# Patient Record
Sex: FEMALE | Race: WHITE | Employment: FULL TIME | URBAN - METROPOLITAN AREA
[De-identification: names, ages, dates, MRNs, and addresses within clinical notes are randomized per-mention and may not be internally consistent; named-entity substitution may affect disease eponyms.]

---

## 2017-02-20 ENCOUNTER — OFFICE VISIT (OUTPATIENT)
Dept: FAMILY MEDICINE | Facility: CLINIC | Age: 32
End: 2017-02-20
Payer: COMMERCIAL

## 2017-02-20 VITALS
SYSTOLIC BLOOD PRESSURE: 110 MMHG | RESPIRATION RATE: 14 BRPM | BODY MASS INDEX: 22.02 KG/M2 | DIASTOLIC BLOOD PRESSURE: 70 MMHG | WEIGHT: 137 LBS | HEART RATE: 72 BPM | TEMPERATURE: 98 F | HEIGHT: 66 IN

## 2017-02-20 DIAGNOSIS — Z30.013 ENCOUNTER FOR INITIAL PRESCRIPTION OF INJECTABLE CONTRACEPTIVE: Primary | ICD-10-CM

## 2017-02-20 LAB — BETA HCG QUAL IFA URINE: NEGATIVE

## 2017-02-20 PROCEDURE — 99213 OFFICE O/P EST LOW 20 MIN: CPT | Performed by: PHYSICIAN ASSISTANT

## 2017-02-20 PROCEDURE — 84703 CHORIONIC GONADOTROPIN ASSAY: CPT | Performed by: PHYSICIAN ASSISTANT

## 2017-02-20 RX ORDER — MEDROXYPROGESTERONE ACETATE 150 MG/ML
150 INJECTION, SUSPENSION INTRAMUSCULAR
Qty: 1 ML | Refills: 3 | OUTPATIENT
Start: 2017-02-20 | End: 2018-02-20

## 2017-02-20 NOTE — PROGRESS NOTES
Initial Depo Injection     Is the patient within 1st 5 days of a normal period?   N/A  Is the patient post delivery ?      No  If yes, is she breastfeeding?  No  If yes breastfeeding, shot given within 6 weeks after delivery?    N/A.  If no breastfeeding, shot given within 5 days of delivery?  N/A    BP Readings from Last 1 Encounters:   02/20/17 110/70     Patient's last menstrual period was 02/05/2017.    Date range to return is given to patient for her next dose: May 4-22nd     See Medication Note for administration information    Staff Sig: Cindy Clinton CMA

## 2017-02-20 NOTE — PATIENT INSTRUCTIONS
Patient Education    Medroxyprogesterone Acetate Oral tablet    Medroxyprogesterone Acetate Suspension for injection [Contraception]    Medroxyprogesterone Acetate Suspension for injection [Malignancy]  Medroxyprogesterone Acetate Suspension for injection [Contraception]  What is this medicine?  MEDROXYPROGESTERONE (me DROX ee proe LAVERNE te sukhdeep) contraceptive injections prevent pregnancy. They provide effective birth control for 3 months. Depo-subQ Provera 104 is also used for treating pain related to endometriosis.  This medicine may be used for other purposes; ask your health care provider or pharmacist if you have questions.  What should I tell my health care provider before I take this medicine?  They need to know if you have any of these conditions:    frequently drink alcohol    asthma    blood vessel disease or a history of a blood clot in the lungs or legs    bone disease such as osteoporosis    breast cancer    diabetes    eating disorder (anorexia nervosa or bulimia)    high blood pressure    HIV infection or AIDS    kidney disease    liver disease    mental depression    migraine    seizures (convulsions)    stroke    tobacco smoker    vaginal bleeding    an unusual or allergic reaction to medroxyprogesterone, other hormones, medicines, foods, dyes, or preservatives    pregnant or trying to get pregnant    breast-feeding  How should I use this medicine?  Depo-Provera Contraceptive injection is given into a muscle. Depo-subQ Provera 104 injection is given under the skin. These injections are given by a health care professional. You must not be pregnant before getting an injection. The injection is usually given during the first 5 days after the start of a menstrual period or 6 weeks after delivery of a baby.  Talk to your pediatrician regarding the use of this medicine in children. Special care may be needed. These injections have been used in female children who have started having menstrual  periods.  Overdosage: If you think you have taken too much of this medicine contact a poison control center or emergency room at once.  NOTE: This medicine is only for you. Do not share this medicine with others.  What if I miss a dose?  Try not to miss a dose. You must get an injection once every 3 months to maintain birth control. If you cannot keep an appointment, call and reschedule it. If you wait longer than 13 weeks between Depo-Provera contraceptive injections or longer than 14 weeks between Depo-subQ Provera 104 injections, you could get pregnant. Use another method for birth control if you miss your appointment. You may also need a pregnancy test before receiving another injection.  What may interact with this medicine?  Do not take this medicine with any of the following medications:    bosentan  This medicine may also interact with the following medications:    aminoglutethimide    antibiotics or medicines for infections, especially rifampin, rifabutin, rifapentine, and griseofulvin    aprepitant    barbiturate medicines such as phenobarbital or primidone    bexarotene    carbamazepine    medicines for seizures like ethotoin, felbamate, oxcarbazepine, phenytoin, topiramate    modafinil    [place]'s wort  This list may not describe all possible interactions. Give your health care provider a list of all the medicines, herbs, non-prescription drugs, or dietary supplements you use. Also tell them if you smoke, drink alcohol, or use illegal drugs. Some items may interact with your medicine.  What should I watch for while using this medicine?  This drug does not protect you against HIV infection (AIDS) or other sexually transmitted diseases.  Use of this product may cause you to lose calcium from your bones. Loss of calcium may cause weak bones (osteoporosis). Only use this product for more than 2 years if other forms of birth control are not right for you. The longer you use this product for birth control  the more likely you will be at risk for weak bones. Ask your health care professional how you can keep strong bones.  You may have a change in bleeding pattern or irregular periods. Many females stop having periods while taking this drug.  If you have received your injections on time, your chance of being pregnant is very low. If you think you may be pregnant, see your health care professional as soon as possible.  Tell your health care professional if you want to get pregnant within the next year. The effect of this medicine may last a long time after you get your last injection.  What side effects may I notice from receiving this medicine?  Side effects that you should report to your doctor or health care professional as soon as possible:    allergic reactions like skin rash, itching or hives, swelling of the face, lips, or tongue    breast tenderness or discharge    breathing problems    changes in vision    depression    feeling faint or lightheaded, falls    fever    pain in the abdomen, chest, groin, or leg    problems with balance, talking, walking    unusually weak or tired    yellowing of the eyes or skin  Side effects that usually do not require medical attention (report to your doctor or health care professional if they continue or are bothersome):    acne    fluid retention and swelling    headache    irregular periods, spotting, or absent periods    temporary pain, itching, or skin reaction at site where injected    weight gain  This list may not describe all possible side effects. Call your doctor for medical advice about side effects. You may report side effects to FDA at 4-839-FDA-8788.  Where should I keep my medicine?  This does not apply. The injection will be given to you by a health care professional.  NOTE:This sheet is a summary. It may not cover all possible information. If you have questions about this medicine, talk to your doctor, pharmacist, or health care provider. Copyright  2016 [employer]  Standard

## 2017-02-20 NOTE — MR AVS SNAPSHOT
After Visit Summary   2/20/2017    Kamilah Dee    MRN: [id number]           Patient Information     Date Of Birth          1985        Visit Information        Provider Department      2/20/2017 10:30 AM Lluvia Peace PA-C [facility]        Today's Diagnoses     Encounter for initial prescription of injectable contraceptive    -  1      Care Instructions      Patient Education    Medroxyprogesterone Acetate Oral tablet    Medroxyprogesterone Acetate Suspension for injection [Contraception]    Medroxyprogesterone Acetate Suspension for injection [Malignancy]  Medroxyprogesterone Acetate Suspension for injection [Contraception]  What is this medicine?  MEDROXYPROGESTERONE (me DROX ee proe LAVERNE te sukhdeep) contraceptive injections prevent pregnancy. They provide effective birth control for 3 months. Depo-subQ Provera 104 is also used for treating pain related to endometriosis.  This medicine may be used for other purposes; ask your health care provider or pharmacist if you have questions.  What should I tell my health care provider before I take this medicine?  They need to know if you have any of these conditions:    frequently drink alcohol    asthma    blood vessel disease or a history of a blood clot in the lungs or legs    bone disease such as osteoporosis    breast cancer    diabetes    eating disorder (anorexia nervosa or bulimia)    high blood pressure    HIV infection or AIDS    kidney disease    liver disease    mental depression    migraine    seizures (convulsions)    stroke    tobacco smoker    vaginal bleeding    an unusual or allergic reaction to medroxyprogesterone, other hormones, medicines, foods, dyes, or preservatives    pregnant or trying to get pregnant    breast-feeding  How should I use this medicine?  Depo-Provera Contraceptive injection is given into a muscle. Depo-subQ Provera 104 injection is given under the skin. These injections are  given by a health care professional. You must not be pregnant before getting an injection. The injection is usually given during the first 5 days after the start of a menstrual period or 6 weeks after delivery of a baby.  Talk to your pediatrician regarding the use of this medicine in children. Special care may be needed. These injections have been used in female children who have started having menstrual periods.  Overdosage: If you think you have taken too much of this medicine contact a poison control center or emergency room at once.  NOTE: This medicine is only for you. Do not share this medicine with others.  What if I miss a dose?  Try not to miss a dose. You must get an injection once every 3 months to maintain birth control. If you cannot keep an appointment, call and reschedule it. If you wait longer than 13 weeks between Depo-Provera contraceptive injections or longer than 14 weeks between Depo-subQ Provera 104 injections, you could get pregnant. Use another method for birth control if you miss your appointment. You may also need a pregnancy test before receiving another injection.  What may interact with this medicine?  Do not take this medicine with any of the following medications:    bosentan  This medicine may also interact with the following medications:    aminoglutethimide    antibiotics or medicines for infections, especially rifampin, rifabutin, rifapentine, and griseofulvin    aprepitant    barbiturate medicines such as phenobarbital or primidone    bexarotene    carbamazepine    medicines for seizures like ethotoin, felbamate, oxcarbazepine, phenytoin, topiramate    modafinil    [place]'s wort  This list may not describe all possible interactions. Give your health care provider a list of all the medicines, herbs, non-prescription drugs, or dietary supplements you use. Also tell them if you smoke, drink alcohol, or use illegal drugs. Some items may interact with your medicine.  What should I  watch for while using this medicine?  This drug does not protect you against HIV infection (AIDS) or other sexually transmitted diseases.  Use of this product may cause you to lose calcium from your bones. Loss of calcium may cause weak bones (osteoporosis). Only use this product for more than 2 years if other forms of birth control are not right for you. The longer you use this product for birth control the more likely you will be at risk for weak bones. Ask your health care professional how you can keep strong bones.  You may have a change in bleeding pattern or irregular periods. Many females stop having periods while taking this drug.  If you have received your injections on time, your chance of being pregnant is very low. If you think you may be pregnant, see your health care professional as soon as possible.  Tell your health care professional if you want to get pregnant within the next year. The effect of this medicine may last a long time after you get your last injection.  What side effects may I notice from receiving this medicine?  Side effects that you should report to your doctor or health care professional as soon as possible:    allergic reactions like skin rash, itching or hives, swelling of the face, lips, or tongue    breast tenderness or discharge    breathing problems    changes in vision    depression    feeling faint or lightheaded, falls    fever    pain in the abdomen, chest, groin, or leg    problems with balance, talking, walking    unusually weak or tired    yellowing of the eyes or skin  Side effects that usually do not require medical attention (report to your doctor or health care professional if they continue or are bothersome):    acne    fluid retention and swelling    headache    irregular periods, spotting, or absent periods    temporary pain, itching, or skin reaction at site where injected    weight gain  This list may not describe all possible side effects. Call your doctor for  medical advice about side effects. You may report side effects to FDA at 5-334-UCP-8511.  Where should I keep my medicine?  This does not apply. The injection will be given to you by a health care professional.  NOTE:This sheet is a summary. It may not cover all possible information. If you have questions about this medicine, talk to your doctor, pharmacist, or health care provider. Copyright  2016 [employer] Standard              Follow-ups after your visit        Your next 10 appointments already scheduled     Feb 20, 2017 10:30 AM CST   Office Visit with Lluvia Peace PA-C   [facility] ([facility])    [address] [place] [id number] [phone]           Bring a current list of meds and any records pertaining to this visit.  For Physicals, please bring immunization records and any forms needing to be filled out.  Please arrive 10 minutes early to complete paperwork.              Who to contact     If you have questions or need follow up information about today's clinic visit or your schedule please contact [facility] directly at [phone].  Normal or non-critical lab and imaging results will be communicated to you by MyChart, letter or phone within 4 business days after the clinic has received the results. If you do not hear from us within 7 days, please contact the clinic through [employer] or phone. If you have a critical or abnormal lab result, we will notify you by phone as soon as possible.  Submit refill requests through [employer] or call your pharmacy and they will forward the refill request to us. Please allow 3 business days for your refill to be completed.          Additional Information About Your Visit        MyChart Information     [employer] gives you secure access to your electronic health record. If you see a primary care provider, you can also send messages to your care team and  "make appointments. If you have questions, please call your primary care clinic.  If you do not have a primary care provider, please call [phone] and they will assist you.        Care EveryWhere ID     This is your Care EveryWhere ID. This could be used by other organizations to access your [facility] medical records  [id number]        Your Vitals Were     Pulse Temperature Respirations Height Last Period BMI (Body Mass Index)    72 98  F (36.7  C) (Tympanic) 14 5' 6\" (1.676 m) 02/05/2017 22.11 kg/m2       Blood Pressure from Last 3 Encounters:   02/20/17 110/70   12/12/16 116/70   10/24/16 117/77    Weight from Last 3 Encounters:   02/20/17 137 lb (62.1 kg)   12/12/16 138 lb (62.6 kg)   10/24/16 140 lb (63.5 kg)              We Performed the Following     Beta HCG qual IFA urine          Today's Medication Changes          These changes are accurate as of: 2/20/17 10:25 AM.  If you have any questions, ask your nurse or doctor.               Start taking these medicines.        Dose/Directions    medroxyPROGESTERone 150 MG/ML injection   Commonly known as:  DEPO-PROVERA   Used for:  Encounter for initial prescription of injectable contraceptive   Started by:  Lluvia Peace PA-C        Dose:  150 mg   Inject 1 mL (150 mg) into the muscle every 3 months   Quantity:  1 mL   Refills:  3         Stop taking these medicines if you haven't already. Please contact your care team if you have questions.     ibuprofen 600 MG tablet   Commonly known as:  ADVIL/MOTRIN   Stopped by:  Lluvia Peace PA-C           MAGIC MOUTHWASH (ENTER INGREDIENTS IN COMMENTS)   Stopped by:  Lluvia Peace PA-C           meclizine 25 MG tablet   Commonly known as:  ANTIVERT   Stopped by:  Lluvia Peace PA-C                Where to get your medicines      Some of these will need a paper prescription and others can be bought over the counter.  Ask your nurse if you have questions.     You don't need a prescription for these " medications     medroxyPROGESTERone 150 MG/ML injection                Primary Care Provider Office Phone # Fax #    Mario Joseph [phone]       [facility] [address] [facility] [address]        Thank you!     Thank you for choosing [facility]  for your care. Our goal is always to provide you with excellent care. Hearing back from our patients is one way we can continue to improve our services. Please take a few minutes to complete the written survey that you may receive in the mail after your visit with us. Thank you!             Your Updated Medication List - Protect others around you: Learn how to safely use, store and throw away your medicines at www.disposemymeds.org.          This list is accurate as of: 2/20/17 10:25 AM.  Always use your most recent med list.                   Brand Name Dispense Instructions for use    DULoxetine 30 MG EC capsule    CYMBALTA    60 capsule    Take 1 capsule (30 mg) by mouth 2 times daily       medroxyPROGESTERone 150 MG/ML injection    DEPO-PROVERA    1 mL    Inject 1 mL (150 mg) into the muscle every 3 months

## 2017-02-20 NOTE — PROGRESS NOTES
SUBJECTIVE:                                                    Kamilah Dee is a 31 year old female who presents to clinic today for the following health issues:    Contraception      Duration: today    Description (location/character/radiation): start depo provera again    Intensity:  na    Accompanying signs and symptoms: has been on it in the past with some weight gain    History (similar episodes/previous evaluation): None    Precipitating or alleviating factors: None    Therapies tried and outcome: None       HPI additional notes:   Chief Complaint   Patient presents with     Contraception     Kamilah presents today requesting resumption of Depo injections. Patient has used in the past; reports mild weight gain, but no other adverse events. Has been off it for approx. 2 years. Does not desire pregnancy in the near future.     ROS:  Skin: negative  Eyes: negative  Ears/Nose/Throat: negative  Respiratory: No shortness of breath, dyspnea on exertion, cough, or hemoptysis  Cardiovascular: negative  Gastrointestinal: negative  Genitourinary: negative  Musculoskeletal: negative  Neurologic: negative  Psychiatric: negative  Hematologic/Lymphatic/Immunologic: negative  Endocrine: negative    Chart Review:  History   Smoking Status     Former Smoker     Quit date: 5/25/2010   Smokeless Tobacco     Never Used       Patient Active Problem List   Diagnosis     Tic disorder     CARDIOVASCULAR SCREENING; LDL GOAL LESS THAN 160     Health Care Home     Migraine     Anxiety     Verruca     HPV test positive     Major depressive disorder, single episode, mild (H)     Cervical high risk HPV (human papillomavirus) test positive     Past Surgical History   Procedure Laterality Date     Hc removal of tonsils,11+ y/o  1996     Tonsils 11+y.o.     Problem list, Medication list, Allergies, Medical/Social/Surg hx reviewed in [employer], updated as appropriate.   OBJECTIVE:                                                    /70  Pulse  "72  Temp 98  F (36.7  C) (Tympanic)  Resp 14  Ht 5' 6\" (1.676 m)  Wt 137 lb (62.1 kg)  LMP 02/05/2017  BMI 22.11 kg/m2  Body mass index is 22.11 kg/(m^2).  GENERAL:  WDWN, no acute distress  PSYCH: pleasant, cooperative  EYES: no discharge, no injection  HENT:  Normocephalic. Moist mucus membranes.  NECK:  Supple, symmetric  EXTREMITIES:  No gross deformities, moves all 4 limbs spontaneously, no peripheral edema  SKIN:  Warm and dry, no rash or suspicious lesions    NEUROLOGIC:  Alert, intermittent tic    Diagnostic test results:   Results for orders placed or performed in visit on 02/20/17 (from the past 24 hour(s))   Beta HCG qual IFA urine   Result Value Ref Range    Beta HCG Qual IFA Urine Negative NEG        ASSESSMENT/PLAN:                                                          ICD-10-CM    1. Encounter for initial prescription of injectable contraceptive Z30.013 Beta HCG qual IFA urine     medroxyPROGESTERone (DEPO-PROVERA) 150 MG/ML injection     Discussed potential SEs and possibility of breakthrough bleeding the first 3 months after injection. Pregnancy test negative. Depo injections resumed today.    Please see patient instructions for treatment details.    Follow up in 3 months for next inj.    Lluvia Peace PA-C  [facility]       "

## 2017-03-17 ENCOUNTER — OFFICE VISIT (OUTPATIENT)
Dept: INTERNAL MEDICINE | Facility: CLINIC | Age: 32
End: 2017-03-17
Payer: COMMERCIAL

## 2017-03-17 VITALS
DIASTOLIC BLOOD PRESSURE: 58 MMHG | WEIGHT: 136.5 LBS | HEART RATE: 81 BPM | BODY MASS INDEX: 21.94 KG/M2 | HEIGHT: 66 IN | OXYGEN SATURATION: 100 % | SYSTOLIC BLOOD PRESSURE: 106 MMHG | TEMPERATURE: 97.8 F

## 2017-03-17 DIAGNOSIS — S63.502A LEFT WRIST SPRAIN, INITIAL ENCOUNTER: Primary | ICD-10-CM

## 2017-03-17 PROCEDURE — 99213 OFFICE O/P EST LOW 20 MIN: CPT | Performed by: INTERNAL MEDICINE

## 2017-03-17 NOTE — MR AVS SNAPSHOT
After Visit Summary   3/17/2017    Kamilah Dee    MRN: [id number]           Patient Information     Date Of Birth          1985        Visit Information        Provider Department      3/17/2017 10:20 AM Marshall Jovel MD [facility]        Today's Diagnoses     Left wrist sprain, initial encounter    -  1      Care Instructions      WRIST SPRAIN:     *  Wrist sprain, no evidence for fracture on xray.     *  No evidence for ankle arthritis, gout, fracture, or other cause for discomfort and swelling. This should slowly resolve over the next couple of weeks.      *  During this recovery  time, your ankle is more vulnerable to re-injury due to the fact that the ligaments are not able to provide as much support for the bones of the foot/ankle.      *  Ice the affected wrist as needed.     *  Return to activity as tolerated.     * Motrin 400-600 mg three times per day as needed for relief of pain and swelling and inflammation.  Be sure to take with food to help reduce chance of stomach irritation   (DO NOT TAKE IBUPROFEN/MOTRIN/ADVIL IF YOU HAVE PRIOR TROUBLES WITH THESE MEDICATIONS OR HAVE BEEN TOLD NOT TO TAKE THEM)    *  Use a wrist brace to help improve stability of the wrist and keep the wrist in a neutral position to help take pressure off that nerve.              *  Wear the wrist brace specifically when you sleep, drive a car, or use the computer.  I would wear it all day long for the next week or two, then gradually wear it less as indicated by your symptoms.  I would continue to wear it while sleeping until your symptoms go completely away.    *  Range of motion exercises for wrist.  Trace the letter of the alphabet in the air.       *  If you continue to have problems, then we can have you see our hand physical therapists or even hand surgeon specialists if needed.               Follow-ups after your visit        Who to contact     If you have questions  "or need follow up information about today's clinic visit or your schedule please contact [facility] directly at [phone].  Normal or non-critical lab and imaging results will be communicated to you by MyChart, letter or phone within 4 business days after the clinic has received the results. If you do not hear from us within 7 days, please contact the clinic through [employer] or phone. If you have a critical or abnormal lab result, we will notify you by phone as soon as possible.  Submit refill requests through [employer] or call your pharmacy and they will forward the refill request to us. Please allow 3 business days for your refill to be completed.          Additional Information About Your Visit        [employer] Information     [employer] gives you secure access to your electronic health record. If you see a primary care provider, you can also send messages to your care team and make appointments. If you have questions, please call your primary care clinic.  If you do not have a primary care provider, please call [phone] and they will assist you.        Care EveryWhere ID     This is your Care EveryWhere ID. This could be used by other organizations to access your [place] medical records  [id number]        Your Vitals Were     Pulse Temperature Height Last Period Pulse Oximetry BMI (Body Mass Index)    81 97.8  F (36.6  C) (Oral) 5' 6\" (1.676 m) 03/13/2017 100% 22.03 kg/m2       Blood Pressure from Last 3 Encounters:   03/17/17 106/58   02/20/17 110/70   12/12/16 116/70    Weight from Last 3 Encounters:   03/17/17 136 lb 8 oz (61.9 kg)   02/20/17 137 lb (62.1 kg)   12/12/16 138 lb (62.6 kg)              Today, you had the following     No orders found for display       Primary Care Provider Office Phone # Fax #    Mario Joseph [phone]       [facility] [address] [facility] [address]        Thank you!     Thank you for choosing [facility]  for your care. Our goal is always to provide you with excellent care. Hearing back from our patients is one way we can continue to improve our services. Please take a few minutes to complete the written survey that you may receive in the mail after your visit with us. Thank you!             Your Updated Medication List - Protect others around you: Learn how to safely use, store and throw away your medicines at www.disposemymeds.org.          This list is accurate as of: 3/17/17 11:59 PM.  Always use your most recent med list.                   Brand Name Dispense Instructions for use    DULoxetine 30 MG EC capsule    CYMBALTA    60 capsule    Take 1 capsule (30 mg) by mouth 2 times daily       medroxyPROGESTERone 150 MG/ML injection    DEPO-PROVERA    1 mL    Inject 1 mL (150 mg) into the muscle every 3 months

## 2017-03-17 NOTE — NURSING NOTE
"Chief Complaint   Patient presents with     Musculoskeletal Problem     left wrist and hand        Initial /58  Pulse 81  Temp 97.8  F (36.6  C) (Oral)  Ht 5' 6\" (1.676 m)  Wt 136 lb 8 oz (61.9 kg)  LMP 03/13/2017  SpO2 100%  BMI 22.03 kg/m2 Estimated body mass index is 22.03 kg/(m^2) as calculated from the following:    Height as of this encounter: 5' 6\" (1.676 m).    Weight as of this encounter: 136 lb 8 oz (61.9 kg).  Medication Reconciliation: complete   Kate Mai  "

## 2017-03-17 NOTE — PROGRESS NOTES
SUBJECTIVE:                                                    Kamilah Dee is a 31 year old female who presents to clinic today for the following health issues:      Joint Pain     Onset: 3 weeks     Description:   Location: L wrist   Character: Dull ache    Intensity: moderate    Progression of Symptoms: same    Accompanying Signs & Symptoms:  Other symptoms: none   History:   Previous similar pain: no       Precipitating factors:   Trauma or overuse: YES, pt lifts at work     Alleviating factors:  Improved by: rest/inactivity       Therapies Tried and outcome: just inactivity provides some relief          Problem list and histories reviewed & adjusted, as indicated.  Additional history: as documented        Reviewed and updated as needed this visit by clinical staff  Allergies       Reviewed and updated as needed this visit by Provider           Past Medical History:  ---------------------------  Past Medical History:   Diagnosis Date     Anemia     with pregnancy     Anxiety 1/21/2014     Depo-Provera contraceptive status      Depression      History of varicella-documented immunity 7/21/2011     Migraine 8/3/2012     Other, mixed, or unspecified nondependent drug abuse, episodic      Tic age 14       Past Surgical History:  ---------------------------  Past Surgical History:   Procedure Laterality Date     HC REMOVAL OF TONSILS,11+ Y/O  1996    Tonsils 11+y.o.       Current Medications:  ---------------------------  Current Outpatient Prescriptions   Medication Sig Dispense Refill     medroxyPROGESTERone (DEPO-PROVERA) 150 MG/ML injection Inject 1 mL (150 mg) into the muscle every 3 months 1 mL 3     DULoxetine (CYMBALTA) 30 MG capsule Take 1 capsule (30 mg) by mouth 2 times daily 60 capsule 11       Allergies:  -------------  No Known Allergies    Social History:  -------------------  Social History     Social History     Marital status:      Spouse name: Miky     Number of children: 1      "Years of education: N/A     Occupational History     [employer]     Social History Main Topics     Smoking status: Former Smoker     Quit date: 5/25/2010     Smokeless tobacco: Never Used     Alcohol use No      Comment: occasionally 1-2 etoh a month/     Drug use: No      Comment: marijuana hasn't used in 3 years, prior use of ampht, LSD, crack & cocaine. Sober since age 18      Sexual activity: Yes     Partners: Male      Comment: Pt. is currently pregnant     Other Topics Concern     Not on file     Social History Narrative    Living arrangements - the patient lives with her , Miky and son       Family Medical History:  ------------------------------  Family History   Problem Relation Age of Onset     Alcohol/Drug Mother      b:1964 addicted to crack     Family History Negative Father      b: 1962     Family History Negative Sister      b:1994     Cervical Cancer Sister 35     10/2015     Family History Negative Sister      b:1981     Family History Negative Brother      b:1989         ROS:  REVIEW OF SYSTEMS:    RESP: negative for cough, dyspnea, wheezing, hemoptysis  CV: negative for chest pain, palpitations, PND, YAP, orthopnea; reports no changes in their ability to perform physical activity (from cardiovascular standpoint)  GI: negative for dysphagia, N/V, pain, melena, diarrhea and constipation  NEURO: negative for numbness/tingling, paralysis, incoordination, or focal weakness     OBJECTIVE:                                                    /58  Pulse 81  Temp 97.8  F (36.6  C) (Oral)  Ht 5' 6\" (1.676 m)  Wt 136 lb 8 oz (61.9 kg)  LMP 03/13/2017  SpO2 100%  BMI 22.03 kg/m2     GENERAL alert and no distress  EYES:  Normal sclera,conjunctiva, EOMI  HENT: oral and posterior pharynx without lesions or erythema, facies symmetric  NECK: Neck supple. No LAD, without thyroidmegaly or JVD., Carotids without bruits.  RESP: Clear to ausculation bilaterally without wheezes or " crackles. Normal BS in all fields.  CV: RRR normal S1S2 without murmurs, rubs or gallops. PMI normal  LYMPH: no cervical lymph adenopathy appreciated  MS: extremities- no gross deformities of the visible extremities noted, no edema  PSYCH: Alert and oriented times 3; speech- coherent  SKIN:  No obvious significant skin lesions on visible portions of face  WRIST:  Left wrist, very minimal swelling, no erythema, no warmth, no crepitus with movement, has FROM in all direction without significant pain, no single focal tender spot.   srtength in hands and thumbs is good.          ASSESSMENT/PLAN:                                                      (S63.502A) Left wrist sprain, initial encounter  (primary encounter diagnosis)  Comment: Wrist sprain, no evidence for fracture or acute bony injury at this time.   Apply ice intermittently as needed.  As pain recedes, begin normal activities slowly as tolerated.    iscussed activity with moderation and slow return to activty.   NSAIDs prn.    Wrist braces to help stabilize the wrist in neutral position, told to wear specifically when sleeping, driving, keyboarding, or using the hands.  Reduce the use of the brace as needed based on symptoms.   Contact us if the symptoms persist, worsen, or change in any way.     Plan:       See Patient Instructions    WARREN UNDERWOOD M.D., MD  [facility]

## 2017-03-23 NOTE — PATIENT INSTRUCTIONS
WRIST SPRAIN:     *  Wrist sprain, no evidence for fracture on xray.     *  No evidence for ankle arthritis, gout, fracture, or other cause for discomfort and swelling. This should slowly resolve over the next couple of weeks.      *  During this recovery  time, your ankle is more vulnerable to re-injury due to the fact that the ligaments are not able to provide as much support for the bones of the foot/ankle.      *  Ice the affected wrist as needed.     *  Return to activity as tolerated.     * Motrin 400-600 mg three times per day as needed for relief of pain and swelling and inflammation.  Be sure to take with food to help reduce chance of stomach irritation   (DO NOT TAKE IBUPROFEN/MOTRIN/ADVIL IF YOU HAVE PRIOR TROUBLES WITH THESE MEDICATIONS OR HAVE BEEN TOLD NOT TO TAKE THEM)    *  Use a wrist brace to help improve stability of the wrist and keep the wrist in a neutral position to help take pressure off that nerve.              *  Wear the wrist brace specifically when you sleep, drive a car, or use the computer.  I would wear it all day long for the next week or two, then gradually wear it less as indicated by your symptoms.  I would continue to wear it while sleeping until your symptoms go completely away.    *  Range of motion exercises for wrist.  Trace the letter of the alphabet in the air.       *  If you continue to have problems, then we can have you see our hand physical therapists or even hand surgeon specialists if needed.

## 2017-05-11 ENCOUNTER — OFFICE VISIT (OUTPATIENT)
Dept: INTERNAL MEDICINE | Facility: CLINIC | Age: 32
End: 2017-05-11
Payer: COMMERCIAL

## 2017-05-11 VITALS
OXYGEN SATURATION: 98 % | BODY MASS INDEX: 22.1 KG/M2 | DIASTOLIC BLOOD PRESSURE: 74 MMHG | SYSTOLIC BLOOD PRESSURE: 100 MMHG | TEMPERATURE: 98.8 F | RESPIRATION RATE: 20 BRPM | HEART RATE: 86 BPM | HEIGHT: 66 IN | WEIGHT: 137.5 LBS

## 2017-05-11 DIAGNOSIS — F32.0 MAJOR DEPRESSIVE DISORDER, SINGLE EPISODE, MILD (H): Primary | ICD-10-CM

## 2017-05-11 DIAGNOSIS — F41.9 ANXIETY: ICD-10-CM

## 2017-05-11 PROCEDURE — 99214 OFFICE O/P EST MOD 30 MIN: CPT | Performed by: INTERNAL MEDICINE

## 2017-05-11 RX ORDER — BUSPIRONE HYDROCHLORIDE 10 MG/1
10 TABLET ORAL 2 TIMES DAILY
Qty: 60 TABLET | Refills: 11 | Status: SHIPPED | OUTPATIENT
Start: 2017-05-11 | End: 2018-01-29

## 2017-05-11 RX ORDER — LORAZEPAM 1 MG/1
TABLET ORAL
Qty: 10 TABLET | Refills: 0 | Status: SHIPPED | OUTPATIENT
Start: 2017-05-11 | End: 2017-10-16

## 2017-05-11 RX ORDER — DULOXETIN HYDROCHLORIDE 30 MG/1
CAPSULE, DELAYED RELEASE ORAL
Qty: 90 CAPSULE | Refills: 5 | Status: SHIPPED | OUTPATIENT
Start: 2017-05-11 | End: 2018-01-04

## 2017-05-11 ASSESSMENT — ANXIETY QUESTIONNAIRES
6. BECOMING EASILY ANNOYED OR IRRITABLE: NEARLY EVERY DAY
1. FEELING NERVOUS, ANXIOUS, OR ON EDGE: NEARLY EVERY DAY
IF YOU CHECKED OFF ANY PROBLEMS ON THIS QUESTIONNAIRE, HOW DIFFICULT HAVE THESE PROBLEMS MADE IT FOR YOU TO DO YOUR WORK, TAKE CARE OF THINGS AT HOME, OR GET ALONG WITH OTHER PEOPLE: VERY DIFFICULT
3. WORRYING TOO MUCH ABOUT DIFFERENT THINGS: NEARLY EVERY DAY
7. FEELING AFRAID AS IF SOMETHING AWFUL MIGHT HAPPEN: NEARLY EVERY DAY
GAD7 TOTAL SCORE: 21
2. NOT BEING ABLE TO STOP OR CONTROL WORRYING: NEARLY EVERY DAY
5. BEING SO RESTLESS THAT IT IS HARD TO SIT STILL: NEARLY EVERY DAY

## 2017-05-11 ASSESSMENT — PATIENT HEALTH QUESTIONNAIRE - PHQ9: 5. POOR APPETITE OR OVEREATING: NEARLY EVERY DAY

## 2017-05-11 NOTE — MR AVS SNAPSHOT
After Visit Summary   5/11/2017    Kamilah Dee    MRN: [id number]           Patient Information     Date Of Birth          1985        Visit Information        Provider Department      5/11/2017 9:30 AM Mario Joseph MD [facility]        Today's Diagnoses     Major depressive disorder, single episode, mild (H)    -  1    Anxiety          Care Instructions    Continue Duloxetine 30mg  Capsule, 2 capsules in AM for now  Add Buspirone 10mg tab, 1/2 tab twice a day for 2 days, Then 1 tab twice a day for anxiety  In 1 week, then increase Duloxetine to 30mg capsule, 3 capsules in AM for depression and anxiety  Lorazepam 1mg tab, 1/2-1 tab  Every 12 hours as needed for severe anxiety episodes (shortterm emergency prescription only)  Appt with  psychiatry in [place]. Call for appt  If not seen by psych in the next  3 weeks, then follow-up with me end of May. Call/email earlier if side effects with meds  Consider counsselling in addition        Follow-ups after your visit        Additional Services     MENTAL HEALTH REFERRAL       Your provider [place] to: [facility]: [facility] [phone]   [email or web address]   *Referral from [facility] Primary Care Provider required - Consultation Model - medication management & future refills will be returned to [facility] PCP upon completion of evaluation  *Please call to schedule an appointment.    All scheduling is subject to the client's specific insurance plan & benefits, provider/location availability, and provider clinical specialities.  Please arrive 15 minutes early for your first appointment and bring your completed paperwork.    Please be aware that coverage of these services is subject to the terms and limitations of your health insurance plan.  Call member services at your health plan with any benefit or coverage questions.          "         Who to contact     If you have questions or need follow up information about today's clinic visit or your schedule please contact [facility] directly at [phone].  Normal or non-critical lab and imaging results will be communicated to you by MyChart, letter or phone within 4 business days after the clinic has received the results. If you do not hear from us within 7 days, please contact the clinic through MyChart or phone. If you have a critical or abnormal lab result, we will notify you by phone as soon as possible.  Submit refill requests through [employer] or call your pharmacy and they will forward the refill request to us. Please allow 3 business days for your refill to be completed.          Additional Information About Your Visit        [employer] Information     [employer] gives you secure access to your electronic health record. If you see a primary care provider, you can also send messages to your care team and make appointments. If you have questions, please call your primary care clinic.  If you do not have a primary care provider, please call [phone] and they will assist you.        Care EveryWhere ID     This is your Care EveryWhere ID. This could be used by other organizations to access your [place] medical records  [id number]        Your Vitals Were     Pulse Temperature Respirations Height Pulse Oximetry BMI (Body Mass Index)    86 98.8  F (37.1  C) (Oral) 20 5' 6\" (1.676 m) 98% 22.19 kg/m2       Blood Pressure from Last 3 Encounters:   05/11/17 100/74   03/17/17 106/58   02/20/17 110/70    Weight from Last 3 Encounters:   05/11/17 137 lb 8 oz (62.4 kg)   03/17/17 136 lb 8 oz (61.9 kg)   02/20/17 137 lb (62.1 kg)              We Performed the Following     DEPRESSION ACTION PLAN (DAP)     MENTAL HEALTH REFERRAL          Today's Medication Changes          These changes are accurate as of: 5/11/17 10:07 AM.  If you have any questions, ask your nurse or doctor.    "            Start taking these medicines.        Dose/Directions    busPIRone 10 MG tablet   Commonly known as:  BUSPAR   Used for:  Anxiety   Started by:  Mario Joseph MD        Dose:  10 mg   Take 1 tablet (10 mg) by mouth 2 times daily   Quantity:  60 tablet   Refills:  11       LORazepam 1 MG tablet   Commonly known as:  ATIVAN   Used for:  Anxiety   Started by:  Mario Joseph MD        1/2-1 tab every 12 hours as needed for severe anxiety   Quantity:  10 tablet   Refills:  0         These medicines have changed or have updated prescriptions.        Dose/Directions    DULoxetine 30 MG EC capsule   Commonly known as:  CYMBALTA   This may have changed:    - how much to take  - how to take this  - when to take this  - additional instructions   Used for:  Major depressive disorder, single episode, mild (H), Anxiety   Changed by:  Mario Joseph MD        3 capsules (total 90mg) daily in AM   Quantity:  90 capsule   Refills:  5            Where to get your medicines      These medications were sent to [facility] [id number] [place] [address] AT [facility] [address]  [address] [place] [id number]     Phone:  [phone]     busPIRone 10 MG tablet    DULoxetine 30 MG EC capsule         Some of these will need a paper prescription and others can be bought over the counter.  Ask your nurse if you have questions.     Bring a paper prescription for each of these medications     LORazepam 1 MG tablet                Primary Care Provider Office Phone # Fax #    Mario Joseph [phone]       [facility] [address] [facility] [address]        Thank you!     Thank you for choosing [facility]  for your care. Our goal is always to provide you with excellent care. Hearing back from our patients is one way we can continue to improve our services. Please take a few minutes to complete the written survey that you may receive in the  mail after your visit with us. Thank you!             Your Updated Medication List - Protect others around you: Learn how to safely use, store and throw away your medicines at www.disposemymeds.org.          This list is accurate as of: 5/11/17 10:07 AM.  Always use your most recent med list.                   Brand Name Dispense Instructions for use    busPIRone 10 MG tablet    BUSPAR    60 tablet    Take 1 tablet (10 mg) by mouth 2 times daily       DULoxetine 30 MG EC capsule    CYMBALTA    90 capsule    3 capsules (total 90mg) daily in AM       LORazepam 1 MG tablet    ATIVAN    10 tablet    1/2-1 tab every 12 hours as needed for severe anxiety       medroxyPROGESTERone 150 MG/ML injection    DEPO-PROVERA    1 mL    Inject 1 mL (150 mg) into the muscle every 3 months

## 2017-05-11 NOTE — NURSING NOTE
"Chief Complaint   Patient presents with     Depression     Anxiety       Initial /74  Pulse 86  Temp 98.8  F (37.1  C) (Oral)  Resp 20  Ht 5' 6\" (1.676 m)  Wt 137 lb 8 oz (62.4 kg)  SpO2 98%  BMI 22.19 kg/m2 Estimated body mass index is 22.19 kg/(m^2) as calculated from the following:    Height as of this encounter: 5' 6\" (1.676 m).    Weight as of this encounter: 137 lb 8 oz (62.4 kg).  Medication Reconciliation: complete   Kate Mai MA   "

## 2017-05-11 NOTE — LETTER
My Depression Action Plan  Name: Kamilah Dee   Date of Birth 1985  Date: 5/11/2017    My doctor: Mario Joseph   My clinic: [address] [address] [phone]          GREEN    ZONE   Good Control    What it looks like:     Things are going generally well. You have normal up s and down s. You may even feel depressed from time to time, but bad moods usually last less than a day.   What you need to do:  1. Continue to care for yourself (see self care plan)  2. Check your depression survival kit and update it as needed  3. Follow your physician s recommendations including any medication.  4. Do not stop taking medication unless you consult with your physician first.           YELLOW         ZONE Getting Worse    What it looks like:     Depression is starting to interfere with your life.     It may be hard to get out of bed; you may be starting to isolate yourself from others.    Symptoms of depression are starting to last most all day and this has happened for several days.     You may have suicidal thoughts but they are not constant.   What you need to do:     1. Call your care team, your response to treatment will improve if you keep your care team informed of your progress. Yellow periods are signs an adjustment may need to be made.     2. Continue your self-care, even if you have to fake it!    3. Talk to someone in your support network    4. Open up your depression survival kit           RED    ZONE Medical Alert - Get Help    What it looks like:     Depression is seriously interfering with your life.     You may experience these or other symptoms: You can t get out of bed most days, can t work or engage in other necessary activities, you have trouble taking care of basic hygiene, or basic responsibilities, thoughts of suicide or death that will not go away, self-injurious behavior.     What you need to do:  1. Call your care  team and request a same-day appointment. If they are not available (weekends or after hours) call your local crisis line, emergency room or 911.      Electronically signed by: Mario Joseph, May 11, 2017    Depression Self Care Plan / Survival Kit    Self-Care for Depression  Here s the deal. Your body and mind are really not as separate as most people think.  What you do and think affects how you feel and how you feel influences what you do and think. This means if you do things that people who feel good do, it will help you feel better.  Sometimes this is all it takes.  There is also a place for medication and therapy depending on how severe your depression is, so be sure to consult with your medical provider and/ or Behavioral Health Consultant if your symptoms are worsening or not improving.     In order to better manage my stress, I will:    Exercise  Get some form of exercise, every day. This will help reduce pain and release endorphins, the  feel good  chemicals in your brain. This is almost as good as taking antidepressants!  This is not the same as joining a gym and then never going! (they count on that by the way ) It can be as simple as just going for a walk or doing some gardening, anything that will get you moving.      Hygiene   Maintain good hygiene (Get out of bed in the morning, Make your bed, Brush your teeth, Take a shower, and Get dressed like you were going to work, even if you are unemployed).  If your clothes don't fit try to get ones that do.    Diet  I will strive to eat foods that are good for me, drink plenty of water, and avoid excessive sugar, caffeine, alcohol, and other mood-altering substances.  Some foods that are helpful in depression are: complex carbohydrates, B vitamins, flaxseed, fish or fish oil, fresh fruits and vegetables.    Psychotherapy  I agree to participate in Individual Therapy (if recommended).    Medication  If prescribed medications, I agree to take them.  Missing  doses can result in serious side effects.  I understand that drinking alcohol, or other illicit drug use, may cause potential side effects.  I will not stop my medication abruptly without first discussing it with my provider.    Staying Connected With Others  I will stay in touch with my friends, family members, and my primary care provider/team.    Use your imagination  Be creative.  We all have a creative side; it doesn t matter if it s oil painting, sand castles, or mud pies! This will also kick up the endorphins.    Witness Beauty  (AKA stop and smell the roses) Take a look outside, even in mid-winter. Notice colors, textures. Watch the squirrels and birds.     Service to others  Be of service to others.  There is always someone else in need.  By helping others we can  get out of ourselves  and remember the really important things.  This also provides opportunities for practicing all the other parts of the program.    Humor  Laugh and be silly!  Adjust your TV habits for less news and crime-drama and more comedy.    Control your stress  Try breathing deep, massage therapy, biofeedback, and meditation. Find time to relax each day.     My support system    Clinic Contact:  Phone number:    Contact 1:  Phone number:    Contact 2:  Phone number:    Yarsanism/:  Phone number:    Therapist:  Phone number:    Local crisis center:    Phone number:    Other community support:  Phone number:

## 2017-05-11 NOTE — PATIENT INSTRUCTIONS
Continue Duloxetine 30mg  Capsule, 2 capsules in AM for now  Add Buspirone 10mg tab, 1/2 tab twice a day for 2 days, Then 1 tab twice a day for anxiety  In 1 week, then increase Duloxetine to 30mg capsule, 3 capsules in AM for depression and anxiety  Lorazepam 1mg tab, 1/2-1 tab  Every 12 hours as needed for severe anxiety episodes (shortterm emergency prescription only). NO driving for 4 hours after use  Appt with  psychiatry in [place]. Call for appt  If not seen by psych in the next  3 weeks, then follow-up with me end of May. Call/email earlier if side effects with meds  Consider counsselling in addition  Stop use of caffeine

## 2017-05-11 NOTE — PROGRESS NOTES
SUBJECTIVE:                                                    Kamilah Dee is a 32 year old female who presents to clinic today for the following health issues:        Depression and Anxiety Follow-Up    Status since last visit: Worsened, high anxiety, especially at night, having SOB and worrying, keeping her up at night. Had a crying episode at work.     Other associated symptoms:None    Complicating factors: Shaking, SOB    Significant life event: No     Current substance abuse: None    PHQ-9 SCORE 1/11/2016 4/11/2016 12/12/2016   Total Score - - -   Total Score 12 16 19     HAMIDA-7 SCORE 1/13/2014 12/11/2015 12/12/2016   Total Score 18 - -   Total Score - 8 12        PHQ-9  English      PHQ-9   Any Language     GAD7       Amount of exercise or physical activity: gets exercise at work, that's it     Problems taking medications regularly: No    Medication side effects: none    Diet: regular (no restrictions)    Pt's past medical history, family history, habits, medications and allergies were reviewed with the patient today.  See snap shot for  HCM status. Most recent lab results reviewed with pt. Problem list and histories reviewed & adjusted, as indicated.  Additional history as below:    More anxious at work. 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  Having family stressors. Some marital issues with . Denies any abuse issues. Not doing therapy. Did in the distant past  but not very helpful.  No ETOH use.  Taking  Cymbalta 60mg daily overall but forgetting to take meds 1x/week. Up at night  Some with anxiety and gets some SOB and lightheadedness. Not  With exertion during the day or at night when not anxious. No shortness of breath now. No chest pain or reflux sx. Nonsmoker. Has 1-2 cups coffee i AM most days  Chronic hx depression and anxiety. Previous med trials reviewed  HAMIDA = 21. PHQ = 19     Additional ROS:   Constitutional, HEENT, Cardiovascular, Pulmonary, GI and , Neuro, MSK and Psych review  "of systems/symptoms are otherwise negative or unchanged from previous, except as noted above.      OBJECTIVE:  /74  Pulse 86  Temp 98.8  F (37.1  C) (Oral)  Resp 20  Ht 5' 6\" (1.676 m)  Wt 137 lb 8 oz (62.4 kg)  SpO2 98%  BMI 22.19 kg/m2   Estimated body mass index is 22.19 kg/(m^2) as calculated from the following:    Height as of this encounter: 5' 6\" (1.676 m).    Weight as of this encounter: 137 lb 8 oz (62.4 kg).    Neck: no adenopathy. Thyroid normal to palpation. No bruits  Pulm: Lungs clear to auscultation   CV: Regular rates and rhythm  GI: Soft, nontender, Normal active bowel sounds, No hepatosplenomegaly or masses palpable  Ext: Peripheral pulses intact. No edema.  Neuro: Normal strength and tone, sensory exam grossly normal  Gen: Anxious affect. Normal dress/thought content/eye contact    Assessment/Plan: (See plan discussion below for further details)  1. Major depressive disorder, single episode, mild (H)  See plan discussion below  - MENTAL HEALTH REFERRAL  - DULoxetine (CYMBALTA) 30 MG EC capsule; 3 capsules (total 90mg) daily in AM  Dispense: 90 capsule; Refill: 5    2. Anxiety  See plan discussion below  - MENTAL HEALTH REFERRAL  - busPIRone (BUSPAR) 10 MG tablet; Take 1 tablet (10 mg) by mouth 2 times daily  Dispense: 60 tablet; Refill: 11  - LORazepam (ATIVAN) 1 MG tablet; 1/2-1 tab every 12 hours as needed for severe anxiety  Dispense: 10 tablet; Refill: 0  - DULoxetine (CYMBALTA) 30 MG EC capsule; 3 capsules (total 90mg) daily in AM  Dispense: 90 capsule; Refill: 5    Plan discussion:  Continue Duloxetine 30mg  Capsule, 2 capsules in AM for now  Add Buspirone 10mg tab, 1/2 tab twice a day for 2 days, Then 1 tab twice a day for anxiety  In 1 week, then increase Duloxetine to 30mg capsule, 3 capsules in AM for depression and anxiety  Lorazepam 1mg tab, 1/2-1 tab  Every 12 hours as needed for severe anxiety episodes (shortterm emergency prescription only). NO driving for 4 hours after " use  Appt with  psychiatry in [place]. Call for appt  If not seen by psych in the next  3 weeks, then follow-up with me end of May. Call/email earlier if side effects with meds  Consider counsselling in addition  Stop use of caffeine       Mario Joseph MD  Internal Medicine Department  [facility]

## 2017-05-12 ASSESSMENT — PATIENT HEALTH QUESTIONNAIRE - PHQ9: SUM OF ALL RESPONSES TO PHQ QUESTIONS 1-9: 19

## 2017-05-12 ASSESSMENT — ANXIETY QUESTIONNAIRES: GAD7 TOTAL SCORE: 21

## 2017-05-22 ENCOUNTER — ALLIED HEALTH/NURSE VISIT (OUTPATIENT)
Dept: NURSING | Facility: CLINIC | Age: 32
End: 2017-05-22
Payer: COMMERCIAL

## 2017-05-22 VITALS — SYSTOLIC BLOOD PRESSURE: 108 MMHG | DIASTOLIC BLOOD PRESSURE: 62 MMHG

## 2017-05-22 PROCEDURE — 96372 THER/PROPH/DIAG INJ SC/IM: CPT

## 2017-05-22 NOTE — MR AVS SNAPSHOT
After Visit Summary   5/22/2017    Kamilah Dee    MRN: [id number]           Patient Information     Date Of Birth          1985        Visit Information        Provider Department      5/22/2017 2:00 PM [facility] - NURSE [facility]        Today's Diagnoses     Birth control    -  1       Follow-ups after your visit        Who to contact     If you have questions or need follow up information about today's clinic visit or your schedule please contact [facility] directly at [phone].  Normal or non-critical lab and imaging results will be communicated to you by [employer], letter or phone within 4 business days after the clinic has received the results. If you do not hear from us within 7 days, please contact the clinic through [employer] or phone. If you have a critical or abnormal lab result, we will notify you by phone as soon as possible.  Submit refill requests through [employer] or call your pharmacy and they will forward the refill request to us. Please allow 3 business days for your refill to be completed.          Additional Information About Your Visit        MyChart Information     [employer] gives you secure access to your electronic health record. If you see a primary care provider, you can also send messages to your care team and make appointments. If you have questions, please call your primary care clinic.  If you do not have a primary care provider, please call [phone] and they will assist you.        Care EveryWhere ID     This is your Care EveryWhere ID. This could be used by other organizations to access your [facility] medical records  [id number]         Blood Pressure from Last 3 Encounters:   05/22/17 108/62   05/11/17 100/74   03/17/17 106/58    Weight from Last 3 Encounters:   05/11/17 137 lb 8 oz (62.4 kg)   03/17/17 136 lb 8 oz (61.9 kg)   02/20/17 137 lb (62.1 kg)              We Performed the Following     INJECTION  INTRAMUSCULAR OR SUB-Q     Medroxyprogesterone inj  1mg   (Depo Provera J-Code)        Primary Care Provider Office Phone # Fax #    Mario Joseph [phone]       [facility] [address]  [facility] [address]        Thank you!     Thank you for choosing [facility]  for your care. Our goal is always to provide you with excellent care. Hearing back from our patients is one way we can continue to improve our services. Please take a few minutes to complete the written survey that you may receive in the mail after your visit with us. Thank you!             Your Updated Medication List - Protect others around you: Learn how to safely use, store and throw away your medicines at www.disposemymeds.org.          This list is accurate as of: 5/22/17  2:50 PM.  Always use your most recent med list.                   Brand Name Dispense Instructions for use    busPIRone 10 MG tablet    BUSPAR    60 tablet    Take 1 tablet (10 mg) by mouth 2 times daily       DULoxetine 30 MG EC capsule    CYMBALTA    90 capsule    3 capsules (total 90mg) daily in AM       LORazepam 1 MG tablet    ATIVAN    10 tablet    1/2-1 tab every 12 hours as needed for severe anxiety       medroxyPROGESTERone 150 MG/ML injection    DEPO-PROVERA    1 mL    Inject 1 mL (150 mg) into the muscle every 3 months

## 2017-05-22 NOTE — NURSING NOTE
BLOOD PRESSURE: 108/62    DATE OF LAST PAP or ANNUAL EXAM:   Lab Results   Component Value Date    PAP NIL 12/12/2016     URINE HCG:not indicated    The following medication was given:     MEDICATION: Depo Provera 150mg  ROUTE: IM  SITE: Deltoid - Left  : Optisense  LOT #: [id number]  EXPIRATION:08/01/2019  NEXT INJECTION DUE: Aug 7th- Aug 21st  Provider:Lluvia Peace PA-C Tamika Ballard, MA

## 2017-08-10 ENCOUNTER — TELEPHONE (OUTPATIENT)
Dept: INTERNAL MEDICINE | Facility: CLINIC | Age: 32
End: 2017-08-10

## 2017-08-10 NOTE — TELEPHONE ENCOUNTER
Panel Management Review      Patient has the following on her problem list:     Depression / Dysthymia review  PHQ-9 SCORE 4/11/2016 12/12/2016 5/11/2017   Total Score - - -   Total Score 16 19 19      Patient is due for:  PHQ9        Composite cancer screening  Chart review shows that this patient is due/due soon for the following None  Summary:    Patient is due/failing the following:   PHQ9    Action needed:   Patient needs to do PHQ9.    Type of outreach:    Sent letter.    Questions for provider review:    None                                                                                                                                    Lisseth Khan CMA       Chart routed to N/A .

## 2017-08-10 NOTE — LETTER
[facility]  [address] [address]  [phone]        Kamilah Dee                                                               Date: 8/10/2017  [address] [place] [id number]      Dear Kamilah,    In order to ensure we are providing the best quality care, we have reviewed your chart and see that you are due for:  1.   PHQ9 (Depression Screening)  Please call the clinic at your earliest convenience to schedule an appointment. Please bring completed form to your appointment    We greatly appreciate the opportunity to serve you.  Thank you for trusting us with your health care.      Sincerely,    Your care team at [facility]  Lisseth Joseph MD

## 2017-08-28 ENCOUNTER — ALLIED HEALTH/NURSE VISIT (OUTPATIENT)
Dept: NURSING | Facility: CLINIC | Age: 32
End: 2017-08-28
Payer: COMMERCIAL

## 2017-08-28 DIAGNOSIS — Z30.42 DEPO-PROVERA CONTRACEPTIVE STATUS: Primary | ICD-10-CM

## 2017-08-28 LAB — BETA HCG QUAL IFA URINE: NEGATIVE

## 2017-08-28 PROCEDURE — 84703 CHORIONIC GONADOTROPIN ASSAY: CPT | Performed by: INTERNAL MEDICINE

## 2017-08-28 PROCEDURE — 96372 THER/PROPH/DIAG INJ SC/IM: CPT

## 2017-08-28 NOTE — NURSING NOTE
BP: Data Unavailable    LAST PAP/EXAM:   Lab Results   Component Value Date    PAP NIL 12/12/2016     URINE HCG:negative    The following medication was given:     MEDICATION: Depo Provera 150mg  ROUTE: IM  SITE: Deltoid - Left  : OptiWi-fi  LOT #: [id number]  EXP:01/2020  NEXT INJECTION DUE: 11/13/17 - 11/27/17   Provider: Opal    Prior to injection verified patient identity using patient's name and date of birth.  Per orders of Dr. Joseph, injection of Depo given by Lisseth Khan. Patient instructed to remain in clinic for 15 minutes afterwards, and to report any adverse reaction to me immediately. Patient declined.

## 2017-08-28 NOTE — MR AVS SNAPSHOT
After Visit Summary   8/28/2017    Kamilah Dee    MRN: [id number]           Patient Information     Date Of Birth          1985        Visit Information        Provider Department      8/28/2017 2:00 PM [facility] - NURSE [facility]        Today's Diagnoses     Depo-Provera contraceptive status    -  1       Follow-ups after your visit        Who to contact     If you have questions or need follow up information about today's clinic visit or your schedule please contact [facility] directly at [phone].  Normal or non-critical lab and imaging results will be communicated to you by Fieldbookhart, letter or phone within 4 business days after the clinic has received the results. If you do not hear from us within 7 days, please contact the clinic through [employer] or phone. If you have a critical or abnormal lab result, we will notify you by phone as soon as possible.  Submit refill requests through [employer] or call your pharmacy and they will forward the refill request to us. Please allow 3 business days for your refill to be completed.          Additional Information About Your Visit        MyChart Information     [employer] gives you secure access to your electronic health record. If you see a primary care provider, you can also send messages to your care team and make appointments. If you have questions, please call your primary care clinic.  If you do not have a primary care provider, please call [phone] and they will assist you.        Care EveryWhere ID     This is your Care EveryWhere ID. This could be used by other organizations to access your [place] medical records  [id number]         Blood Pressure from Last 3 Encounters:   05/22/17 108/62   05/11/17 100/74   03/17/17 106/58    Weight from Last 3 Encounters:   05/11/17 137 lb 8 oz (62.4 kg)   03/17/17 136 lb 8 oz (61.9 kg)   02/20/17 137 lb (62.1 kg)              We Performed the  Following     Beta HCG qual IFA urine        Primary Care Provider Office Phone # Fax #    Mario Joseph [phone]       [address] [facility] [address]        Equal Access to Services     SAV CALVILLO : [place] long dialloo Sokaylan, waaxda luqadaha, qaybta kaalmada adedorianyada, [place] kirill chan. So [facility] [phone].    ATENCIÓN: Si habla español, tiene a farfan disposición servicios gratuitos de asistencia lingüística. Llame al [phone].    We comply with applicable federal civil rights laws and [place] laws. We do not discriminate on the basis of race, color, national origin, age, disability sex, sexual orientation or gender identity.            Thank you!     Thank you for choosing [facility]  for your care. Our goal is always to provide you with excellent care. Hearing back from our patients is one way we can continue to improve our services. Please take a few minutes to complete the written survey that you may receive in the mail after your visit with us. Thank you!             Your Updated Medication List - Protect others around you: Learn how to safely use, store and throw away your medicines at www.disposemymeds.org.          This list is accurate as of: 8/28/17  2:02 PM.  Always use your most recent med list.                   Brand Name Dispense Instructions for use Diagnosis    busPIRone 10 MG tablet    BUSPAR    60 tablet    Take 1 tablet (10 mg) by mouth 2 times daily    Anxiety       DULoxetine 30 MG EC capsule    CYMBALTA    90 capsule    3 capsules (total 90mg) daily in AM    Major depressive disorder, single episode, mild (H), Anxiety       LORazepam 1 MG tablet    ATIVAN    10 tablet    1/2-1 tab every 12 hours as needed for severe anxiety    Anxiety       medroxyPROGESTERone 150 MG/ML injection    DEPO-PROVERA    1 mL    Inject 1 mL (150 mg) into the muscle every 3 months    Encounter for initial prescription of injectable  contraceptive

## 2017-10-11 DIAGNOSIS — F41.9 ANXIETY: ICD-10-CM

## 2017-10-11 RX ORDER — LORAZEPAM 1 MG/1
TABLET ORAL
Qty: 10 TABLET | Refills: 0 | Status: CANCELLED | OUTPATIENT
Start: 2017-10-11

## 2017-10-11 NOTE — TELEPHONE ENCOUNTER
LORazepam (ATIVAN) 1 MG tablet      Last Written Prescription Date: 05/11/2017  Last Fill Quantity: 10,  # refills: 0   Last Office Visit with [facility], [facility] or [facility] prescribing provider: 05/11/2017                                         Next 5 appointments (look out 90 days)     Oct 16, 2017 10:00 AM CDT   Office Visit with Mario Joseph MD   [facility]    [address] [place] [address] [phone]

## 2017-10-13 DIAGNOSIS — F41.9 ANXIETY: ICD-10-CM

## 2017-10-13 RX ORDER — LORAZEPAM 1 MG/1
TABLET ORAL
Qty: 10 TABLET | Refills: 0 | Status: CANCELLED | OUTPATIENT
Start: 2017-10-13

## 2017-10-16 ENCOUNTER — OFFICE VISIT (OUTPATIENT)
Dept: INTERNAL MEDICINE | Facility: CLINIC | Age: 32
End: 2017-10-16
Payer: COMMERCIAL

## 2017-10-16 VITALS
HEART RATE: 93 BPM | BODY MASS INDEX: 24.05 KG/M2 | DIASTOLIC BLOOD PRESSURE: 60 MMHG | SYSTOLIC BLOOD PRESSURE: 110 MMHG | OXYGEN SATURATION: 99 % | TEMPERATURE: 98.2 F | WEIGHT: 149 LBS

## 2017-10-16 DIAGNOSIS — F41.9 ANXIETY: ICD-10-CM

## 2017-10-16 DIAGNOSIS — F95.9 TIC DISORDER: ICD-10-CM

## 2017-10-16 DIAGNOSIS — F32.0 MAJOR DEPRESSIVE DISORDER, SINGLE EPISODE, MILD (H): ICD-10-CM

## 2017-10-16 PROCEDURE — 99214 OFFICE O/P EST MOD 30 MIN: CPT | Performed by: INTERNAL MEDICINE

## 2017-10-16 RX ORDER — LORAZEPAM 1 MG/1
TABLET ORAL
Qty: 30 TABLET | Refills: 0 | Status: SHIPPED | OUTPATIENT
Start: 2017-10-16 | End: 2018-02-05

## 2017-10-16 ASSESSMENT — ANXIETY QUESTIONNAIRES
GAD7 TOTAL SCORE: 13
5. BEING SO RESTLESS THAT IT IS HARD TO SIT STILL: SEVERAL DAYS
IF YOU CHECKED OFF ANY PROBLEMS ON THIS QUESTIONNAIRE, HOW DIFFICULT HAVE THESE PROBLEMS MADE IT FOR YOU TO DO YOUR WORK, TAKE CARE OF THINGS AT HOME, OR GET ALONG WITH OTHER PEOPLE: VERY DIFFICULT
3. WORRYING TOO MUCH ABOUT DIFFERENT THINGS: MORE THAN HALF THE DAYS
7. FEELING AFRAID AS IF SOMETHING AWFUL MIGHT HAPPEN: MORE THAN HALF THE DAYS
6. BECOMING EASILY ANNOYED OR IRRITABLE: MORE THAN HALF THE DAYS
2. NOT BEING ABLE TO STOP OR CONTROL WORRYING: MORE THAN HALF THE DAYS
1. FEELING NERVOUS, ANXIOUS, OR ON EDGE: MORE THAN HALF THE DAYS

## 2017-10-16 ASSESSMENT — PATIENT HEALTH QUESTIONNAIRE - PHQ9
SUM OF ALL RESPONSES TO PHQ QUESTIONS 1-9: 16
5. POOR APPETITE OR OVEREATING: MORE THAN HALF THE DAYS

## 2017-10-16 NOTE — TELEPHONE ENCOUNTER
LORazepam (ATIVAN) 1 MG tablet      Last Written Prescription Date:  5/11/2017  Last Fill Quantity: 10,   # refills: 0  Last Office Visit with [facility] prescribing provider: 5/11/2017  Future Office visit:    Next 5 appointments (look out 90 days)     Oct 16, 2017 10:00 AM CDT   Office Visit with Mario Joseph MD   [facility]    [address] [address] [phone]                   Routing refill request to provider for review/approval because:  Drug not on the [facility], [facility] refill protocol or controlled substance

## 2017-10-16 NOTE — NURSING NOTE
"Chief Complaint   Patient presents with     Anxiety and Depression Follow up       Initial /60  Pulse 93  Temp 98.2  F (36.8  C) (Oral)  Wt 149 lb (67.6 kg)  SpO2 99%  BMI 24.05 kg/m2 Estimated body mass index is 24.05 kg/(m^2) as calculated from the following:    Height as of 5/11/17: 5' 6\" (1.676 m).    Weight as of this encounter: 149 lb (67.6 kg).  Medication Reconciliation: complete  "

## 2017-10-16 NOTE — MR AVS SNAPSHOT
After Visit Summary   10/16/2017    Kamilah Dee    MRN: [id number]           Patient Information     Date Of Birth          1985        Visit Information        Provider Department      10/16/2017 10:00 Mario Sarmiento MD [facility]        Today's Diagnoses     Major depressive disorder, single episode, mild (H)        Anxiety        Tic disorder           Follow-ups after your visit        Your next 10 appointments already scheduled     Nov 15, 2017  1:15 PM CST   New Visit with Peggy Lassiter NP   [facility] ([facility]    [address]  [address]  [facility] [address] [phone]              Who to contact     If you have questions or need follow up information about today's clinic visit or your schedule please contact [facility] directly at [phone].  Normal or non-critical lab and imaging results will be communicated to you by MyChart, letter or phone within 4 business days after the clinic has received the results. If you do not hear from us within 7 days, please contact the clinic through [employer] or phone. If you have a critical or abnormal lab result, we will notify you by phone as soon as possible.  Submit refill requests through [employer] or call your pharmacy and they will forward the refill request to us. Please allow 3 business days for your refill to be completed.          Additional Information About Your Visit        MyChart Information     [employer] gives you secure access to your electronic health record. If you see a primary care provider, you can also send messages to your care team and make appointments. If you have questions, please call your primary care clinic.  If you do not have a primary care provider, please call [phone] and they will assist you.        Care EveryWhere ID     This is your Care EveryWhere ID. This could be used by other organizations to  access your [place] medical records  [id number]        Your Vitals Were     Pulse Temperature Pulse Oximetry BMI (Body Mass Index)          93 98.2  F (36.8  C) (Oral) 99% 24.05 kg/m2         Blood Pressure from Last 3 Encounters:   10/16/17 110/60   05/22/17 108/62   05/11/17 100/74    Weight from Last 3 Encounters:   10/16/17 149 lb (67.6 kg)   05/11/17 137 lb 8 oz (62.4 kg)   03/17/17 136 lb 8 oz (61.9 kg)              Today, you had the following     No orders found for display         Where to get your medicines      Some of these will need a paper prescription and others can be bought over the counter.  Ask your nurse if you have questions.     Bring a paper prescription for each of these medications     LORazepam 1 MG tablet          Primary Care Provider Office Phone # Fax #    Mario Joseph [phone]       [address] [facility] [address]        Equal Access to Services     REYMUNDO CALVILLO : Hadii aad ku hadasho Soomaali, waaxda luqadaha, qaybta kaalmada adeegyada, waxay venuin hayagnesn melanie roy . So [facility] [phone].    ATENCIÓN: Si habla español, tiene a farfan disposición servicios gratuitos de asistencia lingüística. Llame al [phone].    We comply with applicable federal civil rights laws and [place] laws. We do not discriminate on the basis of race, color, national origin, age, disability, sex, sexual orientation, or gender identity.            Thank you!     Thank you for choosing [facility]  for your care. Our goal is always to provide you with excellent care. Hearing back from our patients is one way we can continue to improve our services. Please take a few minutes to complete the written survey that you may receive in the mail after your visit with us. Thank you!             Your Updated Medication List - Protect others around you: Learn how to safely use, store and throw away your medicines at www.disposemymeds.org.          This list is  accurate as of: 10/16/17 10:54 PM.  Always use your most recent med list.                   Brand Name Dispense Instructions for use Diagnosis    busPIRone 10 MG tablet    BUSPAR    60 tablet    Take 1 tablet (10 mg) by mouth 2 times daily    Anxiety       DULoxetine 30 MG EC capsule    CYMBALTA    90 capsule    3 capsules (total 90mg) daily in AM    Major depressive disorder, single episode, mild (H), Anxiety       LORazepam 1 MG tablet    ATIVAN    30 tablet    1/2-1 tab every 12 hours as needed for severe anxiety    Anxiety, Tic disorder       medroxyPROGESTERone 150 MG/ML injection    DEPO-PROVERA    1 mL    Inject 1 mL (150 mg) into the muscle every 3 months    Encounter for initial prescription of injectable contraceptive

## 2017-10-16 NOTE — PROGRESS NOTES
SUBJECTIVE:   Kamilah Dee is a 32 year old female who presents to clinic today for the following health issues:      Depression and Anxiety Follow-Up    Status since last visit: Improved    Other associated symptoms:None    Complicating factors:     Significant life event: No     Current substance abuse: None    PHQ-9 Score and MyChart F/U Questions 12/12/2016 5/11/2017 10/16/2017   Total Score 19 19 16   Q9: Suicide Ideation Not at all Not at all Not at all     HAMIDA-7 SCORE 12/12/2016 5/11/2017 10/16/2017   Total Score - - -   Total Score 12 21 13             Amount of exercise or physical activity: None    Problems taking medications regularly: No    Medication side effects: none  Diet: regular (no restrictions)    Pt's past medical history, family history, habits, medications and allergies were reviewed with the patient today.  See snap shot for  HCM status. Most recent lab results reviewed with pt. Problem list and histories reviewed & adjusted, as indicated.  Additional history as below:    Forgetting to take PM dose of Buspar often. Takes Buspar in AM daily.  Had dose Dulozetine increased to 90mg  In May 2017 but depression/anxiety issues remain. HAMIDA and PHQ scores remained elevated. HAs appt with psych in 2 weeks. When  has used Lorazepam in past with 1/2 tab, then was much calmer and tic issues better. Previously seen by neurology who felt intermittent tics related to psych issue rather than neurologic. 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 Denies abuse issues. Not seeing a therapist currently. Still not interested in counselling as previously recommended     Additional ROS:   Constitutional, HEENT, Cardiovascular, Pulmonary, GI and , Neuro, MSK and Psych review of systems/symptoms are otherwise negative or unchanged from previous, except as noted above.      OBJECTIVE:  /60  Pulse 93  Temp 98.2  F (36.8  C) (Oral)  Wt  "149 lb (67.6 kg)  SpO2 99%  BMI 24.05 kg/m2   Estimated body mass index is 24.05 kg/(m^2) as calculated from the following:    Height as of 5/11/17: 5' 6\" (1.676 m).    Weight as of this encounter: 149 lb (67.6 kg).  Eye: PERRL, EOMI  HENT: ear canals and TM's normal and nose and mouth without ulcers or lesions   Neck: no adenopathy. Thyroid normal to palpation. No bruits  Pulm: Lungs clear to auscultation   CV: Regular rates and rhythm  GI: Soft, nontender, Normal active bowel sounds, No hepatosplenomegaly or masses palpable  Ext: Peripheral pulses intact. No edema.  Neuro: Normal strength and tone, sensory exam grossly normal. No tic movements noted during appt today  Gen: Anxious affect. Normal dress and thought content. Speech not pressured    Assessment/Plan: (See plan discussion below for further details)  1. Major depressive disorder, single episode, mild (H)   On Duloxetine 90mg daily but sx persist. Pt to see psych as scheduled. Strongly consider counseling in addition    2. Anxiety  Continue Buspar and encouraged to use BID. Lorazepam for breathrough sx until seen by psych ([place]  reviewed and no refill since May 2017). Possible med alternatives as below  - LORazepam (ATIVAN) 1 MG tablet; 1/2-1 tab every 12 hours as needed for severe anxiety  Dispense: 30 tablet; Refill: 0    3. Tic disorder  Thought psych related per neurology. No sx seen in clinic today. See plan discussion below re: possible other anxiety treatment options. Encouraged avoidance of caffeine (pt having a couple cups/day currently)  - LORazepam (ATIVAN) 1 MG tablet; 1/2-1 tab every 12 hours as needed for severe anxiety  Dispense: 30 tablet; Refill: 0    Plan discussion:  See [facility] psychiatry in 2 weeks as scheduled  Continue current meds pending psych appt  Lorazepam refilled shortterm. Prefer other longterm treatment option for anxiety besides current meds. ? Gabapentin vs atypical antipsychotic (Seroquel) vs Hydroxyzine. Await psych " "input. Pt has seen pt before re: \"tic\" and felt related to psych issues rather than neuro etiology     >25 minutes was spent with the patient today with more than 50% of the appointment providing counseling/education re: possible treatment options for anxiety/depression/tic disorder (especially nonaddictive options as above)  and coordination of care      Mario Joseph MD  Internal Medicine Department  [facility]        "

## 2017-10-17 ASSESSMENT — ANXIETY QUESTIONNAIRES: GAD7 TOTAL SCORE: 13

## 2017-11-15 ENCOUNTER — OFFICE VISIT (OUTPATIENT)
Dept: PSYCHIATRY | Facility: CLINIC | Age: 32
End: 2017-11-15
Attending: INTERNAL MEDICINE
Payer: COMMERCIAL

## 2017-11-15 VITALS
TEMPERATURE: 98.1 F | WEIGHT: 149 LBS | BODY MASS INDEX: 24.05 KG/M2 | OXYGEN SATURATION: 99 % | HEART RATE: 118 BPM | DIASTOLIC BLOOD PRESSURE: 60 MMHG | SYSTOLIC BLOOD PRESSURE: 110 MMHG

## 2017-11-15 DIAGNOSIS — F41.1 GAD (GENERALIZED ANXIETY DISORDER): Primary | ICD-10-CM

## 2017-11-15 DIAGNOSIS — F95.9 TIC DISORDER: ICD-10-CM

## 2017-11-15 PROCEDURE — 90792 PSYCH DIAG EVAL W/MED SRVCS: CPT | Performed by: NURSE PRACTITIONER

## 2017-11-15 RX ORDER — RISPERIDONE 0.5 MG/1
0.5 TABLET ORAL AT BEDTIME
Qty: 30 TABLET | Refills: 1 | Status: SHIPPED | OUTPATIENT
Start: 2017-11-15 | End: 2018-01-29

## 2017-11-15 ASSESSMENT — ANXIETY QUESTIONNAIRES
1. FEELING NERVOUS, ANXIOUS, OR ON EDGE: NEARLY EVERY DAY
6. BECOMING EASILY ANNOYED OR IRRITABLE: MORE THAN HALF THE DAYS
5. BEING SO RESTLESS THAT IT IS HARD TO SIT STILL: SEVERAL DAYS
GAD7 TOTAL SCORE: 17
IF YOU CHECKED OFF ANY PROBLEMS ON THIS QUESTIONNAIRE, HOW DIFFICULT HAVE THESE PROBLEMS MADE IT FOR YOU TO DO YOUR WORK, TAKE CARE OF THINGS AT HOME, OR GET ALONG WITH OTHER PEOPLE: VERY DIFFICULT
3. WORRYING TOO MUCH ABOUT DIFFERENT THINGS: NEARLY EVERY DAY
2. NOT BEING ABLE TO STOP OR CONTROL WORRYING: NEARLY EVERY DAY
7. FEELING AFRAID AS IF SOMETHING AWFUL MIGHT HAPPEN: MORE THAN HALF THE DAYS

## 2017-11-15 ASSESSMENT — PATIENT HEALTH QUESTIONNAIRE - PHQ9
SUM OF ALL RESPONSES TO PHQ QUESTIONS 1-9: 20
5. POOR APPETITE OR OVEREATING: NEARLY EVERY DAY

## 2017-11-15 NOTE — PROGRESS NOTES
"                                                         Outpatient Psychiatric Evaluation  Adult    Name:  Kamilah Dee  : 1985    Source of Referral:  Primary Care Provider: Mario Joseph MD - last visit 10/16/2017  Current Psychotherapist: None    Identifying Data:  Patient is a 32 year old year old,   White American female  who presents for initial visit with me.  Patient is currently employed full time. Patient attended the session alone. Consent to communicate signed for no-one. Consent for treatment signed and included in electronic medical record. Discussed limits of confidentiality today. My Practice Policy was reviewed and signed.    Chief Complaint:  Consultation.  Patient reports: \"depression and anxiety and review medications\"  Patient prefers to be called: \"Kamilah\"    HPI:  Patient did not have her paperwork completed today. No history of psychiatry care. History of counseling.   Patient reports that lately she is struggling with symptoms that have caused difficulty with work at home and with her employer. Symptoms have been ongoing for years, but worse lately. Reports tics are worse lately. Diagnosed in . Reportedly was on medications in the past that \"did not work\". Reports current medications are making things better, but still struggling. Patient repots \"if I didn't have other problems, I would not be so depressed.\" Reports problems of tic disorder, sleep, psychosocial stressors. Has been missing work due to her symptoms over the last few weeks. Patient reports she has \"severe anxiety\" and it is hard to concentrate and breath at work. Has been at this employer for the last 13 years. Patient has been taking the Ativan (lorazepam) for anxiety and sleep 0.5 mg daily. Has been on this for the last 2 months. She also feels this helps with her tics. Reports her mood has been \"really depressed\". Reports \"I have never really felt happy\".   Past diagnoses include: Major Depressive " Disorder, Anxiety, Tic Disorder   Current medications include: Ativan (lorazepam), Buspar (buspirone), Cymbalta (duloxetine)    Medication side effects: Denies  Current stressors include: Medical Comorbidities, 's Legal Difficulties, Financial Difficulties  Coping mechanisms and supports include: Ativan (lorazepam), Friends    Psychiatric Review of Symptoms:  Depression: Interest: Decrease    Depressed Mood    Sleep: Decrease     Energy: Decrease    Appetite: Decrease     Guilt: Increase    Concentration: Decrease    Irritability: Increase     Ruminations: Increase    PHQ-9 scores:   PHQ-9 SCORE 5/11/2017 10/16/2017 11/15/2017   Total Score 19 16 20     Jesika:  Distractibility: Increase   MDQ Score: Negative Screen  Anxiety: Feeling nervous, anxious, or on edge    Uncontrolled worrying    Worrying too much about different things    Trouble relaxing    Restlessness    Easily annoyed or irritable    Thoughts of impending doom    HAMIDA-7 scores:    HAMIDA-7 SCORE 5/11/2017 10/16/2017 11/15/2017   Total Score 21 13 17     Panic:  Tremors    Shortness of Breath    Last for about 10 minutes when takes Ativan (lorazepam)    Agoraphobia:  No, but reports anxiety   PTSD:  No symptoms   OCD:  Cleaning ears a few times per day, does not interfere with functioning  Psychosis: No symptoms   ADD / ADHD: No symptoms  Gambling or shoplifting: No   Eating Disorder:  No symptoms  Sleep:   Trouble falling asleep    Trouble staying asleep       Psychiatric History:   Hospitalizations: None  History of Commitment? No   Past Treatment: counseling, physician / PCP and medication(s) from physician / PCP  Suicide Attempts: No   Current Suicide Risk:  Suicide Assessment Completed Today.  Self-injurious Behavior: Denies  Electroconvulsive Therapy (ECT) or Transcranial Magnetic Stimulation (TMS): No   GeneSight Genetic Testing: No     Past medication trials include but are not limited to:   Ativan (lorazepam)  Cymbalta (duloxetine) 90  mg  Buspar (buspirone) 10 mg BID  Celexa (citalopram) 30 mg  Lexapro (escitalopram)   Klonopin (clonazepam) 0.5 mg  Adderall (amphetamine salts)   Paxil (paroxetine) 20 mg  Prozac (fluoxetine) 20 mg  Zoloft (sertraline) 50 mg  Remeron (mirtazapine) 15 mg   Wellbutrin (buproprion)     Substance Use History:  Current use of drugs or alcohol: Alcohol 5 drinks per month. History of experimentation with poly substances- not since last 7 years.   Patient reports no problems as a result of their drinking / drug use.   Based on the clinical interview, there  are not indications of drug or alcohol abuse.  Tobacco use: History - quit in 2010  Caffeine:  Yes  3-4 cups/week of coffee  Patient has not received chemical dependency treatment in the past  Recovery Programming Involvement: None    Past Medical History:  Past Medical History:   Diagnosis Date     Anemia     with pregnancy     Anxiety 1/21/2014     Depo-Provera contraceptive status      Depression      History of varicella-documented immunity 7/21/2011     Migraine 8/3/2012     Other, mixed, or unspecified nondependent drug abuse, episodic      Tic age 14      Surgery:   Past Surgical History:   Procedure Laterality Date     HC REMOVAL OF TONSILS,11+ Y/O  1996    Tonsils 11+y.o.     Allergies:   No Known Allergies  Primary Care Provider: Mario Joseph MD  Seizures or Head Injury: No- but two times in last one year had head injury at work with no LOC  Diet: No Restrictions  Food Allergies: No   Exercise: No regular exercise program, treadmill use rarely  Supplements: Reviewed per Electronic Medical Record Today    Current Medications:    Current Outpatient Prescriptions:      LORazepam (ATIVAN) 1 MG tablet, 1/2-1 tab every 12 hours as needed for severe anxiety, Disp: 30 tablet, Rfl: 0     busPIRone (BUSPAR) 10 MG tablet, Take 1 tablet (10 mg) by mouth 2 times daily, Disp: 60 tablet, Rfl: 11     DULoxetine (CYMBALTA) 30 MG EC capsule, 3 capsules (total 90mg) daily in  AM, Disp: 90 capsule, Rfl: 5     medroxyPROGESTERone (DEPO-PROVERA) 150 MG/ML injection, Inject 1 mL (150 mg) into the muscle every 3 months, Disp: 1 mL, Rfl: 3    The [place] Prescription Monitoring Program has been reviewed and there are no concerns about diversionary activity for controlled substances at this time.  Ativan (lorazepam) 1 mg 10 tabs filled 5/11/2017, 10/16/2017 from Primary Care Provider. No other controlled substances    Vital Signs:  Vitals: /60 (BP Location: Right arm, Patient Position: Chair, Cuff Size: Adult Regular)  Pulse 118  Temp 98.1  F (36.7  C) (Oral)  Wt 149 lb (67.6 kg)  SpO2 99%  BMI 24.05 kg/m2    Labs:  Most recent laboratory results reviewed and the pertinent results include:   [facility]ied [facility]/Nurse Visit on 08/28/2017   Component Date Value Ref Range Status     Beta HCG Qual IFA Urine 08/28/2017 Negative  NEG^Negative    Final      Most recent EKG from 2/1/2016 reviewed. QTc interval 401.     Review of Systems:  10 systems (general, cardiovascular, respiratory, eyes, ENT, endocrine, GI, , M/S, neurological) were reviewed. Most pertinent finding(s) is/are: dizziness, tics, chronic shortness of breath, fatigue. The remaining systems are all unremarkable.    Family History:   Patient reported family history includes:   Family History   Problem Relation Age of Onset     Alcohol/Drug Mother      b:1964 addicted to crack     Family History Negative Father      b: 1962     Family History Negative Sister      b:1994     Cervical Cancer Sister 35     10/2015     Family History Negative Sister      b:1981     Family History Negative Brother      b:1989     Mental Illness History: Unknown  Substance Abuse History: Yes: Per EPIC Electronic Medical Record  Suicide History: Denies  Medications: Unknown     Social History:   Birth place: [place]  Childhood: Yes intact home until age of 7 parents got . Both remarried.   Siblings:  two Brother(s),  three Sister(s)  Highest  "education level was high school graduate.   Childhood illnesses: Denies  Current Living situation:  [place], [place] with Spouse/Partner and 2 children. Feels safe at home.  Children: two   Firearms/Weapons Access: Yes No access   Service: No    Legal History:  No: Patient denies any legal history    Significant Losses / Trauma / Abuse / Neglect Issues:  There are no indications or report of: significant losses, trauma, abuse or neglect.   Issues of possible neglect are not present.   A safety and risk management plan has not been developed at this time, however client was given the after-hours number / 911 should there be a change in any of these risk factors..    Mental Status Examination:     Appearance:  awake, alert, adequately groomed, appeared stated age, no apparent distress and normal weight  Attitude:  cooperative   Eye Contact:  adequate  Gait and Station: Normal, No assistive Devices used and No dizziness or falls  Psychomotor Behavior:  evidence of tics  Oriented to:  time, person, and place  Attention Span and Concentration:  Normal  Speech:  clear, coherent, regular rate, regular rhythm and fluent  Mood:  \"really depressed\"  Affect:  appropriate and in normal range  Associations:  no loose associations  Thought Process:  logical, linear and goal oriented  Thought Content:  no evidence of suicidal ideation or homicidal ideation, no evidence of psychotic thought and Appropriate to Interview  Recent and Remote Memory:  intact but notes difficulty at times. Not formally assessed. No amnesia.  Fund of Knowledge: appropriate  Insight:  good  Judgment:  intact and adequate for safety  Impulse Control:  intact    Suicide Risk Assessment:  Today Kamilah Dee reports no history of suicide but has chronic depression and anxiety. In addition, there are notable risk factors for self-harm, including age, anxiety and comorbid medical condition of tic disorder. However, risk is mitigated by commitment to " family, absence of past attempts, ability to volunteer a safety plan, history of seeking help when needed, future oriented, no access to firearms or weapons, denies suicidal intent or plan, no family history of suicide and denies homicidal ideation, intent, or plan. Therefore, based on all available evidence including the factors cited above, Kamilah Dee does not appear to be at imminent risk for self-harm, does not meet criteria for a 72-hr hold, and therefore remains appropriate for ongoing outpatient level of care.  A thorough assessment of risk factors related to suicide and self-harm have been reviewed and are noted above. The patient convincingly denies acute suicidality on several occasions. Local community safety resources reviewed and printed for patient to use if needed. There was no deceit detected, and the patient presented in a manner that was believable.     DSM5  Diagnosis:  Motor Disorders  307.20 (F95.9) Tic Disorders: Unspecified Tic Disorder- motor tics, head shaking, shoulder shrugging  296.32 (F33.1) Major Depressive Disorder, Recurrent Episode, Moderate With anxious distress  300.02 (F41.1) Generalized Anxiety Disorder    Medical Comorbidities Include:   Patient Active Problem List    Diagnosis Date Noted     Major depressive disorder, single episode, mild (H) 04/11/2016     Priority: Medium     Verruca 06/03/2015     Priority: Medium     Anxiety 01/21/2014     Priority: Medium     Migraine 08/03/2012     Priority: Medium     Health Care Home 01/18/2012     Priority: Medium     X  EMERGENCY CARE PLAN  Presenting Problem Signs and Symptoms Treatment Plan    Questions or concerns during clinic hours    I will call the clinic directly     Questions or concerns outside clinic hours    I will call the 24 hour nurse line at [phone]    Patient needs to schedule an appointment    I will call the 24 hour scheduling team at [phone] or clinic directly    Same day treatment     I will call  the clinic first, nurse line if after hours, urgent care and express care if needed                            DX V65.8 REPLACED WITH 13735 HEALTH CARE HOME (04/08/2013)       CARDIOVASCULAR SCREENING; LDL GOAL LESS THAN 160 10/31/2010     Priority: Medium     Tic disorder 06/14/2010     Priority: Medium       Psychosocial & Contextual Factors:  Occupational Difficulties, Financial Difficulties and Medical Comorbidites    Strengths and Opportunities:   Kamilah Dee identified the following strengths or resources that will help she succeed in counseling: commitment to health and well being, friends / good social support, intelligence and positive work environment. Things that may interfere with the patient's success include:  financial hardship.    A 12-item WHODAS 2.0 assessment was completed by the patient today and recorded in EPIC.    WHODAS 2.0 TOTAL SCORES 11/15/2017   Total Score 31       The Patient Activation Measure (ADRIANNE) score was completed and recorded in [facility]. This assesses patient knowledge, skill, and confidence for self-management.   ADRIANNE Score (Last Two) 4/27/2012   ADRIANNE Raw Score 33   Activation Score 41.7   ADRIANNE Level 1     Impression:  Kamilah Dee reports long history of depression and anxiety and tic disorder. Medication side effects and alternatives reviewed. Health promotion activities recommended and reviewed today. All questions addressed. Education and counseling completed regarding risks and benefits of medications and psychotherapy options. Collaborative Care Psychiatry Service model reviewed today. Recommend therapy for additional support. Patient reports that her tic disorder and physical health is affecting her depression and anxiety. Will continue as needed Ativan (lorazepam) for now, but will likely not continue this long term. Will work on adjusting her other daily medications. Has chronic low blood pressure and orthostasis, so may avoid Intuniv (guanfacine) and Catapres  (clonidine) for tic disorder management. Topamax (topiramate) may be an option for mood and tic management. Other options would be to add low dose of Risperdal (risperidone) or Abilify (aripriprazole). First generation antipsychotics for worsening tics may be Orap (pimozide) (will need an EKG) or Haldol (haloperidol). Wellbutrin (buproprion) may help to augment with depression and energy and focus concerns, but this may worsen tics. Patient will keep us updated on how she is doing with new medication and if we need to adjust between visits.    Treatment Plan:    Continue Cymbalta (duloxetine) 90 mg by mouth daily in AM for depression and anxiety. May consider dose increase.     Change Buspar (buspirone) to 20 mg in AM. For anxiety. May consider dose increase.     Start Risperdal (risperidone) 0.5 mg by mouth daily at bedtime for sleep and tics.     Continue Ativan (lorazepam) 0.5 mg - 1 mg as needed for panic per primary care provider.     Continue all other medications as reviewed per electronic medical record today.     Safety plan reviewed. To the Emergency Department as needed or call after hours crisis line at [phone] or [phone].     To schedule individual or family therapy, call [facility] at [phone].     Schedule an appointment with me in 8-10 weeks or sooner as needed.  Call [facility] at [phone] to schedule.    Follow up with primary care provider as planned or for acute medical concerns.    Call the psychiatric nurse line with medication questions or concerns at [phone].    Interwisehart may be used to communicate with your provider, but this is not intended to be used for emergencies.    Community Resources:    National Suicide Prevention Lifeline: 419.709.8170 (TTY: 577.288.8769). Call anytime for help.  (www.suicidepreventionlifeline.org)  National [place] on Mental Illness (www.sarika.org): 136.871.8548 or 515-231-6084.   Mental Health Association  (www.mentalhealth.org): 841.548.3558 or 159-501-7065.    Administrative Billing:   Time spent with patient was 60 minutes and greater than 50% of time or 40 minutes was spent in counseling and coordination of care.    Patient Status:  Patient will continue to be seen for ongoing consultation and stabilization.    Signed:   Peggy Lassiter, PhD, APRN, CNP  Psychiatry

## 2017-11-15 NOTE — Clinical Note
Puneet Joseph Please see psychiatry consult. Tics are a significant concern for patient so will start with manageing those. Will also work on depression and anxiety. More details in my note. Peggy

## 2017-11-15 NOTE — MR AVS SNAPSHOT
After Visit Summary   11/15/2017    Kamilah Dee    MRN: [id number]           Patient Information     Date Of Birth          1985        Visit Information        Provider Department      11/15/2017 1:15 PM Peggy Lassiter NP [facility]        Today's Diagnoses     HAMIDA (generalized anxiety disorder)    -  1    Tic disorder          Care Instructions    Treatment Plan:    Continue Cymbalta (duloxetine) 90 mg by mouth daily in AM for depression and anxiety. May consider dose increase.     Change Buspar (buspirone) to 20 mg in AM. For anxiety. May consider dose increase.     Start Risperdal (risperidone) 0.5 mg by mouth daily at bedtime for sleep and tics.     Continue Ativan (lorazepam) 0.5 mg - 1 mg as needed for panic per primary care provider.     Continue all other medications as reviewed per electronic medical record today.     Safety plan reviewed. To the Emergency Department as needed or call after hours crisis line at [phone] or [phone].     To schedule individual or family therapy, call [facility] at [phone].     Schedule an appointment with me in 8-10 weeks or sooner as needed.  Call [facility] at [phone] to schedule.    Follow up with primary care provider as planned or for acute medical concerns.    Call the psychiatric nurse line with medication questions or concerns at [phone].    Convertrohart may be used to communicate with your provider, but this is not intended to be used for emergencies.          Follow-ups after your visit        Follow-up notes from your care team     Return in about 8 weeks (around 1/10/2018).      Who to contact     If you have questions or need follow up information about today's clinic visit or your schedule please contact [facility] directly at [phone].  Normal or non-critical lab and imaging results will be communicated to you by MyChart, letter or phone within 4  business days after the clinic has received the results. If you do not hear from us within 7 days, please contact the clinic through Wylio or phone. If you have a critical or abnormal lab result, we will notify you by phone as soon as possible.  Submit refill requests through Wylio or call your pharmacy and they will forward the refill request to us. Please allow 3 business days for your refill to be completed.          Additional Information About Your Visit        Wylio Information     Wylio gives you secure access to your electronic health record. If you see a primary care provider, you can also send messages to your care team and make appointments. If you have questions, please call your primary care clinic.  If you do not have a primary care provider, please call [phone] and they will assist you.        Care EveryWhere ID     This is your Care EveryWhere ID. This could be used by other organizations to access your [place] medical records  [id number]        Your Vitals Were     Pulse Temperature Pulse Oximetry BMI (Body Mass Index)          118 98.1  F (36.7  C) (Oral) 99% 24.05 kg/m2         Blood Pressure from Last 3 Encounters:   11/15/17 110/60   10/16/17 110/60   05/22/17 108/62    Weight from Last 3 Encounters:   11/15/17 149 lb (67.6 kg)   10/16/17 149 lb (67.6 kg)   05/11/17 137 lb 8 oz (62.4 kg)              Today, you had the following     No orders found for display         Today's Medication Changes          These changes are accurate as of: 11/15/17  2:09 PM.  If you have any questions, ask your nurse or doctor.               Start taking these medicines.        Dose/Directions    risperiDONE 0.5 MG tablet   Commonly known as:  risperDAL   Used for:  Tic disorder   Started by:  Peggy Lassiter NP        Dose:  0.5 mg   Take 1 tablet (0.5 mg) by mouth At Bedtime   Quantity:  30 tablet   Refills:  1            Where to get your medicines      These medications were sent to [facility] - [facility] [address] [facility] [address]  [address] [facility] [address]     Phone:  [phone]     risperiDONE 0.5 MG tablet                Primary Care Provider Office Phone # Fax #    Mario Joseph [phone]       [address] [facility] [address]        Equal Access to Services     SAV CALVILLO : Hadii aad ku hadasho Soomaali, waaxda luqadaha, qaybta kaalmada adeegyada, waxay idiin hayaan adeeg kharash la'radhika . So [facility] [phone].    ATENCIÓN: Si habla español, tiene a farfan disposición servicios gratuitos de asistencia lingüística. [facility] [phone].    We comply with applicable federal civil rights laws and [place] laws. We do not discriminate on the basis of race, color, national origin, age, disability, sex, sexual orientation, or gender identity.            Thank you!     Thank you for choosing [facility]  for your care. Our goal is always to provide you with excellent care. Hearing back from our patients is one way we can continue to improve our services. Please take a few minutes to complete the written survey that you may receive in the mail after your visit with us. Thank you!             Your Updated Medication List - Protect others around you: Learn how to safely use, store and throw away your medicines at www.disposemymeds.org.          This list is accurate as of: 11/15/17  2:09 PM.  Always use your most recent med list.                   Brand Name Dispense Instructions for use Diagnosis    busPIRone 10 MG tablet    BUSPAR    60 tablet    Take 1 tablet (10 mg) by mouth 2 times daily    Anxiety       DULoxetine 30 MG EC capsule    CYMBALTA    90 capsule    3 capsules (total 90mg) daily in AM    Major depressive disorder, single episode, mild (H), Anxiety       LORazepam 1 MG tablet    ATIVAN    30 tablet    1/2-1 tab every 12 hours as needed for severe anxiety    Anxiety, Tic disorder        medroxyPROGESTERone 150 MG/ML injection    DEPO-PROVERA    1 mL    Inject 1 mL (150 mg) into the muscle every 3 months    Encounter for initial prescription of injectable contraceptive       risperiDONE 0.5 MG tablet    risperDAL    30 tablet    Take 1 tablet (0.5 mg) by mouth At Bedtime    Tic disorder

## 2017-11-15 NOTE — PATIENT INSTRUCTIONS
Treatment Plan:    Continue Cymbalta (duloxetine) 90 mg by mouth daily in AM for depression and anxiety. May consider dose increase.     Change Buspar (buspirone) to 20 mg in AM. For anxiety. May consider dose increase.     Start Risperdal (risperidone) 0.5 mg by mouth daily at bedtime for sleep and tics.     Continue Ativan (lorazepam) 0.5 mg - 1 mg as needed for panic per primary care provider.     Continue all other medications as reviewed per electronic medical record today.     Safety plan reviewed. To the Emergency Department as needed or call after hours crisis line at [phone] or [phone].     To schedule individual or family therapy, call [facility] at [phone].     Schedule an appointment with me in 8-10 weeks or sooner as needed.  Call [facility] at [phone] to schedule.    Follow up with primary care provider as planned or for acute medical concerns.    Call the psychiatric nurse line with medication questions or concerns at [phone].    Pervaciohart may be used to communicate with your provider, but this is not intended to be used for emergencies.

## 2017-11-15 NOTE — NURSING NOTE
"Chief Complaint   Patient presents with     Consult     referred by Dr Joseph-depression/anxiety review medications       Initial /60 (BP Location: Right arm, Patient Position: Chair, Cuff Size: Adult Regular)  Pulse 118  Temp 98.1  F (36.7  C) (Oral)  Wt 149 lb (67.6 kg)  SpO2 99%  BMI 24.05 kg/m2 Estimated body mass index is 24.05 kg/(m^2) as calculated from the following:    Height as of 5/11/17: 5' 6\" (1.676 m).    Weight as of this encounter: 149 lb (67.6 kg).  Medication Reconciliation: complete    "

## 2017-11-16 ASSESSMENT — ANXIETY QUESTIONNAIRES: GAD7 TOTAL SCORE: 17

## 2017-11-21 ENCOUNTER — ALLIED HEALTH/NURSE VISIT (OUTPATIENT)
Dept: NURSING | Facility: CLINIC | Age: 32
End: 2017-11-21
Payer: COMMERCIAL

## 2017-11-21 VITALS — BODY MASS INDEX: 23.73 KG/M2 | WEIGHT: 147 LBS | DIASTOLIC BLOOD PRESSURE: 68 MMHG | SYSTOLIC BLOOD PRESSURE: 110 MMHG

## 2017-11-21 PROCEDURE — 96372 THER/PROPH/DIAG INJ SC/IM: CPT

## 2018-01-04 DIAGNOSIS — F32.0 MAJOR DEPRESSIVE DISORDER, SINGLE EPISODE, MILD (H): ICD-10-CM

## 2018-01-04 DIAGNOSIS — F41.9 ANXIETY: ICD-10-CM

## 2018-01-04 RX ORDER — DULOXETIN HYDROCHLORIDE 30 MG/1
CAPSULE, DELAYED RELEASE ORAL
Qty: 90 CAPSULE | Refills: 0 | Status: SHIPPED | OUTPATIENT
Start: 2018-01-04 | End: 2018-01-29

## 2018-01-04 NOTE — TELEPHONE ENCOUNTER
Requested Prescriptions   Pending Prescriptions Disp Refills     DULoxetine (CYMBALTA) 30 MG EC capsule [Pharmacy Med Name: DULOXETINE DR 30MG CAPSULES]  Last Written Prescription Date:  5/11/17  Last Fill Quantity: 90 CAPSULE,  # refills: 5   Last Office Visit with [facility], [facility] or [facility] prescribing provider:  10/16/17   Future Office Visit:    Next 5 appointments (look out 90 days)     Jan 18, 2018  9:45 AM CST   [facility] Visit with Peggy Lassiter NP   [facility]    [address]  [address]  [facility] [address] [phone]                  90 capsule 0     Sig: TAKE 3 CAPSULES(90 MG) BY MOUTH DAILY IN THE MORNING    Serotonin-Norepinephrine Reuptake Inhibitors  Failed    1/4/2018  3:59 AM       Failed - PHQ-9 score of less than 5 in past 6 months    The PHQ-9 criteria is meant to fail. It requires a PHQ-9 score review  PHQ-9 SCORE 5/11/2017 10/16/2017 11/15/2017   Total Score - - -   Total Score 19 16 20              Passed - Blood pressure under 140/90    BP Readings from Last 3 Encounters:   11/21/17 110/68   11/15/17 110/60   10/16/17 110/60                Passed - Patient is age 18 or older       Passed - No active pregnancy on record       Passed - No positive pregnancy test in past 12 months       Passed - Recent (6 mo) or future visit with authorizing provider's specialty    Patient had office visit in the last 6 months or has a visit in the next 30 days with authorizing provider.  See chart review.

## 2018-01-29 DIAGNOSIS — F41.9 ANXIETY: ICD-10-CM

## 2018-01-29 DIAGNOSIS — F95.9 TIC DISORDER: ICD-10-CM

## 2018-01-29 DIAGNOSIS — F32.0 MAJOR DEPRESSIVE DISORDER, SINGLE EPISODE, MILD (H): ICD-10-CM

## 2018-01-29 NOTE — TELEPHONE ENCOUNTER
"Requested Prescriptions   Pending Prescriptions Disp Refills    DULoxetine (CYMBALTA) 30 MG EC capsule 90 capsule 0    Serotonin-Norepinephrine Reuptake Inhibitors  Failed    1/29/2018  1:26 PM       Failed - PHQ-9 score of less than 5 in past 6 months    The PHQ-9 criteria is meant to fail. It requires a PHQ-9 score review         Passed - Blood pressure under 140/90    BP Readings from Last 3 Encounters:   11/21/17 110/68   11/15/17 110/60   10/16/17 110/60                Passed - Patient is age 18 or older       Passed - No active pregnancy on record       Passed - No positive pregnancy test in past 12 months       Passed - Recent (6 mo) or future visit with authorizing provider's specialty    Patient had office visit in the last 6 months or has a visit in the next 30 days with authorizing provider.  See \"Patient Info\" tab in inbasket, or \"Choose Columns\" in Meds & Orders section of the refill encounter.            busPIRone (BUSPAR) 10 MG tablet 60 tablet 11     Sig: Take 1 tablet (10 mg) by mouth 2 times daily    Atypical Antidepressants Protocol Failed    1/29/2018  1:26 PM       Failed - Patient has PHQ-9 score less than 5 in past 6 months.    The PHQ-9 criteria is meant to fail. It requires a PHQ-9 score review         Passed - Patient is age 18 or older       Passed - No active pregnancy on record       Passed - No positive pregnancy test in past 12 mos       Passed - Recent (6 mo) or future visit with authorizing provider's specialty    Patient had office visit in the last 6 months or has a visit in the next 30 days with authorizing provider.  See \"Patient Info\" tab in inbasket, or \"Choose Columns\" in Meds & Orders section of the refill encounter.            risperiDONE (RISPERDAL) 0.5 MG tablet  Last Written Prescription Date:  11/15/2017  Last Fill Quantity: 30,  # refills: 1   Last Office Visit with [facility] provider:  10/16/2017   Future Office Visit:    Next 5 appointments (look out 90 days)     Feb 05, " "2018  4:30 PM CST   Office Visit with Mario Joseph MD   [facility] ([facility])    [address] [address] [phone]                  30 tablet 1     Sig: Take 1 tablet (0.5 mg) by mouth At Bedtime    Antipsychotic Medications Failed    1/29/2018  1:26 PM       Failed - Lipid panel on file within the past 12 months    No lab results found.           Failed - CBC on file in past 12 months    Recent Labs   Lab Test  10/28/14   2327   05/10/14   1300   WBC   --    --   8.5   RBC   --    --   4.18   HGB  12.2   < >  12.2   HCT   --    --   34.9*   PLT   --    --   208    < > = values in this interval not displayed.            Failed - A1c or Glucose on file in past 12 months    Recent Labs   Lab Test  01/11/16   1209   GLC  74       Please review patients last 3 weights. If a weight gain of >10 lbs exists, you may refill the prescription once after instructing the patient to schedule an appointment within the next 30 days.    Wt Readings from Last 3 Encounters:   11/21/17 147 lb (66.7 kg)   11/15/17 149 lb (67.6 kg)   10/16/17 149 lb (67.6 kg)            Passed - BP is less then 140/90    BP Readings from Last 3 Encounters:   11/21/17 110/68   11/15/17 110/60   10/16/17 110/60                Passed - Patient is 12 years of age or older       Passed - Heart Rate on file within past 12 months    Pulse Readings from Last 3 Encounters:   11/15/17 118   10/16/17 93   05/11/17 86              Passed - Patient is not pregnant       Passed - No positve pregnancy test on file in past 12 months       Passed - Recent or future visit with authorizing provider's specialty    Patient had office visit in the last 6 months or has a visit in the next 30 days with authorizing provider.  See \"Patient Info\" tab in inbasket, or \"Choose Columns\" in Meds & Orders section of the refill encounter.              "

## 2018-01-30 RX ORDER — BUSPIRONE HYDROCHLORIDE 10 MG/1
10 TABLET ORAL 2 TIMES DAILY
Qty: 60 TABLET | Refills: 0 | Status: SHIPPED | OUTPATIENT
Start: 2018-01-30 | End: 2018-09-06

## 2018-01-30 RX ORDER — DULOXETIN HYDROCHLORIDE 30 MG/1
CAPSULE, DELAYED RELEASE ORAL
Qty: 90 CAPSULE | Refills: 0 | Status: SHIPPED | OUTPATIENT
Start: 2018-01-30 | End: 2018-02-05

## 2018-01-30 RX ORDER — RISPERIDONE 0.5 MG/1
0.5 TABLET ORAL AT BEDTIME
Qty: 30 TABLET | Refills: 0 | Status: SHIPPED | OUTPATIENT
Start: 2018-01-30 | End: 2018-05-01

## 2018-01-30 NOTE — TELEPHONE ENCOUNTER
Routing refill request to provider for review/approval because:  Labs out of range:  phq-9  Routing refill request to provider for review/approval because:  Drug not on the G refill protocol     PHQ-9 SCORE 5/11/2017 10/16/2017 11/15/2017   Total Score - - -   Total Score 19 16 20       Requesting to different pharmacy

## 2018-01-31 NOTE — TELEPHONE ENCOUNTER
Risperdal was prescribed by psychiatry.  She was supposed to have a follow-up with psychiatry but was a no-show for the appointment.  Now is appointment to see me next week.  Mental health medications refilled for 1 month only at this time.  Will discuss status further with patient when seen back in clinic and likely arrange for psychiatry follow-up at that time also.

## 2018-02-05 ENCOUNTER — OFFICE VISIT (OUTPATIENT)
Dept: INTERNAL MEDICINE | Facility: CLINIC | Age: 33
End: 2018-02-05
Payer: COMMERCIAL

## 2018-02-05 VITALS
OXYGEN SATURATION: 99 % | TEMPERATURE: 98.7 F | DIASTOLIC BLOOD PRESSURE: 70 MMHG | HEART RATE: 76 BPM | SYSTOLIC BLOOD PRESSURE: 110 MMHG | BODY MASS INDEX: 24.11 KG/M2 | HEIGHT: 66 IN | RESPIRATION RATE: 16 BRPM | WEIGHT: 150 LBS

## 2018-02-05 DIAGNOSIS — F41.9 ANXIETY: ICD-10-CM

## 2018-02-05 DIAGNOSIS — F32.0 MAJOR DEPRESSIVE DISORDER, SINGLE EPISODE, MILD (H): ICD-10-CM

## 2018-02-05 DIAGNOSIS — F95.9 TIC DISORDER: ICD-10-CM

## 2018-02-05 PROCEDURE — 99214 OFFICE O/P EST MOD 30 MIN: CPT | Performed by: INTERNAL MEDICINE

## 2018-02-05 RX ORDER — DULOXETIN HYDROCHLORIDE 30 MG/1
CAPSULE, DELAYED RELEASE ORAL
Qty: 90 CAPSULE | Refills: 5 | Status: SHIPPED | OUTPATIENT
Start: 2018-02-05 | End: 2018-09-06

## 2018-02-05 RX ORDER — DULOXETIN HYDROCHLORIDE 30 MG/1
CAPSULE, DELAYED RELEASE ORAL
Qty: 90 CAPSULE | Refills: 0 | Status: CANCELLED | OUTPATIENT
Start: 2018-02-05

## 2018-02-05 RX ORDER — LORAZEPAM 1 MG/1
TABLET ORAL
Qty: 30 TABLET | Refills: 0 | Status: SHIPPED | OUTPATIENT
Start: 2018-02-05 | End: 2018-09-06

## 2018-02-05 RX ORDER — LORAZEPAM 1 MG/1
TABLET ORAL
Qty: 30 TABLET | Refills: 0 | Status: CANCELLED | OUTPATIENT
Start: 2018-02-05

## 2018-02-05 RX ORDER — BUSPIRONE HYDROCHLORIDE 15 MG/1
15 TABLET ORAL 2 TIMES DAILY
Qty: 60 TABLET | Refills: 3 | Status: SHIPPED | OUTPATIENT
Start: 2018-02-05 | End: 2018-09-06

## 2018-02-05 RX ORDER — BUSPIRONE HYDROCHLORIDE 10 MG/1
10 TABLET ORAL 2 TIMES DAILY
Qty: 60 TABLET | Refills: 0 | Status: CANCELLED | OUTPATIENT
Start: 2018-02-05

## 2018-02-05 RX ORDER — RISPERIDONE 1 MG/1
0.5 TABLET ORAL 2 TIMES DAILY
Qty: 60 TABLET | Refills: 0 | Status: SHIPPED | OUTPATIENT
Start: 2018-02-05 | End: 2018-05-01

## 2018-02-05 RX ORDER — RISPERIDONE 0.5 MG/1
0.5 TABLET ORAL AT BEDTIME
Qty: 30 TABLET | Refills: 0 | Status: CANCELLED | OUTPATIENT
Start: 2018-02-05

## 2018-02-05 ASSESSMENT — ANXIETY QUESTIONNAIRES
IF YOU CHECKED OFF ANY PROBLEMS ON THIS QUESTIONNAIRE, HOW DIFFICULT HAVE THESE PROBLEMS MADE IT FOR YOU TO DO YOUR WORK, TAKE CARE OF THINGS AT HOME, OR GET ALONG WITH OTHER PEOPLE: VERY DIFFICULT
1. FEELING NERVOUS, ANXIOUS, OR ON EDGE: MORE THAN HALF THE DAYS
3. WORRYING TOO MUCH ABOUT DIFFERENT THINGS: NEARLY EVERY DAY
7. FEELING AFRAID AS IF SOMETHING AWFUL MIGHT HAPPEN: NEARLY EVERY DAY
2. NOT BEING ABLE TO STOP OR CONTROL WORRYING: NEARLY EVERY DAY
6. BECOMING EASILY ANNOYED OR IRRITABLE: NEARLY EVERY DAY
GAD7 TOTAL SCORE: 17
5. BEING SO RESTLESS THAT IT IS HARD TO SIT STILL: NOT AT ALL

## 2018-02-05 ASSESSMENT — PATIENT HEALTH QUESTIONNAIRE - PHQ9: 5. POOR APPETITE OR OVEREATING: NEARLY EVERY DAY

## 2018-02-05 NOTE — PROGRESS NOTES
SUBJECTIVE:   Kamilah Dee is a 32 year old female who presents to clinic today for the following health issues:     Chief Complaint   Patient presents with     Depression     Anxiety   Follow up     Depression and Anxiety Follow-Up    Status since last visit: Worsened, Ticks have gotten worse     Other associated symptoms:Headaches    Complicating factors:     Significant life event: No     Current substance abuse: None    PHQ-9 10/16/2017 11/15/2017 2/5/2018   Total Score 16 20 21   Q9: Suicide Ideation Not at all Not at all Not at all     HAMIDA-7 SCORE 10/16/2017 11/15/2017 2/5/2018   Total Score - - -   Total Score 13 17 17       PHQ-9  English  PHQ-9   Any Language  HAMIDA-7  Suicide Assessment Five-step Evaluation and Treatment (SAFE-T)    Amount of exercise or physical activity: On feet at work 4 days a week for 10 hours each day    Problems taking medications regularly: No    Medication side effects: none    Diet: regular (no restrictions)      Pt's past medical history, family history, habits, medications and allergies were reviewed with the patient today.  See snap shot for  HCM status. Most recent lab results reviewed with pt. Problem list and histories reviewed & adjusted, as indicated.  Additional history as below:    Patient has a history of chronic depression and tic disorder.  Previous neurology evaluations have felt that the tick issue is psychological in cause and not neurologic.  Patient had been seen by [facility] in consultation once and was started on risperidone with continuation of duloxetine and BuSpar with suggestion of possible BuSpar increase in the future.  Has not been using lorazepam.  Patient has a 3 and 6-year-old child.  Family helps take care of these kids.  Starting about a month ago, the patient's depression and tic disorder worsened.  Patient admits to increasing stress around that time also.  Patient's  went to halfway 6 months ago in [place].  Patient states  "he was helping care for the children some prior to being incarcerated.  Patient was supposed to see psychiatry but could not get out of bed that day because of depression and crying and so missed that appointment and has not rescheduled a follow-up appointment with collaborative psychiatry.  Patient is having increasing work pressures at [employer] and states her manager is making work harder for her.  Patient is currently on medical assistance.  She is worried about potentially losing her job at [employer] because of decreased performance because of her current symptoms.  She has not spoken with human resources at her work regarding whether she needs FMLA or other paperwork.  Denies suicidal ideation  PHQ as above. HAMIDA = 17     Additional ROS:   Constitutional, HEENT, Cardiovascular, Pulmonary, GI and , Neuro, MSK and Psych review of systems/symptoms are otherwise negative or unchanged from previous, except as noted above.      OBJECTIVE:  /70  Pulse 76  Temp 98.7  F (37.1  C) (Oral)  Resp 16  Ht 5' 6\" (1.676 m)  Wt 150 lb (68 kg)  SpO2 99%  BMI 24.21 kg/m2   Estimated body mass index is 24.21 kg/(m^2) as calculated from the following:    Height as of this encounter: 5' 6\" (1.676 m).    Weight as of this encounter: 150 lb (68 kg).    Neck: no adenopathy. Thyroid normal to palpation. No bruits  Pulm: Lungs clear to auscultation   CV: Regular rates and rhythm  GI: Soft, nontender, Normal active bowel sounds, No hepatosplenomegaly or masses palpable  Ext: Peripheral pulses intact. No edema.  Neuro: Normal strength and tone, sensory exam grossly normal.  Intermittent body tic movement not localizing to a specific extremity  Gen:  flattened but anxious affect.  Normal dress, thought content.   Moderate eye contact    Assessment/Plan: (See plan discussion below for further details)  1. Major depressive disorder, single episode, mild (H)  Uncontrolled.  No suicidal ideation . See plan discussion below.   - " risperiDONE (RISPERDAL) 1 MG tablet; Take 0.5 tablets (0.5 mg) by mouth 2 times daily  Dispense: 60 tablet; Refill: 0  - busPIRone (BUSPAR) 15 MG tablet; Take 1 tablet (15 mg) by mouth 2 times daily  Dispense: 60 tablet; Refill: 3  - DULoxetine (CYMBALTA) 30 MG EC capsule; TAKE 3 CAPSULES(90 MG) BY MOUTH DAILY IN THE MORNING  Dispense: 90 capsule; Refill: 5  - MENTAL HEALTH REFERRAL  - Adult; Outpatient Treatment; Individual/Couples/Family/Group Therapy/Health Psychology; [facility]: [facility] [phone]; We will contact you to schedule the appointment or please call with any questions    2. Anxiety  Uncontrolled. See plan discussion below.   - risperiDONE (RISPERDAL) 1 MG tablet; Take 0.5 tablets (0.5 mg) by mouth 2 times daily  Dispense: 60 tablet; Refill: 0  - busPIRone (BUSPAR) 15 MG tablet; Take 1 tablet (15 mg) by mouth 2 times daily  Dispense: 60 tablet; Refill: 3  - DULoxetine (CYMBALTA) 30 MG EC capsule; TAKE 3 CAPSULES(90 MG) BY MOUTH DAILY IN THE MORNING  Dispense: 90 capsule; Refill: 5  - LORazepam (ATIVAN) 1 MG tablet; 1/2-1 tab every 12 hours as needed for severe anxiety  Dispense: 30 tablet; Refill: 0  - MENTAL HEALTH REFERRAL  - Adult; Outpatient Treatment; Individual/Couples/Family/Group Therapy/Health Psychology; [facility]: [facility] [phone]; We will contact you to schedule the appointment or please call with any questions    3. Tic disorder  Uncontrolled.  Neurology prior consultations have felt this is related to mental health rather than underlying neurologic etiology. See plan discussion below.   - risperiDONE (RISPERDAL) 1 MG tablet; Take 0.5 tablets (0.5 mg) by mouth 2 times daily  Dispense: 60 tablet; Refill: 0  - busPIRone (BUSPAR) 15 MG tablet; Take 1 tablet (15 mg) by mouth 2 times daily  Dispense: 60 tablet; Refill: 3  - LORazepam (ATIVAN) 1 MG tablet; 1/2-1 tab every 12 hours as needed for severe anxiety  Dispense: 30 tablet; Refill: 0  - MENTAL HEALTH  REFERRAL  - Adult; Outpatient Treatment; Individual/Couples/Family/Group Therapy/Health Psychology; [facility]: [facility] [phone]; We will contact you to schedule the appointment or please call with any questions    Plan discussion:   Increase Risperidone to 0.5mg tab, 1 tab twice a day for anxiety/depression/tic  Change  Buspirone to 15mg tab, 1 tab twice a day for anxiety/tic  COntinue DUloxetine  Referral to  Counselling. They will call to schedule  May use Lorazepam as ordered in addition if tic quite bothersome. May cause sedation and do not drive for 4 hrs after taking    Here a list of psychiatrists you can try depending on insurance coverage:    1.  [facility]  [address] [place] [address]  [phone]    2. [facility]  [address], [address]  [place] [address]  Phone:  [phone]     I will contact Peggy Lassiter re: other med recs vs appt to see her soon and let you know Thursday (Staff message sent in EPIC by this MD to Peggy Lassiter)    Email or call ([phone]) update later next week to me       Mario Joseph MD  Internal Medicine Department  [facility]

## 2018-02-05 NOTE — MR AVS SNAPSHOT
After Visit Summary   2/5/2018    Kamilah Dee    MRN: [id number]           Patient Information     Date Of Birth          1985        Visit Information        Provider Department      2/5/2018 4:30 PM Mario Joseph MD [facility]        Today's Diagnoses     Major depressive disorder, single episode, mild (H)        Anxiety        Tic disorder          Care Instructions     Increase Risperidone to 0.5mg tab, 1 tab twice a day for anxiety/depression/tic  Change  Buspirone to 15mg tab, 1 tab twice a day for anxiety/tic  COntinue DUloxetine  Referral to  Counselling. They will call to schedule    Here a list of psychiatrists you can try depending on insurance coverage:    1.  [facility]  [address] [place] [address]  [phone]    2. [facility]  [address], [address]  [place]  [address]  Phone:  [phone]     I will speak with Peggy Maikol re: other med recs vs appt to see her soon and let you know Wednesday    Email or call ([phone]) update later next week to me                     Follow-ups after your visit        Additional Services     MENTAL HEALTH REFERRAL  - Adult; Outpatient Treatment; Individual/Couples/Family/Group Therapy/Health Psychology; [facility] [phone]; We will contact you to schedule the appointment or please call with any questions       All scheduling is subject to the client's specific insurance plan & benefits, provider/location availability, and provider clinical specialities.  Please arrive 15 minutes early for your first appointment and bring your completed paperwork.    Please be aware that coverage of these services is subject to the terms and limitations of your health insurance plan.  Call member services at your health plan with any benefit or coverage questions.                            Who to contact     If you have questions or need follow  "up information about today's clinic visit or your schedule please contact [facility] directly at [phone].  Normal or non-critical lab and imaging results will be communicated to you by MyChart, letter or phone within 4 business days after the clinic has received the results. If you do not hear from us within 7 days, please contact the clinic through [employer] or phone. If you have a critical or abnormal lab result, we will notify you by phone as soon as possible.  Submit refill requests through [employer] or call your pharmacy and they will forward the refill request to us. Please allow 3 business days for your refill to be completed.          Additional Information About Your Visit        [employer] Information     [employer] gives you secure access to your electronic health record. If you see a primary care provider, you can also send messages to your care team and make appointments. If you have questions, please call your primary care clinic.  If you do not have a primary care provider, please call [phone] and they will assist you.        Care EveryWhere ID     This is your Care EveryWhere ID. This could be used by other organizations to access your [facility] medical records  [id number]        Your Vitals Were     Pulse Temperature Respirations Height Pulse Oximetry BMI (Body Mass Index)    76 98.7  F (37.1  C) (Oral) 16 5' 6\" (1.676 m) 99% 24.21 kg/m2       Blood Pressure from Last 3 Encounters:   02/05/18 110/70   11/21/17 110/68   11/15/17 110/60    Weight from Last 3 Encounters:   02/05/18 150 lb (68 kg)   11/21/17 147 lb (66.7 kg)   11/15/17 149 lb (67.6 kg)              We Performed the Following     MENTAL HEALTH REFERRAL  - Adult; Outpatient Treatment; Individual/Couples/Family/Group Therapy/Health Psychology; G: [facility] [phone]; We will contact you to schedule the appointment or please call with any questions          Today's Medication Changes        "   These changes are accurate as of 2/5/18  5:13 PM.  If you have any questions, ask your nurse or doctor.               These medicines have changed or have updated prescriptions.        Dose/Directions    * busPIRone 10 MG tablet   Commonly known as:  BUSPAR   This may have changed:  Another medication with the same name was added. Make sure you understand how and when to take each.   Used for:  Anxiety   Changed by:  Mario Joseph MD        Dose:  10 mg   Take 1 tablet (10 mg) by mouth 2 times daily   Quantity:  60 tablet   Refills:  0       * busPIRone 15 MG tablet   Commonly known as:  BUSPAR   This may have changed:  You were already taking a medication with the same name, and this prescription was added. Make sure you understand how and when to take each.   Used for:  Major depressive disorder, single episode, mild (H), Anxiety, Tic disorder   Changed by:  Mario Joseph MD        Dose:  15 mg   Take 1 tablet (15 mg) by mouth 2 times daily   Quantity:  60 tablet   Refills:  3       * risperiDONE 0.5 MG tablet   Commonly known as:  risperDAL   This may have changed:  Another medication with the same name was added. Make sure you understand how and when to take each.   Used for:  Tic disorder   Changed by:  Mario Joseph MD        Dose:  0.5 mg   Take 1 tablet (0.5 mg) by mouth At Bedtime   Quantity:  30 tablet   Refills:  0       * risperiDONE 1 MG tablet   Commonly known as:  risperDAL   This may have changed:  You were already taking a medication with the same name, and this prescription was added. Make sure you understand how and when to take each.   Used for:  Major depressive disorder, single episode, mild (H), Anxiety, Tic disorder   Changed by:  Mario Joseph MD        Dose:  0.5 mg   Take 0.5 tablets (0.5 mg) by mouth 2 times daily   Quantity:  60 tablet   Refills:  0       * Notice:  This list has 4 medication(s) that are the same as other medications prescribed for you. Read the directions carefully,  and ask your doctor or other care provider to review them with you.         Where to get your medicines      These medications were sent to [id number] - [place] [facility] [address]  [address] [place] [address]    Hours:  Added 10/26 CK Checked with pharmacy Phone:  [phone]     busPIRone 15 MG tablet    DULoxetine 30 MG EC capsule    risperiDONE 1 MG tablet         Some of these will need a paper prescription and others can be bought over the counter.  Ask your nurse if you have questions.     Bring a paper prescription for each of these medications     LORazepam 1 MG tablet                Primary Care Provider Office Phone # Fax #    Mario Joseph [phone]       [address] [place] [address]        Equal Access to Services     SAV CALVILLO : Hadii cristal long hadasho Soomaali, waaxda luqadaha, qaybta kaalmada adeegyada, wellington goldstein hayradhika roy . So [facility] [phone].    ATENCIÓN: Si habla español, tiene a farfan disposición servicios gratuitos de asistencia lingüística. Llame al [phone].    We comply with applicable federal civil rights laws and [place] laws. We do not discriminate on the basis of race, color, national origin, age, disability, sex, sexual orientation, or gender identity.            Thank you!     Thank you for choosing [facility]  for your care. Our goal is always to provide you with excellent care. Hearing back from our patients is one way we can continue to improve our services. Please take a few minutes to complete the written survey that you may receive in the mail after your visit with us. Thank you!             Your Updated Medication List - Protect others around you: Learn how to safely use, store and throw away your medicines at www.disposemymeds.org.          This list is accurate as of 2/5/18  5:13 PM.  Always use your most recent med list.                   Brand Name Dispense  Instructions for use Diagnosis    * busPIRone 10 MG tablet    BUSPAR    60 tablet    Take 1 tablet (10 mg) by mouth 2 times daily    Anxiety       * busPIRone 15 MG tablet    BUSPAR    60 tablet    Take 1 tablet (15 mg) by mouth 2 times daily    Major depressive disorder, single episode, mild (H), Anxiety, Tic disorder       DULoxetine 30 MG EC capsule    CYMBALTA    90 capsule    TAKE 3 CAPSULES(90 MG) BY MOUTH DAILY IN THE MORNING    Major depressive disorder, single episode, mild (H), Anxiety       LORazepam 1 MG tablet    ATIVAN    30 tablet    1/2-1 tab every 12 hours as needed for severe anxiety    Anxiety, Tic disorder       medroxyPROGESTERone 150 MG/ML injection    DEPO-PROVERA    1 mL    Inject 1 mL (150 mg) into the muscle every 3 months    Encounter for initial prescription of injectable contraceptive       * risperiDONE 0.5 MG tablet    risperDAL    30 tablet    Take 1 tablet (0.5 mg) by mouth At Bedtime    Tic disorder       * risperiDONE 1 MG tablet    risperDAL    60 tablet    Take 0.5 tablets (0.5 mg) by mouth 2 times daily    Major depressive disorder, single episode, mild (H), Anxiety, Tic disorder       * Notice:  This list has 4 medication(s) that are the same as other medications prescribed for you. Read the directions carefully, and ask your doctor or other care provider to review them with you.

## 2018-02-05 NOTE — PATIENT INSTRUCTIONS
Increase Risperidone to 0.5mg tab, 1 tab twice a day for anxiety/depression/tic  Change  Buspirone to 15mg tab, 1 tab twice a day for anxiety/tic  COntinue DUloxetine  Referral to [facility]. They will call to schedule  May use Lorazepam as ordered in addition if tic quite bothersome. May cause sedation and do not drive for 4 hrs after taking    Here a list of psychiatrists you can try depending on insurance coverage:    1.  [facility]  [address] [place]  [address]  [phone]    2. [facility]  [address], [address]  [place]  [address]  Phone:  [phone]     I will contact Peggy Lassiter  re: other med recs vs appt to see her soon and let you know Thursday    Email or call ([phone]) update later next week to me

## 2018-02-06 ASSESSMENT — PATIENT HEALTH QUESTIONNAIRE - PHQ9: SUM OF ALL RESPONSES TO PHQ QUESTIONS 1-9: 21

## 2018-02-06 ASSESSMENT — ANXIETY QUESTIONNAIRES: GAD7 TOTAL SCORE: 17

## 2018-02-08 ENCOUNTER — TELEPHONE (OUTPATIENT)
Dept: INTERNAL MEDICINE | Facility: CLINIC | Age: 33
End: 2018-02-08

## 2018-02-09 NOTE — TELEPHONE ENCOUNTER
Received a staff message from Peggy Lassiter agreeing with med changes recently made by me. I had given pt list of 2 private psych groups to try and establish care with including [facility]. Had another pt who recently contacted them and got in a week later so tried to call pt to both check on her and to also suggest that she speak with that clinic or [place] to see if could see her soon and take her insurance. Asked pt to update us via nurseline or [employer]. Pt has signed CTC in chart

## 2018-02-20 ENCOUNTER — ALLIED HEALTH/NURSE VISIT (OUTPATIENT)
Dept: NURSING | Facility: CLINIC | Age: 33
End: 2018-02-20
Payer: COMMERCIAL

## 2018-02-20 ENCOUNTER — OFFICE VISIT (OUTPATIENT)
Dept: PSYCHOLOGY | Facility: CLINIC | Age: 33
End: 2018-02-20
Attending: INTERNAL MEDICINE
Payer: COMMERCIAL

## 2018-02-20 DIAGNOSIS — F32.2 MAJOR DEPRESSIVE DISORDER, SINGLE EPISODE, SEVERE WITH ANXIOUS DISTRESS (H): Primary | ICD-10-CM

## 2018-02-20 DIAGNOSIS — Z30.013 ENCOUNTER FOR INITIAL PRESCRIPTION OF INJECTABLE CONTRACEPTIVE: ICD-10-CM

## 2018-02-20 LAB — BETA HCG QUAL IFA URINE: NEGATIVE

## 2018-02-20 PROCEDURE — 96372 THER/PROPH/DIAG INJ SC/IM: CPT

## 2018-02-20 PROCEDURE — 84703 CHORIONIC GONADOTROPIN ASSAY: CPT | Performed by: INTERNAL MEDICINE

## 2018-02-20 PROCEDURE — 90791 PSYCH DIAGNOSTIC EVALUATION: CPT | Performed by: SOCIAL WORKER

## 2018-02-20 RX ORDER — MEDROXYPROGESTERONE ACETATE 150 MG/ML
150 INJECTION, SUSPENSION INTRAMUSCULAR
Qty: 1 ML | Refills: 3 | OUTPATIENT
Start: 2018-02-20 | End: 2019-04-30

## 2018-02-20 ASSESSMENT — ANXIETY QUESTIONNAIRES
2. NOT BEING ABLE TO STOP OR CONTROL WORRYING: NEARLY EVERY DAY
6. BECOMING EASILY ANNOYED OR IRRITABLE: NEARLY EVERY DAY
1. FEELING NERVOUS, ANXIOUS, OR ON EDGE: SEVERAL DAYS
7. FEELING AFRAID AS IF SOMETHING AWFUL MIGHT HAPPEN: NEARLY EVERY DAY
5. BEING SO RESTLESS THAT IT IS HARD TO SIT STILL: SEVERAL DAYS
GAD7 TOTAL SCORE: 17
3. WORRYING TOO MUCH ABOUT DIFFERENT THINGS: NEARLY EVERY DAY
IF YOU CHECKED OFF ANY PROBLEMS ON THIS QUESTIONNAIRE, HOW DIFFICULT HAVE THESE PROBLEMS MADE IT FOR YOU TO DO YOUR WORK, TAKE CARE OF THINGS AT HOME, OR GET ALONG WITH OTHER PEOPLE: EXTREMELY DIFFICULT

## 2018-02-20 ASSESSMENT — PATIENT HEALTH QUESTIONNAIRE - PHQ9: 5. POOR APPETITE OR OVEREATING: NEARLY EVERY DAY

## 2018-02-20 NOTE — MR AVS SNAPSHOT
MRN:[id number]                      After Visit Summary   2/20/2018    Kamilah Dee    MRN: [id number]           Visit Information        Provider Department      2/20/2018 1:30 PM Norman [facility] Generic      Your next 10 appointments already scheduled     Mar 06, 2018  1:30 PM CST   Return Visit with [facility] ([facility] [place])    [address]  [place] [address]   [phone]            Mar 20, 2018 11:00 AM CDT   Return Visit with Shazia Norman   [facility] ([facility] [place])    [address]  [place] [address]   [phone]            Apr 03, 2018 11:00 AM CDT   Return Visit with Shazia [facility] ([facility] [place])    [address]  [place] [address]   [phone]              MyChart Information     [employer] gives you secure access to your electronic health record. If you see a primary care provider, you can also send messages to your care team and make appointments. If you have questions, please call your primary care clinic.  If you do not have a primary care provider, please call [phone] and they will assist you.        Care EveryWhere ID     This is your Care EveryWhere ID. This could be used by other organizations to access your [place] medical records  [id number]        Equal Access to Services     SAV CALVILLO AH: Hadii aad ku hadasho Solindsayali, waaxda luqadaha, qaybta kaalmada adeegyada, waxdionisio idichu chan. So [facility] [phone].    ATENCIÓN: Si habla español, tiene a farfan disposición servicios gratuitos de asistencia lingüística. Llame al [phone].    We comply with applicable federal civil rights laws and [place] laws. We do not discriminate on the basis of race, color, national origin, age, disability, sex, sexual orientation, or gender identity.

## 2018-02-20 NOTE — Clinical Note
Here is the diagnostic assessment for this client who I saw for an intake on Monday. I have not heard back on whether she will be staying with individual therapy or beginning day treatment. Let me know if you have any questions or concerns.  Thank you, Shazia Austin

## 2018-02-24 ASSESSMENT — ANXIETY QUESTIONNAIRES: GAD7 TOTAL SCORE: 17

## 2018-02-24 ASSESSMENT — PATIENT HEALTH QUESTIONNAIRE - PHQ9: SUM OF ALL RESPONSES TO PHQ QUESTIONS 1-9: 18

## 2018-02-27 ENCOUNTER — OFFICE VISIT (OUTPATIENT)
Dept: PSYCHOLOGY | Facility: CLINIC | Age: 33
End: 2018-02-27
Payer: COMMERCIAL

## 2018-02-27 DIAGNOSIS — F32.2 MAJOR DEPRESSIVE DISORDER, SINGLE EPISODE, SEVERE WITH ANXIOUS DISTRESS (H): Primary | ICD-10-CM

## 2018-02-27 PROCEDURE — 90834 PSYTX W PT 45 MINUTES: CPT | Performed by: SOCIAL WORKER

## 2018-02-27 NOTE — PROGRESS NOTES
Progress Note    Client Name: Kamilah Dee  Date: 2/27/2018         Service Type: Individual      Session Start Time: 1:35  Session End Time: 2:15      Session Length: 40 min     Session #: 2     Attendees: Client attended alone    Treatment Plan Last Reviewed: 2/27/2018  PHQ-9 / HAMIDA-7 : 2/27/2018     DATA      Progress Since Last Session (Related to Symptoms / Goals / Homework):   Symptoms: Improved Client reported a slight improvement in symptoms    Homework: Completed in session identified goals for therapy      Episode of Care Goals: Minimal progress - PREPARATION (Decided to change - considering how); Intervened by negotiating a change plan and determining options / strategies for behavior change, identifying triggers, exploring social supports, and working towards setting a date to begin behavior change     Current / Ongoing Stressors and Concerns:   Ongoing: The client reports experiencing increased symptoms of depression and anxiety since her  [place]. She states that she is struggling to get to work and to get out of bed each day.              Current: The client stated that she spoke with her employers about FMLA and taking paid time off work in order to attend day treatment, but that she was told she would get paid at 60% for the time she would miss. The client explained that she could not feasibly afford to take a pay cut and that she does not think she could make day treatment work for her. Discussed other sources of supportive services the client could enlist to help her through this period of severe depression and anxiety. The client stated that she was feeling a little better since starting to treat her symptoms and that talking about her experiences has helped. The client identified goals for therapy, including feeling more confident about herself, deciding whether she wants to stay in her marriage and finding more things in  her life that bring her ant. Discussed how the client's goals could be measured and how she could reach them. Explored what strategies the client could practice while waiting for further services to be set up.       Treatment Objective(s) Addressed in This Session:   Improve concentration, focus, and mindfulness in daily activities        Intervention:   Motivational Interviewing: Discussed the client's goals for therapy and what steps she can take to reach them.  Solution Focused: Identified resources for client to increase support system        ASSESSMENT: Current Emotional / Mental Status (status of significant symptoms):   Risk status (Self / Other harm or suicidal ideation)   Client denies current fears or concerns for personal safety.   Client denies current or recent suicidal ideation or behaviors.   Client denies current or recent homicidal ideation or behaviors.   Client denies current or recent self injurious behavior or ideation.   Client denies other safety concerns.   A safety and risk management plan has not been developed at this time, however client was given the after-hours number / 911 should there be a change in any of these risk factors.     Appearance:   Disheveled     Eye Contact:   Poor client rarely made eye contact in session   Psychomotor Behavior: Normal    Attitude:   Cooperative    Orientation:   All   Speech    Rate / Production: Normal     Volume:  Normal    Mood:    Depressed  Client presented with low energy, low mood, and feeling badly about herself   Affect:    Appropriate    Thought Content:  Clear    Thought Form:  Coherent  Logical    Insight:    Fair      Medication Review:   No changes to current psychiatric medication(s)     Medication Compliance:   No Client reports she forgets to take her medication occasionally. Identified strategies to help the client take her medication every day, including setting reminder alarms, using a pill reminder box, and taking them as soon as it  occurs to her rather than waiting for later.      Changes in Health Issues:   None reported     Chemical Use Review:   Substance Use: Chemical use reviewed, no active concerns identified      Tobacco Use: No current tobacco use.       Collateral Reports Completed:   Not Applicable    PLAN: (Client Tasks / Therapist Tasks / Other)  Client will use pill reminder box and alarms to remember to take medication every day, will call [facility] to set up further supportive services, do yoga at least once a week and return for therapy in one week.        Shazia IBARRA, [facility] 2/27/2018  Note reviewed and clinical supervision by FRED Camilo [facility] 3/2/2018                                                          ________________________________________________________________________    Treatment Plan    Client's Name: Kamilah Dee  YOB: 1985    Date: 2/27/2018    DSM-V Diagnoses: 296.23 (F32.2) Major Depressive Disorder, Single Episode, Severe With anxious distress  Psychosocial / Contextual Factors: Client has been experiencing an increase in symptoms after her  [place] two months ago.  WHODAS: see intake    Referral / Collaboration:  The following referral(s) was/were discussed but client declines follow up at this time. day treatment.    Anticipated number of session or this episode of care: 12-20      MeasurableTreatment Goal(s) related to diagnosis / functional impairment(s)  Goal 1: Client will reduce symptoms of depression as evidenced by PHQ-9 reducing from 15 to 6 over the next three months and client reporting improved confidence in self.    I will know I've met my goal when I can make decisions at work.      Objective #A (Client Action)    Client will use cognitive strategies identified in therapy to challenge anxious thoughts  Identify negative self-talk and behaviors: challenge core beliefs, myths, and actions.  Status: New - Date: 2/27/2018      Intervention(s)  Therapist will teach CBT skills .    Objective #B  Client will Increase interest, engagement, and pleasure in doing things  Improve concentration, focus, and mindfulness in daily activities .  Status: New - Date: 2/27/2018     Intervention(s)  Therapist will assign homework to practice skills daily  teach mindfulness skills and behavioral activation.    Objective #C  Client will Decrease frequency and intensity of feeling down, depressed, hopeless  Feel less tired and more energy during the day .  Status: New - Date: 2/27/2018     Intervention(s)  Therapist will assign homework to accomplish a task daily  teach behavioral activation, sleep hygeine and mindfulness techniques.      Goal 2: Client will improve symptoms of anxious distress as evidenced by HAMIDA-7 reducing from 17 to 6 over the next four months and client reporting an ability to make decisions about her future.       I will know I've met my goal when I have structure and things that make me happy.      Objective #A (Client Action)    Status: New - Date: 2/27/2018     Client will exercise for 30 minutes 3 times per week for 30 minutes.    Intervention(s)  Therapist will assign homework to track exercise.    Objective #B  Client will increase support systems and increase the frequency she contacts other people.    Status: New - Date: 2/27/2018     Intervention(s)  Therapist will assign homework to reach out to others on a regular basis  make referrals to case management and day treatment  provide support and encouragement for client to use support systems.    Objective #C  Client will use relaxation strategies 2 times per day to reduce the physical symptoms of anxiety.  Status: New - Date: 2/27/2018     Intervention(s)  Therapist will assign homework for client to use mindfulness skills when making decisions  teach how to make decisions while managing anxiety.          Client has reviewed and agreed to the above plan.      Shazia  Norman IBARRA, SW February 27, 2018  Note reviewed and clinical supervision by FR[facility] Camilo [facility] 3/2/2018

## 2018-02-27 NOTE — MR AVS SNAPSHOT
MRN:[id number]                      After Visit Summary   2/27/2018    Kamilah Dee    MRN: [id number]           Visit Information        Provider Department      2/27/2018 1:30 PM Norman [facility] Generic      Your next 10 appointments already scheduled     Mar 06, 2018  1:30 PM CST   Return [facility] ([facility] [place])    [address]  [place] [address]   [phone]            Mar 20, 2018 11:00 AM CDT   Return Visit with Shazia Norman   [facility] ([facility] [place])    [address]  [place] [address]   [phone]            Apr 03, 2018 11:00 AM CDT   Return Visit with Shazia [facility] ([facility] [place])    [address]  [place] [address]   [phone]              MyChart Information     [employer] gives you secure access to your electronic health record. If you see a primary care provider, you can also send messages to your care team and make appointments. If you have questions, please call your primary care clinic.  If you do not have a primary care provider, please call [phone] and they will assist you.        Care EveryWhere ID     This is your Care EveryWhere ID. This could be used by other organizations to access your [place] medical records  [id number]        Equal Access to Services     SAV CALVILLO AH: Hadii aad ku hadasho Solindsayali, waaxda luqadaha, qaybta kaalmada adeegyada, waxdionisio idichu chan. So [facility] [phone].    ATENCIÓN: Si habla español, tiene a farfan disposición servicios gratuitos de asistencia lingüística. Llame al [phone].    We comply with applicable federal civil rights laws and [place] laws. We do not discriminate on the basis of race, color, national origin, age, disability, sex, sexual orientation, or gender identity.

## 2018-03-06 ENCOUNTER — OFFICE VISIT (OUTPATIENT)
Dept: PSYCHOLOGY | Facility: CLINIC | Age: 33
End: 2018-03-06
Payer: COMMERCIAL

## 2018-03-06 DIAGNOSIS — F32.1 MAJOR DEPRESSIVE DISORDER, SINGLE EPISODE, MODERATE WITH ANXIOUS DISTRESS (H): Primary | ICD-10-CM

## 2018-03-06 PROCEDURE — 90832 PSYTX W PT 30 MINUTES: CPT | Performed by: SOCIAL WORKER

## 2018-03-06 ASSESSMENT — ANXIETY QUESTIONNAIRES
2. NOT BEING ABLE TO STOP OR CONTROL WORRYING: MORE THAN HALF THE DAYS
5. BEING SO RESTLESS THAT IT IS HARD TO SIT STILL: SEVERAL DAYS
IF YOU CHECKED OFF ANY PROBLEMS ON THIS QUESTIONNAIRE, HOW DIFFICULT HAVE THESE PROBLEMS MADE IT FOR YOU TO DO YOUR WORK, TAKE CARE OF THINGS AT HOME, OR GET ALONG WITH OTHER PEOPLE: VERY DIFFICULT
3. WORRYING TOO MUCH ABOUT DIFFERENT THINGS: MORE THAN HALF THE DAYS
7. FEELING AFRAID AS IF SOMETHING AWFUL MIGHT HAPPEN: MORE THAN HALF THE DAYS
1. FEELING NERVOUS, ANXIOUS, OR ON EDGE: SEVERAL DAYS
GAD7 TOTAL SCORE: 12
6. BECOMING EASILY ANNOYED OR IRRITABLE: MORE THAN HALF THE DAYS

## 2018-03-06 ASSESSMENT — PATIENT HEALTH QUESTIONNAIRE - PHQ9: 5. POOR APPETITE OR OVEREATING: MORE THAN HALF THE DAYS

## 2018-03-06 NOTE — PROGRESS NOTES
Progress Note    Client Name: Kamilah Dee  Date: 3/6/2018         Service Type: Individual      Session Start Time: 1:30  Session End Time: 2:00      Session Length: 30 min     Session #: 3     Attendees: Client attended alone    Treatment Plan Last Reviewed: 2/27/2018  PHQ-9 / HAMIDA-7 : 3/6/2018     DATA      Progress Since Last Session (Related to Symptoms / Goals / Homework):   Symptoms: Improved Client reported a slight improvement in symptoms    Homework: Completed in session identified goals for therapy      Episode of Care Goals: Minimal progress - PREPARATION (Decided to change - considering how); Intervened by negotiating a change plan and determining options / strategies for behavior change, identifying triggers, exploring social supports, and working towards setting a date to begin behavior change     Current / Ongoing Stressors and Concerns:   Ongoing: The client reports experiencing increased symptoms of depression and anxiety since her  went to [facility] 2 months ago. She states that she is struggling to get to work and to get out of bed each day.              Current: The client reported feeling better over the past week and having more energy. She stated that she hung out with a friend during the weekend, which she had not been able to do in a while. She reported that generally she was having less negative thinking about herself and that she had been taking her medication at the same time every day. When asked what she had been doing to improve her mood she reported that she had been staying busy. Discussed how distraction can be a helpful coping mechanism and encouraged the client to continue practicing mindfulness along with distraction to work towards her goals. The client reported that she did not do yoga as she had felt too busy to take the time. Explored the potential benefits of taking the time for yoga, and how the client could further  prioritize her well-being as she was able to do with therapy appointments. Discussed what overall wellness means to the client and how she could engage with her children while also practicing wellness for herself.      Treatment Objective(s) Addressed in This Session:   Improve concentration, focus, and mindfulness in daily activities   Increase interest, engagement, and pleasure in doing things     Intervention:   Motivational Interviewing: Identified the success the client had and the barriers she encountered. The client discussed how she could address these barriers and maintain her progress        ASSESSMENT: Current Emotional / Mental Status (status of significant symptoms):   Risk status (Self / Other harm or suicidal ideation)   Client denies current fears or concerns for personal safety.   Client denies current or recent suicidal ideation or behaviors.   Client denies current or recent homicidal ideation or behaviors.   Client denies current or recent self injurious behavior or ideation.   Client denies other safety concerns.   A safety and risk management plan has not been developed at this time, however client was given the after-hours number / 911 should there be a change in any of these risk factors.     Appearance:   Appropriate  Client appeared more put together than in previous appointments    Eye Contact:   Poor client rarely made eye contact in session   Psychomotor Behavior: Normal    Attitude:   Cooperative    Orientation:   All   Speech    Rate / Production: Normal     Volume:  Normal    Mood:    Depressed  Client presented with low energy, low mood, and feeling badly about herself   Affect:    Appropriate    Thought Content:  Clear    Thought Form:  Coherent  Logical    Insight:    Fair      Medication Review:   No changes to current psychiatric medication(s)     Medication Compliance:   Yes Client has enacted agreed upon strategies for increasing her medication compliance and is finding these  strategies to be successful.      Changes in Health Issues:   None reported     Chemical Use Review:   Substance Use: Chemical use reviewed, no active concerns identified      Tobacco Use: No current tobacco use.       Collateral Reports Completed:   Not Applicable    PLAN: (Client Tasks / Therapist Tasks / Other)  Client will continue taking medication daily, do one yoga class, have one social outing and return for therapy in two weeks.        Shazia Norman  MSW, [facility] 3/6/2018  Note reviewed and clinical supervision by FRED Camilo [facility] 3/9/2018                                                          ________________________________________________________________________    Treatment Plan    Client's Name: Kamilah Dee  YOB: 1985    Date: 2/27/2018    DSM-V Diagnoses: 296.22 (F32.1)  Major Depressive Disorder, Single Episode, Moderate _ and With anxious distress  Psychosocial / Contextual Factors: Client has been experiencing an increase in symptoms after her  went to [facility] two months ago.  WHODAS: see intake    Referral / Collaboration:  The following referral(s) was/were discussed but client declines follow up at this time. day treatment.    Anticipated number of session or this episode of care: 12-20      MeasurableTreatment Goal(s) related to diagnosis / functional impairment(s)  Goal 1: Client will reduce symptoms of depression as evidenced by PHQ-9 reducing from 15 to 6 over the next three months and client reporting improved confidence in self.    I will know I've met my goal when I can make decisions at work.      Objective #A (Client Action)    Client will use cognitive strategies identified in therapy to challenge anxious thoughts  Identify negative self-talk and behaviors: challenge core beliefs, myths, and actions.  Status: New - Date: 2/27/2018     Intervention(s)  Therapist will teach CBT skills .    Objective #B  Client will Increase interest, engagement, and  pleasure in doing things  Improve concentration, focus, and mindfulness in daily activities .  Status: New - Date: 2/27/2018     Intervention(s)  Therapist will assign homework to practice skills daily  teach mindfulness skills and behavioral activation.    Objective #C  Client will Decrease frequency and intensity of feeling down, depressed, hopeless  Feel less tired and more energy during the day .  Status: New - Date: 2/27/2018     Intervention(s)  Therapist will assign homework to accomplish a task daily  teach behavioral activation, sleep hygeine and mindfulness techniques.      Goal 2: Client will improve symptoms of anxious distress as evidenced by HAMIDA-7 reducing from 17 to 6 over the next four months and client reporting an ability to make decisions about her future.       I will know I've met my goal when I have structure and things that make me happy.      Objective #A (Client Action)    Status: New - Date: 2/27/2018     Client will exercise for 30 minutes 3 times per week for 30 minutes.    Intervention(s)  Therapist will assign homework to track exercise.    Objective #B  Client will increase support systems and increase the frequency she contacts other people.    Status: New - Date: 2/27/2018     Intervention(s)  Therapist will assign homework to reach out to others on a regular basis  make referrals to case management and day treatment  provide support and encouragement for client to use support systems.    Objective #C  Client will use relaxation strategies 2 times per day to reduce the physical symptoms of anxiety.  Status: New - Date: 2/27/2018     Intervention(s)  Therapist will assign homework for client to use mindfulness skills when making decisions  teach how to make decisions while managing anxiety.          Client has reviewed and agreed to the above plan.      Shazia IBARRA, [facility] February 27, 2018  Note reviewed and clinical supervision by FRED Camilo [facility] 3/2/2018

## 2018-03-07 ASSESSMENT — PATIENT HEALTH QUESTIONNAIRE - PHQ9: SUM OF ALL RESPONSES TO PHQ QUESTIONS 1-9: 12

## 2018-03-07 ASSESSMENT — ANXIETY QUESTIONNAIRES: GAD7 TOTAL SCORE: 12

## 2018-04-03 ENCOUNTER — OFFICE VISIT (OUTPATIENT)
Dept: PSYCHOLOGY | Facility: CLINIC | Age: 33
End: 2018-04-03
Payer: COMMERCIAL

## 2018-04-03 DIAGNOSIS — F32.1 MAJOR DEPRESSIVE DISORDER, SINGLE EPISODE, MODERATE WITH ANXIOUS DISTRESS (H): Primary | ICD-10-CM

## 2018-04-03 PROCEDURE — 90832 PSYTX W PT 30 MINUTES: CPT | Performed by: SOCIAL WORKER

## 2018-04-03 ASSESSMENT — ANXIETY QUESTIONNAIRES
7. FEELING AFRAID AS IF SOMETHING AWFUL MIGHT HAPPEN: MORE THAN HALF THE DAYS
1. FEELING NERVOUS, ANXIOUS, OR ON EDGE: MORE THAN HALF THE DAYS
GAD7 TOTAL SCORE: 14
3. WORRYING TOO MUCH ABOUT DIFFERENT THINGS: MORE THAN HALF THE DAYS
IF YOU CHECKED OFF ANY PROBLEMS ON THIS QUESTIONNAIRE, HOW DIFFICULT HAVE THESE PROBLEMS MADE IT FOR YOU TO DO YOUR WORK, TAKE CARE OF THINGS AT HOME, OR GET ALONG WITH OTHER PEOPLE: VERY DIFFICULT
5. BEING SO RESTLESS THAT IT IS HARD TO SIT STILL: SEVERAL DAYS
2. NOT BEING ABLE TO STOP OR CONTROL WORRYING: MORE THAN HALF THE DAYS
6. BECOMING EASILY ANNOYED OR IRRITABLE: NEARLY EVERY DAY

## 2018-04-03 ASSESSMENT — PATIENT HEALTH QUESTIONNAIRE - PHQ9: 5. POOR APPETITE OR OVEREATING: MORE THAN HALF THE DAYS

## 2018-04-03 NOTE — MR AVS SNAPSHOT
MRN:[id number]                      After Visit Summary   4/3/2018    Kamilah Dee    MRN: [id number]           Visit Information        Provider Department      4/3/2018 11:00 AM Norman Shazia [facility] Generic      Your next 10 appointments already scheduled     Apr 24, 2018 11:00 AM CDT   Return Visit with Shazia Austin   [facility] [place] ([facility])    [address]  [place] [address] [phone]              MyChart Information     [employer] gives you secure access to your electronic health record. If you see a primary care provider, you can also send messages to your care team and make appointments. If you have questions, please call your primary care clinic.  If you do not have a primary care provider, please call [phone] and they will assist you.        Care EveryWhere ID     This is your Care EveryWhere ID. This could be used by other organizations to access your [place] medical records  [id number]        Equal Access to Services     SAV CALVILLO AH: Hadii cristal long hadasho Soomaali, waaxda luqadaha, qaybta kaalmada adeegyada, [place] chan. So [facility] [phone].    ATENCIÓN: Si habla español, tiene a farfan disposición servicios gratuitos de asistencia lingüística. Llame al [phone].    We comply with applicable federal civil rights laws and [place] laws. We do not discriminate on the basis of race, color, national origin, age, disability, sex, sexual orientation, or gender identity.

## 2018-04-03 NOTE — PROGRESS NOTES
Progress Note    Client Name: Kamilah Dee  Date: 4/3/2018         Service Type: Individual      Session Start Time: 1:40  Session End Time: 2:00      Session Length: 20 min     Session #: 4     Attendees: Client attended alone    Treatment Plan Last Reviewed: 2/27/2018  PHQ-9 / HAMIDA-7 : 4/3/2018     DATA      Progress Since Last Session (Related to Symptoms / Goals / Homework):   Symptoms: Stable Client reported no change in symptoms    Homework: Partially completed client reported using distraction to minimize symptoms      Episode of Care Goals: Minimal progress - PREPARATION (Decided to change - considering how); Intervened by negotiating a change plan and determining options / strategies for behavior change, identifying triggers, exploring social supports, and working towards setting a date to begin behavior change     Current / Ongoing Stressors and Concerns:   Ongoing: The client reports experiencing increased symptoms of depression and anxiety since her  [facility] ago. She states that she is struggling to get to work and to get out of bed each day.              Current: The client stated that she had been very busy recently allowing her to feel distracted from feeling depressed. She explained that she was late to the session as she needed to fill up her gas in the car, and that she had to leave early to  her son. Discussed how the client was taking care of herself while she was so busy, and she reported she was taking her medication every day as prescribed. She reported that she was experiencing panic attacks at work while being at the [facility] by herself, as she has told her bosses she does not want to work alone in the [facility]. Discussed TIPP skills and identified how the client could use these skills while at work if she experiences a panic attack. 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 Identified how the client could use her coping skills to reduce her experience of stress over the next few weeks before she can return to therapy.       Treatment Objective(s) Addressed in This Session:   use cognitive strategies identified in therapy to challenge anxious thoughts  Improve concentration, focus, and mindfulness in daily activities   Increase interest, engagement, and pleasure in doing things     Intervention:   Motivational Interviewing: Explored how the client could overcome challenges she anticipated encountering over the next few weeks        ASSESSMENT: Current Emotional / Mental Status (status of significant symptoms):   Risk status (Self / Other harm or suicidal ideation)   Client denies current fears or concerns for personal safety.   Client denies current or recent suicidal ideation or behaviors.   Client denies current or recent homicidal ideation or behaviors.   Client denies current or recent self injurious behavior or ideation.   Client denies other safety concerns.   A safety and risk management plan has not been developed at this time, however client was given the after-hours number / 911 should there be a change in any of these risk factors.     Appearance:   Appropriate     Eye Contact:   Poor client rarely made eye contact in session   Psychomotor Behavior: Normal    Attitude:   Cooperative    Orientation:   All   Speech    Rate / Production: Normal     Volume:  Normal    Mood:    Anxious  Elevated  Client presented with anxiety and nervous energy discussing her busy schedule   Affect:    Appropriate    Thought Content:  Clear    Thought Form:  Coherent  Logical    Insight:    Fair      Medication Review:   No changes to current  psychiatric medication(s)     Medication Compliance:   Yes Client has enacted agreed upon strategies for increasing her medication compliance and is finding these strategies to be successful.      Changes in Health Issues:   None reported     Chemical Use Review:   Substance Use: Chemical use reviewed, no active concerns identified      Tobacco Use: No current tobacco use.       Collateral Reports Completed:   Not Applicable    PLAN: (Client Tasks / Therapist Tasks / Other)  Client will continue taking medication daily, do one yoga class, use TIPP skills if she experiences a panic attack and return for therapy in two weeks.        Shazia Austin  MSW, [facility] 4/3/2018  Note reviewed and clinical supervision by FRED Camilo [facility] 4/6/2018                                                             ________________________________________________________________________    Treatment Plan    Client's Name: Kamilah Dee  YOB: 1985    Date: 2/27/2018    DSM-V Diagnoses: 296.22 (F32.1)  Major Depressive Disorder, Single Episode, Moderate _ and With anxious distress  Psychosocial / Contextual Factors: Client has been experiencing an increase in symptoms after her  went [facility] two months ago.  WHODAS: see intake    Referral / Collaboration:  The following referral(s) was/were discussed but client declines follow up at this time. day treatment.    Anticipated number of session or this episode of care: 12-20      MeasurableTreatment Goal(s) related to diagnosis / functional impairment(s)  Goal 1: Client will reduce symptoms of depression as evidenced by PHQ-9 reducing from 15 to 6 over the next three months and client reporting improved confidence in self.    I will know I've met my goal when I can make decisions at work.      Objective #A (Client Action)    Client will use cognitive strategies identified in therapy to challenge anxious thoughts  Identify negative self-talk and behaviors:  challenge core beliefs, myths, and actions.  Status: New - Date: 2/27/2018     Intervention(s)  Therapist will teach CBT skills .    Objective #B  Client will Increase interest, engagement, and pleasure in doing things  Improve concentration, focus, and mindfulness in daily activities .  Status: New - Date: 2/27/2018     Intervention(s)  Therapist will assign homework to practice skills daily  teach mindfulness skills and behavioral activation.    Objective #C  Client will Decrease frequency and intensity of feeling down, depressed, hopeless  Feel less tired and more energy during the day .  Status: New - Date: 2/27/2018     Intervention(s)  Therapist will assign homework to accomplish a task daily  teach behavioral activation, sleep hygeine and mindfulness techniques.      Goal 2: Client will improve symptoms of anxious distress as evidenced by HAMIDA-7 reducing from 17 to 6 over the next four months and client reporting an ability to make decisions about her future.       I will know I've met my goal when I have structure and things that make me happy.      Objective #A (Client Action)    Status: New - Date: 2/27/2018     Client will exercise for 30 minutes 3 times per week for 30 minutes.    Intervention(s)  Therapist will assign homework to track exercise.    Objective #B  Client will increase support systems and increase the frequency she contacts other people.    Status: New - Date: 2/27/2018     Intervention(s)  Therapist will assign homework to reach out to others on a regular basis  make referrals to case management and day treatment  provide support and encouragement for client to use support systems.    Objective #C  Client will use relaxation strategies 2 times per day to reduce the physical symptoms of anxiety.  Status: New - Date: 2/27/2018     Intervention(s)  Therapist will assign homework for client to use mindfulness skills when making decisions  teach how to make decisions while managing  anxiety.          Client has reviewed and agreed to the above plan.      Shazia IBARRA, LGSW February 27, 2018  Note reviewed and clinical supervision by FRED Camilo LICSW 3/2/2018

## 2018-04-04 ASSESSMENT — ANXIETY QUESTIONNAIRES: GAD7 TOTAL SCORE: 14

## 2018-04-04 ASSESSMENT — PATIENT HEALTH QUESTIONNAIRE - PHQ9: SUM OF ALL RESPONSES TO PHQ QUESTIONS 1-9: 14

## 2018-04-05 ENCOUNTER — OFFICE VISIT (OUTPATIENT)
Dept: INTERNAL MEDICINE | Facility: CLINIC | Age: 33
End: 2018-04-05
Payer: COMMERCIAL

## 2018-04-05 VITALS
BODY MASS INDEX: 22.56 KG/M2 | OXYGEN SATURATION: 99 % | WEIGHT: 139.8 LBS | HEART RATE: 72 BPM | RESPIRATION RATE: 12 BRPM | SYSTOLIC BLOOD PRESSURE: 100 MMHG | TEMPERATURE: 98 F | DIASTOLIC BLOOD PRESSURE: 68 MMHG

## 2018-04-05 DIAGNOSIS — L85.3 DRY SKIN: Primary | ICD-10-CM

## 2018-04-05 PROCEDURE — 99212 OFFICE O/P EST SF 10 MIN: CPT | Performed by: PHYSICIAN ASSISTANT

## 2018-04-05 ASSESSMENT — PAIN SCALES - GENERAL: PAINLEVEL: NO PAIN (0)

## 2018-04-05 NOTE — PATIENT INSTRUCTIONS
-  a daily moisturized - Cerave cream for legs otherwise lotion   - Aveno moisturizing, Cetphil     - use twice a day     - if worsens, try vanicream

## 2018-04-05 NOTE — PROGRESS NOTES
SUBJECTIVE:   Kamilah Dee is a 32 year old female who presents to clinic today for the following health issues:    Chief Complaint   Patient presents with     Derm Problem     Pt has a rash on bilateral legs- thighs,  red bumps and sometimes they itch x a couple of months     - pt presents to clinic for concern of rash   - onset: couple months ago  - location: mainly on legs, but does spread some to the glutes   - accompanying symptoms: pt notes rash is icthy, sometimes  - overall pt notes rash is improving     Problem list and histories reviewed & adjusted, as indicated.  Additional history: as documented    Reviewed and updated as needed this visit by clinical staff  Tobacco  Allergies  Meds  Problems       Reviewed and updated as needed this visit by Provider  Meds  Problems         OBJECTIVE:     /68  Pulse 72  Temp 98  F (36.7  C) (Oral)  Resp 12  Wt 139 lb 12.8 oz (63.4 kg)  SpO2 99%  Breastfeeding? No  BMI 22.56 kg/m2  Body mass index is 22.56 kg/(m^2).  GENERAL: healthy, alert and no distress  SKIN: small red papules noted throughout the skin, skin overall is dry         ASSESSMENT/PLAN:       1. Dry skin  - keratosis pilaris identified due to dry skin   - reviewed using moisturizer twice a day  - follow up if symptoms worsen or fail to improve   - pt agreed to plan and all questions are answered         Enedelia Gordon PA-C  [facility]

## 2018-04-05 NOTE — MR AVS SNAPSHOT
After Visit Summary   4/5/2018    Kamilah Dee    MRN: [id number]           Patient Information     Date Of Birth          1985        Visit Information        Provider Department      4/5/2018 10:20 AM Enedelia Gordon PA-C [facility]        Care Instructions    -  a daily moisturized - Cerave cream for legs otherwise lotion   - Aveno moisturizing, Cetphil     - use twice a day     - if worsens, try vanicream           Follow-ups after your visit        Your next 10 appointments already scheduled     Apr 24, 2018 11:00 AM CDT   Return Visit with Shazia Austin   [facility] [place] ([facility] [place])    [address]  [place] [address] [phone]              Who to contact     If you have questions or need follow up information about today's clinic visit or your schedule please contact [facility] directly at [phone].  Normal or non-critical lab and imaging results will be communicated to you by [employer], letter or phone within 4 business days after the clinic has received the results. If you do not hear from us within 7 days, please contact the clinic through [employer] or phone. If you have a critical or abnormal lab result, we will notify you by phone as soon as possible.  Submit refill requests through [employer] or call your pharmacy and they will forward the refill request to us. Please allow 3 business days for your refill to be completed.          Additional Information About Your Visit        MyChart Information     [employer] gives you secure access to your electronic health record. If you see a primary care provider, you can also send messages to your care team and make appointments. If you have questions, please call your primary care clinic.  If you do not have a primary care provider, please call [phone] and they will assist you.        Care EveryWhere ID     This is your Care EveryWhere ID. This  could be used by other organizations to access your [place] medical records  [id number]        Your Vitals Were     Pulse Temperature Respirations Pulse Oximetry Breastfeeding? BMI (Body Mass Index)    72 98  F (36.7  C) (Oral) 12 99% No 22.56 kg/m2       Blood Pressure from Last 3 Encounters:   04/05/18 100/68   02/05/18 110/70   11/21/17 110/68    Weight from Last 3 Encounters:   04/05/18 139 lb 12.8 oz (63.4 kg)   02/05/18 150 lb (68 kg)   11/21/17 147 lb (66.7 kg)              Today, you had the following     No orders found for display       Primary Care Provider Office Phone # Fax #    Mario Joseph [phone]       [address] [facility] [address]        Equal Access to Services     SAV CALVILLO : Hadii cristal long hadasho Soomaali, waaxda luqadaha, qaybta kaalmada adeegyada, [place] goldstein hayradhika roy . So [facility] [phone].    ATENCIÓN: Si habla español, tiene a farfan disposición servicios gratuitos de asistencia lingüística. Llame al [phone].    We comply with applicable federal civil rights laws and [place] laws. We do not discriminate on the basis of race, color, national origin, age, disability, sex, sexual orientation, or gender identity.            Thank you!     Thank you for choosing [facility]  for your care. Our goal is always to provide you with excellent care. Hearing back from our patients is one way we can continue to improve our services. Please take a few minutes to complete the written survey that you may receive in the mail after your visit with us. Thank you!             Your Updated Medication List - Protect others around you: Learn how to safely use, store and throw away your medicines at www.disposemymeds.org.          This list is accurate as of 4/5/18 11:01 AM.  Always use your most recent med list.                   Brand Name Dispense Instructions for use Diagnosis    * busPIRone 10 MG tablet    BUSPAR    60 tablet     Take 1 tablet (10 mg) by mouth 2 times daily    Anxiety       * busPIRone 15 MG tablet    BUSPAR    60 tablet    Take 1 tablet (15 mg) by mouth 2 times daily    Major depressive disorder, single episode, mild (H), Anxiety, Tic disorder       DULoxetine 30 MG EC capsule    CYMBALTA    90 capsule    TAKE 3 CAPSULES(90 MG) BY MOUTH DAILY IN THE MORNING    Major depressive disorder, single episode, mild (H), Anxiety       LORazepam 1 MG tablet    ATIVAN    30 tablet    1/2-1 tab every 12 hours as needed for severe anxiety    Anxiety, Tic disorder       medroxyPROGESTERone 150 MG/ML injection    DEPO-PROVERA    1 mL    Inject 1 mL (150 mg) into the muscle every 3 months    Encounter for initial prescription of injectable contraceptive       * risperiDONE 0.5 MG tablet    risperDAL    30 tablet    Take 1 tablet (0.5 mg) by mouth At Bedtime    Tic disorder       * risperiDONE 1 MG tablet    risperDAL    60 tablet    Take 0.5 tablets (0.5 mg) by mouth 2 times daily    Major depressive disorder, single episode, mild (H), Anxiety, Tic disorder       * Notice:  This list has 4 medication(s) that are the same as other medications prescribed for you. Read the directions carefully, and ask your doctor or other care provider to review them with you.

## 2018-05-01 ENCOUNTER — OFFICE VISIT (OUTPATIENT)
Dept: INTERNAL MEDICINE | Facility: CLINIC | Age: 33
End: 2018-05-01
Payer: COMMERCIAL

## 2018-05-01 VITALS
DIASTOLIC BLOOD PRESSURE: 60 MMHG | BODY MASS INDEX: 21.95 KG/M2 | HEART RATE: 74 BPM | WEIGHT: 136 LBS | RESPIRATION RATE: 16 BRPM | SYSTOLIC BLOOD PRESSURE: 110 MMHG | TEMPERATURE: 98 F

## 2018-05-01 DIAGNOSIS — F32.0 MAJOR DEPRESSIVE DISORDER, SINGLE EPISODE, MILD (H): ICD-10-CM

## 2018-05-01 DIAGNOSIS — F95.9 TIC DISORDER: Primary | ICD-10-CM

## 2018-05-01 DIAGNOSIS — F41.1 GAD (GENERALIZED ANXIETY DISORDER): ICD-10-CM

## 2018-05-01 LAB
ALBUMIN SERPL-MCNC: 4 G/DL (ref 3.4–5)
ALP SERPL-CCNC: 43 U/L (ref 40–150)
ALT SERPL W P-5'-P-CCNC: 22 U/L (ref 0–50)
ANION GAP SERPL CALCULATED.3IONS-SCNC: 6 MMOL/L (ref 3–14)
AST SERPL W P-5'-P-CCNC: 11 U/L (ref 0–45)
BILIRUB SERPL-MCNC: 0.6 MG/DL (ref 0.2–1.3)
BUN SERPL-MCNC: 15 MG/DL (ref 7–30)
CALCIUM SERPL-MCNC: 8.8 MG/DL (ref 8.5–10.1)
CHLORIDE SERPL-SCNC: 107 MMOL/L (ref 94–109)
CO2 SERPL-SCNC: 27 MMOL/L (ref 20–32)
CREAT SERPL-MCNC: 0.72 MG/DL (ref 0.52–1.04)
GFR SERPL CREATININE-BSD FRML MDRD: >90 ML/MIN/1.7M2
GLUCOSE SERPL-MCNC: 91 MG/DL (ref 70–99)
MAGNESIUM SERPL-MCNC: 2.1 MG/DL (ref 1.6–2.3)
POTASSIUM SERPL-SCNC: 4.3 MMOL/L (ref 3.4–5.3)
PROT SERPL-MCNC: 7.5 G/DL (ref 6.8–8.8)
SODIUM SERPL-SCNC: 140 MMOL/L (ref 133–144)
TSH SERPL DL<=0.005 MIU/L-ACNC: 1.82 MU/L (ref 0.4–4)

## 2018-05-01 PROCEDURE — 80053 COMPREHEN METABOLIC PANEL: CPT | Performed by: INTERNAL MEDICINE

## 2018-05-01 PROCEDURE — 99214 OFFICE O/P EST MOD 30 MIN: CPT | Performed by: INTERNAL MEDICINE

## 2018-05-01 PROCEDURE — 83735 ASSAY OF MAGNESIUM: CPT | Performed by: INTERNAL MEDICINE

## 2018-05-01 PROCEDURE — 82306 VITAMIN D 25 HYDROXY: CPT | Performed by: INTERNAL MEDICINE

## 2018-05-01 PROCEDURE — 36415 COLL VENOUS BLD VENIPUNCTURE: CPT | Performed by: INTERNAL MEDICINE

## 2018-05-01 PROCEDURE — 84443 ASSAY THYROID STIM HORMONE: CPT | Performed by: INTERNAL MEDICINE

## 2018-05-01 RX ORDER — BACLOFEN 10 MG/1
TABLET ORAL
Qty: 90 TABLET | Refills: 1 | Status: CANCELLED | OUTPATIENT
Start: 2018-05-01

## 2018-05-01 RX ORDER — GABAPENTIN 300 MG/1
CAPSULE ORAL
Qty: 60 CAPSULE | Refills: 11 | Status: SHIPPED | OUTPATIENT
Start: 2018-05-01 | End: 2018-09-06

## 2018-05-01 ASSESSMENT — PAIN SCALES - GENERAL: PAINLEVEL: NO PAIN (0)

## 2018-05-01 NOTE — MR AVS SNAPSHOT
After Visit Summary   5/1/2018    Kamilah Dee    MRN: [id number]           Patient Information     Date Of Birth          1985        Visit Information        Provider Department      5/1/2018 9:00 AM Mario Joseph MD [facility]        Today's Diagnoses     Tic disorder    -  1      Care Instructions    Gabapentin 300mg capsule, 1 capsule at bedtime. If tolerate over 4 days and tics not better, then increase to 1 capsule twice a day (Am and PM)  Referral to Dr Mota ( Neurology) for consultation. Call [facility] [phone]  For appt   labs as ordered  Email  Update in 2 weeks in Mychart  Continue counselling               Follow-ups after your visit        Additional Services     NEUROLOGY ADULT REFERRAL       Your provider has referred you for the following:   Consult at [facility]: [facility] [phone]   [email or web address]    Please be aware that coverage of these services is subject to the terms and limitations of your health insurance plan.  Call member services at your health plan with any benefit or coverage questions.      Please bring the following with you to your appointment:    (1) Any X-Rays, CTs or MRIs which have been performed.  Contact the facility where they were done to arrange for  prior to your scheduled appointment.    (2) List of current medications  (3) This referral request   (4) Any documents/labs given to you for this referral                  Who to contact     If you have questions or need follow up information about today's clinic visit or your schedule please contact [facility] directly at [phone].  Normal or non-critical lab and imaging results will be communicated to you by MyChart, letter or phone within 4 business days after the clinic has received the results. If you do not hear from us within 7 days,  please contact the clinic through [employer] or phone. If you have a critical or abnormal lab result, we will notify you by phone as soon as possible.  Submit refill requests through [employer] or call your pharmacy and they will forward the refill request to us. Please allow 3 business days for your refill to be completed.          Additional Information About Your Visit        [employer] Information     [employer] gives you secure access to your electronic health record. If you see a primary care provider, you can also send messages to your care team and make appointments. If you have questions, please call your primary care clinic.  If you do not have a primary care provider, please call [phone] and they will assist you.        Care EveryWhere ID     This is your Care EveryWhere ID. This could be used by other organizations to access your [place] medical records  [id number]        Your Vitals Were     Pulse Temperature Respirations BMI (Body Mass Index)          74 98  F (36.7  C) (Oral) 16 21.95 kg/m2         Blood Pressure from Last 3 Encounters:   05/01/18 110/60   04/05/18 100/68   02/05/18 110/70    Weight from Last 3 Encounters:   05/01/18 136 lb (61.7 kg)   04/05/18 139 lb 12.8 oz (63.4 kg)   02/05/18 150 lb (68 kg)              We Performed the Following     Comprehensive metabolic panel     Magnesium     NEUROLOGY ADULT REFERRAL     TSH with free T4 reflex     Vitamin D Deficiency          Today's Medication Changes          These changes are accurate as of 5/1/18  9:48 AM.  If you have any questions, ask your nurse or doctor.               Start taking these medicines.        Dose/Directions    gabapentin 300 MG capsule   Commonly known as:  NEURONTIN   Used for:  Tic disorder   Started by:  Mario Joseph MD        1 capsule twice a day   Quantity:  60 capsule   Refills:  11            Where to get your medicines      These medications were sent to [facility] [address] [place] [address] - [address] [place] [address]  [place]  [address] [facility] [address]     Phone:  [phone]     gabapentin 300 MG capsule                Primary Care Provider Office Phone # Fax #    Mario Joseph [phone]       [address] [facility] [address]        Equal Access to Services     SAV CALVILLO : Hadii aad ku hadasho Soomaali, waaxda luqadaha, qaybta kaalmada adeegyada, waxay idiin hayaan adeeg cary lachris chan. So [facility] [phone].    ATENCIÓN: Si habla español, tiene a farfan disposición servicios gratuitos de asistencia lingüística. Llame al [phone].    We comply with applicable federal civil rights laws and [place] laws. We do not discriminate on the basis of race, color, national origin, age, disability, sex, sexual orientation, or gender identity.            Thank you!     Thank you for choosing [facility]  for your care. Our goal is always to provide you with excellent care. Hearing back from our patients is one way we can continue to improve our services. Please take a few minutes to complete the written survey that you may receive in the mail after your visit with us. Thank you!             Your Updated Medication List - Protect others around you: Learn how to safely use, store and throw away your medicines at www.disposemymeds.org.          This list is accurate as of 5/1/18  9:48 AM.  Always use your most recent med list.                   Brand Name Dispense Instructions for use Diagnosis    * busPIRone 10 MG tablet    BUSPAR    60 tablet    Take 1 tablet (10 mg) by mouth 2 times daily    Anxiety       * busPIRone 15 MG tablet    BUSPAR    60 tablet    Take 1 tablet (15 mg) by mouth 2 times daily    Major depressive disorder, single episode, mild (H), Anxiety, Tic disorder       DULoxetine 30 MG EC capsule    CYMBALTA    90 capsule    TAKE 3 CAPSULES(90 MG) BY MOUTH DAILY IN THE MORNING    Major depressive disorder, single episode, mild (H), Anxiety       gabapentin 300  MG capsule    NEURONTIN    60 capsule    1 capsule twice a day    Tic disorder       LORazepam 1 MG tablet    ATIVAN    30 tablet    1/2-1 tab every 12 hours as needed for severe anxiety    Anxiety, Tic disorder       medroxyPROGESTERone 150 MG/ML injection    DEPO-PROVERA    1 mL    Inject 1 mL (150 mg) into the muscle every 3 months    Encounter for initial prescription of injectable contraceptive       * Notice:  This list has 2 medication(s) that are the same as other medications prescribed for you. Read the directions carefully, and ask your doctor or other care provider to review them with you.

## 2018-05-01 NOTE — PROGRESS NOTES
SUBJECTIVE:   Kamilah Dee is a 33 year old female who presents to clinic today for the following health issues:    Chief Complaint   Patient presents with     Musculoskeletal Problem     Spasms     Pt's past medical history, family history, habits, medications and allergies were reviewed with the patient today.  See snap shot for  HCM status. Most recent lab results reviewed with pt. Problem list and histories reviewed & adjusted, as indicated.  Additional history as below:    History of a tic disorder dating back over 10 years time.  Was seen by neurology in 2009 in 2012.  See those notes in the chart.  She was thought to be more related to psychiatric issues rather than the neurologic cause.  Patient seeing a therapist currently.  Is also been seen once by [facility] and was started on risperidone though the patient is not taking that medication now.  Patient failed to follow-up with psychiatry clinic in January 2018.  Has chronic anxiety a depression.  Denies suicidal ideation. PHQ and HAMIDA scores both 14 when done 1 month ago.  On duloxetine and buspirone.  Very rare use of lorazepam.  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  Patient states work is becoming more difficult because of her tick issue and she is thinking of working overnights instead so she does not have to deal with customers as much.  States she got stuck once in the employee room for 30 minutes because of worsening tic symptoms and not wanting to have customers see it.  See most recent counseling note in the chart for other details regarding patient's recent mental health symptoms.  Patient states most of her depression issues relate to frustration regarding her tic issue     Additional ROS:   Constitutional, HEENT, Cardiovascular, Pulmonary, GI and , Neuro, MSK and Psych review of systems/symptoms are otherwise negative or unchanged from previous, except as noted above.      OBJECTIVE:  /60  Pulse  "74  Temp 98  F (36.7  C) (Oral)  Resp 16  Wt 136 lb (61.7 kg)  BMI 21.95 kg/m2   Estimated body mass index is 21.95 kg/(m^2) as calculated from the following:    Height as of 2/5/18: 5' 6\" (1.676 m).    Weight as of this encounter: 136 lb (61.7 kg).  Eye: PERRL, EOMI  HENT: ear canals and TM's normal and nose and mouth without ulcers or lesions   Neck: no adenopathy. Thyroid normal to palpation. No bruits  Pulm: Lungs clear to auscultation   CV: Regular rates and rhythm  GI: Soft, nontender, Normal active bowel sounds, No hepatosplenomegaly or masses palpable  Ext: Peripheral pulses intact. No edema.  Neuro: Normal strength and tone, sensory exam grossly normal.  Intermittent tic movement that involves both extremities and upper body.  Gen: Anxious appearance.  Patient is accompanied by her 3-year-old daughter today who runs about some in the exam room during the appointment and occasionally sits on the patient's lap.  Patient appears to have a loving relationship with her daughter.     Assessment/Plan: (See plan discussion below for further details)  1. Tic disorder  Patient feels symptoms are worsening though anxiety also worsened.   Working with psych therapist.  Will recheck labs as ordered below.  Though initially thought to be all psych related, will ask a different neurologist to see the patient to rule out any missed neurologic cause.  Will start a trial of gabapentin in addition  - gabapentin (NEURONTIN) 300 MG capsule; 1 capsule twice a day  Dispense: 60 capsule; Refill: 11  - NEUROLOGY ADULT REFERRAL  - Vitamin D Deficiency  - Comprehensive metabolic panel  - TSH with free T4 reflex  - Magnesium    2. HAMIDA (generalized anxiety disorder)  Continue buspirone and duloxetine.  Gabapentin in addition as below  - gabapentin (NEURONTIN) 300 MG capsule; 1 capsule twice a day  Dispense: 60 capsule; Refill: 11    3. Major depressive disorder, single episode, mild (H)  Continue duloxetine.  Patient feels this is " mostly related to take issues so symptoms will hopefully improve also with trial of gabapentin.  Continue counseling therapy.  If not improving, patient to schedule appointment with [facility] in addition for follow-up    Plan discussion:  Gabapentin 300mg capsule, 1 capsule at bedtime. If tolerate over 4 days and tics not better, then increase to 1 capsule twice a day (AM and PM)  Referral to Dr Mota ( Neurology) for consultation. Call [facility] [phone]  For appt   labs as ordered  Email  Update in 2 weeks in Mychart  Continue counselling  See Peggy Lassiter in follow-up if depression not improving also       Mario Joseph MD  Internal Medicine Department  [facility]

## 2018-05-01 NOTE — PATIENT INSTRUCTIONS
Gabapentin 300mg capsule, 1 capsule at bedtime. If tolerate over 4 days and tics not better, then increase to 1 capsule twice a day (AM and PM)  Referral to Dr Mota ( Neurology) for consultation. Call [facility] [phone]  For appt   labs as ordered  Email  Update in 2 weeks in Mychart  Continue counselling  See Peggy Lassiter in follow-up if depression not improving also

## 2018-05-02 LAB — DEPRECATED CALCIDIOL+CALCIFEROL SERPL-MC: 24 UG/L (ref 20–75)

## 2018-05-15 ENCOUNTER — OFFICE VISIT (OUTPATIENT)
Dept: NEUROLOGY | Facility: CLINIC | Age: 33
End: 2018-05-15
Payer: COMMERCIAL

## 2018-05-15 VITALS
TEMPERATURE: 98.7 F | SYSTOLIC BLOOD PRESSURE: 112 MMHG | WEIGHT: 133 LBS | RESPIRATION RATE: 18 BRPM | HEART RATE: 68 BPM | DIASTOLIC BLOOD PRESSURE: 60 MMHG | BODY MASS INDEX: 21.47 KG/M2

## 2018-05-15 DIAGNOSIS — F95.9 TIC DISORDER: Primary | ICD-10-CM

## 2018-05-15 PROCEDURE — 99245 OFF/OP CONSLTJ NEW/EST HI 55: CPT | Performed by: PSYCHIATRY & NEUROLOGY

## 2018-05-15 RX ORDER — GUANFACINE 1 MG/1
TABLET ORAL
Qty: 90 TABLET | Refills: 0 | Status: SHIPPED | OUTPATIENT
Start: 2018-05-15 | End: 2018-07-26

## 2018-05-15 NOTE — NURSING NOTE
"Vital signs:  Temp: 98.7  F (37.1  C) Temp src: Oral BP: 112/60 Pulse: 68   Resp: 18         Weight: 60.3 kg (133 lb)  Estimated body mass index is 21.47 kg/(m^2) as calculated from the following:    Height as of 2/5/18: 1.676 m (5' 6\").    Weight as of this encounter: 60.3 kg (133 lb).            Rohith Fitzpatrick MA    "

## 2018-05-15 NOTE — PATIENT INSTRUCTIONS
AFTER VISIT SUMMARY (AVS):    At today's visit we discussed various diagnostic possibilities for your symptoms that are most consistent with tic's disorder.  We also reviewed in detail available treatment options and the plan.    The following medications were ordered/refilled: Guanfacine (TENEX) 1 MG tablet: Take 1/2 pill at bedtime for 3 days, then increase to 1/2 pill twice a day for 3 days, and increase to 1/2 pill 3 times daily after that.  Please contact my clinic if you develop any of intolerable side effects.    Next follow-up appointment is in the next 4 weeks or earlier if needed.    Please do not hesitate to call me with any questions or concerns.    Thanks.

## 2018-05-15 NOTE — PROGRESS NOTES
INITIAL NEUROLOGY CONSULTATION    DATE OF VISIT: 5/15/2018  CLINIC LOCATION: [facility]  MRN: [id number]  PATIENT NAME: Kamilah Dee  YOB: 1985    PRIMARY CARE PROVIDER: Mario Joseph MD     REASON FOR VISIT:   Chief Complaint   Patient presents with     Consult     TIC disorder      HISTORY OF PRESENT ILLNESS:                                                    Ms. Kamilah Dee is 33 year old right handed female patient with history of anxiety, depression, migraine, polysubstance drug abuse, and tic disorder, who was seen in consultation today requested by Mario Joseph MD, for motor tics.    Per patient's report, she has long-standing history of motor tics dating back at least 20 years (when she was 14-15 years old).  These involuntary movements were on and off throughout the years.  Over the last month, they worsened again.    Currently, the patient has brief multiple per day involuntary movements that involve twisting of her neck, shoulder shrugging, and limb movements.  Prior to the movement she feels tension that builds up (premonitory urge) and is relieved after the movement.  She could temporarily suppress these movements for 5-10 seconds, but after that they restart again.  She feels that alcohol and lorazepam make her movements better.  Massage seemed to temporarily improve her shoulder movement, but has no effect on the rest of the movements.  Symptoms seem to be worse while driving, and possibly with increased stress and anxiety.  There are no other focal neurological symptoms.  She tried Cogentin and risperidone without significant effect.  Her primary care provider recently prescribed gabapentin.  She takes 300 mg at bedtime without noticeable effect on her tics.    In the past, the patient was evaluated at [facility] by Dr. Angel (2009 and 2012).  Initially, she presented with dizziness and uncontrolled movements.  Her evaluation included lab  "workup (ceruloplasmin, heavy metal screen, Treponema pallidum testing, blood smear/CBC for acanthocytosis), which was unrevealing.  In 2012, she was seen again for worsening movements after starting Adderall.  She was recommended to stop it.    Recent laboratory workup (May 2018) includes normal CMP, TSH (1.82) vitamin D level of 24, and negative hCG.    Previous brain MRI from 2009 (performed for headaches/dizziness) was unremarkable.    No additional pertinent information is available in Care Everywhere, which was reviewed.    The patient denies a history of recent head injury. Prior neurological history: negative for stroke, brain neoplasms, seizure disorders, multiple sclerosis, meningitis, encephalitis, and major head injuries.    Neurologic Review of Systems - no amaurosis, diplopia, abnormal speech, unilateral numbness or weakness.  Since her polysubstance drug abuse, the patient experiences mild intermittent chronic sensations inside her head in the form of \"brain is moving inside\" or \"something is leaking out of my head\".  She endorses insomnia, fatigue, chest pain, anxiety, depression, panic attacks, headaches, shortness of breath, and memory problems.  These complaints have been already discussed with other medical providers.  Otherwise, she denies any other complaints on 14-point comprehensive review of systems.  PAST MEDICAL/SURGICAL HISTORY:                                                    I personally reviewed patient's past medical and surgical history with the patient at today's visit.  Past Medical History:   Diagnosis Date     Anemia     with pregnancy     Anxiety 1/21/2014     Depo-Provera contraceptive status      Depression      History of varicella-documented immunity 7/21/2011     Migraine 8/3/2012     Other, mixed, or unspecified nondependent drug abuse, episodic      Tic age 14     Past Surgical History:   Procedure Laterality Date     HC REMOVAL OF TONSILS,12+ Y/O  1996    Tonsils " 11+y.o.     MEDICATIONS:                                                    I personally reviewed patient's medications and allergies with the patient at today's visit.  Current Outpatient Prescriptions on File Prior to Visit:  busPIRone (BUSPAR) 10 MG tablet Take 1 tablet (10 mg) by mouth 2 times daily   busPIRone (BUSPAR) 15 MG tablet Take 1 tablet (15 mg) by mouth 2 times daily   DULoxetine (CYMBALTA) 30 MG EC capsule TAKE 3 CAPSULES(90 MG) BY MOUTH DAILY IN THE MORNING   gabapentin (NEURONTIN) 300 MG capsule 1 capsule twice a day   LORazepam (ATIVAN) 1 MG tablet 1/2-1 tab every 12 hours as needed for severe anxiety   medroxyPROGESTERone (DEPO-PROVERA) 150 MG/ML injection Inject 1 mL (150 mg) into the muscle every 3 months     ALLERGIES:                                                    No Known Allergies  FAMILY/SOCIAL HISTORY:                                                    Family and social history was reviewed with the patient at today's visit. No family history of neurological disorders.   Problem (# of Occurrences) Relation (Name,Age of Onset)    Alcohol/Drug (1) Mother: b:1964 addicted to crack    Cervical Cancer (1) Sister (35): 10/2015    Family History Negative (4) Father: b: 1962, Sister: b:1994, Sister: b:1981, Brother: b:1989        , lives with her 2 children.  Former smoker, quit in 2010.  Consumes 2-5 glasses of wine per week.  Denies use of recreational drugs.  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  Social History   Substance Use Topics     Smoking status: Former Smoker     Quit date: 5/25/2010     Smokeless tobacco: Never Used     Alcohol use No      Comment: occasionally 1-2 etoh a month/     REVIEW OF SYSTEMS:                                                    Patient has completed a [facility] Patient Health History, including a 14-system review, which was personally reviewed, and pertinent positives are listed in HPI. She denies any additional  problems on the further questioning.  EXAM:                                                    VITAL SIGNS:   /60 (BP Location: Left arm, Patient Position: Sitting, Cuff Size: Adult Regular)  Pulse 68  Temp 98.7  F (37.1  C) (Oral)  Resp 18  Wt 60.3 kg (133 lb)  BMI 21.47 kg/m2    MINI COG 5/15/2018   Clock Draw Score 2 Normal   3 Item Recall 3 objects recalled   Mini Cog Total Score 5   Administered by:  Rohtih Mccormack MA     General: pt is in NAD, cooperative.  Skin: normal turgor, moist mucous membranes, no lesions/rashes noticed.  HEENT: ATNC, EOMI, PERRL, white sclera, normal conjunctiva, no nystagmus or ptosis. No carotid bruits bilaterally.  Respiratory: lung sounds clear to auscultation bilaterally, no crackles, wheezes, rhonchi. Symmetric lung excursion, no accessory respiratory muscle use.  Cardiovascular: normal S1/S2, no murmurs/rubs/gallops.   Abdomen: Not distended.  : deferred.    Neurological:  Mental: alert, follows commands, mini-cog is 5/5 with 3/3 on memory recall, no aphasia or dysarthria. Fund of knowledge is appropriate for age.  Cranial Nerves:  CN II: visual acuity - able to accurately count fingers with each eye. Visual fields intact, fundi: discs sharp, no papilledema and normal vessels bilaterally.  CN III, IV, VI: EOM intact, pupils equal and reactive  CN V: facial sensation nl  CN VII: face symmetric, no facial droop  CN VIII: hearing normal  CN IX: palate elevation symmetric, uvula at midline  CN XI SCM normal, shoulder shrug nl  CN XII: tongue midline  Motor: Strength: 5/5 in all major groups of all extremities. Normal tone.  The patient was noted to have frequent brief motor tics involving her head, right shoulder, and left leg. No pronator drift b/l.  Reflexes: Triceps, biceps, brachioradialis, patellar, and achilles reflexes normal and symmetric. No clonus noted. Toes are down-going b/l.   Sensory: temperature, light touch, pinprick, and vibration intact. Romberg:  negative.  Coordination: FNF and heel-shin tests intact b/l. No dysdiadochokinesia with rapid alternating movements.  Gait:  Normal, able to tandem, toe, and heel walk.    DATA:   LABS/IMAGING/OTHER STUDIES: I reviewed pertinent medical records, including personal review of brain MRI images, previous neurology notes, and Care Everywhere, as detailed in the history of present illness.  ASSESSMENT and PLAN:      ASSESSMENT: Kamilah Dee is a 33 year old female patient with history of anxiety, depression, migraine, and polysubstance drug abuse, who presents with chronic involuntary movements worsened over the last month that are clinically consistent with tic disorder.    We had a detailed discussion with the patient regarding her symptoms.  We also reviewed in detail available treatment options, including tetrabenazine, Topamax, and guanfacine along with common and serious side effects.  We decided to try guanfacine first given its side effect profile.  The rest of our discussion and the plan are summarized below.    DIAGNOSES:    ICD-10-CM    1. Tic disorder F95.9      PLAN: At today's visit we thoroughly discussed various diagnostic possibilities for patient's symptoms that are most consistent with tic disorder.  We also reviewed in detail available treatment options and the plan.    The following medications were ordered/refilled: Guanfacine (TENEX) 1 MG tablet: I counseled the patient to take 1/2 pill at bedtime for 3 days, then increase to 1/2 pill twice a day for 3 days, and increase to 1/2 pill 3 times daily after that.  She will contact my clinic if she develops any of intolerable side effects, which were discussed.  They include but not limited to drowsiness, headache, fatigue, dizziness, insomnia, and abdominal pain.  The patient was also counseled to avoid alcohol while taking this medication, as it tends to increase the sedation.    Next follow-up appointment is in the next 4 weeks or earlier if  needed.    I advised the patient to call me with any questions or concerns.    Total Time:  81 minutes with > 50% spent counseling the patient on stated above assessment and recommendations, including nature of the diagnosis, available treatment options, proposed plan of treatment, and prognosis.  Additional time was used to answer patient's questions regarding her symptoms and the plan.    Aydin Younger MD  / Neurology  [place]  (Chart documentation was completed in part with Dragon voice-recognition software. Even though reviewed, some grammatical, spelling, and word errors may remain.)

## 2018-05-15 NOTE — LETTER
5/15/2018         RE: Kamilah Dee  [address]  [facility] [place] [facility] [address]        Dear Colleague,    Thank you for referring your patient, Kamilah Dee, to the [facility]. Please see a copy of my visit note below.    INITIAL NEUROLOGY CONSULTATION    DATE OF VISIT: 5/15/2018  CLINIC LOCATION: [facility]  MRN: [id number]  PATIENT NAME: Kamilah Dee  YOB: 1985    PRIMARY CARE PROVIDER: Mario Joseph MD     REASON FOR VISIT:   Chief Complaint   Patient presents with     Consult     TIC disorder      HISTORY OF PRESENT ILLNESS:                                                    Ms. Kamilah Dee is 33 year old right handed female patient with history of anxiety, depression, migraine, polysubstance drug abuse, and tic disorder, who was seen in consultation today requested by Mario Joseph MD, for motor tics.    Per patient's report, she has long-standing history of motor tics dating back at least 20 years (when she was 14-15 years old).  These involuntary movements were on and off throughout the years.  Over the last month, they worsened again.    Currently, the patient has brief multiple per day involuntary movements that involve twisting of her neck, shoulder shrugging, and limb movements.  Prior to the movement she feels tension that builds up (premonitory urge) and is relieved after the movement.  She could temporarily suppress these movements for 5-10 seconds, but after that they restart again.  She feels that alcohol and lorazepam make her movements better.  Massage seemed to temporarily improve her shoulder movement, but has no effect on the rest of the movements.  Symptoms seem to be worse while driving, and possibly with increased stress and anxiety.  There are no other focal neurological symptoms.  She tried Cogentin and risperidone without significant effect.  Her primary care provider recently prescribed gabapentin.  She takes 300 mg at bedtime  "without noticeable effect on her tics.    In the past, the patient was evaluated at [facility] by Dr. Angel (2009 and 2012).  Initially, she presented with dizziness and uncontrolled movements.  Her evaluation included lab workup (ceruloplasmin, heavy metal screen, Treponema pallidum testing, blood smear/CBC for acanthocytosis), which was unrevealing.  In 2012, she was seen again for worsening movements after starting Adderall.  She was recommended to stop it.    Recent laboratory workup (May 2018) includes normal CMP, TSH (1.82) vitamin D level of 24, and negative hCG.    Previous brain MRI from 2009 (performed for headaches/dizziness) was unremarkable.    No additional pertinent information is available in Care Everywhere, which was reviewed.    The patient denies a history of recent head injury. Prior neurological history: negative for stroke, brain neoplasms, seizure disorders, multiple sclerosis, meningitis, encephalitis, and major head injuries.    Neurologic Review of Systems - no amaurosis, diplopia, abnormal speech, unilateral numbness or weakness.  Since her polysubstance drug abuse, the patient experiences mild intermittent chronic sensations inside her head in the form of \"brain is moving inside\" or \"something is leaking out of my head\".  She endorses insomnia, fatigue, chest pain, anxiety, depression, panic attacks, headaches, shortness of breath, and memory problems.  These complaints have been already discussed with other medical providers.  Otherwise, she denies any other complaints on 14-point comprehensive review of systems.  PAST MEDICAL/SURGICAL HISTORY:                                                    I personally reviewed patient's past medical and surgical history with the patient at today's visit.  Past Medical History:   Diagnosis Date     Anemia     with pregnancy     Anxiety 1/21/2014     Depo-Provera contraceptive status      Depression      History of " varicella-documented immunity 7/21/2011     Migraine 8/3/2012     Other, mixed, or unspecified nondependent drug abuse, episodic      Tic age 14     Past Surgical History:   Procedure Laterality Date     HC REMOVAL OF TONSILS,11+ Y/O  1996    Tonsils 11+y.o.     MEDICATIONS:                                                    I personally reviewed patient's medications and allergies with the patient at today's visit.  Current Outpatient Prescriptions on File Prior to Visit:  busPIRone (BUSPAR) 10 MG tablet Take 1 tablet (10 mg) by mouth 2 times daily   busPIRone (BUSPAR) 15 MG tablet Take 1 tablet (15 mg) by mouth 2 times daily   DULoxetine (CYMBALTA) 30 MG EC capsule TAKE 3 CAPSULES(90 MG) BY MOUTH DAILY IN THE MORNING   gabapentin (NEURONTIN) 300 MG capsule 1 capsule twice a day   LORazepam (ATIVAN) 1 MG tablet 1/2-1 tab every 12 hours as needed for severe anxiety   medroxyPROGESTERone (DEPO-PROVERA) 150 MG/ML injection Inject 1 mL (150 mg) into the muscle every 3 months     ALLERGIES:                                                    No Known Allergies  FAMILY/SOCIAL HISTORY:                                                    Family and social history was reviewed with the patient at today's visit. No family history of neurological disorders.   Problem (# of Occurrences) Relation (Name,Age of Onset)    Alcohol/Drug (1) Mother: b:1964 addicted to crack    Cervical Cancer (1) Sister (35): 10/2015    Family History Negative (4) Father: b: 1962, Sister: b:1994, Sister: b:1981, Brother: b:1989        , lives with her 2 children.  Former smoker, quit in 2010.  Consumes 2-5 glasses of wine per week.  Denies use of recreational drugs.  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  Social History   Substance Use Topics     Smoking status: Former Smoker     Quit date: 5/25/2010     Smokeless tobacco: Never Used     Alcohol use No      Comment: occasionally 1-2 etoh a month/     REVIEW OF SYSTEMS:                                                     Patient has completed a [facility] Patient Health History, including a 14-system review, which was personally reviewed, and pertinent positives are listed in HPI. She denies any additional problems on the further questioning.  EXAM:                                                    VITAL SIGNS:   /60 (BP Location: Left arm, Patient Position: Sitting, Cuff Size: Adult Regular)  Pulse 68  Temp 98.7  F (37.1  C) (Oral)  Resp 18  Wt 60.3 kg (133 lb)  BMI 21.47 kg/m2    MINI COG 5/15/2018   Clock Draw Score 2 Normal   3 Item Recall 3 objects recalled   Mini Cog Total Score 5   Administered by:  Rohith Mccormack MA     General: pt is in NAD, cooperative.  Skin: normal turgor, moist mucous membranes, no lesions/rashes noticed.  HEENT: ATNC, EOMI, PERRL, white sclera, normal conjunctiva, no nystagmus or ptosis. No carotid bruits bilaterally.  Respiratory: lung sounds clear to auscultation bilaterally, no crackles, wheezes, rhonchi. Symmetric lung excursion, no accessory respiratory muscle use.  Cardiovascular: normal S1/S2, no murmurs/rubs/gallops.   Abdomen: Not distended.  : deferred.    Neurological:  Mental: alert, follows commands, mini-cog is 5/5 with 3/3 on memory recall, no aphasia or dysarthria. Fund of knowledge is appropriate for age.  Cranial Nerves:  CN II: visual acuity - able to accurately count fingers with each eye. Visual fields intact, fundi: discs sharp, no papilledema and normal vessels bilaterally.  CN III, IV, VI: EOM intact, pupils equal and reactive  CN V: facial sensation nl  CN VII: face symmetric, no facial droop  CN VIII: hearing normal  CN IX: palate elevation symmetric, uvula at midline  CN XI SCM normal, shoulder shrug nl  CN XII: tongue midline  Motor: Strength: 5/5 in all major groups of all extremities. Normal tone.  The patient was noted to have frequent brief motor tics involving her head, right shoulder, and left leg. No  pronator drift b/l.  Reflexes: Triceps, biceps, brachioradialis, patellar, and achilles reflexes normal and symmetric. No clonus noted. Toes are down-going b/l.   Sensory: temperature, light touch, pinprick, and vibration intact. Romberg: negative.  Coordination: FNF and heel-shin tests intact b/l. No dysdiadochokinesia with rapid alternating movements.  Gait:  Normal, able to tandem, toe, and heel walk.    DATA:   LABS/IMAGING/OTHER STUDIES: I reviewed pertinent medical records, including personal review of brain MRI images, previous neurology notes, and Care Everywhere, as detailed in the history of present illness.  ASSESSMENT and PLAN:      ASSESSMENT: Kamilah Dee is a 33 year old female patient with history of anxiety, depression, migraine, and polysubstance drug abuse, who presents with chronic involuntary movements worsened over the last month that are clinically consistent with tic disorder.    We had a detailed discussion with the patient regarding her symptoms.  We also reviewed in detail available treatment options, including tetrabenazine, Topamax, and guanfacine along with common and serious side effects.  We decided to try guanfacine first given its side effect profile.  The rest of our discussion and the plan are summarized below.    DIAGNOSES:    ICD-10-CM    1. Tic disorder F95.9      PLAN: At today's visit we thoroughly discussed various diagnostic possibilities for patient's symptoms that are most consistent with tic disorder.  We also reviewed in detail available treatment options and the plan.    The following medications were ordered/refilled: Guanfacine (TENEX) 1 MG tablet: I counseled the patient to take 1/2 pill at bedtime for 3 days, then increase to 1/2 pill twice a day for 3 days, and increase to 1/2 pill 3 times daily after that.  She will contact my clinic if she develops any of intolerable side effects, which were discussed.  They include but not limited to drowsiness, headache,  fatigue, dizziness, insomnia, and abdominal pain.  The patient was also counseled to avoid alcohol while taking this medication, as it tends to increase the sedation.    Next follow-up appointment is in the next 4 weeks or earlier if needed.    I advised the patient to call me with any questions or concerns.    Total Time:  81 minutes with > 50% spent counseling the patient on stated above assessment and recommendations, including nature of the diagnosis, available treatment options, proposed plan of treatment, and prognosis.  Additional time was used to answer patient's questions regarding her symptoms and the plan.    Aydin Younger MD  / Neurology  [place]  (Chart documentation was completed in part with Dragon voice-recognition software. Even though reviewed, some grammatical, spelling, and word errors may remain.)              Again, thank you for allowing me to participate in the care of your patient.        Sincerely,        Aydin Younger MD

## 2018-05-15 NOTE — MR AVS SNAPSHOT
After Visit Summary   5/15/2018    Kamilah Dee    MRN: [id number]           Patient Information     Date Of Birth          1985        Visit Information        Provider Department      5/15/2018 1:30 PM Aydin Younger MD [facility]        Today's Diagnoses     Tic disorder    -  1      Care Instructions    AFTER VISIT SUMMARY (AVS):    At today's visit we discussed various diagnostic possibilities for your symptoms that are most consistent with cystic disorders.  We also reviewed in detail available treatment options and the plan.    The following medications were ordered/refilled: Guanfacine (TENEX) 1 MG tablet: Take 1/2 pill at bedtime for 3 days, then increase to 1/2 pill twice a day for 3 days, increase to 1/2 pill 3 times daily.  Please contact my clinic if you develop any of intolerable side effects.    Next follow-up appointment is in the next 4 weeks or earlier if needed.    Please do not hesitate to call me with any questions or concerns.    Thanks.            Follow-ups after your visit        Follow-up notes from your care team     Return in about 4 weeks (around 6/12/2018).      Your next 10 appointments already scheduled     Jun 08, 2018 10:00 AM CDT   [facility] Aydin [facility] MD   [facility]    [address] [place] [address] [phone]              Who to contact     If you have questions or need follow up information about today's clinic visit or your schedule please contact [facility] directly at [phone].  Normal or non-critical lab and imaging results will be communicated to you by MyChart, letter or phone within 4 business days after the clinic has received the results. If you do not hear from us within 7 days, please contact the clinic through MyChart or phone. If you have a critical or abnormal lab result, we will notify you by  phone as soon as possible.  Submit refill requests through [employer] or call your pharmacy and they will forward the refill request to us. Please allow 3 business days for your refill to be completed.          Additional Information About Your Visit        [employer] Information     [employer] gives you secure access to your electronic health record. If you see a primary care provider, you can also send messages to your care team and make appointments. If you have questions, please call your primary care clinic.  If you do not have a primary care provider, please call [phone] and they will assist you.        Care EveryWhere ID     This is your Care EveryWhere ID. This could be used by other organizations to access your [facility] medical records  [id number]        Your Vitals Were     Pulse Temperature Respirations BMI (Body Mass Index)          68 98.7  F (37.1  C) (Oral) 18 21.47 kg/m2         Blood Pressure from Last 3 Encounters:   05/15/18 112/60   05/01/18 110/60   04/05/18 100/68    Weight from Last 3 Encounters:   05/15/18 60.3 kg (133 lb)   05/01/18 61.7 kg (136 lb)   04/05/18 63.4 kg (139 lb 12.8 oz)              Today, you had the following     No orders found for display         Today's Medication Changes          These changes are accurate as of 5/15/18  2:09 PM.  If you have any questions, ask your nurse or doctor.               Start taking these medicines.        Dose/Directions    guanFACINE 1 MG tablet   Commonly known as:  TENEX   Used for:  Tic disorder   Started by:  Aydin Younger MD        Take 1/2 pill at bedtime for 3 days, then increase to 1/2 pill twice a day for 3 days, increase to 1/2 pills 3 times daily.   Quantity:  90 tablet   Refills:  0            Where to get your medicines      These medications were sent to [facility] [address] [place] [address]  [address] [facility] [address]     Phone:  [phone]     guanFACINE 1 MG  tablet                Primary Care Provider Office Phone # Fax #    Mario Joseph [phone]       [address] [place] [address]        Equal Access to Services     JOHNSONREYMUNDO RAJINDER : Hadjuan cristal ku dialloo Solindsayali, waaxda luqadaha, qaybta kaalmada adekatherineda, [place] rosaradhika rocio. So [facility] [phone].    ATENCIÓN: Si habla español, tiene a farfan disposición servicios gratuitos de asistencia lingüística. Llame al [phone].    We comply with applicable federal civil rights laws and [place] laws. We do not discriminate on the basis of race, color, national origin, age, disability, sex, sexual orientation, or gender identity.            Thank you!     Thank you for choosing [facility]  for your care. Our goal is always to provide you with excellent care. Hearing back from our patients is one way we can continue to improve our services. Please take a few minutes to complete the written survey that you may receive in the mail after your visit with us. Thank you!             Your Updated Medication List - Protect others around you: Learn how to safely use, store and throw away your medicines at www.disposemymeds.org.          This list is accurate as of 5/15/18  2:09 PM.  Always use your most recent med list.                   Brand Name Dispense Instructions for use Diagnosis    * busPIRone 10 MG tablet    BUSPAR    60 tablet    Take 1 tablet (10 mg) by mouth 2 times daily    Anxiety       * busPIRone 15 MG tablet    BUSPAR    60 tablet    Take 1 tablet (15 mg) by mouth 2 times daily    Major depressive disorder, single episode, mild (H), Anxiety, Tic disorder       DULoxetine 30 MG EC capsule    CYMBALTA    90 capsule    TAKE 3 CAPSULES(90 MG) BY MOUTH DAILY IN THE MORNING    Major depressive disorder, single episode, mild (H), Anxiety       gabapentin 300 MG capsule    NEURONTIN    60 capsule    1 capsule twice a day    Tic disorder, HAMIDA (generalized anxiety  disorder)       guanFACINE 1 MG tablet    TENEX    90 tablet    Take 1/2 pill at bedtime for 3 days, then increase to 1/2 pill twice a day for 3 days, increase to 1/2 pills 3 times daily.    Tic disorder       LORazepam 1 MG tablet    ATIVAN    30 tablet    1/2-1 tab every 12 hours as needed for severe anxiety    Anxiety, Tic disorder       medroxyPROGESTERone 150 MG/ML injection    DEPO-PROVERA    1 mL    Inject 1 mL (150 mg) into the muscle every 3 months    Encounter for initial prescription of injectable contraceptive       * Notice:  This list has 2 medication(s) that are the same as other medications prescribed for you. Read the directions carefully, and ask your doctor or other care provider to review them with you.

## 2018-05-22 ENCOUNTER — ALLIED HEALTH/NURSE VISIT (OUTPATIENT)
Dept: NURSING | Facility: CLINIC | Age: 33
End: 2018-05-22
Payer: COMMERCIAL

## 2018-05-22 PROCEDURE — 96372 THER/PROPH/DIAG INJ SC/IM: CPT

## 2018-05-22 NOTE — NURSING NOTE
BP: Data Unavailable    LAST PAP/EXAM:   Lab Results   Component Value Date    PAP NIL 12/12/2016     URINE HCG:not indicated    The following medication was given:     MEDICATION: Depo Provera 150mg  ROUTE: IM  SITE: Deltoid - Right  : [employer]  LOT #: [id number]  EXP: 5/1/2020  NEXT INJECTION DUE: 8/7/18 - 8/21/18   Provider: Dr. Mario Luque, CMA on 5/22/2018 at 2:23 PM

## 2018-05-22 NOTE — MR AVS SNAPSHOT
After Visit Summary   5/22/2018    Kamilah Dee    MRN: [id number]           Patient Information     Date Of Birth          1985        Visit Information        Provider Department      5/22/2018 2:00 PM [facility] - NURSE [facility]        Today's Diagnoses     Contraception    -  1       Follow-ups after your visit        Your next 10 appointments already scheduled     Jun 08, 2018 10:00 AM CDT   [facility]    [address] [facility] [address] [phone]              Who to contact     If you have questions or need follow up information about today's clinic visit or your schedule please contact [facility] directly at [phone].  Normal or non-critical lab and imaging results will be communicated to you by MyChart, letter or phone within 4 business days after the clinic has received the results. If you do not hear from us within 7 days, please contact the clinic through [employer] or phone. If you have a critical or abnormal lab result, we will notify you by phone as soon as possible.  Submit refill requests through [employer] or call your pharmacy and they will forward the refill request to us. Please allow 3 business days for your refill to be completed.          Additional Information About Your Visit        MyChart Information     Lost My Name gives you secure access to your electronic health record. If you see a primary care provider, you can also send messages to your care team and make appointments. If you have questions, please call your primary care clinic.  If you do not have a primary care provider, please call [phone] and they will assist you.        Care EveryWhere ID     This is your Care EveryWhere ID. This could be used by other organizations to access your [place] medical records  [id number]         Blood  Pressure from Last 3 Encounters:   05/15/18 112/60   05/01/18 110/60   04/05/18 100/68    Weight from Last 3 Encounters:   05/15/18 133 lb (60.3 kg)   05/01/18 136 lb (61.7 kg)   04/05/18 139 lb 12.8 oz (63.4 kg)              We Performed the Following     INJECTION INTRAMUSCULAR OR SUB-Q     Medroxyprogesterone inj  1mg   (Depo Provera J-Code)        Primary Care Provider Office Phone # Fax #    Mario Joseph [phone]       [address] [facility] [address]        Equal Access to Services     SAV CALVILLO : Hadii cristal ku hadasho Sokaylan, waaxda luqadaha, qaybta kaalmada adedorianyada, well[place] chan. So [facility] [phone].    ATENCIÓN: Si habla español, tiene a farfan disposición servicios gratuitos de asistencia lingüística. Llame al [phone].    We comply with applicable federal civil rights laws and [place] laws. We do not discriminate on the basis of race, color, national origin, age, disability, sex, sexual orientation, or gender identity.            Thank you!     Thank you for choosing [facility]  for your care. Our goal is always to provide you with excellent care. Hearing back from our patients is one way we can continue to improve our services. Please take a few minutes to complete the written survey that you may receive in the mail after your visit with us. Thank you!             Your Updated Medication List - Protect others around you: Learn how to safely use, store and throw away your medicines at www.disposemymeds.org.          This list is accurate as of 5/22/18  2:26 PM.  Always use your most recent med list.                   Brand Name Dispense Instructions for use Diagnosis    * busPIRone 10 MG tablet    BUSPAR    60 tablet    Take 1 tablet (10 mg) by mouth 2 times daily    Anxiety       * busPIRone 15 MG tablet    BUSPAR    60 tablet    Take 1 tablet (15 mg) by mouth 2 times daily    Major depressive disorder, single episode,  mild (H), Anxiety, Tic disorder       DULoxetine 30 MG EC capsule    CYMBALTA    90 capsule    TAKE 3 CAPSULES(90 MG) BY MOUTH DAILY IN THE MORNING    Major depressive disorder, single episode, mild (H), Anxiety       gabapentin 300 MG capsule    NEURONTIN    60 capsule    1 capsule twice a day    Tic disorder, HAMIDA (generalized anxiety disorder)       guanFACINE 1 MG tablet    TENEX    90 tablet    Take 1/2 pill at bedtime for 3 days, then increase to 1/2 pill twice a day for 3 days, increase to 1/2 pills 3 times daily.    Tic disorder       LORazepam 1 MG tablet    ATIVAN    30 tablet    1/2-1 tab every 12 hours as needed for severe anxiety    Anxiety, Tic disorder       medroxyPROGESTERone 150 MG/ML injection    DEPO-PROVERA    1 mL    Inject 1 mL (150 mg) into the muscle every 3 months    Encounter for initial prescription of injectable contraceptive       * Notice:  This list has 2 medication(s) that are the same as other medications prescribed for you. Read the directions carefully, and ask your doctor or other care provider to review them with you.

## 2018-07-26 ENCOUNTER — OFFICE VISIT (OUTPATIENT)
Dept: NEUROLOGY | Facility: CLINIC | Age: 33
End: 2018-07-26
Payer: COMMERCIAL

## 2018-07-26 VITALS
SYSTOLIC BLOOD PRESSURE: 122 MMHG | BODY MASS INDEX: 21.31 KG/M2 | WEIGHT: 132 LBS | DIASTOLIC BLOOD PRESSURE: 72 MMHG | HEART RATE: 97 BPM | OXYGEN SATURATION: 98 % | RESPIRATION RATE: 16 BRPM | TEMPERATURE: 98.8 F

## 2018-07-26 DIAGNOSIS — F95.9 TIC DISORDER: ICD-10-CM

## 2018-07-26 PROCEDURE — 99214 OFFICE O/P EST MOD 30 MIN: CPT | Performed by: PSYCHIATRY & NEUROLOGY

## 2018-07-26 RX ORDER — GUANFACINE 1 MG/1
TABLET ORAL
Qty: 90 TABLET | Refills: 2 | Status: SHIPPED | OUTPATIENT
Start: 2018-07-26 | End: 2018-09-06

## 2018-07-26 NOTE — MR AVS SNAPSHOT
After Visit Summary   7/26/2018    Kamilah Dee    MRN: [id number]           Patient Information     Date Of Birth          1985        Visit Information        Provider Department      7/26/2018 4:00 PM Aydin Younger MD [facility]        Today's Diagnoses     Tic disorder          Care Instructions    AFTER VISIT SUMMARY (AVS):    At today's visit we discussed current symptoms, treatment options, and the plan, which includes:  Orders Placed This Encounter   Procedures     NEUROLOGY ADULT REFERRAL (movement disorder clinic)     The following medications were changed: Guanfacine (TENEX) 1 MG: gradually increase to 1 mg 3 times daily.    Next follow-up appointment is on as needed basis.    Please do not hesitate to call me with any questions or concerns.    Thanks.            Follow-ups after your visit        Additional Services     NEUROLOGY ADULT REFERRAL       Your provider has referred you for the following:   Consult at [facility] [place]: Neurology Clinic [facility] [place] [phone]   [email or web address]  Movement Disorder    Please be aware that coverage of these services is subject to the terms and limitations of your health insurance plan.  Call member services at your health plan with any benefit or coverage questions.      Please bring the following with you to your appointment:    (1) Any X-Rays, CTs or MRIs which have been performed.  Contact the facility where they were done to arrange for  prior to your scheduled appointment.    (2) List of current medications  (3) This referral request   (4) Any documents/labs given to you for this referral                  Your next 10 appointments already scheduled     Jul 26, 2018  4:00 PM CDT   [facility]    [address] [facility] [address] [phone]               Who to contact     If you have questions or need follow up information about today's clinic visit or your schedule please contact [facility] directly at [phone].  Normal or non-critical lab and imaging results will be communicated to you by [employer], letter or phone within 4 business days after the clinic has received the results. If you do not hear from us within 7 days, please contact the clinic through [employer] or phone. If you have a critical or abnormal lab result, we will notify you by phone as soon as possible.  Submit refill requests through [employer] or call your pharmacy and they will forward the refill request to us. Please allow 3 business days for your refill to be completed.          Additional Information About Your Visit        [employer] Information     [employer] gives you secure access to your electronic health record. If you see a primary care provider, you can also send messages to your care team and make appointments. If you have questions, please call your primary care clinic.  If you do not have a primary care provider, please call [phone] and they will assist you.        Care EveryWhere ID     This is your Care EveryWhere ID. This could be used by other organizations to access your [facility] medical records  [id number]        Your Vitals Were     Pulse Temperature Respirations Pulse Oximetry BMI (Body Mass Index)       97 98.8  F (37.1  C) (Oral) 16 98% 21.31 kg/m2        Blood Pressure from Last 3 Encounters:   07/26/18 122/72   05/15/18 112/60   05/01/18 110/60    Weight from Last 3 Encounters:   07/26/18 59.9 kg (132 lb)   05/15/18 60.3 kg (133 lb)   05/01/18 61.7 kg (136 lb)              We Performed the Following     NEUROLOGY ADULT REFERRAL          Today's Medication Changes          These changes are accurate as of 7/26/18  3:25 PM.  If you have any questions, ask your nurse or doctor.               These medicines have changed or have updated  prescriptions.        Dose/Directions    guanFACINE 1 MG tablet   Commonly known as:  TENEX   This may have changed:  additional instructions   Used for:  Tic disorder   Changed by:  Aydin Younger MD        Gradually increase to 1 tablet 3 times daily.   Quantity:  90 tablet   Refills:  2            Where to get your medicines      These medications were sent to [facility] [address] [place] [address] [address] [facility]  [address] [facility] [address]     Phone:  [phone]     guanFACINE 1 MG tablet                Primary Care Provider Office Phone # Fax #    Mario Joseph [phone]       [address] [facility] [address]        Equal Access to Services     REYMUNDO CALVILLO : Hadii cristal long hadasho Soomaali, waaxda luqadaha, qaybta kaalmada adeegyada, wellington roy . So [facility] [phone].    ATENCIÓN: Si habla español, tiene a farfan disposición servicios gratuitos de asistencia lingüística. [facility] [phone].    We comply with applicable federal civil rights laws and [place] laws. We do not discriminate on the basis of race, color, national origin, age, disability, sex, sexual orientation, or gender identity.            Thank you!     Thank you for choosing [facility]  for your care. Our goal is always to provide you with excellent care. Hearing back from our patients is one way we can continue to improve our services. Please take a few minutes to complete the written survey that you may receive in the mail after your visit with us. Thank you!             Your Updated Medication List - Protect others around you: Learn how to safely use, store and throw away your medicines at www.disposemymeds.org.          This list is accurate as of 7/26/18  3:25 PM.  Always use your most recent med list.                   Brand Name Dispense Instructions for use Diagnosis    * busPIRone 10 MG tablet    BUSPAR    60 tablet     Take 1 tablet (10 mg) by mouth 2 times daily    Anxiety       * busPIRone 15 MG tablet    BUSPAR    60 tablet    Take 1 tablet (15 mg) by mouth 2 times daily    Major depressive disorder, single episode, mild (H), Anxiety, Tic disorder       DULoxetine 30 MG EC capsule    CYMBALTA    90 capsule    TAKE 3 CAPSULES(90 MG) BY MOUTH DAILY IN THE MORNING    Major depressive disorder, single episode, mild (H), Anxiety       gabapentin 300 MG capsule    NEURONTIN    60 capsule    1 capsule twice a day    Tic disorder, HAMIDA (generalized anxiety disorder)       guanFACINE 1 MG tablet    TENEX    90 tablet    Gradually increase to 1 tablet 3 times daily.    Tic disorder       LORazepam 1 MG tablet    ATIVAN    30 tablet    1/2-1 tab every 12 hours as needed for severe anxiety    Anxiety, Tic disorder       medroxyPROGESTERone 150 MG/ML injection    DEPO-PROVERA    1 mL    Inject 1 mL (150 mg) into the muscle every 3 months    Encounter for initial prescription of injectable contraceptive       * Notice:  This list has 2 medication(s) that are the same as other medications prescribed for you. Read the directions carefully, and ask your doctor or other care provider to review them with you.

## 2018-07-26 NOTE — LETTER
7/26/2018         RE: Kamilah Dee  [address] [facility] [address]        Dear Colleague,    Thank you for referring your patient, Kamilah Dee, to the [facility]. Please see a copy of my visit note below.    ESTABLISHED PATIENT NEUROLOGY NOTE    DATE OF VISIT: 7/26/2018  CLINIC LOCATION: [facility]  MRN: [id number]  PATIENT NAME: Kamilah Dee  YOB: 1985    PCP: Mario Joseph MD    REASON FOR VISIT:   Chief Complaint   Patient presents with     Follow Up For     Tic disorder had increase.      SUBJECTIVE:                                                      HISTORY OF PRESENT ILLNESS: Patient is here for follow up regarding her tic disorder. Please refer to my initial note from 05/15/2018 for further information.    Since the last visit, the patient reports worsening of her involuntary movements.  She denies interval development of new focal neurological symptoms.  She takes guanfacine as prescribed at 0.5 mg 3 times daily without noticeable side effects.  She does not feel that at this dose it helps.    On review of systems, patient endorses no additional new active complaints. Medications, allergies, family and social history were also reviewed. There are no changes reported by patient.  REVIEW OF SYSTEMS:                                                    10-system review was completed. Pertinent positives are included in HPI. The remainder of ROS is negative.  EXAM:                                                    Physical Exam:   Vitals: /72 (BP Location: Right arm, Patient Position: Sitting, Cuff Size: Adult Regular)  Pulse 97  Temp 98.8  F (37.1  C) (Oral)  Resp 16  Wt 59.9 kg (132 lb)  SpO2 98%  BMI 21.31 kg/m2    General: pt is in NAD, cooperative.  Skin: normal turgor, moist mucous membranes, no lesions/rashes noticed.  HEENT: ATNC, white sclera, normal conjunctiva.  Respiratory: Symmetric lung excursion, no  accessory respiratory muscle use.  Abdomen: Non distended.  Neurological: awake, cooperative, follows commands, no aphasia or dysarthria noted, cranial nerves II-XII: no ptosis, extraocular motility is full, face is symmetric, tongue is midline, equally moves all extremities, continues to have brief motor tics involving her head, right shoulder, and limbs, no dysmetria bilaterally, casual gait is normal.  ASSESSMENT and PLAN:                                                    Assessment: 33-year-old female patient with tic disorder presents for follow-up.  She is on guanfacine 0.5 mg 3 times daily without noticeable benefit.  She feels that her tics worsened.  We discussed the remaining treatment options including trial of tetrabenazine or Topamax versus increasing the dose of guanfacine. we decided to increase her guanfacine dose first.  The patient also expressed a desire to be seen at the movement clinic of the [facility].  The referral was placed.  She also was concerned about her work, because she misses work days due to increased tics.  I advised the patient to apply for FMLA and bring the paperwork to our office to be filled.    Diagnoses:    ICD-10-CM    1. Tic disorder F95.9 guanFACINE (TENEX) 1 MG tablet     NEUROLOGY ADULT REFERRAL     Plan: At today's visit we thoroughly discussed current symptoms, treatment options, and the plan, which includes:  Orders Placed This Encounter   Procedures     NEUROLOGY ADULT REFERRAL (movement disorder clinic)     The following medications were changed: Guanfacine (TENEX) 1 MG: I instructed the patient to gradually increase to 1 mg 3 times daily.    Next follow-up appointment is on as needed basis.    I advised the patient to call me with any questions or concerns.    Total Time: 25 minutes with > 50% spent counseling the patient on stated above assessment and recommendations, including review of current symptoms and proposed plan of treatment.    Aydin  VISHNU Younger MD  / Neurology         Again, thank you for allowing me to participate in the care of your patient.        Sincerely,        Aydin Younger MD

## 2018-07-26 NOTE — PROGRESS NOTES
ESTABLISHED PATIENT NEUROLOGY NOTE    DATE OF VISIT: 7/26/2018  CLINIC LOCATION: [facility]  MRN: [id number]  PATIENT NAME: Kamilah Dee  YOB: 1985    PCP: Mario Joseph MD    REASON FOR VISIT:   Chief Complaint   Patient presents with     Follow Up For     Tic disorder had increase.      SUBJECTIVE:                                                      HISTORY OF PRESENT ILLNESS: Patient is here for follow up regarding her tic disorder. Please refer to my initial note from 05/15/2018 for further information.    Since the last visit, the patient reports worsening of her involuntary movements.  She denies interval development of new focal neurological symptoms.  She takes guanfacine as prescribed at 0.5 mg 3 times daily without noticeable side effects.  She does not feel that at this dose it helps.    On review of systems, patient endorses no additional new active complaints. Medications, allergies, family and social history were also reviewed. There are no changes reported by patient.  REVIEW OF SYSTEMS:                                                    10-system review was completed. Pertinent positives are included in HPI. The remainder of ROS is negative.  EXAM:                                                    Physical Exam:   Vitals: /72 (BP Location: Right arm, Patient Position: Sitting, Cuff Size: Adult Regular)  Pulse 97  Temp 98.8  F (37.1  C) (Oral)  Resp 16  Wt 59.9 kg (132 lb)  SpO2 98%  BMI 21.31 kg/m2    General: pt is in NAD, cooperative.  Skin: normal turgor, moist mucous membranes, no lesions/rashes noticed.  HEENT: ATNC, white sclera, normal conjunctiva.  Respiratory: Symmetric lung excursion, no accessory respiratory muscle use.  Abdomen: Non distended.  Neurological: awake, cooperative, follows commands, no aphasia or dysarthria noted, cranial nerves II-XII: no ptosis, extraocular motility is full, face is symmetric, tongue is midline, equally  moves all extremities, continues to have brief motor tics involving her head, right shoulder, and limbs, no dysmetria bilaterally, casual gait is normal.  ASSESSMENT and PLAN:                                                    Assessment: 33-year-old female patient with tic disorder presents for follow-up.  She is on guanfacine 0.5 mg 3 times daily without noticeable benefit.  She feels that her tics worsened.  We discussed the remaining treatment options including trial of tetrabenazine or Topamax versus increasing the dose of guanfacine. we decided to increase her guanfacine dose first.  The patient also expressed a desire to be seen at the movement clinic of the [facility].  The referral was placed.  She also was concerned about her work, because she misses work days due to increased tics.  I advised the patient to apply for FMLA and bring the paperwork to our office to be filled.    Diagnoses:    ICD-10-CM    1. Tic disorder F95.9 guanFACINE (TENEX) 1 MG tablet     NEUROLOGY ADULT REFERRAL     Plan: At today's visit we thoroughly discussed current symptoms, treatment options, and the plan, which includes:  Orders Placed This Encounter   Procedures     NEUROLOGY ADULT REFERRAL (movement disorder clinic)     The following medications were changed: Guanfacine (TENEX) 1 MG: I instructed the patient to gradually increase to 1 mg 3 times daily.    Next follow-up appointment is on as needed basis.    I advised the patient to call me with any questions or concerns.    Total Time: 25 minutes with > 50% spent counseling the patient on stated above assessment and recommendations, including review of current symptoms and proposed plan of treatment.    Aydin Younger MD  / Neurology

## 2018-07-26 NOTE — PATIENT INSTRUCTIONS
AFTER VISIT SUMMARY (AVS):    At today's visit we discussed current symptoms, treatment options, and the plan, which includes:  Orders Placed This Encounter   Procedures     NEUROLOGY ADULT REFERRAL (movement disorder clinic)     The following medications were changed: Guanfacine (TENEX) 1 MG: gradually increase to 1 mg 3 times daily.    Next follow-up appointment is on as needed basis.    Please do not hesitate to call me with any questions or concerns.    Thanks.

## 2018-08-08 NOTE — TELEPHONE ENCOUNTER
FUTURE VISIT INFORMATION      FUTURE VISIT INFORMATION:    Date: 08/15/2018    Time: 6:00 pm     [place] [facility]  [place]ERR[place] INFORMATION:    Referring provider:  TANYA BARKSDALE    Referring providers clinic:      Reason for visit/diagnosis        NOTES (FOR ALL VISITS) STATUS DETAILS   OFFICE NOTE from referring provider Internal 07/26/2018   OFFICE NOTE from other specialist Internal 07/26/2018   DISCHARGE SUMMARY from hospital Internal 10/28/2014, 11/09/2011   DISCHARGE REPORT from the ER Internal 10/24/2016, 07/09/2015, 05/10/2014, 01/31/2012   OPERATIVE REPORT N/A    MEDICATION LIST Internal    IMAGING  (FOR ALL VISITS)     EMG Internal 07/26/201/, 05/15/2018   EEG N/A    ECT N/A    MRI (HEAD, NECK, SPINE) Internal PACS  07/23/2009   CT (HEAD, NECK, SPINE) N/A

## 2018-08-15 ENCOUNTER — PRE VISIT (OUTPATIENT)
Dept: NEUROLOGY | Facility: CLINIC | Age: 33
End: 2018-08-15

## 2018-08-15 ENCOUNTER — OFFICE VISIT (OUTPATIENT)
Dept: NEUROLOGY | Facility: CLINIC | Age: 33
End: 2018-08-15
Payer: MEDICAID

## 2018-08-15 VITALS
HEIGHT: 66 IN | DIASTOLIC BLOOD PRESSURE: 55 MMHG | WEIGHT: 132 LBS | BODY MASS INDEX: 21.21 KG/M2 | HEART RATE: 80 BPM | SYSTOLIC BLOOD PRESSURE: 99 MMHG

## 2018-08-15 DIAGNOSIS — F95.9 TIC DISORDER: Primary | ICD-10-CM

## 2018-08-15 ASSESSMENT — PAIN SCALES - GENERAL: PAINLEVEL: NO PAIN (0)

## 2018-08-15 NOTE — PATIENT INSTRUCTIONS
#1  We are going to have you see our pharmacist, Latoya Juarez, to see if we can get approval for newer medications for tics.    #2  You have a condition called chronic motor tic disorder    #3  I will see in 1 month

## 2018-08-15 NOTE — MR AVS SNAPSHOT
After Visit Summary   8/15/2018    Kamilah Dee    MRN: [id number]           Patient Information     Date Of Birth          1985        Visit Information        Provider Department      8/15/2018 6:00 PM Ravi Powell MD [facility] Neurology        Today's Diagnoses     Tic disorder    -  1      Care Instructions    #1  We are going to have you see our pharmacist, Latoya Juarez, to see if we can get approval for newer medications for tics.    #2  You have a condition called chronic motor tic disorder    #3  I will see in 1 month          Follow-ups after your visit        Additional Services     MED THERAPY MANAGE REFERRAL       Your provider has referred you to: **[facility] Scheduling (numerous locations) [phone]   [email or web address]  Latoya Juarez    Reason for Referral: Tobacco Cessation, tic treatment Latoya Juarez    The [facility] department will contact you to schedule an appointment.  You may also schedule the appointment by calling [phone].  For [facility] [place] patients, please call [phone] to confirm/schedule your appointment on the next business day.    This service is designed to help you get the most from your medications.  A specially trained Pharmacist will work closely with you and your providers to solve any questions, concerns, issues or problems related to your medications.    Please bring all of your prescription and non-prescription medications (such as vitamins, over-the-counter medications, and herbals) or a detailed medication list to your appointment.    If you have a glucose meter or other home monitoring information, please also bring this to your appointment (i.e. blood glucose log, blood pressure log, pain log, etc.).                  Follow-up notes from your care team     Return in about 1 month (around 9/15/2018).      Your next 10  "appointments already scheduled     Sep 06, 2018 11:30 AM CDT   PHYSICAL with Alejandrina Bridges Masters, DO   [facility] [place] ([facility] [place])    [address] [facility] [address]  [place] [id number] [phone]            Sep 19, 2018  6:00 PM CDT   (Arrive by 5:45 PM)   Return Movement Disorder with Ravi Powell MD   [facility] Neurology ([facility])    [address] [place] [address]  [address] [facility] [address] [phone]              Future tests that were ordered for you today     Open Future Orders        Priority Expected Expires Ordered    MED THERAPY MANAGE REFERRAL Routine  8/15/2019 8/15/2018            Who to contact     Please call your clinic at [phone] to:    Ask questions about your health    Make or cancel appointments    Discuss your medicines    Learn about your test results    Speak to your doctor            Additional Information About Your Visit        [employer] Information     [employer] gives you secure access to your electronic health record. If you see a primary care provider, you can also send messages to your care team and make appointments. If you have questions, please call your primary care clinic.  If you do not have a primary care provider, please call [phone] and they will assist you.      [employer] is an electronic gateway that provides easy, online access to your medical records. With [employer] you can request a clinic appointment, read your test results, renew a prescription or communicate with your care team.     To access your existing account, please contact your [facility] or call [phone] for assistance.        Care EveryWhere ID     This is your Care EveryWhere ID. This could be used by other organizations to access your [place] medical records  [id number]        Your Vitals Were     Pulse Height BMI (Body Mass Index)             80 1.676 m (5' 6\") 21.31 kg/m2          " Blood Pressure from Last 3 Encounters:   08/15/18 99/55   07/26/18 122/72   05/15/18 112/60    Weight from Last 3 Encounters:   08/15/18 59.9 kg (132 lb)   07/26/18 59.9 kg (132 lb)   05/15/18 60.3 kg (133 lb)               Primary Care Provider Office Phone # Fax #    Mario Joseph [phone]       [address] [place] [address]        Equal Access to Services     SAV CALVILLO : Hadii aad ku hadasho Soomaali, waaxda luqadaha, qaybta kaalmada adeegyada, waxay venuin hayaan melanie roy . So [facility] [phone].    ATENCIÓN: Si habla español, tiene a farfan disposición servicios gratuitos de asistencia lingüística. [facility] [phone].    We comply with applicable federal civil rights laws and [place] laws. We do not discriminate on the basis of race, color, national origin, age, disability, sex, sexual orientation, or gender identity.            Thank you!     Thank you for choosing [facility] NEUROLOGY  for your care. Our goal is always to provide you with excellent care. Hearing back from our patients is one way we can continue to improve our services. Please take a few minutes to complete the written survey that you may receive in the mail after your visit with us. Thank you!             Your Updated Medication List - Protect others around you: Learn how to safely use, store and throw away your medicines at www.disposemymeds.org.          This list is accurate as of 8/15/18  6:22 PM.  Always use your most recent med list.                   Brand Name Dispense Instructions for use Diagnosis    * busPIRone 10 MG tablet    BUSPAR    60 tablet    Take 1 tablet (10 mg) by mouth 2 times daily    Anxiety       * busPIRone 15 MG tablet    BUSPAR    60 tablet    Take 1 tablet (15 mg) by mouth 2 times daily    Major depressive disorder, single episode, mild (H), Anxiety, Tic disorder       DULoxetine 30 MG EC capsule    CYMBALTA    90 capsule    TAKE 3 CAPSULES(90 MG) BY MOUTH DAILY IN THE MORNING     Major depressive disorder, single episode, mild (H), Anxiety       gabapentin 300 MG capsule    NEURONTIN    60 capsule    1 capsule twice a day    Tic disorder, HAMIDA (generalized anxiety disorder)       guanFACINE 1 MG tablet    TENEX    90 tablet    Gradually increase to 1 tablet 3 times daily.    Tic disorder       LORazepam 1 MG tablet    ATIVAN    30 tablet    1/2-1 tab every 12 hours as needed for severe anxiety    Anxiety, Tic disorder       medroxyPROGESTERone 150 MG/ML injection    DEPO-PROVERA    1 mL    Inject 1 mL (150 mg) into the muscle every 3 months    Encounter for initial prescription of injectable contraceptive       * Notice:  This list has 2 medication(s) that are the same as other medications prescribed for you. Read the directions carefully, and ask your doctor or other care provider to review them with you.

## 2018-08-15 NOTE — PROGRESS NOTES
Department of Neurology  Movement Disorders Division   Initial Clinic Evaluation     Patient: Kamilah Dee   MRN: [id number]   : 1985   Date of Visit: 8/15/2018     Referring Physician: Carisa    Reason for Referral: Tic disorder    Impression:  1.  Chronic motor tic disorder  2.  Past history of substance abuse    Recommendations:  1.  I think the patient should have an escalation of her chronic tic therapy.  The options would be for dopamine blockers such as Risperdal or Olanzapine versus a VMAT2 inhibitor such as tetrabenazine, valbenazine or deutrotetrabenazine.  The advantage of a dopamine blocker is that it would be easy to prescribe and relatively low cost.  The disadvantage would be the possibility of metabolic syndrome and the small possibility of tardive dyskinesia over time.  The advantage of the VMAT2 inhibitor would be the virtually zero risk of tardive dyskinesia.  The disadvantage would be the possible worsening of depression and of course the biggest disadvantage is getting the medicine approved or making it available at a reasonable cost.  I have asked the patient to visit with Latoya Juarez our pharmacist to see if there is any way we can get a VMAT2 inhibitor approved for her.  2.  The patient has had a fairly complete workup but nothing more needs to be done.  She has had an MRI of the head in  and has had a ceruloplasmin tested by Dr. Angel in the past.  I think her diagnosis is secure and no further testing is necessary.    We appreciate Dr. Younger giving us the opportunity to meet this very pleasant young woman.      Please call or write with questions or concerns,    Sincerely yours,    Ravi Powell MD      History of Present Illness  Ms. Dee is a 33 year old female who is right-handed.  She works at [employer].    She has no family history of tics or Tourette's syndrome.    Her pregnancy and delivery were normal.  She had no difficulty with developmental  milestones.  She did well or at least satisfactory in school until around the age of 11.  She had no learning disabilities and no one was concerned about ADHD.    At age 11 or 12 she became involved in substance abuse.  She says that she was involved with meth abuse, LSD, ecstasy and some cocaine.  She also did heavy drinking.  During this time it was noted that when she would be intoxicated she would have abnormal movements.  It does not sound like she ever had severe chorea that required medical attention.  However her friends would comment on her extra movements.  These movements had onset around the age of 14.  They would involve her arms head and back.  They were mainly upper body movements.  She  clearly says that she gets a strong urge to move.  It is an uncomfortable feeling and she feels she has to move to relieve it.  She can hold this off for 10-20 seconds but during that time she has a buildup of the urge and it is extremely and overwhelmingly uncomfortable.  She denies any vocalization.  She denies sniffing or snorting.  She is beginning to develop some my her eye blinking tics.  These tics are troubling to her because it she feels it makes it hard for her to be a good parent.  Her arms will fling and she has a hard time holding things.  Of course her children notice the tics as well.  They are noticed at work but most of her workmates know that she has tics.    She has been off all drugs since the age of 20.  However despite this fact her tics seem to be getting worse.  She admits to some depression from an early age around 11.  She says she still feels she is somewhat depressed but has recently stopped her Cymbalta.  She states that she does have some compulsive tendencies and that she has to check her car door or kiko frequently.  These are not disabling however.    Dr. Younger has tried guanfacine.  Unfortunately the patient has had no relief.  The patient is also tried gabapentin 300 mg at  bedtime without benefit.    In 2009 she had an MRI of the head for unclear reasons and I reviewed this.  It looks entirely normal.  There is no basal ganglia hyperintensity.  She saw Dr. Isha Angel in the past and by report ceruloplasmin was checked and was normal.  No medication trials were tried as best we can tell.    She has other symptoms which are unusual and likely nonneurologic.  She feels she cannot catch her breath at times.  She gets lightheaded even reading a book to her child.  She feels something in her head like a draining or leaking or a popping.  We went over her MRI of the head together and I showed her shoulder that there was no evidence of CSF leak anywhere and that her brain looked entirely normal.  She was reassured by this.      Past Medical History:   Past Medical History:   Diagnosis Date     Anemia     with pregnancy     Anxiety 1/21/2014     Depo-Provera contraceptive status      Depression      History of varicella-documented immunity 7/21/2011     Migraine 8/3/2012     Other, mixed, or unspecified nondependent drug abuse, episodic      Tic age 14       Past Surgical History:   Past Surgical History:   Procedure Laterality Date     HC REMOVAL OF TONSILS,11+ Y/O  1996    Tonsils 11+y.o.       Medications:  Current Outpatient Prescriptions   Medication Sig Dispense Refill     gabapentin (NEURONTIN) 300 MG capsule 1 capsule twice a day 60 capsule 11     guanFACINE (TENEX) 1 MG tablet Gradually increase to 1 tablet 3 times daily. 90 tablet 2     medroxyPROGESTERone (DEPO-PROVERA) 150 MG/ML injection Inject 1 mL (150 mg) into the muscle every 3 months 1 mL 3     busPIRone (BUSPAR) 10 MG tablet Take 1 tablet (10 mg) by mouth 2 times daily (Patient not taking: Reported on 8/15/2018) 60 tablet 0     busPIRone (BUSPAR) 15 MG tablet Take 1 tablet (15 mg) by mouth 2 times daily (Patient not taking: Reported on 8/15/2018) 60 tablet 3     DULoxetine (CYMBALTA) 30 MG EC capsule TAKE 3 CAPSULES(90 MG)  BY MOUTH DAILY IN THE MORNING (Patient not taking: Reported on 8/15/2018) 90 capsule 5     LORazepam (ATIVAN) 1 MG tablet 1/2-1 tab every 12 hours as needed for severe anxiety (Patient not taking: Reported on 8/15/2018) 30 tablet 0          Movement Disorder-related Medications                                                                                                                                                             Allergies: has No Known Allergies.    Social History:   Social History     Social History     Marital status:      Spouse name: Miky     Number of children: 1     Years of education: N/A     Occupational History     Customer [employer]     Social History Main Topics     Smoking status: Former Smoker     Quit date: 5/25/2010     Smokeless tobacco: Never Used     Alcohol use No      Comment: occasionally 1-2 etoh a month/     Drug use: No      Comment: marijuana hasn't used in 3 years, prior use of ampht, LSD, crack & cocaine. Sober since age 18      Sexual activity: Yes     Partners: Male      Comment: Pt. is currently pregnant     Other Topics Concern     None     Social History Narrative    Living arrangements - the patient lives with her , Miky and son       Family History:  Family History   Problem Relation Age of Onset     Alcohol/Drug Mother      b:1964 addicted to crack     Family History Negative Father      b: 1962     Family History Negative Sister      b:1994     Cervical Cancer Sister 35     10/2015     Family History Negative Sister      b:1981     Family History Negative Brother      b:1989       ROS:  General:  Fever : no, Chills: no, Sweats: no, Fatigue: no, ,Weight loss: no  Cardiovascular  Chest Pains: no, Palpitations: no, Edema: no, Shortness of breath: no  Respiratory  Cough: no  Gastrointestinal  Nausea: no, Vomiting: no, Diarrhea: no, Constipation: no  Genitourinary  Dysuria: no, Frequency: no, Urgency: no,  Nocturia: no, Incontinence: no  Women:  Abnormal vaginal bleeding: no  Musculoskeletal  Back pain: no, Neck Pain: no  Joint pain, swelling, redness: no, Stiffness: no  Skin  Rash: no, Itching: no,  Suspicious lesions: no  Psychiatric  Depression: no, Anxiety/Panic: no  Endocrine  Cold intolerance: no, Heat intolerance: no, Excessive thirst: no  Hematologic/LymphatiAbnormal bruising, bleeding: no ,Enlarged lymph nodes: {[id number]  Allergic/Immunologic  Hives (Urticaria): no, HIV exposure: no    Neurologic  Visual loss: no, Double vision: no, Headache: no, Loss of consciousness:  no, Seizure: no, Fainting (syncope): no, Dysarthria: no, Dysphagia:  no, Vertigo:  no, Weakness: no, Atrophy: no, Twitches: no, Lhermitte's: no, Numbness or tingling: no, Handwriting change: no, Tremors: no, Involuntary movements: no, Imbalance: no, Abnormal gait:  no, Falls :no, Memory loss: no, RBD: no, Sleep disorder: no, Hallucination: no, Loss of concentration: no,  Behavioral change: no,  Loss of motivation: no      Comprehensive Neurologic Exam    Mental Status Exam   The patient is alert, oriented and exhibits no difficulty with cognition or memory.  A formal short test of mental status was performed.     Neurovascular         Speech and Language   Right Left     Carotid Bruit Absent Absent  Speech is normal.   Dorsalis Pedis Pulse Normal Normal  Description   Posterior Tibial Pulse Normal Normal  Language is normal.     Cranial Nerve Exam                 Right Left   Right Left   II                                        Normal Normal  Hearing (VIII) Normal Normal      III, IV, V!                   Normal Normal  Nystagmus (VIII) Normal Normal   EOM description                              Gag (IX, X) Normal Normal   Facial Sensation (V) Normal Normal  SCM (XI) Normal Normal   Muscles of Mastication (V) Normal Normal  Trapezius (XI) Normal Normal   Facial Strength (VII) Normal Normal  Tongue (XII) Normal Normal     Motor Exam  Strength (*/5 grading)  Muscle                   Right Left  Muscle Right Left   Frontalis                                           Normal Normal  Iliopsoas Normal    Normal          Orbicularis Oculi                     Normal Normal  Quadrideps Normal    Normal        Orbicularis Oris                         Normal Normal  Anterior Tibial Normal Normal      Deltoid Normal Normal  Gastrocnemius Normal    Normal   Biceps Normal Normal  Extensor Hallucis Longus Normal    Normal   Triceps Normal Normal  Toe Extensors Normal    Normal   EDC Normal Normal  Toe Flexor Normal    Normal   Finger Flexors Normal Normal  Other Normal Normal   First Dorsal Interosseous Normal Normal  Other Normal Normal   Hypothenar Normal Normal  Other Normal Normal   Thenar Normal Normal  Other Normal Normal     Reflexes      Tone   Right Left   Right Left   Biceps Normal Normal  Spasticity (S)  Rigidity (R)     Triceps Normal Normal  Neck     Brachioradialis Normal Normal  Arm     Quadriceps Normal Normal  Leg     Ankle Normal Normal       Babkinski Flexor Flexor         AMR       Coordination   Right Left   Right Left   Fingers Normal Normal  Finger nose finger Normal Normal   Hand Normal Normal  Drift Normal Normal   Leg Normal Normal  Heel Biggs Normal Normal   Foot Normal Normal  Other     Other               Involuntary Movements    Gait and Station  Frequent involuntary movements with rapid head shrugging shoulder shrugging arm elevation and occasional blepharospasm all consistent with motor tics.  These can be inhibited for short period of time.   Right Left  Stand & Sit Normal   (Movement type) Normal Normal  Gait Normal   (Movement type) Normal Normal  Tandem Normal   (Movement type) Normal Normal  Romberg Absent       Sensory Exam          Right Left    Pin Normal Normal    Vibration Normal Normal    Joint position Normal Normal    Other             Coding statement:     Duration of  Services: face-to-face60 min.   Greater than 50% of this visit was spent in counseling  and coordination of care.    Medical decision making is high due to the following components:    Number of diagnoses:[id number]  Management:Diagnostic tests orders or reviewed.No  Medications were prescribed.No Medications were adjusted.No  Complexity of medical data:Outside records reviewed.Yes Radiology images reviewed by myself.Yes Review/order clinical lab tests. Yes Review/order radiologyNo Discussed tests with performing physician. No Called outside records.No Discussed patient with another provider.No Took collateral history.NoRiskOne or more chronic illnesses with severe exacerbation, progression, or side effects of treatment.YesAcute or chronic illness or injury that poses a threat to life or bodily function.No  Abrupt change in neurologic status.No

## 2018-08-15 NOTE — LETTER
8/15/2018     RE: Kamilah Dee  [address] [facility] [address]     Dear Colleague,    Thank you for referring your patient, Kamilah Dee, to the [facility] at [facility]. Please see a copy of my visit note below.    Department of Neurology  Movement Disorders Division   Initial Clinic Evaluation     Patient: Kamilah Dee   MRN: [id number]   : 1985   Date of Visit: 8/15/2018     Referring Physician: Carisa    Reason for Referral: Tic disorder    Impression:  1.  Chronic motor tic disorder  2.  Past history of substance abuse    Recommendations:  1.  I think the patient should have an escalation of her chronic tic therapy.  The options would be for dopamine blockers such as Risperdal or Olanzapine versus a VMAT2 inhibitor such as tetrabenazine, valbenazine or deutrotetrabenazine.  The advantage of a dopamine blocker is that it would be easy to prescribe and relatively low cost.  The disadvantage would be the possibility of metabolic syndrome and the small possibility of tardive dyskinesia over time.  The advantage of the VMAT2 inhibitor would be the virtually zero risk of tardive dyskinesia.  The disadvantage would be the possible worsening of depression and of course the biggest disadvantage is getting the medicine approved or making it available at a reasonable cost.  I have asked the patient to visit with Latoya Juarez our pharmacist to see if there is any way we can get a VMAT2 inhibitor approved for her.  2.  The patient has had a fairly complete workup but nothing more needs to be done.  She has had an MRI of the head in  and has had a ceruloplasmin tested by Dr. Angel in the past.  I think her diagnosis is secure and no further testing is necessary.    We appreciate Dr. Younger giving us the opportunity to meet this very pleasant young woman.      Please call or write with questions or concerns,    Sincerely yours,    Ravi  MD Andre      History of Present Illness  Ms. Dee is a 33 year old female who is right-handed.  She works at [employer].    She has no family history of tics or Tourette's syndrome.    Her pregnancy and delivery were normal.  She had no difficulty with developmental milestones.  She did well or at least satisfactory in school until around the age of 11.  She had no learning disabilities and no one was concerned about ADHD.    At age 11 or 12 she became involved in substance abuse.  She says that she was involved with meth abuse, LSD, ecstasy and some cocaine.  She also did heavy drinking.  During this time it was noted that when she would be intoxicated she would have abnormal movements.  It does not sound like she ever had severe chorea that required medical attention.  However her friends would comment on her extra movements.  These movements had onset around the age of 14.  They would involve her arms head and back.  They were mainly upper body movements.  She  clearly says that she gets a strong urge to move.  It is an uncomfortable feeling and she feels she has to move to relieve it.  She can hold this off for 10-20 seconds but during that time she has a buildup of the urge and it is extremely and overwhelmingly uncomfortable.  She denies any vocalization.  She denies sniffing or snorting.  She is beginning to develop some my her eye blinking tics.  These tics are troubling to her because it she feels it makes it hard for her to be a good parent.  Her arms will fling and she has a hard time holding things.  Of course her children notice the tics as well.  They are noticed at work but most of her workmates know that she has tics.    She has been off all drugs since the age of 20.  However despite this fact her tics seem to be getting worse.  She admits to some depression from an early age around 11.  She says she still feels she is somewhat depressed but has recently stopped her Cymbalta.  She states  that she does have some compulsive tendencies and that she has to check her car door or kiko frequently.  These are not disabling however.    Dr. Younger has tried guanfacine.  Unfortunately the patient has had no relief.  The patient is also tried gabapentin 300 mg at bedtime without benefit.    In 2009 she had an MRI of the head for unclear reasons and I reviewed this.  It looks entirely normal.  There is no basal ganglia hyperintensity.  She saw Dr. Isha Angel in the past and by report ceruloplasmin was checked and was normal.  No medication trials were tried as best we can tell.    She has other symptoms which are unusual and likely nonneurologic.  She feels she cannot catch her breath at times.  She gets lightheaded even reading a book to her child.  She feels something in her head like a draining or leaking or a popping.  We went over her MRI of the head together and I showed her shoulder that there was no evidence of CSF leak anywhere and that her brain looked entirely normal.  She was reassured by this.      Past Medical History:   Past Medical History:   Diagnosis Date     Anemia     with pregnancy     Anxiety 1/21/2014     Depo-Provera contraceptive status      Depression      History of varicella-documented immunity 7/21/2011     Migraine 8/3/2012     Other, mixed, or unspecified nondependent drug abuse, episodic      Tic age 14       Past Surgical History:   Past Surgical History:   Procedure Laterality Date     HC REMOVAL OF TONSILS,11+ Y/O  1996    Tonsils 11+y.o.       Medications:  Current Outpatient Prescriptions   Medication Sig Dispense Refill     gabapentin (NEURONTIN) 300 MG capsule 1 capsule twice a day 60 capsule 11     guanFACINE (TENEX) 1 MG tablet Gradually increase to 1 tablet 3 times daily. 90 tablet 2     medroxyPROGESTERone (DEPO-PROVERA) 150 MG/ML injection Inject 1 mL (150 mg) into the muscle every 3 months 1 mL 3     busPIRone (BUSPAR) 10 MG tablet Take 1 tablet (10 mg) by  mouth 2 times daily (Patient not taking: Reported on 8/15/2018) 60 tablet 0     busPIRone (BUSPAR) 15 MG tablet Take 1 tablet (15 mg) by mouth 2 times daily (Patient not taking: Reported on 8/15/2018) 60 tablet 3     DULoxetine (CYMBALTA) 30 MG EC capsule TAKE 3 CAPSULES(90 MG) BY MOUTH DAILY IN THE MORNING (Patient not taking: Reported on 8/15/2018) 90 capsule 5     LORazepam (ATIVAN) 1 MG tablet 1/2-1 tab every 12 hours as needed for severe anxiety (Patient not taking: Reported on 8/15/2018) 30 tablet 0          Movement Disorder-related Medications                                                                                                                                                             Allergies: has No Known Allergies.    Social History:   Social History     Social History     Marital status:      Spouse name: Miky     Number of children: 1     Years of education: N/A     Occupational History     Customer [employer]     Social History Main Topics     Smoking status: Former Smoker     Quit date: 5/25/2010     Smokeless tobacco: Never Used     Alcohol use No      Comment: occasionally 1-2 etoh a month/     Drug use: No      Comment: marijuana hasn't used in 3 years, prior use of ampht, LSD, crack & cocaine. Sober since age 18      Sexual activity: Yes     Partners: Male      Comment: Pt. is currently pregnant     Other Topics Concern     None     Social History Narrative    Living arrangements - the patient lives with her , Miky and son       Family History:  Family History   Problem Relation Age of Onset     Alcohol/Drug Mother      b:1964 addicted to crack     Family History Negative Father      b: 1962     Family History Negative Sister      b:1994     Cervical Cancer Sister 35     10/2015     Family History Negative Sister      b:1981     Family History Negative Brother      b:1989       ROS:  General:  Fever : no, Chills: no, Sweats: no, Fatigue: no, ,Weight loss: no   Cardiovascular  Chest Pains: no, Palpitations: no, Edema: no, Shortness of breath: no  Respiratory  Cough: no  Gastrointestinal  Nausea: no, Vomiting: no, Diarrhea: no, Constipation: no  Genitourinary  Dysuria: no, Frequency: no, Urgency: no,  Nocturia: no, Incontinence: no Women:  Abnormal vaginal bleeding: no  Musculoskeletal  Back pain: no, Neck Pain: no  Joint pain, swelling, redness: no, Stiffness: no  Skin  Rash: no, Itching: no,  Suspicious lesions: no  Psychiatric  Depression: no, Anxiety/Panic: no  Endocrine  Cold intolerance: no, Heat intolerance: no, Excessive thirst: no  Hematologic/LymphatiAbnormal bruising, bleeding: no ,Enlarged lymph nodes: {.:[id number]  Allergic/Immunologic  Hives (Urticaria): no, HIV exposure: no    Neurologic  Visual loss: no, Double vision: no, Headache: no, Loss of consciousness:  no, Seizure: no, Fainting (syncope): no, Dysarthria: no, Dysphagia:  no, Vertigo:  no, Weakness: no, Atrophy: no, Twitches: no, Lhermitte's: no, Numbness or tingling: no, Handwriting change: no, Tremors: no, Involuntary movements: no, Imbalance: no, Abnormal gait:  no, Falls :no, Memory loss: no, RBD: no, Sleep disorder: no, Hallucination: no, Loss of concentration: no,  Behavioral change: no,  Loss of motivation: no      Comprehensive Neurologic Exam    Mental Status Exam   The patient is alert, oriented and exhibits no difficulty with cognition or memory.  A formal short test of mental status was performed.     Neurovascular         Speech and Language   Right Left     Carotid Bruit Absent Absent  Speech is normal.   Dorsalis Pedis Pulse Normal Normal  Description   Posterior Tibial Pulse Normal Normal  Language is normal.     Cranial Nerve Exam                 Right Left   Right Left   II                                        Normal Normal  Hearing (VIII) Normal Normal      III, IV, V!                   Normal Normal  Nystagmus (VIII) Normal Normal   EOM description                              Gag  (IX, X) Normal Normal   Facial Sensation (V) Normal Normal  SCM (XI) Normal Normal   Muscles of Mastication (V) Normal Normal  Trapezius (XI) Normal Normal   Facial Strength (VII) Normal Normal  Tongue (XII) Normal Normal     Motor Exam  Strength (*/5 grading)  Muscle                  Right Left  Muscle Right Left   Frontalis                                           Normal Normal  Iliopsoas Normal    Normal          Orbicularis Oculi                     Normal Normal  Quadrideps Normal    Normal        Orbicularis Oris                         Normal Normal  Anterior Tibial Normal Normal      Deltoid Normal Normal  Gastrocnemius Normal    Normal   Biceps Normal Normal  Extensor Hallucis Longus Normal    Normal   Triceps Normal Normal  Toe Extensors Normal    Normal   EDC Normal Normal  Toe Flexor Normal    Normal   Finger Flexors Normal Normal  Other Normal Normal   First Dorsal Interosseous Normal Normal  Other Normal Normal   Hypothenar Normal Normal  Other Normal Normal   Thenar Normal Normal  Other Normal Normal     Reflexes      Tone   Right Left   Right Left   Biceps Normal Normal  Spasticity (S)  Rigidity (R)     Triceps Normal Normal  Neck     Brachioradialis Normal Normal  Arm     Quadriceps Normal Normal  Leg     Ankle Normal Normal       Babkinski Flexor Flexor         AMR       Coordination   Right Left   Right Left   Fingers Normal Normal  Finger nose finger Normal Normal   Hand Normal Normal  Drift Normal Normal   Leg Normal Normal  Heel Bigsg Normal Normal   Foot Normal Normal  Other     Other               Involuntary Movements    Gait and Station  Frequent involuntary movements with rapid head shrugging shoulder shrugging arm elevation and occasional blepharospasm all consistent with motor tics.  These can be inhibited for short period of time.   Right Left  Stand & Sit Normal   (Movement type) Normal Normal  Gait Normal   (Movement type) Normal Normal  Tandem Normal   (Movement type) Normal Normal   Romberg Absent       Sensory Exam          Right Left    Pin Normal Normal    Vibration Normal Normal    Joint position Normal Normal    Other             Coding statement:     Duration of  Services: face-to-face60 min.   Greater than 50% of this visit was spent in counseling and coordination of care.    Medical decision making is high due to the following components:    Number of diagnoses:[id number]  Management:Diagnostic tests orders or reviewed.No  Medications were prescribed.No Medications were adjusted.No  Complexity of medical data:Outside records reviewed.Yes Radiology images reviewed by myself.Yes Review/order clinical lab tests. Yes Review/order radiologyNo Discussed tests with performing physician. No Called outside records.No Discussed patient with another provider.No Took collateral history.NoRiskOne or more chronic illnesses with severe exacerbation, progression, or side effects of treatment.YesAcute or chronic illness or injury that poses a threat to life or bodily function.No  Abrupt change in neurologic status.No    Again, thank you for allowing me to participate in the care of your patient.      Sincerely,    Ravi Powell MD

## 2018-08-28 ENCOUNTER — ALLIED HEALTH/NURSE VISIT (OUTPATIENT)
Dept: NURSING | Facility: CLINIC | Age: 33
End: 2018-08-28
Payer: COMMERCIAL

## 2018-08-28 DIAGNOSIS — Z30.42 ON DEPO MEDROXYPROGESTERONE ACETATE FOR CONTRACEPTION: Primary | ICD-10-CM

## 2018-08-28 LAB — BETA HCG QUAL IFA URINE: NEGATIVE

## 2018-08-28 PROCEDURE — 84703 CHORIONIC GONADOTROPIN ASSAY: CPT | Performed by: INTERNAL MEDICINE

## 2018-08-28 PROCEDURE — 96372 THER/PROPH/DIAG INJ SC/IM: CPT

## 2018-09-06 ENCOUNTER — OFFICE VISIT (OUTPATIENT)
Dept: OBGYN | Facility: CLINIC | Age: 33
End: 2018-09-06
Payer: COMMERCIAL

## 2018-09-06 VITALS
SYSTOLIC BLOOD PRESSURE: 122 MMHG | HEART RATE: 72 BPM | DIASTOLIC BLOOD PRESSURE: 70 MMHG | HEIGHT: 66 IN | BODY MASS INDEX: 20.31 KG/M2 | WEIGHT: 126.4 LBS

## 2018-09-06 DIAGNOSIS — Z12.4 CERVICAL CANCER SCREENING: ICD-10-CM

## 2018-09-06 DIAGNOSIS — Z23 NEED FOR PROPHYLACTIC VACCINATION AND INOCULATION AGAINST INFLUENZA: ICD-10-CM

## 2018-09-06 DIAGNOSIS — Z11.3 SCREEN FOR STD (SEXUALLY TRANSMITTED DISEASE): ICD-10-CM

## 2018-09-06 DIAGNOSIS — Z01.419 ENCOUNTER FOR GYNECOLOGICAL EXAMINATION WITHOUT ABNORMAL FINDING: Primary | ICD-10-CM

## 2018-09-06 PROCEDURE — G0124 SCREEN C/V THIN LAYER BY MD: HCPCS | Performed by: OBSTETRICS & GYNECOLOGY

## 2018-09-06 PROCEDURE — 87591 N.GONORRHOEAE DNA AMP PROB: CPT | Performed by: OBSTETRICS & GYNECOLOGY

## 2018-09-06 PROCEDURE — 90471 IMMUNIZATION ADMIN: CPT | Performed by: OBSTETRICS & GYNECOLOGY

## 2018-09-06 PROCEDURE — 87491 CHLMYD TRACH DNA AMP PROBE: CPT | Performed by: OBSTETRICS & GYNECOLOGY

## 2018-09-06 PROCEDURE — 90686 IIV4 VACC NO PRSV 0.5 ML IM: CPT | Performed by: OBSTETRICS & GYNECOLOGY

## 2018-09-06 PROCEDURE — 99395 PREV VISIT EST AGE 18-39: CPT | Mod: 25 | Performed by: OBSTETRICS & GYNECOLOGY

## 2018-09-06 PROCEDURE — 36415 COLL VENOUS BLD VENIPUNCTURE: CPT | Performed by: OBSTETRICS & GYNECOLOGY

## 2018-09-06 PROCEDURE — 87389 HIV-1 AG W/HIV-1&-2 AB AG IA: CPT | Performed by: OBSTETRICS & GYNECOLOGY

## 2018-09-06 PROCEDURE — 87624 HPV HI-RISK TYP POOLED RSLT: CPT | Performed by: OBSTETRICS & GYNECOLOGY

## 2018-09-06 PROCEDURE — G0145 SCR C/V CYTO,THINLAYER,RESCR: HCPCS | Performed by: OBSTETRICS & GYNECOLOGY

## 2018-09-06 ASSESSMENT — ANXIETY QUESTIONNAIRES
2. NOT BEING ABLE TO STOP OR CONTROL WORRYING: NEARLY EVERY DAY
3. WORRYING TOO MUCH ABOUT DIFFERENT THINGS: NEARLY EVERY DAY
7. FEELING AFRAID AS IF SOMETHING AWFUL MIGHT HAPPEN: MORE THAN HALF THE DAYS
5. BEING SO RESTLESS THAT IT IS HARD TO SIT STILL: SEVERAL DAYS
1. FEELING NERVOUS, ANXIOUS, OR ON EDGE: NEARLY EVERY DAY
GAD7 TOTAL SCORE: 17
IF YOU CHECKED OFF ANY PROBLEMS ON THIS QUESTIONNAIRE, HOW DIFFICULT HAVE THESE PROBLEMS MADE IT FOR YOU TO DO YOUR WORK, TAKE CARE OF THINGS AT HOME, OR GET ALONG WITH OTHER PEOPLE: SOMEWHAT DIFFICULT
6. BECOMING EASILY ANNOYED OR IRRITABLE: MORE THAN HALF THE DAYS

## 2018-09-06 ASSESSMENT — PATIENT HEALTH QUESTIONNAIRE - PHQ9: 5. POOR APPETITE OR OVEREATING: NEARLY EVERY DAY

## 2018-09-06 NOTE — PROGRESS NOTES
Kamilah is a 33 year old  female who presents for annual exam.     Besides routine health maintenance, she has no other health concerns today .    HPI:  The patient's PCP is  Mario Joseph MD.      Back on Depo provera, doing well. Restarted less than year ago. Is UTD.     Not exercising. No vitamins.           GYNECOLOGIC HISTORY:    No LMP recorded. Patient has had an injection.  Her current contraception method is: depo or other injectable.  She  reports that she quit smoking about 8 years ago. She has never used smokeless tobacco.    Patient is sexually active.  STD testing offered?  Accepted  Last PHQ-9 score on record =   PHQ-9 SCORE 2018   Total Score -   Total Score 19     Last GAD7 score on record =   HAMIDA-7 SCORE 2018   Total Score -   Total Score 17     Alcohol Score = 3    HEALTH MAINTENANCE:  Cholesterol: (No results found for: CHOL   Last Mammo: NA, Result: not applicable, Next Mammo: 6 years   Pap: 2016 Neg, HPV Neg  Lab Results   Component Value Date    PAP NIL 2016    PAP NIL 2015    PAP NIL 04/15/2014      Colonoscopy:  NA, Result: not applicable, Next Colonoscopy: 16 years.  Dexa:  NA    Health maintenance updated:  yes    HISTORY:  Obstetric History       T2      L2     SAB0   TAB0   Ectopic0   Multiple0   Live Births2       # Outcome Date GA Lbr Eddie/2nd Weight Sex Delivery Anes PTL Lv   2 Term 10/29/14 39w6d 04:40 / 00:21 7 lb 5.5 oz (3.33 kg) F Vag-Spont EPI N LEAH      Name: Clary      Apgar1:  8                Apgar5: 9   1 Term 11 40w1d 07:34 / 01:28 7 lb 13.6 oz (3.56 kg) M Vag-Spont   LEAH      Name: Bandar      Apgar1:                 Apgar5: 9          Patient Active Problem List   Diagnosis     Tic disorder     CARDIOVASCULAR SCREENING; LDL GOAL LESS THAN 160     Health Care Home     Migraine     HAMIDA (generalized anxiety disorder)     Verruca     Major depressive disorder, single episode, mild (H)     Past Surgical History:   Procedure  "Laterality Date     HC REMOVAL OF TONSILS,12+ Y/O  1996    Tonsils 11+y.o.      Social History   Substance Use Topics     Smoking status: Former Smoker     Quit date: 5/25/2010     Smokeless tobacco: Never Used     Alcohol use No      Comment: occasionally 1-2 etoh a month/      Problem (# of Occurrences) Relation (Name,Age of Onset)    Alcohol/Drug (1) Mother: b:1964 addicted to crack    Cervical Cancer (1) Sister (35): 10/2015    Family History Negative (4) Father: b: 1962, Sister: b:1994, Sister: b:1981, Brother: b:1989            Current Outpatient Prescriptions   Medication Sig     medroxyPROGESTERone (DEPO-PROVERA) 150 MG/ML injection Inject 1 mL (150 mg) into the muscle every 3 months     No current facility-administered medications for this visit.      No Known Allergies    Past medical, surgical, social and family histories were reviewed and updated in EPIC.    ROS:   12 point review of systems negative other than symptoms noted below.  Constitutional: Fatigue  Breast: Tenderness  Cardiovascular: Lightheadedness  Respiratory: Shortness of Breath  Neurologic: Dizziness  Musculoskeletal: Muscular Weakness  Endocrine: Loss of Hair  Psychiatric: Anxiety, Depression and Difficulty Sleeping    EXAM:  /70  Pulse 72  Ht 5' 5.75\" (1.67 m)  Wt 126 lb 6.4 oz (57.3 kg)  Breastfeeding? No  BMI 20.56 kg/m2   BMI: Body mass index is 20.56 kg/(m^2).    PHYSICAL EXAM:  Constitutional:  Appearance: Well nourished, well developed, alert, in no acute distress  Neck:  Lymph Nodes:  No lymphadenopathy present    Thyroid:  Gland size normal, nontender, no nodules or masses present  on palpation  Chest:  Respiratory Effort:  Breathing unlabored  Cardiovascular:    Heart: Auscultation:  Regular rate, normal rhythm, no murmurs present  Breasts: Inspection of Breasts:  No lymphadenopathy present., Palpation of Breasts and Axillae:  No masses present on palpation, no breast tenderness., Axillary Lymph Nodes:  No " lymphadenopathy present. and No nodularity, asymmetry or nipple discharge bilaterally.  Gastrointestinal:   Abdominal Examination:  Abdomen nontender to palpation, tone normal without rigidity or guarding, no masses present, umbilicus without lesions   Liver and Spleen:  No hepatomegaly present, liver nontender to palpation    Hernias:  No hernias present  Lymphatic: Lymph Nodes:  No other lymphadenopathy present  Skin:  General Inspection:  No rashes present, no lesions present, no areas of  discoloration    Genitalia and Groin:  No rashes present, no lesions present, no areas of  discoloration, no masses present  Neurologic/Psychiatric:    Mental Status:  Oriented X3     Pelvic Exam:  External Genitalia:     Normal appearance for age, no discharge present, no tenderness present, no inflammatory lesions present, color normal  Vagina:     Normal vaginal vault without central or paravaginal defects, no discharge present, no inflammatory lesions present, no masses present  Bladder:     Nontender to palpation  Urethra:   Urethral Body:  Urethra palpation normal, urethra structural support normal   Urethral Meatus:  No erythema or lesions present  Cervix:     Appearance healthy, no lesions present, nontender to palpation, no bleeding present  Uterus:     Uterus: firm, normal sized and nontender, anteverted in position.   Adnexa:     No adnexal tenderness present, no adnexal masses present  Perineum:     Perineum within normal limits, no evidence of trauma, no rashes or skin lesions present  Anus:     Anus within normal limits, no hemorrhoids present  Inguinal Lymph Nodes:     No lymphadenopathy present  Pubic Hair:     Normal pubic hair distribution for age  Genitalia and Groin:     No rashes present, no lesions present, no areas of discoloration, no masses present      COUNSELING:   Reviewed preventive health counseling, as reflected in patient instructions       Regular exercise       Osteoporosis Prevention/Bone  Health    BMI: Body mass index is 20.56 kg/(m^2).      ASSESSMENT:  33 year old female with satisfactory annual exam.    ICD-10-CM    1. Encounter for gynecological examination without abnormal finding Z01.419 HPV High Risk Types DNA Cervical     Pap imaged thin layer screen with HPV - recommended age 30 - 65 years (select HPV order below)     Treponema Abs w Reflex to RPR and Titer     HIV Antigen Antibody Combo     Chlamydia trachomatis PCR     Neisseria gonorrhoeae PCR   2. Need for prophylactic vaccination and inoculation against influenza Z23 FLU VACCINE, SPLIT VIRUS, IM (QUADRIVALENT) [17006]- >3 YRS     Vaccine Administration, Initial [99029]   3. Screen for STD (sexually transmitted disease) Z11.3 HPV High Risk Types DNA Cervical     Pap imaged thin layer screen with HPV - recommended age 30 - 65 years (select HPV order below)     Treponema Abs w Reflex to RPR and Titer     HIV Antigen Antibody Combo     Chlamydia trachomatis PCR     Neisseria gonorrhoeae PCR   4. Cervical cancer screening Z12.4 HPV High Risk Types DNA Cervical     Pap imaged thin layer screen with HPV - recommended age 30 - 65 years (select HPV order below)       PLAN:  -Pap/hpv obtained for cervical cancer screening. Reviewed guidelines-pap q 3yrs until age 30 when co-testing q 5 years. Pt desires to have screening due to her sister's history.  Reviewed her sister's history, as was in 2016, pt not sure of specifics. Rec she find out more history/diagnoses as this may impact her own care and screening  -Breast self awareness discussed. Age 40 for mammogram.  -Discussed exercise-making plan, strength training. Nutrition encouraged.  -Colonoscopy age 50  -Osteoporosis prevention discussed. As she is on depo, rec she ensure 1,000-1,200mg ca per day either in diet or supplements  -Desires STI labs  -Depo for contraception  -Flu shot given today.  -Return one year for next annual exam       Alejandrina Bridges Masters, DO    Injectable Influenza  Immunization Documentation    1.  Is the person to be vaccinated sick today?   No    2. Does the person to be vaccinated have an allergy to a component   of the vaccine?   No  Egg Allergy Algorithm Link    3. Has the person to be vaccinated ever had a serious reaction   to influenza vaccine in the past?   No    4. Has the person to be vaccinated ever had Guillain-Barré syndrome?   No    Form completed by BRUCE

## 2018-09-06 NOTE — MR AVS SNAPSHOT
After Visit Summary   9/6/2018    Kamilah Dee    MRN: [id number]           Patient Information     Date Of Birth          1985        Visit Information        Provider Department      9/6/2018 11:30 AM Alejandrina Louie,  [facility]        Today's Diagnoses     Encounter for gynecological examination without abnormal finding    -  1    Need for prophylactic vaccination and inoculation against influenza        Screen for STD (sexually transmitted disease)        Cervical cancer screening           Follow-ups after your visit        Follow-up notes from your care team     Return in about 1 year (around 9/6/2019).      Your next 10 appointments already scheduled     Sep 19, 2018  6:00 PM CDT   (Arrive by 5:45 PM)   Return Movement Disorder with Ravi Powell MD   [facility] Neurology ([facility])    [address] [place] [address] [phone]              Who to contact     If you have questions or need follow up information about today's clinic visit or your schedule please contact [facility] directly at [phone].  Normal or non-critical lab and imaging results will be communicated to you by MyChart, letter or phone within 4 business days after the clinic has received the results. If you do not hear from us within 7 days, please contact the clinic through MyChart or phone. If you have a critical or abnormal lab result, we will notify you by phone as soon as possible.  Submit refill requests through [employer] or call your pharmacy and they will forward the refill request to us. Please allow 3 business days for your refill to be completed.          Additional Information About Your Visit        MyChart Information     [employer] gives you secure access to your electronic health record. If you see a primary care provider, you can also send messages to your care team and make appointments. If you  "have questions, please call your primary care clinic.  If you do not have a primary care provider, please call [phone] and they will assist you.        Care EveryWhere ID     This is your Care EveryWhere ID. This could be used by other organizations to access your [facility] medical records  [id number]        Your Vitals Were     Pulse Height Breastfeeding? BMI (Body Mass Index)          72 5' 5.75\" (1.67 m) No 20.56 kg/m2         Blood Pressure from Last 3 Encounters:   09/06/18 122/70   08/15/18 99/55   07/26/18 122/72    Weight from Last 3 Encounters:   09/06/18 126 lb 6.4 oz (57.3 kg)   08/15/18 132 lb (59.9 kg)   07/26/18 132 lb (59.9 kg)              We Performed the Following     Chlamydia trachomatis PCR     FLU VACCINE, SPLIT VIRUS, IM (QUADRIVALENT) [27140]- >3 YRS     HIV Antigen Antibody Combo     HPV High Risk Types DNA Cervical     Neisseria gonorrhoeae PCR     Pap imaged thin layer screen with HPV - recommended age 30 - 65 years (select HPV order below)     Treponema Abs w Reflex to RPR and Titer     Vaccine Administration, Initial [54270]        Primary Care Provider Office Phone # Fax #    Mario Joseph [phone]       [address] [place] [address]        Equal Access to Services     SAV CALVILLO : Hadii aad ku hadasho Soomaali, waaxda luqadaha, qaybta kaalmada adedorianyada, wellington chan. So [facility] [phone].    ATENCIÓN: Si habla español, tiene a farfan disposición servicios gratuitos de asistencia lingüística. Llame al [phone].    We comply with applicable federal civil rights laws and [place] laws. We do not discriminate on the basis of race, color, national origin, age, disability, sex, sexual orientation, or gender identity.            Thank you!     Thank you for choosing [facility]  for your care. Our goal is always to provide you with excellent care. Hearing back from our patients is one way we can continue to " improve our services. Please take a few minutes to complete the written survey that you may receive in the mail after your visit with us. Thank you!             Your Updated Medication List - Protect others around you: Learn how to safely use, store and throw away your medicines at www.disposemymeds.org.          This list is accurate as of 9/6/18 12:29 PM.  Always use your most recent med list.                   Brand Name Dispense Instructions for use Diagnosis    medroxyPROGESTERone 150 MG/ML injection    DEPO-PROVERA    1 mL    Inject 1 mL (150 mg) into the muscle every 3 months    Encounter for initial prescription of injectable contraceptive

## 2018-09-06 NOTE — LETTER
September 18, 2018    Kamilah Dee  [address] [facility] [address]      Dear ,      This letter is in regards to your recent cervical cancer screening (Pap smear and HPV test).    Your Pap smear result was reported as ASCUS or Atypical Squamous Cells of Undetermined Significance. This means that there were mildly abnormal cells found in the sample that we collected from your cervix. The vast majority of patients with this result do not have significant cervical abnormalities.     Your cervical sample was also tested for the presence of Human Papillomavirus (HPV). Your HPV test is NEGATIVE for high risk HPV, meaning that no HPV was found at this time.     Over time, your body can get rid of these abnormal cells, so it is recommended that you repeat your pap and HPV in 3 years. A pap smear can always be repeated earlier than this if you prefer.     If you have questions about these results contact [phone]    Please continue to be seen every year for an annual physical exam and other preventative tests.         Sincerely,    Alejandrina Bridges Masters, DO/  Esperanza Lang, RN, BSN  Pap Tracking

## 2018-09-07 LAB
C TRACH DNA SPEC QL NAA+PROBE: NEGATIVE
HIV 1+2 AB+HIV1 P24 AG SERPL QL IA: NONREACTIVE
N GONORRHOEA DNA SPEC QL NAA+PROBE: NEGATIVE
SPECIMEN SOURCE: NORMAL
SPECIMEN SOURCE: NORMAL

## 2018-09-07 ASSESSMENT — PATIENT HEALTH QUESTIONNAIRE - PHQ9: SUM OF ALL RESPONSES TO PHQ QUESTIONS 1-9: 19

## 2018-09-07 ASSESSMENT — ANXIETY QUESTIONNAIRES: GAD7 TOTAL SCORE: 17

## 2018-09-12 LAB
COPATH REPORT: ABNORMAL
PAP: ABNORMAL

## 2018-09-14 PROBLEM — R87.610 ASCUS OF CERVIX WITH NEGATIVE HIGH RISK HPV: Status: ACTIVE | Noted: 2018-09-06

## 2018-09-14 LAB
FINAL DIAGNOSIS: NORMAL
HPV HR 12 DNA CVX QL NAA+PROBE: NEGATIVE
HPV16 DNA SPEC QL NAA+PROBE: NEGATIVE
HPV18 DNA SPEC QL NAA+PROBE: NEGATIVE
SPECIMEN DESCRIPTION: NORMAL
SPECIMEN SOURCE CVX/VAG CYTO: NORMAL

## 2018-09-19 ENCOUNTER — OFFICE VISIT (OUTPATIENT)
Dept: NEUROLOGY | Facility: CLINIC | Age: 33
End: 2018-09-19
Payer: MEDICAID

## 2018-09-19 VITALS
DIASTOLIC BLOOD PRESSURE: 61 MMHG | BODY MASS INDEX: 20.52 KG/M2 | WEIGHT: 127.7 LBS | HEIGHT: 66 IN | SYSTOLIC BLOOD PRESSURE: 116 MMHG | OXYGEN SATURATION: 98 % | HEART RATE: 82 BPM

## 2018-09-19 DIAGNOSIS — F95.9 TIC DISORDER: Primary | ICD-10-CM

## 2018-09-19 LAB — LAB SCANNED RESULT: NORMAL

## 2018-09-19 ASSESSMENT — PAIN SCALES - GENERAL: PAINLEVEL: NO PAIN (0)

## 2018-09-19 NOTE — NURSING NOTE
Chief Complaint   Patient presents with     RECHECK     1 MONTH F/U MOVEMENT DISORDER       Juvencio Santizo, EMT

## 2018-09-19 NOTE — PROGRESS NOTES
Movement Disorder Clinic follow up note    Patient: Kamilah Dee  Medical Record Number: [id number]  Encounter Date: September 19, 2018  PCP:Mario Joseph    CC: Chronic motor tic disorder    Impression:  1.  Chronic motor tic disorder    Recommendations:  1.  I have rescheduled the patient's visit with Latoya Juarez our movement disorder pharmacist.  Latoya will see if any of the VMAT2 inhibitors are available to Kamliah.  2.  I talked to Kamilah about using Risperdal as a bridge therapy to the VMAT2 inhibitor.  After talking about the possible side effects, in particular tardive dyskinesia,  Kamilah is not interested in taking Risperdal.  3.  Once we have started the VMAT2 inhibitor I will see Kamilah back at regular intervals to adjust the dosage with Latoya.    Return to clinic: After visit with Latoya Juarez    Interval Hx:: Ms. Kamilah Dee returns to clinic today for follow-up of her chronic motor tic disorder.Unfortunately she had to cancel her visit with Latoya Juarez.  We have not been able to start the process of applying for a VMAT2 inhibitor for her.  Kamilah is still having disabling tics.  And today she had to call in sick because her tics were so severe.  Two days she had to leave work at [employer] because of her severe tics.  We definitely need to start therapy.  On examination today Kamilah has frequent tics.  The most common tic today causes her to shrug her left shoulder and throw her left arm in the air.      Current medications    Movement Disorder-related Medications                                 None                                                                                                                            Current Outpatient Prescriptions   Medication     medroxyPROGESTERone (DEPO-PROVERA) 150 MG/ML injection     No current facility-administered medications for this visit.        Examination:  /61 (BP Location: Left arm, Patient Position: Sitting, Cuff Size: Adult Small)  Pulse 82  Ht 1.664  "m (5' 5.5\")  Wt 57.9 kg (127 lb 11.2 oz)  SpO2 98%  BMI 20.93 kg/m2  General- no distress, no rash, good pulses, no edema  Respiratory-auscultation over the anterior lung fields is clear  Cardiac- regular rate and rhythm    Neurologic  Mental status  Patient is alert, appropriate, speech is fluent and comprehension is intact    Cranial nerve testing  Pupils are equal and reactive to light, visual fields are full to confrontation bilaterally  Extraocular movements are full  Facial sensation is intact, face is symmetric with rest and activation  Palate rises symmetrically, tongue protrudes at midline with normal movements  Sterocleidomastoid and trapezius strength is normal and symmetric    Motor  Bulk and tone are normal  Strength is 5/5 shoulder abduction, finger abduction, arm flexion and extension, hip flexion, plantar and dorsiflexion    Sensation  Intact to pin, vibration   Proprioception intact in great toes and fingers    Tendon reflexes  Testing at brachioradialis, biceps, triceps, patella and achilles bilaterally showed normal reflexes, symmetrically, without Babinski or Morales signs    Coordination  Finger nose finger testing: normalRapid alternating movements are normal.  Gait and Station: normal  15 minutes of total time was spent face-to-face with the patient over 50% of which was counseling..    "

## 2018-09-19 NOTE — LETTER
9/19/2018       RE: Kamilah Dee  [address] [facility] [address]     Dear Colleague,    Thank you for referring your patient, Kamilah Dee, to the [facility] NEUROLOGY at [facility]. Please see a copy of my visit note below.    Movement Disorder Clinic follow up note    Patient: Kamilah Dee  Medical Record Number: [id number]  Encounter Date: September 19, 2018  PCP:Mario Joseph    CC: Chronic motor tic disorder    Impression:  1.  Chronic motor tic disorder    Recommendations:  1.  I have rescheduled the patient's visit with Latoya Juarez our movement disorder pharmacist.  Latoya will see if any of the VMAT2 inhibitors are available to Kamilah.  2.  I talked to Kamilah about using Risperdal as a bridge therapy to the VMAT2 inhibitor.  After talking about the possible side effects, in particular tardive dyskinesia,  Kamilah is not interested in taking Risperdal.  3.  Once we have started the VMAT2 inhibitor I will see Kamilah back at regular intervals to adjust the dosage with Latoya.    Return to clinic: After visit with Latoya Juarez    Interval Hx:: Ms. Kamilah Dee returns to clinic today for follow-up of her chronic motor tic disorder.Unfortunately she had to cancel her visit with Latoya Juarez.  We have not been able to start the process of applying for a VMAT2 inhibitor for her.  Kamilah is still having disabling tics.  And today she had to call in sick because her tics were so severe.  Two days she had to leave work at [employer] because of her severe tics.  We definitely need to start therapy.  On examination today Kamilah has frequent tics.  The most common tic today causes her to shrug her left shoulder and throw her left arm in the air.      Current medications    Movement Disorder-related Medications                                 None                                                                                                                            Current  "Outpatient Prescriptions   Medication     medroxyPROGESTERone (DEPO-PROVERA) 150 MG/ML injection     No current facility-administered medications for this visit.        Examination:  /61 (BP Location: Left arm, Patient Position: Sitting, Cuff Size: Adult Small)  Pulse 82  Ht 1.664 m (5' 5.5\")  Wt 57.9 kg (127 lb 11.2 oz)  SpO2 98%  BMI 20.93 kg/m2  General- no distress, no rash, good pulses, no edema  Respiratory-auscultation over the anterior lung fields is clear  Cardiac- regular rate and rhythm    Neurologic  Mental status  Patient is alert, appropriate, speech is fluent and comprehension is intact    Cranial nerve testing  Pupils are equal and reactive to light, visual fields are full to confrontation bilaterally  Extraocular movements are full  Facial sensation is intact, face is symmetric with rest and activation  Palate rises symmetrically, tongue protrudes at midline with normal movements  Sterocleidomastoid and trapezius strength is normal and symmetric    Motor  Bulk and tone are normal  Strength is 5/5 shoulder abduction, finger abduction, arm flexion and extension, hip flexion, plantar and dorsiflexion    Sensation  Intact to pin, vibration   Proprioception intact in great toes and fingers    Tendon reflexes  Testing at brachioradialis, biceps, triceps, patella and achilles bilaterally showed normal reflexes, symmetrically, without Babinski or Morales signs    Coordination  Finger nose finger testing: normalRapid alternating movements are normal.  Gait and Station: normal  15 minutes of total time was spent face-to-face with the patient over 50% of which was counseling..    Sincerely,    Ravi Powell MD      "

## 2018-09-19 NOTE — MR AVS SNAPSHOT
After Visit Summary   9/19/2018    Kamilah Dee    MRN: [id number]           Patient Information     Date Of Birth          1985        Visit Information        Provider Department      9/19/2018 6:00 PM Ravi Powell MD [facility] Neurology        Today's Diagnoses     Tic disorder    -  1       Follow-ups after your visit        Additional Services     Medication Therapy Management Referral       Your provider has referred you to: **[facility] Scheduling ZENAIDA Latoya Juarez    Reason for Referral: VMAT2 inhibitor    The [facility] department will contact you to schedule an appointment.  You may also schedule the appointment by calling [phone].  For [facility] patients, please call [phone] to confirm/schedule your appointment on the next business day.    This service is designed to help you get the most from your medications.  A specially trained Pharmacist will work closely with you and your providers to solve any questions, concerns, issues or problems related to your medications.    Please bring all of your prescription and non-prescription medications (such as vitamins, over-the-counter medications, and herbals) or a detailed medication list to your appointment.    If you have a glucose meter or other home monitoring information, please also bring this to your appointment (i.e. blood glucose log, blood pressure log, pain log, etc.).                  Future tests that were ordered for you today     Open Future Orders        Priority Expected Expires Ordered    Medication Therapy Management Referral Routine  9/19/2019 9/19/2018            Who to contact     Please call your clinic at [phone] to:    Ask questions about your health    Make or cancel appointments    Discuss your medicines    Learn about your test results    Speak to your doctor            Additional Information About Your Visit        Reggie  "Information     [employer] gives you secure access to your electronic health record. If you see a primary care provider, you can also send messages to your care team and make appointments. If you have questions, please call your primary care clinic.  If you do not have a primary care provider, please call [phone] and they will assist you.      [employer] is an electronic gateway that provides easy, online access to your medical records. With [employer], you can request a clinic appointment, read your test results, renew a prescription or communicate with your care team.     To access your existing account, please contact your [facility] or call [phone] for assistance.        Care EveryWhere ID     This is your Care EveryWhere ID. This could be used by other organizations to access your [facility] medical records  [id number]        Your Vitals Were     Pulse Height Pulse Oximetry BMI (Body Mass Index)          82 1.664 m (5' 5.5\") 98% 20.93 kg/m2         Blood Pressure from Last 3 Encounters:   09/19/18 116/61   09/06/18 122/70   08/15/18 99/55    Weight from Last 3 Encounters:   09/19/18 57.9 kg (127 lb 11.2 oz)   09/06/18 57.3 kg (126 lb 6.4 oz)   08/15/18 59.9 kg (132 lb)               Primary Care Provider Office Phone # Fax #    Mario Joseph [phone]       [address] [facility] [address]        Equal Access to Services     [facility] Center[facility] : Hadii aad ku hadasho Soomaali, waaxda luqadaha, qaybta kaalmada adeegyada, waxdionisio triston roy . So [facility] [phone].    ATENCIÓN: Si habla español, tiene a farfan disposición servicios gratuitos de asistencia lingüística. Llame al [phone].    We comply with applicable federal civil rights laws and [place] laws. We do not discriminate on the basis of race, color, national origin, age, disability, sex, sexual orientation, or gender identity.            Thank you!     Thank you for choosing [facility] " NEUROLOGY  for your care. Our goal is always to provide you with excellent care. Hearing back from our patients is one way we can continue to improve our services. Please take a few minutes to complete the written survey that you may receive in the mail after your visit with us. Thank you!             Your Updated Medication List - Protect others around you: Learn how to safely use, store and throw away your medicines at www.disposemymeds.org.          This list is accurate as of 9/19/18  6:09 PM.  Always use your most recent med list.                   Brand Name Dispense Instructions for use Diagnosis    medroxyPROGESTERone 150 MG/ML injection    DEPO-PROVERA    1 mL    Inject 1 mL (150 mg) into the muscle every 3 months    Encounter for initial prescription of injectable contraceptive

## 2018-10-05 ENCOUNTER — TELEPHONE (OUTPATIENT)
Dept: NEUROLOGY | Facility: CLINIC | Age: 33
End: 2018-10-05

## 2018-10-05 ENCOUNTER — OFFICE VISIT (OUTPATIENT)
Dept: PHARMACY | Facility: CLINIC | Age: 33
End: 2018-10-05
Payer: COMMERCIAL

## 2018-10-05 VITALS — SYSTOLIC BLOOD PRESSURE: 117 MMHG | DIASTOLIC BLOOD PRESSURE: 73 MMHG | HEART RATE: 78 BPM | OXYGEN SATURATION: 100 %

## 2018-10-05 DIAGNOSIS — F95.9 TIC DISORDER: Primary | ICD-10-CM

## 2018-10-05 DIAGNOSIS — F41.1 GAD (GENERALIZED ANXIETY DISORDER): ICD-10-CM

## 2018-10-05 DIAGNOSIS — Z30.9 ENCOUNTER FOR CONTRACEPTIVE MANAGEMENT, UNSPECIFIED TYPE: ICD-10-CM

## 2018-10-05 DIAGNOSIS — F32.0 MAJOR DEPRESSIVE DISORDER, SINGLE EPISODE, MILD (H): ICD-10-CM

## 2018-10-05 DIAGNOSIS — Z78.9 TAKES DIETARY SUPPLEMENTS: ICD-10-CM

## 2018-10-05 PROCEDURE — 99605 MTMS BY PHARM NP 15 MIN: CPT | Performed by: PHARMACIST

## 2018-10-05 PROCEDURE — 99607 MTMS BY PHARM ADDL 15 MIN: CPT | Performed by: PHARMACIST

## 2018-10-05 ASSESSMENT — PAIN SCALES - GENERAL: PAINLEVEL: NO PAIN (0)

## 2018-10-05 NOTE — MR AVS SNAPSHOT
After Visit Summary   10/5/2018    Kamilah Dee    MRN: [id number]           Patient Information     Date Of Birth          1985        Visit Information        Provider Department      10/5/2018 12:00 PM Latoya Juarez [facility] Neurology [facility]        Care Instructions    Recommendations from today's MT visit:                                                      1. I will keep you updated on the status of the medications:    1) Ingrezza - newer, less side effects   2) tetrabenazine - older, more side effects    2. Look into trying yoga again or other relaxing exercises!    3. Try [employer]     Next MTM visit: as needed    To schedule another MTM appointment, please call the clinic directly or you may call the MT scheduling line at [phone] or toll-free at [phone].     My Clinical Pharmacist's contact information:                                                      It was a pleasure seeing you today!  Please feel free to contact me with any questions or concerns you have.      Latoya Juarez, Pharm.D.  Medication Therapy Management Pharmacist  Phone: [phone]    You may receive a survey about the MT services you received.  I would appreciate your feedback to help me serve you better in the future. Please fill it out and return it when you can. Your comments will be anonymous.            Follow-ups after your visit        Who to contact     If you have questions or need follow up information about today's clinic visit or your schedule please contact [facility] NEUROLOGY [facility] MTM directly at [phone].  Normal or non-critical lab and imaging results will be communicated to you by MyChart, letter or phone within 4 business days after the clinic has received the results. If you do not hear from us within 7 days, please contact the clinic through MyChart or phone. If you have a critical or abnormal lab result, we will notify you by phone as soon as  possible.  Submit refill requests through [employer] or call your pharmacy and they will forward the refill request to us. Please allow 3 business days for your refill to be completed.          Additional Information About Your Visit        [employer] Information     [employer] gives you secure access to your electronic health record. If you see a primary care provider, you can also send messages to your care team and make appointments. If you have questions, please call your primary care clinic.  If you do not have a primary care provider, please call [phone] and they will assist you.        Care EveryWhere ID     This is your Care EveryWhere ID. This could be used by other organizations to access your [place] medical records  [id number]        Your Vitals Were     Pulse Pulse Oximetry                78 100%           Blood Pressure from Last 3 Encounters:   10/05/18 117/73   09/19/18 116/61   09/06/18 122/70    Weight from Last 3 Encounters:   09/19/18 127 lb 11.2 oz (57.9 kg)   09/06/18 126 lb 6.4 oz (57.3 kg)   08/15/18 132 lb (59.9 kg)              We Performed the Following     MED THERAPY MANAGE REFERRAL        Primary Care Provider Office Phone # Fax #    Mario Joseph [phone]       [address] [place] [address]        Equal Access to Services     SAV CALVILLO AH: Hadii cristal ku hadasho Soomaali, waaxda luqadaha, qaybta kaalmada adeegyada, waxay idiin hayagnesn melanie chan. So [facility] [phone].    ATENCIÓN: Si habla español, tiene a farfan disposición servicios gratuitos de asistencia lingüística. Llame al [phone].    We comply with applicable federal civil rights laws and [place] laws. We do not discriminate on the basis of race, color, national origin, age, disability, sex, sexual orientation, or gender identity.            Thank you!     Thank you for choosing [facility] NEUROLOGY CLINIC [facility]  for your care. Our goal is always to provide you with excellent care. Hearing back  from our patients is one way we can continue to improve our services. Please take a few minutes to complete the written survey that you may receive in the mail after your visit with us. Thank you!             Your Updated Medication List - Protect others around you: Learn how to safely use, store and throw away your medicines at www.disposemymeds.org.          This list is accurate as of 10/5/18 12:40 PM.  Always use your most recent med list.                   Brand Name Dispense Instructions for use Diagnosis    CALCIUM PO      Take 1 tablet by mouth daily        medroxyPROGESTERone 150 MG/ML injection    DEPO-PROVERA    1 mL    Inject 1 mL (150 mg) into the muscle every 3 months    Encounter for initial prescription of injectable contraceptive       MULTIVITAMIN PO      Take by mouth daily        NONFORMULARY      Brain Booster supplement

## 2018-10-05 NOTE — PATIENT INSTRUCTIONS
Recommendations from today's MTM visit:                                                      1. I will keep you updated on the status of the medications:    1) Ingrezza - newer, less side effects   2) tetrabenazine - older, more side effects    2. Look into trying yoga again or other relaxing exercises!    3. Try [employer]     Next MTM visit: as needed    To schedule another MTM appointment, please call the clinic directly or you may call the MTM scheduling line at [phone] or toll-free at [phone].     My Clinical Pharmacist's contact information:                                                      It was a pleasure seeing you today!  Please feel free to contact me with any questions or concerns you have.      Latoya Juarez, Pharm.D.  Medication Therapy Management Pharmacist  Phone: [phone]    You may receive a survey about the MTM services you received.  I would appreciate your feedback to help me serve you better in the future. Please fill it out and return it when you can. Your comments will be anonymous.

## 2018-10-05 NOTE — PROGRESS NOTES
"SUBJECTIVE/OBJECTIVE:                           Kamilah Dee is a 33 year old female coming in for an initial visit for Medication Therapy Management.  She was referred to me from Dr. Powell.    Chief Complaint: Initial MTM visit - discuss VMAT-2 inhibitor    Allergies/ADRs: Reviewed in Epic  Tobacco: History of tobacco dependence - quit 5 years ago  Alcohol: 1-3 beverages / week  Caffeine: no caffeine - tics get worse  Activity: very active in job; goes to the gym   PMH: Reviewed in Epic    Medication Adherence/Access: no issues reported    Tic disorder: Not currently taking any mediations. Patient states that she has been have tics since age 14, affecting her upper body, both left and right sides. She states that she has tried a number of medications in the past and nothing has been very effective. She cannot remember the names of the medications she tried, but upon chart review it appears as though she has tried lorazepam, clonazepam, risperidone, benztropine, guanfacine, and gabapentin, with maybe other medications too. She reports that caffeine makes the tics worse, as well as when her anxiety or depression is worse. The best experience she ever had was when she had made significant health changes and did yoga one night; the following day she said she had virtually no tics and felt like herself (this was in her early 20s). She has not tried yoga since, but states she does some stretching and other exercises. Patient reports she is concerned that her involuntary movements are related to her history of \"heavy drug use\" when she was a young teenager. She recalls these symptoms starting when she started doing LSD, meth, and cocaine.     Depression/anxiety:  Current medications include: None. Pt reports that depression symptoms are unchanged. Patient states that she has been on many medications for anxiety and depression in the past and nothing worked for her. She states she saw a therapist in the past and is " not currently interested in trying this again. She reports she is too busy to see a therapist or exercise more foten.  PHQ-9 SCORE 3/6/2018 4/3/2018 9/6/2018   Total Score - - -   Total Score 12 14 19     HAMIDA-7 SCORE 3/6/2018 4/3/2018 9/6/2018   Total Score - - -   Total Score 12 14 17     Motivational Interviewing  Target behavior: anxiety/exercise  Stage of change: PRECONTEMPLATION (Not seeing need for change); Intervened by educating the patient about the effects of current behavior on health.  Evoked information about reasons to continue behavior, express concern / recommendations, and explored any change talk  Change talk expressed by patient: N/A - precontemplative  Co-developed goal: discussed options she could try such as donation-based yoga or [employer] website    Contraception: Depo-provera injections every 3 months.    Supplements: Takes a calcium, MVI and Brain Booster vitamin daily. States that she started the Brain Booster about 3 months ago and initially felt like her mind was clearer but is no longer sure if it is helping her cognition/thought process. She'd like to continue these supplements for now.    Today's Vitals: /73 (BP Location: Right arm, Patient Position: Sitting, Cuff Size: Adult Regular)  Pulse 78  SpO2 100%      ASSESSMENT:                             Current medications were reviewed today.     Medication Adherence: excellent, no issues identified    Tic disorder: Needs improvement.  Patient has already tried a number of medications and nothing has been effective in managing her tics. Dr. Powell referred the patient to [facility] to discuss initiation of VMAT-2 inhibitor.  Insurance coverage of these agents can be quite challenging but it is worth pursuing a prior authorization or appeal.  Valbenazine is a reasonable medication to start with given its relatively safe adverse effect profile.  Tetrabenazine could be a secondary option.  Patient's primary insurance is commercial so if  the insurance approves the medication the drug  may be able to cover the remaining cost.    Depression/anxiety:  Needs improvement.  Patient may benefit from treatment of her depression and anxiety given her elevated PHQ 9 and HAMIDA 7 scores as well as her subjective report of worse mood.  It also seems as though reducing her stress and depression may improve her tics.  We discussed at length options for managing depression and anxiety other than medications and she is willing to think about trying yoga again.  She is not interested in a therapist so she could try an at-home meditation program.    Contraception: Stable.    Supplements: Stable.     PLAN:                            1. Will attempt to obtain insurance approval of Ingrezza 40 mg daily.   2. Encouraged patient to try a donation-based yoga class.  3.  Encouraged patient to try the [employer] online meditation program.    I spent 45 minutes with this patient today. I offer these suggestions for consideration by Dr. Powell. A copy of the visit note was provided to the patient's referring provider.    Will follow up when able to obtain approval of Ingrezza or to discuss alternate medications.    The patient was given a summary of these recommendations as an after visit summary.     Chart documentation was completed in part with Dragon voice-recognition software. Even though reviewed, some grammatical, spelling, and word errors may remain.    Latoya Juarez, Pharm.D.  Medication Therapy Management Pharmacist  Phone: [phone]

## 2018-10-05 NOTE — Clinical Note
Dr. Powell - We are going to try to get insurance approval for Ingrezza first and then will try tetrabenazine. I also think her anxiety/depression needs to be better managed and we discussed yoga and meditation.   Latoya

## 2018-10-05 NOTE — TELEPHONE ENCOUNTER
PA Initiation    Medication: Ingrezza 40mg  Insurance Company: [employer] ([employer]) - Phone [phone] Fax [phone]  Pharmacy Filling the Rx: [place], [place] - [address] [place] [address]  Filling Pharmacy Phone:    Filling Pharmacy Fax:    Start Date: 10/5/2018

## 2018-10-05 NOTE — NURSING NOTE
Chief Complaint   Patient presents with     Consult     [facility] NEW - INITIAL MTM       Juvencio Santizo, EMT

## 2018-10-08 NOTE — TELEPHONE ENCOUNTER
PRIOR AUTHORIZATION DENIED    Medication: Ingrezza 40mg capsules - DENIED    Denial Date:  10/08/2018    Denial Rational: Unknown - no official denial letter yet rec'd    Appeal Information: Yes - to [employer] - via mail or fax

## 2018-10-10 NOTE — TELEPHONE ENCOUNTER
Medication Appeal Initiation    We have initiated an appeal for the requested medication:  Medication: Ingrezza 40mg capsules - DENIED, APPEALING  Appeal Start Date:  10/10/2018  Insurance Company: [employer] - Phone [phone] Fax [phone]  Comments:   SALVATORE along with chart notes and mention of Ingrezza use in Tourette Syndrome faxed to [employer] appeals via fax# [phone]

## 2018-10-15 ENCOUNTER — TELEPHONE (OUTPATIENT)
Dept: NEUROLOGY | Facility: CLINIC | Age: 33
End: 2018-10-15

## 2018-10-15 NOTE — TELEPHONE ENCOUNTER
Starting to prep [employer] PAP application in case appeal is denied (per rep only need PA and appeal denial to qualify for free drug Ingrezza).    [email or web address]

## 2018-10-15 NOTE — TELEPHONE ENCOUNTER
"Received fax from insurance stating the appeal is denied.    Case #[id number]  ID number [id number]  Denial code: 409176    \"Coverage is provided for the diagnosis of tardive dyskinesia (TD). Coverage cannot be authorized at this time.\"  "

## 2018-10-18 NOTE — TELEPHONE ENCOUNTER
Per call to E/S, PA was resubmitted and not completed as an appeal. New fax# for appeals dept given: [phone] and # [phone]. Will resubmit.

## 2018-10-19 NOTE — TELEPHONE ENCOUNTER
Medication Appeal Initiation    We have initiated an appeal for the requested medication:  Medication: Ingrezza 40mg capsules - DENIED, APPEALING  Appeal Start Date:  10/19/2018  Insurance Company: [employer] - Phone [phone] Fax [phone]  Comments:   All of appeal info refaxed to new fax# for clinic appeals with a note asking for external review: [phone]

## 2018-10-24 NOTE — TELEPHONE ENCOUNTER
"Free Drug Application Approved  Effective Dates Unknown  Patient notified? Yes  Additional Information Per [employer] rep: \"patient was approved for Patient Assistance and Ingrezza was shipped yesterday.\" Awaiting actual fax with further details.    "

## 2018-11-11 ENCOUNTER — HOSPITAL ENCOUNTER (EMERGENCY)
Facility: CLINIC | Age: 33
Discharge: HOME | End: 2018-11-12
Attending: EMERGENCY MEDICINE | Admitting: EMERGENCY MEDICINE
Payer: COMMERCIAL

## 2018-11-11 DIAGNOSIS — S01.501A OPEN WOUND OF LIP, UNSPECIFIED OPEN WOUND TYPE, INITIAL ENCOUNTER: ICD-10-CM

## 2018-11-11 DIAGNOSIS — K92.0 HEMATEMESIS, PRESENCE OF NAUSEA NOT SPECIFIED: ICD-10-CM

## 2018-11-11 LAB
BASOPHILS # BLD AUTO: 0 10E9/L (ref 0–0.2)
BASOPHILS NFR BLD AUTO: 0.3 %
DIFFERENTIAL METHOD BLD: NORMAL
EOSINOPHIL # BLD AUTO: 0.2 10E9/L (ref 0–0.7)
EOSINOPHIL NFR BLD AUTO: 2 %
ERYTHROCYTE [DISTWIDTH] IN BLOOD BY AUTOMATED COUNT: 12.6 % (ref 10–15)
HCT VFR BLD AUTO: 40.7 % (ref 35–47)
HGB BLD-MCNC: 13.8 G/DL (ref 11.7–15.7)
IMM GRANULOCYTES # BLD: 0 10E9/L (ref 0–0.4)
IMM GRANULOCYTES NFR BLD: 0.1 %
LYMPHOCYTES # BLD AUTO: 2.4 10E9/L (ref 0.8–5.3)
LYMPHOCYTES NFR BLD AUTO: 31.4 %
MCH RBC QN AUTO: 29.6 PG (ref 26.5–33)
MCHC RBC AUTO-ENTMCNC: 33.9 G/DL (ref 31.5–36.5)
MCV RBC AUTO: 87 FL (ref 78–100)
MONOCYTES # BLD AUTO: 0.5 10E9/L (ref 0–1.3)
MONOCYTES NFR BLD AUTO: 6.9 %
NEUTROPHILS # BLD AUTO: 4.5 10E9/L (ref 1.6–8.3)
NEUTROPHILS NFR BLD AUTO: 59.3 %
NRBC # BLD AUTO: 0 10*3/UL
NRBC BLD AUTO-RTO: 0 /100
PLATELET # BLD AUTO: 283 10E9/L (ref 150–450)
RBC # BLD AUTO: 4.66 10E12/L (ref 3.8–5.2)
WBC # BLD AUTO: 7.6 10E9/L (ref 4–11)

## 2018-11-11 PROCEDURE — 85025 COMPLETE CBC W/AUTO DIFF WBC: CPT | Performed by: EMERGENCY MEDICINE

## 2018-11-11 PROCEDURE — 80320 DRUG SCREEN QUANTALCOHOLS: CPT | Performed by: EMERGENCY MEDICINE

## 2018-11-11 PROCEDURE — 99283 EMERGENCY DEPT VISIT LOW MDM: CPT

## 2018-11-11 PROCEDURE — 84703 CHORIONIC GONADOTROPIN ASSAY: CPT | Performed by: EMERGENCY MEDICINE

## 2018-11-11 PROCEDURE — 80053 COMPREHEN METABOLIC PANEL: CPT | Performed by: EMERGENCY MEDICINE

## 2018-11-11 NOTE — ED AVS SNAPSHOT
Emergency Department    [address] [facility] [address]    Phone:  [phone]    Fax:  [phone]                                       Kamilah Dee   MRN: [id number]    Department:   Emergency Department   Date of Visit:  11/11/2018           After Visit Summary Signature Page     I have received my discharge instructions, and my questions have been answered. I have discussed any challenges I see with this plan with the nurse or doctor.    ..........................................................................................................................................  Patient/Patient Representative Signature      ..........................................................................................................................................  Patient Representative Print Name and Relationship to Patient    ..................................................               ................................................  Date                                   Time    ..........................................................................................................................................  Reviewed by Signature/Title    ...................................................              ..............................................  Date                                               Time          22EPIC Rev 08/18

## 2018-11-11 NOTE — ED AVS SNAPSHOT
Emergency Department    [address] [facility] [address]    Phone:  [phone]    Fax:  [phone]                                       Kamilah Dee   MRN: [id number]    Department:   Emergency Department   Date of Visit:  11/11/2018           Patient Information     Date Of Birth          1985        Your diagnoses for this visit were:     Hematemesis, presence of nausea not specified     Open wound of lip, unspecified open wound type, initial encounter        You were seen by Gareth Pena MD.      Follow-up Information     Follow up with Mario Joseph MD. Call in 1 day.    Specialty:  Internal Medicine    Why:  to make appt for recheck this week    Contact information:    [address] [facility] [address] [phone]          Follow up with  Emergency Department.    Specialty:  EMERGENCY MEDICINE    Why:  As needed, If symptoms worsen    Contact information:    [address] [facility] [address] [phone]        Discharge Instructions         Upper Gastrointestinal (GI) Bleeding (Stable)  Your upper gastrointestinal (GI) tract includes your esophagus, stomach, and upper small intestine. You have signs of bleeding from your upper GI tract. You may have vomited or coughed up blood or coffee-ground like material. Or you may have black or tarry stools. Very small amounts of GI bleeding may not be visible and can only be found by a test of the stool.  Causes of upper GI bleeding can include:    Tear in the lining of the esophagus    Enlarged veins in the esophagus or stomach, especially in someone with cirrhosis    An ulcer in the stomach or top of the small intestine    Severe irritation of the stomach    Inflammation of the digestive tract    Abnormal growth (tumor) of the upper digestive tract  A bloody nose or mouth or dental problems may cause you to swallow blood. You may vomit this blood up. This is not true GI bleeding. Iron supplements  and medicines for diarrhea and upset stomach can cause black stools. This is not GI bleeding and is not a cause for concern.  Home care  You've had an evaluation for your bleeding. You will need to continue your care at home. Depending on the cause of your bleeding, care may include the following:    You may be given medicines to help protect your GI tract, treat your problem, and promote healing. Take these as directed.    Sometimes tests such as endoscopy may also be used to stop bleeding. An endoscope is a thin flexible tube with a light and a camera on the tip that is put into your stomach through your esophagus (throat).    Don't take NSAIDs, such as aspirin, ibuprofen, or naproxen. They can irritate the stomach and cause more bleeding. If you are taking these medicines for other reasons, talk to your healthcare provider before you stop them.     If you are on blood thinners, discuss the plan with your healthcare provider.    Don't use alcohol, caffeine, or tobacco. These can delay healing and make your problem worse.  Follow-up care  Follow up with your healthcare provider, or as advised. More tests may need to be done to find the cause of your bleeding.  When to seek medical advice  Call your healthcare provider right away for any of the following:    Stomach pain starts or gets worse    Pain spreads to the neck, back, shoulder, or arm    Weakness or dizziness    Swelling of your belly    Red blood in your stool    Fever of 100.4 F (38 C) or higher, or as directed by your healthcare provider  Call 911  Call 911 if any of these occur:    Trouble breathing or swallowing    Severe dizziness    Loss of consciousness    Vomiting blood or large amounts of blood in the stool    Black, tarry stool    Chest pain or lightheadedness  Date Last Reviewed: 3/1/2018    7835-1009 [employer]. [address] [place] [address], [place] [address]. All rights reserved. This information is not intended as a substitute for  professional medical care. Always follow your healthcare professional's instructions.          24 Hour Appointment Hotline       To make an appointment at any [facility], call [phone] ([phone]). If you don't have a family doctor or clinic, we will help you find one. [facility] [place] clinics are conveniently located to serve the needs of you and your family.             Review of your medicines      START taking        Dose / Directions Last dose taken    omeprazole 20 MG CR capsule   Commonly known as:  priLOSEC   Dose:  20 mg   Quantity:  30 capsule        Take 1 capsule (20 mg) by mouth daily   Refills:  0          Our records show that you are taking the medicines listed below. If these are incorrect, please call your family doctor or clinic.        Dose / Directions Last dose taken    CALCIUM PO   Dose:  1 tablet        Take 1 tablet by mouth daily   Refills:  0        medroxyPROGESTERone 150 MG/ML injection   Commonly known as:  DEPO-PROVERA   Dose:  150 mg   Quantity:  1 mL        Inject 1 mL (150 mg) into the muscle every 3 months   Refills:  3        MULTIVITAMIN PO        Take by mouth daily   Refills:  0        NONFORMULARY        Brain Booster supplement   Refills:  0        valbenazine 40 MG capsule   Commonly known as:  INGREZZA   Dose:  40 mg   Quantity:  30 capsule        Take 1 capsule (40 mg) by mouth daily   Refills:  5                Prescriptions were sent or printed at these locations (1 Prescription)                   Other Prescriptions                Printed at Department/Unit printer (1 of 1)         omeprazole (PRILOSEC) 20 MG CR capsule                Procedures and tests performed during your visit     Alcohol ethyl    CBC with platelets differential    Comprehensive metabolic panel    HCG qualitative      Orders Needing Specimen Collection     None      Pending Results     No orders found for last 3 day(s).            Pending Culture Results     No orders found for last 3  day(s).            Pending Results Instructions     If you had any lab results that were not finalized at the time of your Discharge, you can call the ED Lab Result RN at [phone]. You will be contacted by this team for any positive Lab results or changes in treatment. The nurses are available 7 days a week from 10A to 6:30P.  You can leave a message 24 hours per day and they will return your call.        Test Results From Your Hospital Stay        11/11/2018 11:52 PM      Component Results     Component Value Ref Range & Units Status    WBC 7.6 4.0 - 11.0 10e9/L Final    RBC Count 4.66 3.8 - 5.2 10e12/L Final    Hemoglobin 13.8 11.7 - 15.7 g/dL Final    Hematocrit 40.7 35.0 - 47.0 % Final    MCV 87 78 - 100 fl Final    MCH 29.6 26.5 - 33.0 pg Final    MCHC 33.9 31.5 - 36.5 g/dL Final    RDW 12.6 10.0 - 15.0 % Final    Platelet Count 283 150 - 450 10e9/L Final    Diff Method Automated Method  Final    % Neutrophils 59.3 % Final    % Lymphocytes 31.4 % Final    % Monocytes 6.9 % Final    % Eosinophils 2.0 % Final    % Basophils 0.3 % Final    % Immature Granulocytes 0.1 % Final    Nucleated RBCs 0 0 /100 Final    Absolute Neutrophil 4.5 1.6 - 8.3 10e9/L Final    Absolute Lymphocytes 2.4 0.8 - 5.3 10e9/L Final    Absolute Monocytes 0.5 0.0 - 1.3 10e9/L Final    Absolute Eosinophils 0.2 0.0 - 0.7 10e9/L Final    Absolute Basophils 0.0 0.0 - 0.2 10e9/L Final    Abs Immature Granulocytes 0.0 0 - 0.4 10e9/L Final    Absolute Nucleated RBC 0.0  Final         11/12/2018 12:11 AM      Component Results     Component Value Ref Range & Units Status    Sodium 141 133 - 144 mmol/L Final    Potassium 3.5 3.4 - 5.3 mmol/L Final    Chloride 103 94 - 109 mmol/L Final    Carbon Dioxide 32 20 - 32 mmol/L Final    Anion Gap 6 3 - 14 mmol/L Final    Glucose 85 70 - 99 mg/dL Final    Urea Nitrogen 16 7 - 30 mg/dL Final    Creatinine 0.69 0.52 - 1.04 mg/dL Final    GFR Estimate >90 >60 mL/min/1.7m2 Final    Non African American GFR  Calc    GFR Estimate If Black >90 >60 mL/min/1.7m2 Final    African American GFR Calc    Calcium 8.9 8.5 - 10.1 mg/dL Final    Bilirubin Total 0.4 0.2 - 1.3 mg/dL Final    Albumin 4.2 3.4 - 5.0 g/dL Final    Protein Total 8.1 6.8 - 8.8 g/dL Final    Alkaline Phosphatase 51 40 - 150 U/L Final    ALT 23 0 - 50 U/L Final    AST 16 0 - 45 U/L Final         11/12/2018 12:06 AM      Component Results     Component Value Ref Range & Units Status    HCG Qualitative Serum Negative NEG^Negative Final    This test is for screening purposes.  Results should be interpreted along with   the clinical picture.  Confirmation testing is available if warranted by   ordering [id number], HCG Quantitative Pregnancy.           11/12/2018 12:10 AM      Component Results     Component Value Ref Range & Units Status    Ethanol g/dL <0.01 <0.01 g/dL Final                Clinical Quality Measure: Blood Pressure Screening     Your blood pressure was checked while you were in the emergency department today. The last reading we obtained was  BP: 118/82 . Please read the guidelines below about what these numbers mean and what you should do about them.  If your systolic blood pressure (the top number) is less than 120 and your diastolic blood pressure (the bottom number) is less than 80, then your blood pressure is normal. There is nothing more that you need to do about it.  If your systolic blood pressure (the top number) is 120-139 or your diastolic blood pressure (the bottom number) is 80-89, your blood pressure may be higher than it should be. You should have your blood pressure rechecked within a year by a primary care provider.  If your systolic blood pressure (the top number) is 140 or greater or your diastolic blood pressure (the bottom number) is 90 or greater, you may have high blood pressure. High blood pressure is treatable, but if left untreated over time it can put you at risk for heart attack, stroke, or kidney failure. You should have  your blood pressure rechecked by a primary care provider within the next 4 weeks.  If your provider in the emergency department today gave you specific instructions to follow-up with your doctor or provider even sooner than that, you should follow that instruction and not wait for up to 4 weeks for your follow-up visit.        Thank you for choosing [facility]       Thank you for choosing [facility] for your care. Our goal is always to provide you with excellent care. Hearing back from our patients is one way we can continue to improve our services. Please take a few minutes to complete the written survey that you may receive in the mail after you visit with us. Thank you!        [employer] Information     [employer] gives you secure access to your electronic health record. If you see a primary care provider, you can also send messages to your care team and make appointments. If you have questions, please call your primary care clinic.  If you do not have a primary care provider, please call [phone] and they will assist you.        Care EveryWhere ID     This is your Care EveryWhere ID. This could be used by other organizations to access your [facility] medical records  [id number]        Equal Access to Services     SAV CALVILLO : Hadjuan Perez, wabreanna garcia, qajennifer antonio, wellington roy . So [facility] [phone].    ATENCIÓN: Si habla español, tiene a farfan disposición servicios gratuitos de asistencia lingüística. Llame al [phone].    We comply with applicable federal civil rights laws and [place] laws. We do not discriminate on the basis of race, color, national origin, age, disability, sex, sexual orientation, or gender identity.            After Visit Summary       This is your record. Keep this with you and show to your community pharmacist(s) and doctor(s) at your next visit.

## 2018-11-12 VITALS
SYSTOLIC BLOOD PRESSURE: 118 MMHG | DIASTOLIC BLOOD PRESSURE: 82 MMHG | HEIGHT: 66 IN | OXYGEN SATURATION: 99 % | BODY MASS INDEX: 20.61 KG/M2 | TEMPERATURE: 97.5 F | RESPIRATION RATE: 16 BRPM

## 2018-11-12 LAB
ALBUMIN SERPL-MCNC: 4.2 G/DL (ref 3.4–5)
ALP SERPL-CCNC: 51 U/L (ref 40–150)
ALT SERPL W P-5'-P-CCNC: 23 U/L (ref 0–50)
ANION GAP SERPL CALCULATED.3IONS-SCNC: 6 MMOL/L (ref 3–14)
AST SERPL W P-5'-P-CCNC: 16 U/L (ref 0–45)
BILIRUB SERPL-MCNC: 0.4 MG/DL (ref 0.2–1.3)
BUN SERPL-MCNC: 16 MG/DL (ref 7–30)
CALCIUM SERPL-MCNC: 8.9 MG/DL (ref 8.5–10.1)
CHLORIDE SERPL-SCNC: 103 MMOL/L (ref 94–109)
CO2 SERPL-SCNC: 32 MMOL/L (ref 20–32)
CREAT SERPL-MCNC: 0.69 MG/DL (ref 0.52–1.04)
ETHANOL SERPL-MCNC: <0.01 G/DL
GFR SERPL CREATININE-BSD FRML MDRD: >90 ML/MIN/1.7M2
GLUCOSE SERPL-MCNC: 85 MG/DL (ref 70–99)
HCG SERPL QL: NEGATIVE
POTASSIUM SERPL-SCNC: 3.5 MMOL/L (ref 3.4–5.3)
PROT SERPL-MCNC: 8.1 G/DL (ref 6.8–8.8)
SODIUM SERPL-SCNC: 141 MMOL/L (ref 133–144)

## 2018-11-12 NOTE — DISCHARGE INSTRUCTIONS
Upper Gastrointestinal (GI) Bleeding (Stable)  Your upper gastrointestinal (GI) tract includes your esophagus, stomach, and upper small intestine. You have signs of bleeding from your upper GI tract. You may have vomited or coughed up blood or coffee-ground like material. Or you may have black or tarry stools. Very small amounts of GI bleeding may not be visible and can only be found by a test of the stool.  Causes of upper GI bleeding can include:    Tear in the lining of the esophagus    Enlarged veins in the esophagus or stomach, especially in someone with cirrhosis    An ulcer in the stomach or top of the small intestine    Severe irritation of the stomach    Inflammation of the digestive tract    Abnormal growth (tumor) of the upper digestive tract  A bloody nose or mouth or dental problems may cause you to swallow blood. You may vomit this blood up. This is not true GI bleeding. Iron supplements and medicines for diarrhea and upset stomach can cause black stools. This is not GI bleeding and is not a cause for concern.  Home care  You've had an evaluation for your bleeding. You will need to continue your care at home. Depending on the cause of your bleeding, care may include the following:    You may be given medicines to help protect your GI tract, treat your problem, and promote healing. Take these as directed.    Sometimes tests such as endoscopy may also be used to stop bleeding. An endoscope is a thin flexible tube with a light and a camera on the tip that is put into your stomach through your esophagus (throat).    Don't take NSAIDs, such as aspirin, ibuprofen, or naproxen. They can irritate the stomach and cause more bleeding. If you are taking these medicines for other reasons, talk to your healthcare provider before you stop them.     If you are on blood thinners, discuss the plan with your healthcare provider.    Don't use alcohol, caffeine, or tobacco. These can delay healing and make your problem  worse.  Follow-up care  Follow up with your healthcare provider, or as advised. More tests may need to be done to find the cause of your bleeding.  When to seek medical advice  Call your healthcare provider right away for any of the following:    Stomach pain starts or gets worse    Pain spreads to the neck, back, shoulder, or arm    Weakness or dizziness    Swelling of your belly    Red blood in your stool    Fever of 100.4 F (38 C) or higher, or as directed by your healthcare provider  Call 911  Call 911 if any of these occur:    Trouble breathing or swallowing    Severe dizziness    Loss of consciousness    Vomiting blood or large amounts of blood in the stool    Black, tarry stool    Chest pain or lightheadedness  Date Last Reviewed: 3/1/2018    7534-4309 [employer]. [address] [place] [address], [place] [address]. All rights reserved. This information is not intended as a substitute for professional medical care. Always follow your healthcare professional's instructions.

## 2018-11-12 NOTE — ED PROVIDER NOTES
"  History     Chief Complaint:  Hematemesis     HPI   Kamilah Dee is a 33 year old female who presents to the emergency department today for evaluation of hematemesis. Patient reports that she had one drink and one beer tonight when she felt nauseous and later vomited once, noting some streaks of bright red blood in the otherwise nonbloody vomit.  She describes feeling fine before this, only drinks alcohol occasionally, drove here tonight, feels slightly lightheaded, and has not had her period in awhile due to being on the depo shot. She denies  NSAID use, personal or family history of GI bleeding, abdominal pain, has never had an EGD, is not on blood thinners, and does not currently feel intoxicated. Of note, patient endorses recently beginning Ingrezza.  She states \"I'm an over-worrier.\"  No rectal bleeding or black stool.    Allergies:  No Known Drug Allergies     Medications:    Depo-provera   Ingrezza     Past Medical History:    Anemia   Anxiety   Depression   Migraine   Other, mixed, or unspecified nondependent drug abuse, episodic   Tic disorder     Past Surgical History:    Tonsillectomy     Family History:    Mother: Alcohol/drug use  Sister: Cervical cancer   No history of GI bleeding.     Social History:  Smoking Status: Former Smoker   Quit date: 5/25/2010  Smokeless Tobacco: Never Used  Alcohol Use: Positive   Occasional      Review of Systems   All other systems reviewed and are negative.    Physical Exam     Patient Vitals for the past 24 hrs:   BP Temp Temp src Heart Rate Resp SpO2 Height   11/11/18 2254 118/82 97.5  F (36.4  C) Oral 74 18 99 % 1.676 m (5' 6\")     Physical Exam  General: nontoxic appearing woman sitting upright in room 26  HENT: mucous membranes moist, tongue normal, no intraoral lesions visualized  CV: regular rate, regular rhythm  Resp: clear throughout, normal effort, no crackles or wheezing  GI: abdomen soft and nontender, no guarding  MSK: no bony tenderness  Skin: " appropriately warm and dry, small punctate spot of bleeding from the left lower lateral lip  Neuro: alert, clear speech, oriented  Psych: anxious, pleasant, cooperative      Emergency Department Course     Laboratory:  Laboratory findings were communicated with the patient who voiced understanding of the findings.    CBC: WBC 7.6, HGB 13.8,   CMP: WNL (Creatinine 0.69)  HCG qualitative: Negative  Alcohol ethyl: <0.01     Emergency Department Course:    2309 Nursing notes and vitals reviewed.    2312 I performed an exam of the patient as documented above.     2323 IV was inserted and blood was drawn for laboratory testing, results above.    0019 Recheck and update.  No further vomiting.  She feels well.  Her lipids stop bleeding.    0030 I personally reviewed the lab results with the patient and answered all related questions prior to discharge.    Impression & Plan      Medical Decision Making:  The specific cause of her streaks of blood in her vomit is unconfirmed.  She could have a small Isadora-Freeman tear.  She does not have identified or suspected risk factors for esophageal varices or a more dangerous cause.  Differential also includes gastritis, neoplasm, esophagitis, vascular malformation and others.  Her vital signs are satisfactory, as is her hemoglobin.  It is somewhat unusual that she has an unexplained small wound to her left lower lip, which was slowly oozing blood when I first saw her.  It is possible that blood from this was mistaken for hematemesis.  This is unconfirmed, however, and regardless I consider her appropriate for discharge home and close outpatient follow-up, having discussed and agreed upon strict return precautions for worsening at any hour.    Diagnosis:    ICD-10-CM    1. Hematemesis, presence of nausea not specified K92.0    2. Open wound of lip, unspecified open wound type, initial encounter S01.501A      Disposition:   The patient is discharged to home.    This record was  created at least in part using electronic voice recognition software, so please excuse any typographical errors.     Discharge Medications:  New Prescriptions    OMEPRAZOLE (PRILOSEC) 20 MG CR CAPSULE    Take 1 capsule (20 mg) by mouth daily     Scribe Disclosure:  I, Breann Danielle, am serving as a scribe at 11:10 PM on 11/11/2018 to document services personally performed by Gareth Pena MD based on my observations and the provider's statements to me.       EMERGENCY DEPARTMENT       Gareth Pena MD  11/12/18 9726

## 2018-11-20 ENCOUNTER — OFFICE VISIT (OUTPATIENT)
Dept: INTERNAL MEDICINE | Facility: CLINIC | Age: 33
End: 2018-11-20
Payer: COMMERCIAL

## 2018-11-20 VITALS
RESPIRATION RATE: 16 BRPM | TEMPERATURE: 98.1 F | WEIGHT: 128 LBS | SYSTOLIC BLOOD PRESSURE: 122 MMHG | HEART RATE: 72 BPM | DIASTOLIC BLOOD PRESSURE: 68 MMHG | OXYGEN SATURATION: 100 % | BODY MASS INDEX: 20.66 KG/M2

## 2018-11-20 DIAGNOSIS — R10.13 ABDOMINAL PAIN, EPIGASTRIC: Primary | ICD-10-CM

## 2018-11-20 DIAGNOSIS — R14.0 BLOATING SYMPTOM: ICD-10-CM

## 2018-11-20 PROCEDURE — 99214 OFFICE O/P EST MOD 30 MIN: CPT | Performed by: PHYSICIAN ASSISTANT

## 2018-11-20 NOTE — PATIENT INSTRUCTIONS
Clear liquids.  Water, juice that you can see through. Vitamin Water. Jello  Broth, Soup -      Omeprazole try.    When to be worried,   Severe pain in one local  More episodes of vomiting with blood   Fever.      Low-Fiber Diet     Eggs are high in protein and easy to digest.   Eating a low-fiber diet means eating foods that don t have much fiber. These foods are easy to digest.  Most of the fiber that you eat passes undigested through your bowel. This is what forms stool. Low-fiber foods can help to slow down your bowel movements. When you eat a low-fiber diet, you have fewer stools. This lets your intestine rest.  Your healthcare provider will tell you how long you need to be on this diet. It may only be for a short time. Low-fiber foods often don t give you all the nutrients you need to stay healthy. Your healthcare provider may have you take certain vitamins while you are on this diet.  Reasons to eat a low-fiber diet  The goal of a low-fiber diet is to limit the size and number of your stools. It may be prescribed if you:    Are going through chemotherapy or radiation treatments    Have had intestinal surgery    Have a condition that affects your intestine, such as irritable bowel syndrome, Crohn s disease, ulcerative colitis, or diverticulitis  General guidelines for a low-fiber diet  In general, a low-fiber diet means having fewer than 13 grams of fiber a day. Your healthcare provider may give you a list of things you can and can t eat or drink. Read food labels. Choose foods and drinks that have as close to zero grams of fiber as possible. Here are general guidelines to follow:  Breads, pasta, cereal, rice, and other starches (6 to 11 servings daily)    What to choose: white bread, biscuits, muffins, and white rolls; plain crackers; waffles; white pasta; white rice; cream of wheat; grits; white pancakes; corn flakes; cooked potatoes without skin. Fiber content of these foods should be less than 0.5 (1/2)  gram per serving.    What to avoid: whole-wheat or whole-grain breads, crackers, and pasta; breads with seeds or nuts; wheat germ; taty crackers; cornbread; wild or brown rice; cereals with whole-grain, bran, and granola; cereals with seeds, nuts, coconut, or dried fruit; potatoes with skin  Milk and dairy (2 servings daily)    What to choose: milk, buttermilk; yogurt or ice cream without seeds or nuts; custard or pudding; sour cream; cheese and cottage cheese    What to avoid: ice cream and yogurt with seeds, nuts, or fruit chunks  Fruit (2 to 4 servings daily)    What to choose: ripe banana; ripe nectarine, peach, apricot, papaya, and plum; soft honeydew melon and cantaloupe; cooked or canned fruit without skin or seeds (not sweetened with sorbitol); applesauce; strained fruit juice (without pulp)    What to avoid: raw or dried fruit; all berries; raisins; canned and raw pineapple; prunes and prune juice; fruit juice with pulp  Vegetables (3 to 5 servings daily)    What to choose: well-cooked or canned vegetables without seeds, such as spinach, eggplant, green and wax beans, carrots, yellow squash, pumpkin; lettuce on a sandwich    What to avoid: all raw or steamed vegetables; vegetables with seeds, such as unstrained tomato sauce; green peas; lima beans; broccoli; corn; parsnips  Meats and protein (4 to 6 ounces daily)    What to choose: tender, well-cooked meat, including ground meat, poultry, and fish; eggs; tofu; creamy peanut butter    What to avoid: tough, chewy meat with gristle; peas, including split, yellow, and black-eyed; beans, including navy, lima, black, garbanzo, soy, fournier, and lentil; peanuts and crunchy peanut butter   Fats, oils, sauces, condiments (fewer than 8 teaspoons daily)    What to choose: butter, margarine, oils, whipped cream, sour cream, mayonnaise, smooth dressings and sauces; plain gravy; smooth condiments    What to avoid: dressing with seeds or fruit chunks; pickles and  relishes  Other foods and drinks    What to choose: water; plain gelatin; plain puddings; pretzels; plain cookies and cakes; honey, syrup; decaffeinated drinks, including tea and coffee      What to avoid: popcorn; potato chips; spicy foods; fried, greasy foods; alcohol (ask your healthcare provider); marmalade, jam, and preserves; desserts that have seeds, nuts, coconut, dried fruit, whole grains, or bran; candy that has seeds or nuts; drinks sweetened with sorbitol or other sugar substitutes; caffeinated drinks, including tea, coffee, soda, and energy drinks  Date Last Reviewed: 6/1/2017 2000-2018 [employer]. [address] [place] [address], [place] [address]. All rights reserved. This information is not intended as a substitute for professional medical care. Always follow your healthcare professional's instructions.

## 2018-11-20 NOTE — MR AVS SNAPSHOT
After Visit Summary   11/20/2018    Kamilah Dee    MRN: [id number]           Patient Information     Date Of Birth          1985        Visit Information        Provider Department      11/20/2018 5:20 PM Lisseth Johnson PA-C [facility]        Care Instructions    Clear liquids.  Water, juice that you can see through. Vitamin Water. Jello  Broth, Soup -      Omeprazole try.    When to be worried,   Severe pain in one local  More episodes of vomiting with blood   Fever.      Low-Fiber Diet     Eggs are high in protein and easy to digest.   Eating a low-fiber diet means eating foods that don t have much fiber. These foods are easy to digest.  Most of the fiber that you eat passes undigested through your bowel. This is what forms stool. Low-fiber foods can help to slow down your bowel movements. When you eat a low-fiber diet, you have fewer stools. This lets your intestine rest.  Your healthcare provider will tell you how long you need to be on this diet. It may only be for a short time. Low-fiber foods often don t give you all the nutrients you need to stay healthy. Your healthcare provider may have you take certain vitamins while you are on this diet.  Reasons to eat a low-fiber diet  The goal of a low-fiber diet is to limit the size and number of your stools. It may be prescribed if you:    Are going through chemotherapy or radiation treatments    Have had intestinal surgery    Have a condition that affects your intestine, such as irritable bowel syndrome, Crohn s disease, ulcerative colitis, or diverticulitis  General guidelines for a low-fiber diet  In general, a low-fiber diet means having fewer than 13 grams of fiber a day. Your healthcare provider may give you a list of things you can and can t eat or drink. Read food labels. Choose foods and drinks that have as close to zero grams of fiber as possible. Here are general guidelines to follow:  Breads,  pasta, cereal, rice, and other starches (6 to 11 servings daily)    What to choose: white bread, biscuits, muffins, and white rolls; plain crackers; waffles; white pasta; white rice; cream of wheat; grits; white pancakes; corn flakes; cooked potatoes without skin. Fiber content of these foods should be less than 0.5 (1/2) gram per serving.    What to avoid: whole-wheat or whole-grain breads, crackers, and pasta; breads with seeds or nuts; wheat germ; taty crackers; cornbread; wild or brown rice; cereals with whole-grain, bran, and granola; cereals with seeds, nuts, coconut, or dried fruit; potatoes with skin  Milk and dairy (2 servings daily)    What to choose: milk, buttermilk; yogurt or ice cream without seeds or nuts; custard or pudding; sour cream; cheese and cottage cheese    What to avoid: ice cream and yogurt with seeds, nuts, or fruit chunks  Fruit (2 to 4 servings daily)    What to choose: ripe banana; ripe nectarine, peach, apricot, papaya, and plum; soft honeydew melon and cantaloupe; cooked or canned fruit without skin or seeds (not sweetened with sorbitol); applesauce; strained fruit juice (without pulp)    What to avoid: raw or dried fruit; all berries; raisins; canned and raw pineapple; prunes and prune juice; fruit juice with pulp  Vegetables (3 to 5 servings daily)    What to choose: well-cooked or canned vegetables without seeds, such as spinach, eggplant, green and wax beans, carrots, yellow squash, pumpkin; lettuce on a sandwich    What to avoid: all raw or steamed vegetables; vegetables with seeds, such as unstrained tomato sauce; green peas; lima beans; broccoli; corn; parsnips  Meats and protein (4 to 6 ounces daily)    What to choose: tender, well-cooked meat, including ground meat, poultry, and fish; eggs; tofu; creamy peanut butter    What to avoid: tough, chewy meat with gristle; peas, including split, yellow, and black-eyed; beans, including navy, lima, black, garbanzo, soy, fournier,  and lentil; peanuts and crunchy peanut butter   Fats, oils, sauces, condiments (fewer than 8 teaspoons daily)    What to choose: butter, margarine, oils, whipped cream, sour cream, mayonnaise, smooth dressings and sauces; plain gravy; smooth condiments    What to avoid: dressing with seeds or fruit chunks; pickles and relishes  Other foods and drinks    What to choose: water; plain gelatin; plain puddings; pretzels; plain cookies and cakes; honey, syrup; decaffeinated drinks, including tea and coffee      What to avoid: popcorn; potato chips; spicy foods; fried, greasy foods; alcohol (ask your healthcare provider); marmalade, jam, and preserves; desserts that have seeds, nuts, coconut, dried fruit, whole grains, or bran; candy that has seeds or nuts; drinks sweetened with sorbitol or other sugar substitutes; caffeinated drinks, including tea, coffee, soda, and energy drinks  Date Last Reviewed: 6/1/2017 2000-2018 [employer]. [address] [place] [address]. All rights reserved. This information is not intended as a substitute for professional medical care. Always follow your healthcare professional's instructions.                Follow-ups after your visit        Your next 10 appointments already scheduled     Nov 20, 2018  5:20 PM CST   SHORT with Lisseth Johnson PA-C   [facility]    [address] [place] [id number] [phone]              Who to contact     If you have questions or need follow up information about today's clinic visit or your schedule please contact [facility] directly at [phone].  Normal or non-critical lab and imaging results will be communicated to you by MyChart, letter or phone within 4 business days after the clinic has received the results. If you do not hear from us within 7 days, please contact the clinic through MyChart or phone. If you have a  critical or abnormal lab result, we will notify you by phone as soon as possible.  Submit refill requests through [employer] or call your pharmacy and they will forward the refill request to us. Please allow 3 business days for your refill to be completed.          Additional Information About Your Visit        [employer] Information     [employer] gives you secure access to your electronic health record. If you see a primary care provider, you can also send messages to your care team and make appointments. If you have questions, please call your primary care clinic.  If you do not have a primary care provider, please call [phone] and they will assist you.        Care EveryWhere ID     This is your Care EveryWhere ID. This could be used by other organizations to access your [facility] medical records  [id number]        Your Vitals Were     Pulse Temperature Respirations Pulse Oximetry BMI (Body Mass Index)       72 98.1  F (36.7  C) (Oral) 16 100% 20.66 kg/m2        Blood Pressure from Last 3 Encounters:   11/20/18 122/68   11/11/18 118/82   10/05/18 117/73    Weight from Last 3 Encounters:   11/20/18 128 lb (58.1 kg)   09/19/18 127 lb 11.2 oz (57.9 kg)   09/06/18 126 lb 6.4 oz (57.3 kg)              Today, you had the following     No orders found for display       Primary Care Provider Office Phone # Fax #    Mario Joseph [phone]       [address] [facility] [address]        Equal Access to Services     SAV CALVILLO : Hadii aad ku hadasho Soomaali, waaxda luqadaha, qaybta kaalmada melanieyada, wellington roy . So [facility] [phone].    ATENCIÓN: Si habla español, tiene a farfan disposición servicios gratuitos de asistencia lingüística. Llame al [phone].    We comply with applicable federal civil rights laws and [place] laws. We do not discriminate on the basis of race, color, national origin, age, disability, sex, sexual orientation, or gender identity.             Thank you!     Thank you for choosing [facility]  for your care. Our goal is always to provide you with excellent care. Hearing back from our patients is one way we can continue to improve our services. Please take a few minutes to complete the written survey that you may receive in the mail after your visit with us. Thank you!             Your Updated Medication List - Protect others around you: Learn how to safely use, store and throw away your medicines at www.disposemymeds.org.          This list is accurate as of 11/20/18  5:13 PM.  Always use your most recent med list.                   Brand Name Dispense Instructions for use Diagnosis    CALCIUM PO      Take 1 tablet by mouth daily        medroxyPROGESTERone 150 MG/ML injection    DEPO-PROVERA    1 mL    Inject 1 mL (150 mg) into the muscle every 3 months    Encounter for initial prescription of injectable contraceptive       MULTIVITAMIN PO      Take by mouth daily        NONFORMULARY      Brain Booster supplement        omeprazole 20 MG CR capsule    priLOSEC    30 capsule    Take 1 capsule (20 mg) by mouth daily        valbenazine 40 MG capsule    INGREZZA    30 capsule    Take 1 capsule (40 mg) by mouth daily    Tic disorder

## 2018-12-14 ENCOUNTER — ALLIED HEALTH/NURSE VISIT (OUTPATIENT)
Dept: NURSING | Facility: CLINIC | Age: 33
End: 2018-12-14
Payer: COMMERCIAL

## 2018-12-14 PROCEDURE — 96372 THER/PROPH/DIAG INJ SC/IM: CPT

## 2018-12-14 RX ORDER — MEDROXYPROGESTERONE ACETATE 150 MG/ML
150 INJECTION, SUSPENSION INTRAMUSCULAR
Status: DISCONTINUED | OUTPATIENT
Start: 2018-12-14 | End: 2019-04-30

## 2018-12-14 RX ADMIN — MEDROXYPROGESTERONE ACETATE 150 MG: 150 INJECTION, SUSPENSION INTRAMUSCULAR at 11:07

## 2018-12-14 NOTE — PROGRESS NOTES
Prior to injection, verified patient identity using patient's name and date of birth.  Due to injection administration, patient instructed to remain in clinic for 15 minutes  afterwards, and to report any adverse reaction to me immediately.    BP: Data Unavailable    LAST PAP/EXAM:   Lab Results   Component Value Date    PAP ASC-US 09/06/2018     URINE HCG:not indicated    NEXT INJECTION DUE: 3/1/19 - 3/15/19       Drug Amount Wasted:  None.  Vial/Syringe: Single dose vial

## 2019-03-11 ENCOUNTER — ALLIED HEALTH/NURSE VISIT (OUTPATIENT)
Dept: NURSING | Facility: CLINIC | Age: 34
End: 2019-03-11
Payer: COMMERCIAL

## 2019-03-11 DIAGNOSIS — Z30.9 ENCOUNTER FOR CONTRACEPTIVE MANAGEMENT, UNSPECIFIED TYPE: Primary | ICD-10-CM

## 2019-03-20 DIAGNOSIS — F95.9 TIC DISORDER: ICD-10-CM

## 2019-03-20 NOTE — TELEPHONE ENCOUNTER
Nurse received refill request for:   valbenazine (INGREZZA) 40 MG capsule 30 capsule 5 10/5/2018  --   Sig - Route: Take 1 capsule (40 mg) by mouth daily - Oral     Pharmacy: [facility]    Last re-ordered: 10/5/2018    Last appointment: 9/19/2018    Next appointment: Not scheduled    Action taken: Will send in a 1 month supply with a note stating she needs to be seen in clinic before she can receive anymore refills.

## 2019-04-03 ENCOUNTER — OFFICE VISIT (OUTPATIENT)
Dept: INTERNAL MEDICINE | Facility: CLINIC | Age: 34
End: 2019-04-03
Payer: COMMERCIAL

## 2019-04-03 VITALS
OXYGEN SATURATION: 97 % | HEART RATE: 94 BPM | RESPIRATION RATE: 14 BRPM | HEIGHT: 65 IN | TEMPERATURE: 98.8 F | DIASTOLIC BLOOD PRESSURE: 50 MMHG | WEIGHT: 131 LBS | BODY MASS INDEX: 21.83 KG/M2 | SYSTOLIC BLOOD PRESSURE: 84 MMHG

## 2019-04-03 DIAGNOSIS — R53.83 OTHER FATIGUE: Primary | ICD-10-CM

## 2019-04-03 LAB
ALBUMIN SERPL-MCNC: 3.7 G/DL (ref 3.4–5)
ALP SERPL-CCNC: 36 U/L (ref 40–150)
ALT SERPL W P-5'-P-CCNC: 28 U/L (ref 0–50)
ANION GAP SERPL CALCULATED.3IONS-SCNC: 6 MMOL/L (ref 3–14)
AST SERPL W P-5'-P-CCNC: 21 U/L (ref 0–45)
BASOPHILS # BLD AUTO: 0 10E9/L (ref 0–0.2)
BASOPHILS NFR BLD AUTO: 0.3 %
BILIRUB SERPL-MCNC: 0.4 MG/DL (ref 0.2–1.3)
BUN SERPL-MCNC: 32 MG/DL (ref 7–30)
CALCIUM SERPL-MCNC: 8.5 MG/DL (ref 8.5–10.1)
CHLORIDE SERPL-SCNC: 108 MMOL/L (ref 94–109)
CO2 SERPL-SCNC: 27 MMOL/L (ref 20–32)
CREAT SERPL-MCNC: 0.89 MG/DL (ref 0.52–1.04)
DIFFERENTIAL METHOD BLD: NORMAL
EOSINOPHIL # BLD AUTO: 0.2 10E9/L (ref 0–0.7)
EOSINOPHIL NFR BLD AUTO: 1.6 %
ERYTHROCYTE [DISTWIDTH] IN BLOOD BY AUTOMATED COUNT: 13.2 % (ref 10–15)
GFR SERPL CREATININE-BSD FRML MDRD: 84 ML/MIN/{1.73_M2}
GLUCOSE SERPL-MCNC: 88 MG/DL (ref 70–99)
HCT VFR BLD AUTO: 41.9 % (ref 35–47)
HGB BLD-MCNC: 13.9 G/DL (ref 11.7–15.7)
LYMPHOCYTES # BLD AUTO: 2.6 10E9/L (ref 0.8–5.3)
LYMPHOCYTES NFR BLD AUTO: 27.8 %
MCH RBC QN AUTO: 29.2 PG (ref 26.5–33)
MCHC RBC AUTO-ENTMCNC: 33.2 G/DL (ref 31.5–36.5)
MCV RBC AUTO: 88 FL (ref 78–100)
MONOCYTES # BLD AUTO: 0.6 10E9/L (ref 0–1.3)
MONOCYTES NFR BLD AUTO: 6 %
NEUTROPHILS # BLD AUTO: 6 10E9/L (ref 1.6–8.3)
NEUTROPHILS NFR BLD AUTO: 64.3 %
PLATELET # BLD AUTO: 246 10E9/L (ref 150–450)
POTASSIUM SERPL-SCNC: 3.8 MMOL/L (ref 3.4–5.3)
PROT SERPL-MCNC: 6.6 G/DL (ref 6.8–8.8)
RBC # BLD AUTO: 4.76 10E12/L (ref 3.8–5.2)
SODIUM SERPL-SCNC: 141 MMOL/L (ref 133–144)
TSH SERPL DL<=0.005 MIU/L-ACNC: 1.76 MU/L (ref 0.4–4)
WBC # BLD AUTO: 9.3 10E9/L (ref 4–11)

## 2019-04-03 PROCEDURE — 84443 ASSAY THYROID STIM HORMONE: CPT | Performed by: PHYSICIAN ASSISTANT

## 2019-04-03 PROCEDURE — 99213 OFFICE O/P EST LOW 20 MIN: CPT | Performed by: PHYSICIAN ASSISTANT

## 2019-04-03 PROCEDURE — 82306 VITAMIN D 25 HYDROXY: CPT | Performed by: PHYSICIAN ASSISTANT

## 2019-04-03 PROCEDURE — 36415 COLL VENOUS BLD VENIPUNCTURE: CPT | Performed by: PHYSICIAN ASSISTANT

## 2019-04-03 PROCEDURE — 85025 COMPLETE CBC W/AUTO DIFF WBC: CPT | Performed by: PHYSICIAN ASSISTANT

## 2019-04-03 PROCEDURE — 80053 COMPREHEN METABOLIC PANEL: CPT | Performed by: PHYSICIAN ASSISTANT

## 2019-04-03 ASSESSMENT — MIFFLIN-ST. JEOR: SCORE: 1295.09

## 2019-04-03 NOTE — PROGRESS NOTES
"  SUBJECTIVE:   Kamilah Dee is a 33 year old female who presents to clinic today for the following health issues:      Concern - Fatigue  Onset: 2 months    Description:   Been feeling weak and fatigued. Denies any cold sx or changes in mood.     Intensity: mild    Progression of Symptoms:  same and intermittent    Accompanying Signs & Symptoms:  None    Previous history of similar problem:   yes    Precipitating factors:   Worsened by: None    Alleviating factors:  Improved by: None    Therapies Tried and outcome: increase exercise, does not help    Patient on Ingrezza for tic disorder per neurology. NO change in dosing of medication   No other new medications.       -------------------------------------    Problem list and histories reviewed & adjusted, as indicated.  Additional history: as documented    Labs reviewed in EPIC    Reviewed and updated as needed this visit by clinical staff       Reviewed and updated as needed this visit by Provider  Allergies  Meds         ROS:  Constitutional, HEENT, cardiovascular, pulmonary, gi and gu systems are negative, except as otherwise noted.    OBJECTIVE:     BP (!) 84/50 (BP Location: Left arm, Patient Position: Chair, Cuff Size: Adult Regular)   Pulse 94   Temp 98.8  F (37.1  C) (Oral)   Resp 14   Ht 1.651 m (5' 5\")   Wt 59.4 kg (131 lb)   SpO2 97%   BMI 21.80 kg/m    Body mass index is 21.8 kg/m .  GENERAL: healthy, alert and no distress  NECK: no adenopathy, no asymmetry, masses, or scars and thyroid normal to palpation  RESP: lungs clear to auscultation - no rales, rhonchi or wheezes  CV: regular rate and rhythm, normal S1 S2, no S3 or S4, no murmur, click or rub, no peripheral edema and peripheral pulses strong  MS: no gross musculoskeletal defects noted, no edema  SKIN: no suspicious lesions or rashes  NEURO: Normal strength and tone, mentation intact and speech normal    Diagnostic Test Results:  Pending     ASSESSMENT/PLAN:             1. Other " fatigue    - CBC with platelets differential  - Comprehensive metabolic panel  - TSH with free T4 reflex  - Vitamin D Deficiency    Will notify of results when available and further f/u with PCP         Lisseth Johnson PA-C  [facility]

## 2019-04-04 LAB — DEPRECATED CALCIDIOL+CALCIFEROL SERPL-MC: 25 UG/L (ref 20–75)

## 2019-04-22 DIAGNOSIS — F95.9 TIC DISORDER: ICD-10-CM

## 2019-04-29 ENCOUNTER — OFFICE VISIT (OUTPATIENT)
Dept: URGENT CARE | Facility: URGENT CARE | Age: 34
End: 2019-04-29
Payer: COMMERCIAL

## 2019-04-29 VITALS
SYSTOLIC BLOOD PRESSURE: 108 MMHG | TEMPERATURE: 97.7 F | HEART RATE: 79 BPM | RESPIRATION RATE: 16 BRPM | DIASTOLIC BLOOD PRESSURE: 78 MMHG | OXYGEN SATURATION: 99 %

## 2019-04-29 DIAGNOSIS — M54.2 NECK PAIN ON RIGHT SIDE: Primary | ICD-10-CM

## 2019-04-29 DIAGNOSIS — M62.838 SPASM OF MUSCLE: ICD-10-CM

## 2019-04-29 PROCEDURE — 99214 OFFICE O/P EST MOD 30 MIN: CPT | Performed by: PHYSICIAN ASSISTANT

## 2019-04-29 RX ORDER — METHYLPREDNISOLONE 4 MG
TABLET, DOSE PACK ORAL
Qty: 21 TABLET | Refills: 0 | Status: SHIPPED | OUTPATIENT
Start: 2019-04-29 | End: 2019-08-12

## 2019-04-29 RX ORDER — CYCLOBENZAPRINE HCL 10 MG
10 TABLET ORAL 3 TIMES DAILY PRN
Qty: 15 TABLET | Refills: 0 | Status: SHIPPED | OUTPATIENT
Start: 2019-04-29 | End: 2019-08-12

## 2019-04-29 NOTE — PROGRESS NOTES
SUBJECTIVE:  Chief Complaint   Patient presents with     Urgent Care     neck pain for 3 weeks     Kamilah Dee is a 34 year old female presents with a chief complaint of right neck tenderness, tightness or neck and upper back .  How: no known injury.  The patient complained of mild and moderate pain  and has had decreased ROM.  Pain exacerbated by movement.  Relieved by rest.  She treated it initially with no therapy. This is the first time this type of injury has occurred to this patient.     Past Medical History:   Diagnosis Date     Anemia     with pregnancy     Anxiety 2014     Depo-Provera contraceptive status      Depression      History of varicella-documented immunity 2011     Migraine 8/3/2012     Other, mixed, or unspecified nondependent drug abuse, episodic      Tic age 14     ALLERGIES  No Known Allergies     Family History   Problem Relation Age of Onset     Alcohol/Drug Mother         b: addicted to crack     Family History Negative Father         b:      Family History Negative Sister         b:     Cervical Cancer Sister 35        10/2015     Family History Negative Sister         b:     Family History Negative Brother         b:       Social History     Tobacco Use     Smoking status: Former Smoker     Last attempt to quit: 2010     Years since quittin.9     Smokeless tobacco: Never Used   Substance Use Topics     Alcohol use: No     Alcohol/week: 0.0 oz     Comment: occasionally 1-2 etoh a month/       ROS:  CONSTITUTIONAL:NEGATIVE for fever, chills, change in weight  INTEGUMENTARY/SKIN: NEGATIVE for worrisome rashes, moles or lesions  ENT/MOUTH: NEGATIVE for ear, mouth and throat problems  RESP:NEGATIVE for significant cough or SOB  CV: NEGATIVE for chest pain, palpitations or peripheral edema  GI: NEGATIVE for nausea, abdominal pain, heartburn, or change in bowel habits  MUSCULOSKELETAL: POSITIVE  for right side neck tenderness, pain and  tightness  NEURO: NEGATIVE for weakness, dizziness or paresthesias    EXAM:   /78   Pulse 79   Temp 97.7  F (36.5  C) (Oral)   Resp 16   SpO2 99%   Gen: healthy,alert,no distress  Extremity: full ROM of upper arms bilaterally.   There is not compromise to the distal circulation.  Pulses are +2 and CRT is brisk  NECK:  tenderness to palpation along posterior aspect right side  CHEST: clear to auscultation  EXTREMITIES: peripheral pulses normal  MS: no gross deformities noted, no evidence of inflammation in joints, FROM in all extremities.  SKIN: no suspicious lesions or rashes  NEURO: Normal strength and tone, sensory exam grossly normal, mentation intact and speech normal    X-RAY was not done.     ASSESSMENT/PLAN:    ICD-10-CM    1. Neck pain on right side M54.2 methylPREDNISolone (MEDROL DOSEPAK) 4 MG tablet therapy pack   2. Spasm of muscle M62.838 cyclobenzaprine (FLEXERIL) 10 MG tablet      warm moist compresses  Alternate ice and heat  ROM exercises  Follow up with PCP as needed

## 2019-04-30 ENCOUNTER — OFFICE VISIT (OUTPATIENT)
Dept: INTERNAL MEDICINE | Facility: CLINIC | Age: 34
End: 2019-04-30
Payer: COMMERCIAL

## 2019-04-30 VITALS
TEMPERATURE: 97.5 F | DIASTOLIC BLOOD PRESSURE: 70 MMHG | RESPIRATION RATE: 16 BRPM | OXYGEN SATURATION: 99 % | HEART RATE: 88 BPM | BODY MASS INDEX: 21.7 KG/M2 | SYSTOLIC BLOOD PRESSURE: 120 MMHG | WEIGHT: 130.4 LBS

## 2019-04-30 DIAGNOSIS — Z41.1 ENCOUNTER FOR BREAST AUGMENTATION: ICD-10-CM

## 2019-04-30 DIAGNOSIS — Z01.818 PREOP GENERAL PHYSICAL EXAM: Primary | ICD-10-CM

## 2019-04-30 DIAGNOSIS — F95.9 TIC DISORDER: ICD-10-CM

## 2019-04-30 PROCEDURE — 99214 OFFICE O/P EST MOD 30 MIN: CPT | Performed by: PHYSICIAN ASSISTANT

## 2019-04-30 NOTE — PROGRESS NOTES
[address] [address]  [phone]  Dept: [phone]    PRE-OP EVALUATION:  Today's date: 2019    Kamilah Dee (: 1985) presents for pre-operative evaluation assessment as requested by Dr. George.  She requires evaluation and anesthesia risk assessment prior to undergoing surgery/procedure for treatment of fat graphing (transfer) .    Proposed Surgery/ Procedure: fat transfer (fat graphing)  Date of Surgery/ Procedure: 2019  Time of Surgery/ Procedure: [facility]  Hospital/Surgical Facility: [address] [place] [address] Plastic Surgery  Fax number for surgical facility: [phone]  Primary Physician: Mario Joseph  Type of Anesthesia Anticipated: General    Patient has a Health Care Directive or Living Will:  NO    1. NO - Do you have a history of heart attack, stroke, stent, bypass or surgery on an artery in the head, neck, heart or legs?  2. NO - Do you ever have any pain or discomfort in your chest?  3. NO - Do you have a history of  Heart Failure?  4. NO - Are you troubled by shortness of breath when: walking on the level, up a slight hill or at night?  5. NO - Do you currently have a cold, bronchitis or other respiratory infection?  6. NO - Do you have a cough, shortness of breath or wheezing?  7. NO - Do you sometimes get pains in the calves of your legs when you walk?  8. NO - Do you or anyone in your family have previous history of blood clots?  9. NO - Do you or does anyone in your family have a serious bleeding problem such as prolonged bleeding following surgeries or cuts?  10. YES - Have you ever had problems with anemia or been told to take iron pills?Possibly past pregnancy  11. NO - Have you had any abnormal blood loss such as black, tarry or bloody stools, or abnormal vaginal bleeding?  12. NO - Have you ever had a blood transfusion?  13. NO - Have you or any of your relatives ever had problems with  anesthesia?  14. NO - Do you have sleep apnea, excessive snoring or daytime drowsiness?  15. NO - Do you have any prosthetic heart valves?  16. NO - Do you have prosthetic joints?  17. NO - Is there any chance that you may be pregnant?      HPI:     HPI related to upcoming procedure: breast augmentation with fat transfer       See problem list for active medical problems.  Problems all longstanding and stable, except as noted/documented.  See ROS for pertinent symptoms related to these conditions.                                                                                                                                                          .  Tic disorder and sees neurology for management - stable     MEDICAL HISTORY:     Patient Active Problem List    Diagnosis Date Noted     ASCUS of cervix with negative high risk HPV 09/06/2018     Priority: Medium     '03, '05 , '11 NIL pap  4/15/14 NIL pap, Neg HPV  12/11/15 NIL pap, + HR HPV (not 16 or 18) >> 1/11/16 Colpo: ECC-neg  12/12/16 NIL pap, Neg HPV  9/6/18 ASCUS pap, Neg HPV.  Plan: cotest in 3 years per guidelines, but pt may request annual pap         Major depressive disorder, single episode, mild (H) 04/11/2016     Priority: Medium     Verruca 06/03/2015     Priority: Medium     HAMIDA (generalized anxiety disorder) 01/21/2014     Priority: Medium     Migraine 08/03/2012     Priority: Medium     Health Care Home 01/18/2012     Priority: Medium     X  EMERGENCY CARE PLAN  Presenting Problem Signs and Symptoms Treatment Plan    Questions or concerns during clinic hours    I will call the clinic directly     Questions or concerns outside clinic hours    I will call the 24 hour nurse line at [phone]    Patient needs to schedule an appointment    I will call the 24 hour scheduling team at [phone] or clinic directly    Same day treatment     I will call the clinic first, nurse line if after hours, urgent care and express care if needed                             DX V65.8 REPLACED WITH 66587 HEALTH CARE HOME (2013)       CARDIOVASCULAR SCREENING; LDL GOAL LESS THAN 160 10/31/2010     Priority: Medium     Tic disorder 2010     Priority: Medium      Past Medical History:   Diagnosis Date     Anemia     with pregnancy     Anxiety 2014     Depo-Provera contraceptive status      Depression      History of varicella-documented immunity 2011     Migraine 8/3/2012     Other, mixed, or unspecified nondependent drug abuse, episodic      Tic age 14     Past Surgical History:   Procedure Laterality Date     HC REMOVAL OF TONSILS,11+ Y/O      Tonsils 11+y.o.     Current Outpatient Medications   Medication Sig Dispense Refill     CALCIUM PO Take 1 tablet by mouth daily        cyclobenzaprine (FLEXERIL) 10 MG tablet Take 1 tablet (10 mg) by mouth 3 times daily as needed for muscle spasms 15 tablet 0     methylPREDNISolone (MEDROL DOSEPAK) 4 MG tablet therapy pack Follow package instructions 21 tablet 0     Multiple Vitamins-Minerals (MULTIVITAMIN PO) Take by mouth daily       NONFORMULARY Brain Booster supplement       valbenazine (INGREZZA) 40 MG capsule Take 1 capsule (40 mg) by mouth daily 30 capsule 4     OTC products: None, except as noted above    No Known Allergies   Latex Allergy: NO    Social History     Tobacco Use     Smoking status: Former Smoker     Last attempt to quit: 2010     Years since quittin.9     Smokeless tobacco: Never Used   Substance Use Topics     Alcohol use: No     Alcohol/week: 0.0 oz     Comment: occasionally 1-2 etoh a month/     History   Drug Use No     Comment: marijuana hasn't used in 3 years, prior use of ampht, LSD, crack & cocaine. Sober since age 18        REVIEW OF SYSTEMS:   CONSTITUTIONAL: NEGATIVE for fever, chills, change in weight  INTEGUMENTARY/SKIN: NEGATIVE for worrisome rashes, moles or lesions  EYES: NEGATIVE for vision changes or irritation  ENT/MOUTH: NEGATIVE for ear, mouth and throat  problems  RESP: NEGATIVE for significant cough or SOB  CV: NEGATIVE for chest pain, palpitations or peripheral edema  GI: NEGATIVE for nausea, abdominal pain, heartburn, or change in bowel habits  MUSCULOSKELETAL: NEGATIVE for significant arthralgias or myalgia  NEURO: NEGATIVE for weakness, dizziness or paresthesias  ENDOCRINE: NEGATIVE for temperature intolerance, skin/hair changes  HEME/ALLERGY/IMMUNE: NEGATIVE for bleeding problems  PSYCHIATRIC: NEGATIVE for changes in mood or affect  ROS otherwise negative    EXAM:   /70 (BP Location: Left arm, Patient Position: Chair, Cuff Size: Adult Regular)   Pulse 88   Temp 97.5  F (36.4  C) (Oral)   Resp 16   Wt 59.1 kg (130 lb 6.4 oz)   SpO2 99%   BMI 21.70 kg/m    GENERAL APPEARANCE: healthy, alert and no distress  EYES: Eyes grossly normal to inspection, PERRL and conjunctivae and sclerae normal  HENT: ear canals and TM's normal and nose and mouth without ulcers or lesions  RESP: lungs clear to auscultation - no rales, rhonchi or wheezes  CV: regular rate and rhythm, normal S1 S2, no S3 or S4 and no murmur, click or rub   ABDOMEN: soft, nontender, no HSM or masses and bowel sounds normal  MS: extremities normal- no gross deformities noted  SKIN: no suspicious lesions or rashes  NEURO: Normal strength and tone, sensory exam grossly normal, mentation intact and speech normal  PSYCH: mentation appears normal and affect normal/bright    DIAGNOSTICS:   EKG: Not indicated due to non-vascular surgery and low risk of event (age <65 and without cardiac risk factors)    Recent Labs   Lab Test 04/03/19  1636 11/11/18  2323   HGB 13.9 13.8    283    141   POTASSIUM 3.8 3.5   CR 0.89 0.69        IMPRESSION:   Reason for surgery/procedure: breast augmentation with fat graft transfer  Diagnosis/reason for consult: Tic disorder, cardiopulmonary and clearance for surgery     The proposed surgical procedure is considered LOW risk.    REVISED CARDIAC RISK  INDEX  The patient has the following serious cardiovascular risks for perioperative complications such as (MI, PE, VFib and 3  AV Block):  No serious cardiac risks  INTERPRETATION: 0 risks: Class I (very low risk - 0.4% complication rate)    The patient has the following additional risks for perioperative complications:  No identified additional risks      ICD-10-CM    1. Preop general physical exam Z01.818    2. Encounter for breast augmentation Z41.1    3. Tic disorder F95.9        RECOMMENDATIONS:         --Patient is to take all scheduled medications on the day of surgery EXCEPT for modifications listed below.  Patient Instructions   No aspirin ibuprofen or aleve ( naproxen) one week prior to surgery.    Routine medication with a sip of water.  No supplements or vitamins the morning of surgery         Before Your Surgery      Call your surgeon if there is any change in your health. This includes signs of a cold or flu (such as a sore throat, runny nose, cough, rash or fever).    Do not smoke, drink alcohol or take over the counter medicine (unless your surgeon or primary care doctor tells you to) for the 24 hours before and after surgery.    If you take prescribed drugs: Follow your doctor s orders about which medicines to take and which to stop until after surgery.    Eating and drinking prior to surgery: follow the instructions from your surgeon    Take a shower or bath the night before surgery. Use the soap your surgeon gave you to gently clean your skin. If you do not have soap from your surgeon, use your regular soap. Do not shave or scrub the surgery site.  Wear clean pajamas and have clean sheets on your bed.         APPROVAL GIVEN to proceed with proposed procedure, without further diagnostic evaluation       Signed Electronically by: Lisseth Johnson PA-C    Copy of this evaluation report is provided to requesting physician.    [facility] Preop Guidelines    Revised Cardiac Risk Index

## 2019-04-30 NOTE — PATIENT INSTRUCTIONS
No aspirin ibuprofen or aleve ( naproxen) one week prior to surgery.    Routine medication with a sip of water.  No supplements or vitamins the morning of surgery         Before Your Surgery      Call your surgeon if there is any change in your health. This includes signs of a cold or flu (such as a sore throat, runny nose, cough, rash or fever).    Do not smoke, drink alcohol or take over the counter medicine (unless your surgeon or primary care doctor tells you to) for the 24 hours before and after surgery.    If you take prescribed drugs: Follow your doctor s orders about which medicines to take and which to stop until after surgery.    Eating and drinking prior to surgery: follow the instructions from your surgeon    Take a shower or bath the night before surgery. Use the soap your surgeon gave you to gently clean your skin. If you do not have soap from your surgeon, use your regular soap. Do not shave or scrub the surgery site.  Wear clean pajamas and have clean sheets on your bed.

## 2019-05-02 ENCOUNTER — MYC MEDICAL ADVICE (OUTPATIENT)
Dept: INTERNAL MEDICINE | Facility: CLINIC | Age: 34
End: 2019-05-02

## 2019-05-02 NOTE — TELEPHONE ENCOUNTER
Panel Management Review    Patient Active Problem List   Diagnosis     Tic disorder     CARDIOVASCULAR SCREENING; LDL GOAL LESS THAN 160     Health Care Home     Migraine     HAMIDA (generalized anxiety disorder)     Verruca     ASCUS of cervix with negative high risk HPV     Major depressive disorder, single episode, mild (H)       Patient has the following on her problem list:     Depression / Dysthymia review    Measure:  Needs PHQ-9 score of 4 or less during index window.  Administer PHQ-9 and if score is 5 or more, send encounter to provider for next steps.    5 - 7 month window range: 19    PHQ-9 SCORE 3/6/2018 4/3/2018 9/6/2018   PHQ-9 Total Score - - -   PHQ-9 Total Score 12 14 19       If PHQ-9 recheck is 5 or more, route to provider for next steps.    Patient is due for:  PHQ9      Composite cancer screening  Chart review shows that this patient is due/due soon for the following None  Summary:    Patient is due/failing the following:   PHQ9    Action needed:   Patient needs to do PHQ9.    Type of outreach:    Sent [employer] message.    Questions for provider review:    None                                                                                                                                    Lisseth Khan [facility]       Chart routed to none .

## 2019-06-14 ENCOUNTER — ALLIED HEALTH/NURSE VISIT (OUTPATIENT)
Dept: NURSING | Facility: CLINIC | Age: 34
End: 2019-06-14
Payer: COMMERCIAL

## 2019-06-14 DIAGNOSIS — Z30.9 ENCOUNTER FOR CONTRACEPTIVE MANAGEMENT, UNSPECIFIED TYPE: Primary | ICD-10-CM

## 2019-06-14 DIAGNOSIS — Z30.42 ENCOUNTER FOR DEPO-PROVERA CONTRACEPTION: Primary | ICD-10-CM

## 2019-06-14 PROCEDURE — 96372 THER/PROPH/DIAG INJ SC/IM: CPT

## 2019-06-14 RX ORDER — MEDROXYPROGESTERONE ACETATE 150 MG/ML
150 INJECTION, SUSPENSION INTRAMUSCULAR
Status: DISCONTINUED | OUTPATIENT
Start: 2019-06-14 | End: 2019-09-24

## 2019-06-14 RX ADMIN — MEDROXYPROGESTERONE ACETATE 150 MG: 150 INJECTION, SUSPENSION INTRAMUSCULAR at 14:15

## 2019-07-16 ENCOUNTER — OFFICE VISIT (OUTPATIENT)
Dept: INTERNAL MEDICINE | Facility: CLINIC | Age: 34
End: 2019-07-16
Payer: COMMERCIAL

## 2019-07-16 VITALS
DIASTOLIC BLOOD PRESSURE: 60 MMHG | OXYGEN SATURATION: 99 % | BODY MASS INDEX: 20.93 KG/M2 | TEMPERATURE: 98.9 F | HEART RATE: 82 BPM | WEIGHT: 125.8 LBS | SYSTOLIC BLOOD PRESSURE: 90 MMHG | RESPIRATION RATE: 16 BRPM

## 2019-07-16 DIAGNOSIS — H81.11 BENIGN PAROXYSMAL POSITIONAL VERTIGO OF RIGHT EAR: Primary | ICD-10-CM

## 2019-07-16 PROCEDURE — 99213 OFFICE O/P EST LOW 20 MIN: CPT | Performed by: PHYSICIAN ASSISTANT

## 2019-07-16 NOTE — PROGRESS NOTES
Subjective     Kamilah Dee is a 34 year old female who presents to clinic today for the following health issues:    HPI   Dizziness      Duration: off and on for 15 years    Description   Feeling faint:  no   Feeling like the surroundings are moving: YES- when leaning babk  Loss of consciousness or falls: no   Duration: few seconds     Intensity:  moderate    Accompanying signs and symptoms:   Nausea/vomitting: no   Palpitations: no   Weakness in arms or legs: YES  Vision or speech changes: no   Ringing in ears (Tinnitus): no   Hearing loss related to dizziness: no   Other (fevers/chills/sweating/dyspnea): YES    History (similar episodes/head trauma/previous evaluation/recent bleeding): off and on for 15 years, seen dizziness and balance 8 years ago. Would like to know if something else can be done other then excercises    Precipitating or alleviating factors (new meds/chemicals): None  Worse with activity/head movement: YES    Therapies tried and outcome: exercise with little relief    Reviewed and updated as needed this visit by Provider  Meds  Problems             Objective    BP 90/60 (BP Location: Right arm, Patient Position: Chair, Cuff Size: Adult Regular)   Pulse 82   Temp 98.9  F (37.2  C) (Oral)   Resp 16   Wt 57.1 kg (125 lb 12.8 oz)   SpO2 99%   BMI 20.93 kg/m    Body mass index is 20.93 kg/m .  Physical Exam   GENERAL: healthy, alert and no distress  EYES: Eyes grossly normal to inspection, PERRL and conjunctivae and sclerae normal  HENT: ear canals and TM's normal, nose and mouth without ulcers or lesions, Clarice Halpike positive on right   NECK: no adenopathy, no asymmetry, masses, or scars and thyroid normal to palpation  RESP: lungs clear to auscultation - no rales, rhonchi or wheezes  CV: regular rates and rhythm, normal S1 S2, no S3 or S4 and no murmur, click or rub  NEURO: Normal strength and tone, sensory exam grossly normal, mentation intact and cranial nerves 2-12  intact    Diagnostic Test Results:  Labs reviewed in Epic        Assessment & Plan     1. Benign paroxysmal positional vertigo of right ear  - referral to physical therapy for treatment   - PHYSICAL THERAPY REFERRAL; Future    No follow-ups on file.    Enedelia Araujo PA-C  [facility]

## 2019-08-12 ENCOUNTER — OFFICE VISIT (OUTPATIENT)
Dept: FAMILY MEDICINE | Facility: CLINIC | Age: 34
End: 2019-08-12
Payer: COMMERCIAL

## 2019-08-12 VITALS
HEIGHT: 65 IN | RESPIRATION RATE: 16 BRPM | TEMPERATURE: 99 F | SYSTOLIC BLOOD PRESSURE: 112 MMHG | OXYGEN SATURATION: 97 % | DIASTOLIC BLOOD PRESSURE: 74 MMHG | HEART RATE: 87 BPM | WEIGHT: 126 LBS | BODY MASS INDEX: 20.99 KG/M2

## 2019-08-12 DIAGNOSIS — F41.1 GAD (GENERALIZED ANXIETY DISORDER): ICD-10-CM

## 2019-08-12 DIAGNOSIS — F32.0 MAJOR DEPRESSIVE DISORDER, SINGLE EPISODE, MILD (H): ICD-10-CM

## 2019-08-12 DIAGNOSIS — F10.10 ALCOHOL ABUSE: ICD-10-CM

## 2019-08-12 DIAGNOSIS — R10.13 ABDOMINAL PAIN, EPIGASTRIC: Primary | ICD-10-CM

## 2019-08-12 LAB
BASOPHILS # BLD AUTO: 0 10E9/L (ref 0–0.2)
BASOPHILS NFR BLD AUTO: 0.5 %
DIFFERENTIAL METHOD BLD: NORMAL
EOSINOPHIL # BLD AUTO: 0.1 10E9/L (ref 0–0.7)
EOSINOPHIL NFR BLD AUTO: 1.1 %
ERYTHROCYTE [DISTWIDTH] IN BLOOD BY AUTOMATED COUNT: 12.9 % (ref 10–15)
HCT VFR BLD AUTO: 41.1 % (ref 35–47)
HGB BLD-MCNC: 13.7 G/DL (ref 11.7–15.7)
LIPASE SERPL-CCNC: 150 U/L (ref 73–393)
LYMPHOCYTES # BLD AUTO: 1.8 10E9/L (ref 0.8–5.3)
LYMPHOCYTES NFR BLD AUTO: 28.9 %
MCH RBC QN AUTO: 29.7 PG (ref 26.5–33)
MCHC RBC AUTO-ENTMCNC: 33.3 G/DL (ref 31.5–36.5)
MCV RBC AUTO: 89 FL (ref 78–100)
MONOCYTES # BLD AUTO: 0.4 10E9/L (ref 0–1.3)
MONOCYTES NFR BLD AUTO: 5.8 %
NEUTROPHILS # BLD AUTO: 4 10E9/L (ref 1.6–8.3)
NEUTROPHILS NFR BLD AUTO: 63.7 %
PLATELET # BLD AUTO: 256 10E9/L (ref 150–450)
RBC # BLD AUTO: 4.61 10E12/L (ref 3.8–5.2)
WBC # BLD AUTO: 6.3 10E9/L (ref 4–11)

## 2019-08-12 PROCEDURE — 36415 COLL VENOUS BLD VENIPUNCTURE: CPT | Performed by: PHYSICIAN ASSISTANT

## 2019-08-12 PROCEDURE — 80053 COMPREHEN METABOLIC PANEL: CPT | Performed by: PHYSICIAN ASSISTANT

## 2019-08-12 PROCEDURE — 83690 ASSAY OF LIPASE: CPT | Performed by: PHYSICIAN ASSISTANT

## 2019-08-12 PROCEDURE — 85025 COMPLETE CBC W/AUTO DIFF WBC: CPT | Performed by: PHYSICIAN ASSISTANT

## 2019-08-12 PROCEDURE — 99214 OFFICE O/P EST MOD 30 MIN: CPT | Performed by: PHYSICIAN ASSISTANT

## 2019-08-12 ASSESSMENT — ANXIETY QUESTIONNAIRES
GAD7 TOTAL SCORE: 13
IF YOU CHECKED OFF ANY PROBLEMS ON THIS QUESTIONNAIRE, HOW DIFFICULT HAVE THESE PROBLEMS MADE IT FOR YOU TO DO YOUR WORK, TAKE CARE OF THINGS AT HOME, OR GET ALONG WITH OTHER PEOPLE: VERY DIFFICULT
7. FEELING AFRAID AS IF SOMETHING AWFUL MIGHT HAPPEN: MORE THAN HALF THE DAYS
1. FEELING NERVOUS, ANXIOUS, OR ON EDGE: MORE THAN HALF THE DAYS
5. BEING SO RESTLESS THAT IT IS HARD TO SIT STILL: SEVERAL DAYS
2. NOT BEING ABLE TO STOP OR CONTROL WORRYING: MORE THAN HALF THE DAYS
3. WORRYING TOO MUCH ABOUT DIFFERENT THINGS: MORE THAN HALF THE DAYS
6. BECOMING EASILY ANNOYED OR IRRITABLE: MORE THAN HALF THE DAYS

## 2019-08-12 ASSESSMENT — PATIENT HEALTH QUESTIONNAIRE - PHQ9
5. POOR APPETITE OR OVEREATING: MORE THAN HALF THE DAYS
SUM OF ALL RESPONSES TO PHQ QUESTIONS 1-9: 12

## 2019-08-12 ASSESSMENT — MIFFLIN-ST. JEOR: SCORE: 1267.41

## 2019-08-12 NOTE — PROGRESS NOTES
Subjective     Kamilah Dee is a 34 year old female who presents to clinic today for the following health issues:    HPI   Abdominal Pain      Duration: 2 weeks    Description (location/character/radiation): epigastric pain.  Pt stated she had been drinking ETOH a lot over the past 1-2 months and her pain has become worse.  She has not been drinking over the past 3-4 days and pain has improved       Associated flank pain: None    Intensity:  moderate    Accompanying signs and symptoms:        Fever/Chills: no        Gas/Bloating: YES- bloating       Nausea/vomitting: YES- nausea and feeling hot       Diarrhea: no        Dysuria or Hematuria: no     History (previous similar pain/trauma/previous testing): off and on in past but only lasted a couple days    Precipitating or alleviating factors:       Pain worse with eating/BM/urination: no       Pain relieved by BM: no     Therapies tried and outcome: ibuprofen and pepto bismol    LMP:  unsure        Kamilah has a PMH depression and anxiety.  Over the past few months she has been experiencing more stress as she is going through a divorce and is having trouble at work.  She tells me that she has been drinking alcohol nearly everyday over the past 1-2 months, usually multiple alcoholic beverages per day. She has been experiencing a dull constant epigastric pain that is intermittently sharp over the past 2-3 weeks.  She stopped drinking 3-4 days ago and her pain is improving.  She has not noticed melena, hematochezia or dizziness.  She has had some associated nausea and has had a few episodes of vomiting over the past few months. No diarrhea or fever          Patient Active Problem List   Diagnosis     Tic disorder     CARDIOVASCULAR SCREENING; LDL GOAL LESS THAN 160     Health Care Home     Migraine     HAMIDA (generalized anxiety disorder)     Verruca     ASCUS of cervix with negative high risk HPV     Major depressive disorder, single episode, mild (H)     Past Surgical  "History:   Procedure Laterality Date     HC REMOVAL OF TONSILS,11+ Y/O      Tonsils 11+y.o.       Social History     Tobacco Use     Smoking status: Former Smoker     Last attempt to quit: 2010     Years since quittin.2     Smokeless tobacco: Never Used   Substance Use Topics     Alcohol use: No     Alcohol/week: 0.0 oz     Comment: occasionally 1-2 etoh a month/     Family History   Problem Relation Age of Onset     Alcohol/Drug Mother         b: addicted to crack     Family History Negative Father         b:      Family History Negative Sister         b:     Cervical Cancer Sister 35        10/2015     Family History Negative Sister         b:     Family History Negative Brother         b:         Current Outpatient Medications   Medication Sig Dispense Refill     Multiple Vitamins-Minerals (MULTIVITAMIN PO) Take by mouth daily       NONFORMULARY Brain Booster supplement       omeprazole (PRILOSEC) 20 MG DR capsule Take 1 capsule (20 mg) by mouth daily 60 capsule 0     valbenazine (INGREZZA) 40 MG capsule Take 1 capsule (40 mg) by mouth daily 30 capsule 4     No Known Allergies      Reviewed and updated as needed this visit by Provider         Review of Systems   ROS COMP: Constitutional, HEENT, cardiovascular, pulmonary, gi and gu systems are negative, except as otherwise noted.      Objective    /74   Pulse 87   Temp 99  F (37.2  C) (Tympanic)   Resp 16   Ht 1.651 m (5' 5\")   Wt 57.2 kg (126 lb)   SpO2 97%   BMI 20.97 kg/m    Body mass index is 20.97 kg/m .  Physical Exam   GENERAL: healthy, alert and no distress  RESP: lungs clear to auscultation - no rales, rhonchi or wheezes  CV: regular rate and rhythm, normal S1 S2, no S3 or S4, no murmur, click or rub  ABDOMEN: soft, moderate epigastric TTP, no rebound or guarding, no hepatosplenomegaly, no masses and bowel sounds normal  PSYCH: mentation appears normal, affect normal/bright    Diagnostic Test Results:  Labs " reviewed in Epic        Assessment & Plan     1. Abdominal pain, epigastric  Wide differential including GERD/alcoholic gastritis/gastric ulcer vs pancreatitis.  She is hemodynamically stable today and is afebrile.  I have advised that she abstain from ETOH and begin prilosec daily over the next 2 months.  I am going to check labs today to r/o pancreatitis.   PLan for 2-4 week follow up.  Offered resources and help for ETOH abuse.  She is not interested at this time and does not feel like this has been a problem for her but rather just a stressful few months.  Will follow up in 1 month.   - Lipase  - Comprehensive metabolic panel (BMP + Alb, Alk Phos, ALT, AST, Total. Bili, TP)  - CBC with platelets differential  - omeprazole (PRILOSEC) 20 MG DR capsule; Take 1 capsule (20 mg) by mouth daily  Dispense: 60 capsule; Refill: 0    2. Alcohol abuse  - omeprazole (PRILOSEC) 20 MG DR capsule; Take 1 capsule (20 mg) by mouth daily  Dispense: 60 capsule; Refill: 0    3. Major depressive disorder, single episode, mild (H)  Poorly controlled. No SI/HI  Offered counseling resources today, she is not interested.  She follows with pcp for this    4. HAMIDA (generalized anxiety disorder)   poorly controlled.  See above     2-4 week follow up      No follow-ups on file.    Eliezer Patel PA-C  [facility]

## 2019-08-13 LAB
ALBUMIN SERPL-MCNC: 4.4 G/DL (ref 3.4–5)
ALP SERPL-CCNC: 33 U/L (ref 40–150)
ALT SERPL W P-5'-P-CCNC: 25 U/L (ref 0–50)
ANION GAP SERPL CALCULATED.3IONS-SCNC: 6 MMOL/L (ref 3–14)
AST SERPL W P-5'-P-CCNC: 14 U/L (ref 0–45)
BILIRUB SERPL-MCNC: 0.7 MG/DL (ref 0.2–1.3)
BUN SERPL-MCNC: 22 MG/DL (ref 7–30)
CALCIUM SERPL-MCNC: 8.9 MG/DL (ref 8.5–10.1)
CHLORIDE SERPL-SCNC: 108 MMOL/L (ref 94–109)
CO2 SERPL-SCNC: 23 MMOL/L (ref 20–32)
CREAT SERPL-MCNC: 0.67 MG/DL (ref 0.52–1.04)
GFR SERPL CREATININE-BSD FRML MDRD: >90 ML/MIN/{1.73_M2}
GLUCOSE SERPL-MCNC: 90 MG/DL (ref 70–99)
POTASSIUM SERPL-SCNC: 4.5 MMOL/L (ref 3.4–5.3)
PROT SERPL-MCNC: 7.6 G/DL (ref 6.8–8.8)
SODIUM SERPL-SCNC: 137 MMOL/L (ref 133–144)

## 2019-08-13 ASSESSMENT — ANXIETY QUESTIONNAIRES: GAD7 TOTAL SCORE: 13

## 2019-08-14 NOTE — RESULT ENCOUNTER NOTE
Kamilah-  Here are your recent results.     Your labs look normal and do not show any reason for your abdominal pain.  Please abstain from alcohol use and continue Prilosec daily x 8 weeks.  Follow up with me in 2-4 weeks for a recheck    If you have any questions please do not hesitate to contact our office via phone ([phone]) or you may send me a message via [facility] by clicking the contact my Care Team link.      It was a pleasure meeting you and participating in your care!    Thank you,    Eliezer Patel MPH, PA-C  [address] [place] [address] [phone]

## 2019-09-10 ENCOUNTER — NURSE TRIAGE (OUTPATIENT)
Dept: NURSING | Facility: CLINIC | Age: 34
End: 2019-09-10

## 2019-09-10 NOTE — TELEPHONE ENCOUNTER
"  Reason for call; Pt called.  When am I due for pap smear  And HCV testing .     Recommendation / teaching ; FNA last pap 9/6/18 with note \" This letter is in regards to the PAP smear you had recently completed. The test result is stated to be ASCUS or Atypical Squamous Cells of Undetermined Significance.   This indicates a mild change only. The vast majority of patients with this result do not have significant cervical abnormalities.     We are now able to do a follow up test on all ASCUS PAP smears. This DNA test is for HPV ( Human Papillomvirus). Your test has no evidence of high risk HPV strains.  Therefore, it is recommended that you return in 3 years for your next pap smear and HPV test.  A pap can always be completed earlier than this if you prefer.  You will still need to return to the clinic yearly for your annual exam and other preventative tests.     Please contact the clinic with any questions.     Thanks   Esperanza Lang RN, BSN-Pap Tracking Nurse \"          Caller Verbalizes understanding and denies further questions and will call back if  symptoms to triage or questions  .  Patricia Byrnes RN  - [facility] Nurse Advisor                  "

## 2019-09-11 ENCOUNTER — TELEPHONE (OUTPATIENT)
Dept: NEUROLOGY | Facility: CLINIC | Age: 34
End: 2019-09-11

## 2019-09-11 DIAGNOSIS — F95.9 TIC DISORDER: ICD-10-CM

## 2019-09-11 NOTE — TELEPHONE ENCOUNTER
Rx Authorization:    Requested Medication/ Dose valbenazine (INGREZZA) 40 MG capsule    Date last refill ordered: 04/23/19    Quantity ordered: 30    # refills: 4    Date of last clinic visit with ordering provider: 09/19/18    Date of next clinic visit with ordering provider: None Scheduled     All pertinent protocol data (lab date/result):     Include pertinent information from patients message:

## 2019-09-11 NOTE — TELEPHONE ENCOUNTER
valbenazine (INGREZZA) 40 MG capsule 30 capsule 4 4/23/2019  No   Sig - Route: Take 1 capsule (40 mg) by mouth daily - Oral     Prior Authorization Specialty Medication Request    Medication/Dose: See above  ICD code (if different than what is on RX):  Tic disorder F95.9  Previously Tried and Failed:  Buspirone 15 mg, Lorazepam 1 mg, Gabapentin 300 mg, Guanfacine 1 mg, Risperidone 0.5 mg, risperidone 1 mg, clonazepam 1 mg, Pimozide 1 mg    Important Lab Values: N/A  Rationale: This therapy has been very beneficial for her Tic disorder.    Insurance Name:  Other  Insurance ID: [id number]  Insurance Phone Number: ?    Pharmacy Information (if different than what is on RX)  Name:  [facility]  Phone:  [phone]

## 2019-09-11 NOTE — TELEPHONE ENCOUNTER
Will send a 1 month supply to be reviewed and signed by the provider with a note stating no more refills until an appointment is made to be seen in clinic.

## 2019-09-11 NOTE — TELEPHONE ENCOUNTER
PA Initiation    Medication: Ingrezza 40MG Capsules (PENDING)  Insurance Company: [employer] - Phone [phone] Fax [phone]  Pharmacy Filling the Rx: [facility] [address] [place] [address]  Filling Pharmacy Phone:    Filling Pharmacy Fax:    Start Date: 9/11/2019    Patient is receiving medication through [employer] - Patient Assistance Program. Submitted PA to [employer] (preferred product is Austedo - therefore Ingrezza will most likely be denied again):

## 2019-09-11 NOTE — TELEPHONE ENCOUNTER
"PRIOR AUTHORIZATION DENIED    Medication: Ingrezza 40MG Capsules (PENDING)    Denial Date: 9/11/2019    Denial Rational: \"Austedo is preferred product\" -  Also being use off-label    Appeal Information: [employer] representative stated an appeal is not required due to patient already being enrolled in [facility]. We just need to submit a new application along with PA denial for renewal process.      "

## 2019-09-18 NOTE — TELEPHONE ENCOUNTER
Free Drug Application Initiated  Medication: Ingrezza  Sponsor: [employer]  Phone #: [phone]  Fax #: [phone]  Additional Information: Faxed PAP enrollment form to [employer] for renewal. (Rep stated MD and PT signature is not required due to it being a renewal)

## 2019-09-24 ENCOUNTER — OFFICE VISIT (OUTPATIENT)
Dept: INTERNAL MEDICINE | Facility: CLINIC | Age: 34
End: 2019-09-24
Payer: COMMERCIAL

## 2019-09-24 VITALS
RESPIRATION RATE: 16 BRPM | BODY MASS INDEX: 21.8 KG/M2 | HEART RATE: 81 BPM | DIASTOLIC BLOOD PRESSURE: 66 MMHG | SYSTOLIC BLOOD PRESSURE: 118 MMHG | OXYGEN SATURATION: 97 % | TEMPERATURE: 98.8 F | WEIGHT: 131 LBS

## 2019-09-24 DIAGNOSIS — F32.0 MAJOR DEPRESSIVE DISORDER, SINGLE EPISODE, MILD (H): Primary | ICD-10-CM

## 2019-09-24 DIAGNOSIS — F41.1 GAD (GENERALIZED ANXIETY DISORDER): ICD-10-CM

## 2019-09-24 DIAGNOSIS — F95.9 TIC DISORDER: ICD-10-CM

## 2019-09-24 LAB — HCG UR QL: NEGATIVE

## 2019-09-24 PROCEDURE — 99214 OFFICE O/P EST MOD 30 MIN: CPT | Mod: 25 | Performed by: INTERNAL MEDICINE

## 2019-09-24 PROCEDURE — 96127 BRIEF EMOTIONAL/BEHAV ASSMT: CPT | Performed by: INTERNAL MEDICINE

## 2019-09-24 PROCEDURE — 96372 THER/PROPH/DIAG INJ SC/IM: CPT | Performed by: INTERNAL MEDICINE

## 2019-09-24 PROCEDURE — 81025 URINE PREGNANCY TEST: CPT | Performed by: INTERNAL MEDICINE

## 2019-09-24 RX ORDER — LORAZEPAM 0.5 MG/1
0.5 TABLET ORAL 2 TIMES DAILY PRN
Qty: 15 TABLET | Refills: 0 | Status: SHIPPED | OUTPATIENT
Start: 2019-09-24 | End: 2020-07-27

## 2019-09-24 RX ORDER — MEDROXYPROGESTERONE ACETATE 150 MG/ML
150 INJECTION, SUSPENSION INTRAMUSCULAR
Status: DISCONTINUED | OUTPATIENT
Start: 2019-09-24 | End: 2020-09-01

## 2019-09-24 RX ORDER — MIRTAZAPINE 15 MG/1
15 TABLET, FILM COATED ORAL AT BEDTIME
Qty: 30 TABLET | Refills: 11 | Status: SHIPPED | OUTPATIENT
Start: 2019-09-24 | End: 2019-11-19 | Stop reason: SINTOL

## 2019-09-24 RX ADMIN — MEDROXYPROGESTERONE ACETATE 150 MG: 150 INJECTION, SUSPENSION INTRAMUSCULAR at 17:08

## 2019-09-24 ASSESSMENT — ANXIETY QUESTIONNAIRES
3. WORRYING TOO MUCH ABOUT DIFFERENT THINGS: NEARLY EVERY DAY
2. NOT BEING ABLE TO STOP OR CONTROL WORRYING: NEARLY EVERY DAY
7. FEELING AFRAID AS IF SOMETHING AWFUL MIGHT HAPPEN: NEARLY EVERY DAY
1. FEELING NERVOUS, ANXIOUS, OR ON EDGE: NEARLY EVERY DAY
GAD7 TOTAL SCORE: 20
IF YOU CHECKED OFF ANY PROBLEMS ON THIS QUESTIONNAIRE, HOW DIFFICULT HAVE THESE PROBLEMS MADE IT FOR YOU TO DO YOUR WORK, TAKE CARE OF THINGS AT HOME, OR GET ALONG WITH OTHER PEOPLE: VERY DIFFICULT
5. BEING SO RESTLESS THAT IT IS HARD TO SIT STILL: MORE THAN HALF THE DAYS
6. BECOMING EASILY ANNOYED OR IRRITABLE: NEARLY EVERY DAY

## 2019-09-24 ASSESSMENT — PATIENT HEALTH QUESTIONNAIRE - PHQ9
5. POOR APPETITE OR OVEREATING: NEARLY EVERY DAY
SUM OF ALL RESPONSES TO PHQ QUESTIONS 1-9: 20

## 2019-09-24 NOTE — PROCEDURES
Clinic Administered Medication Documentation    MEDICATION LIST:   Depo Provera Documentation    Prior to injection, verified patient identity using patient's name and date of birth. Medication was administered. Please see MAR and medication order for additional information. Patient instructed to remain in clinic for 15 minutes.    BP: 118/66    LAST PAP/EXAM:   Lab Results   Component Value Date    PAP ASC-US 09/06/2018     URINE HCG:negative    NEXT INJECTION DUE: 12/10/19 - 12/24/19    Was entire vial of medication used? Yes  Vial/Syringe: Single dose vial  Expiration Date:  07/2020
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
## 2019-09-24 NOTE — PATIENT INSTRUCTIONS
Mirtazepine 15mg tab, 1/2 tab at bedtime for anxiety and insomnia. If not helping after after 2 days and no acute side effects with med, then increase to 1 tab at bedtime  If doing well with med in 3 weeks, then do Evisit with Mychart. If not, then see me in clinic   DepoProvera shot today   Call Neurology clinic to get appt with them in the next couple weeks for follow-up per their instructions  Lorazepam 1 tab twice a day as needed only for severe anxiety. #15 tabs total for expected longterm supply

## 2019-09-25 ASSESSMENT — ANXIETY QUESTIONNAIRES: GAD7 TOTAL SCORE: 20

## 2019-09-29 ENCOUNTER — HEALTH MAINTENANCE LETTER (OUTPATIENT)
Age: 34
End: 2019-09-29

## 2019-10-18 NOTE — TELEPHONE ENCOUNTER
Free Drug Application Approved  Effective Dates: 9/24/219-9/24/2020  Patient notified?: Yes  Additional Information: Patient received her shipment for September.

## 2019-10-28 ENCOUNTER — HEALTH MAINTENANCE LETTER (OUTPATIENT)
Age: 34
End: 2019-10-28

## 2019-11-14 ENCOUNTER — TELEPHONE (OUTPATIENT)
Dept: INTERNAL MEDICINE | Facility: CLINIC | Age: 34
End: 2019-11-14

## 2019-11-14 NOTE — TELEPHONE ENCOUNTER
Reason for call:  Form   Our goal is to have forms completed within 72 hours, however some forms may require a visit or additional information.     Who is the form from? [employer] (if other please explain)  Where did the form come from? form was faxed in  What clinic location was the form placed at? [facility]  Where was the form placed? Pcp's Folder  What number is listed as a contact on the form? [phone]    Phone call message - patient request for a letter, form or note:     Date needed: as soon as possible  Please fax to [phone]  Has the patient signed a consent form for release of information? NO    Additional comments:     Type of letter, form or note: FMLA    Phone number to reach patient:  Cell number on file:    Telephone Information:   Mobile [phone]       Best Time:      Can we leave a detailed message on this number?  YES

## 2019-11-19 ENCOUNTER — OFFICE VISIT (OUTPATIENT)
Dept: INTERNAL MEDICINE | Facility: CLINIC | Age: 34
End: 2019-11-19
Payer: COMMERCIAL

## 2019-11-19 VITALS
HEART RATE: 76 BPM | SYSTOLIC BLOOD PRESSURE: 118 MMHG | BODY MASS INDEX: 22.3 KG/M2 | WEIGHT: 134 LBS | DIASTOLIC BLOOD PRESSURE: 70 MMHG | TEMPERATURE: 98.7 F | OXYGEN SATURATION: 99 % | RESPIRATION RATE: 16 BRPM

## 2019-11-19 DIAGNOSIS — F95.9 TIC DISORDER: ICD-10-CM

## 2019-11-19 DIAGNOSIS — G47.00 INSOMNIA, UNSPECIFIED TYPE: ICD-10-CM

## 2019-11-19 DIAGNOSIS — F32.0 MAJOR DEPRESSIVE DISORDER, SINGLE EPISODE, MILD (H): ICD-10-CM

## 2019-11-19 DIAGNOSIS — F41.9 ANXIETY: Primary | ICD-10-CM

## 2019-11-19 PROCEDURE — 99214 OFFICE O/P EST MOD 30 MIN: CPT | Performed by: INTERNAL MEDICINE

## 2019-11-19 RX ORDER — ESCITALOPRAM OXALATE 10 MG/1
10 TABLET ORAL DAILY
Qty: 30 TABLET | Refills: 1 | Status: SHIPPED | OUTPATIENT
Start: 2019-11-19 | End: 2019-12-17 | Stop reason: DRUGHIGH

## 2019-11-19 RX ORDER — ESZOPICLONE 2 MG/1
2 TABLET, FILM COATED ORAL AT BEDTIME
Qty: 30 TABLET | Refills: 1 | Status: SHIPPED | OUTPATIENT
Start: 2019-11-19 | End: 2019-12-17 | Stop reason: DRUGHIGH
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
## 2019-11-19 NOTE — PATIENT INSTRUCTIONS
Stop Mirtazapine  Start Escitalopram 10mg tab, 1/2 tab daily in AM for anxiety. After 1 week. Then increase to 1 tab daily  Lunesta 2mg tab, 1 tab at bedtime for insomnia/trouble sleeping (paper prescription)  See me in 4 weeks for follow-up  Will do FMLA with plan to re-evaluate in 1 month  Call today to make ap[pt with Neurology

## 2019-11-29 NOTE — TELEPHONE ENCOUNTER
MD out of office and have completed most of disability/FMLA forms. Will be back in office Monday 12/2/19 and will have forms faxed then at 7 AM. Inform pt

## 2019-11-29 NOTE — TELEPHONE ENCOUNTER
Left detailed message informing pt of notes below and to call with questions    Keiko Paez, RASHEEDA

## 2019-11-29 NOTE — TELEPHONE ENCOUNTER
Reason for Call:  Other call back    Detailed comments: pt called concerned whether her below [place] form, will be faxed to them on or before Monday Dec 2,2019 or she will lose her job    Phone Number Patient can be reached at: Cell number on file:    Telephone Information:   Mobile [phone]       Best Time: asap    Can we leave a detailed message on this number? yes    Call taken on 11/29/2019 at 10:21 AM by Cathie Spencer

## 2019-12-02 NOTE — TELEPHONE ENCOUNTER
Left message letting patient know form was completed but she needs to sign the release of information on the back.

## 2019-12-09 NOTE — TELEPHONE ENCOUNTER
Patient informed that there is some information in form that Dr. Joseph cannot determine and is more appropriate for the Neurologist to answer. Patient has appointment scheduled for Tuesday, 12/31/2019 with Neurology.

## 2019-12-17 ENCOUNTER — OFFICE VISIT (OUTPATIENT)
Dept: INTERNAL MEDICINE | Facility: CLINIC | Age: 34
End: 2019-12-17
Payer: COMMERCIAL

## 2019-12-17 VITALS
WEIGHT: 137 LBS | BODY MASS INDEX: 22.82 KG/M2 | OXYGEN SATURATION: 99 % | HEIGHT: 65 IN | RESPIRATION RATE: 14 BRPM | TEMPERATURE: 97.6 F | HEART RATE: 86 BPM | DIASTOLIC BLOOD PRESSURE: 60 MMHG | SYSTOLIC BLOOD PRESSURE: 116 MMHG

## 2019-12-17 DIAGNOSIS — F32.0 MAJOR DEPRESSIVE DISORDER, SINGLE EPISODE, MILD (H): ICD-10-CM

## 2019-12-17 DIAGNOSIS — F41.9 ANXIETY: ICD-10-CM

## 2019-12-17 DIAGNOSIS — G47.00 INSOMNIA, UNSPECIFIED TYPE: ICD-10-CM

## 2019-12-17 DIAGNOSIS — F95.9 TIC DISORDER: ICD-10-CM

## 2019-12-17 PROCEDURE — 99214 OFFICE O/P EST MOD 30 MIN: CPT | Performed by: INTERNAL MEDICINE

## 2019-12-17 RX ORDER — ESCITALOPRAM OXALATE 20 MG/1
20 TABLET ORAL DAILY
Qty: 30 TABLET | Refills: 1 | Status: SHIPPED | OUTPATIENT
Start: 2019-12-17 | End: 2020-07-27 | Stop reason: DRUGHIGH

## 2019-12-17 RX ORDER — ESZOPICLONE 3 MG/1
3 TABLET, FILM COATED ORAL AT BEDTIME
Qty: 30 TABLET | Refills: 1 | Status: SHIPPED | OUTPATIENT
Start: 2019-12-17 | End: 2020-07-27

## 2019-12-17 ASSESSMENT — PATIENT HEALTH QUESTIONNAIRE - PHQ9
SUM OF ALL RESPONSES TO PHQ QUESTIONS 1-9: 16
5. POOR APPETITE OR OVEREATING: NEARLY EVERY DAY

## 2019-12-17 ASSESSMENT — ANXIETY QUESTIONNAIRES
5. BEING SO RESTLESS THAT IT IS HARD TO SIT STILL: SEVERAL DAYS
7. FEELING AFRAID AS IF SOMETHING AWFUL MIGHT HAPPEN: NEARLY EVERY DAY
IF YOU CHECKED OFF ANY PROBLEMS ON THIS QUESTIONNAIRE, HOW DIFFICULT HAVE THESE PROBLEMS MADE IT FOR YOU TO DO YOUR WORK, TAKE CARE OF THINGS AT HOME, OR GET ALONG WITH OTHER PEOPLE: VERY DIFFICULT
3. WORRYING TOO MUCH ABOUT DIFFERENT THINGS: NEARLY EVERY DAY
6. BECOMING EASILY ANNOYED OR IRRITABLE: NEARLY EVERY DAY
GAD7 TOTAL SCORE: 18
1. FEELING NERVOUS, ANXIOUS, OR ON EDGE: MORE THAN HALF THE DAYS
2. NOT BEING ABLE TO STOP OR CONTROL WORRYING: NEARLY EVERY DAY

## 2019-12-17 ASSESSMENT — MIFFLIN-ST. JEOR: SCORE: 1317.31

## 2019-12-17 NOTE — PATIENT INSTRUCTIONS
Increase Lunesta to 3mg tab, 1 tab at bedtime for insomnia/ Sleep   Increase Escitalopram to 10mg tab, 2 tabs (20mg) daily until run out. Then change  to 20mg tab, 1 tab daily for anxiety   See me in 1 month or do an Evisit in [facility] re: anxiety status and sleep. Possible retrial of Buspar in addition with Escitalopram   See Neurology in a couple weeks as scheduled. Bring in bottle of Ingrezza taking currently to that appointment

## 2019-12-17 NOTE — PROGRESS NOTES
Subjective     Kamilah Dee is a 34 year old female who presents to clinic today for the following health issues:    HPI   Depression and Anxiety Follow-Up    How are you doing with your depression since your last visit? No change to a little better    How are you doing with your anxiety since your last visit?  No change    Are you having other symptoms that might be associated with depression or anxiety? Yes:  insomnia and tic disorder getting worse in last few months    Have you had a significant life event? No     Do you have any concerns with your use of alcohol or other drugs? No    Social History     Tobacco Use     Smoking status: Former Smoker     Last attempt to quit: 2010     Years since quittin.5     Smokeless tobacco: Never Used   Substance Use Topics     Alcohol use: No     Alcohol/week: 0.0 standard drinks     Comment: occasionally 1-2 etoh a month/     Drug use: No     Comment: marijuana hasn't used in 3 years, prior use of ampht, LSD, crack & cocaine. Sober since age 18      PHQ 2018   PHQ-9 Total Score 19 12 20   Q9: Thoughts of better off dead/self-harm past 2 weeks Not at all Not at all Not at all     HAMIDA-7 SCORE 2018   Total Score - - -   Total Score 17 13 20     Last PHQ-9 2019   1.  Little interest or pleasure in doing things 3   2.  Feeling down, depressed, or hopeless 3   3.  Trouble falling or staying asleep, or sleeping too much 2   4.  Feeling tired or having little energy 3   5.  Poor appetite or overeating 1   6.  Feeling bad about yourself 2   7.  Trouble concentrating 1   8.  Moving slowly or restless 1   Q9: Thoughts of better off dead/self-harm past 2 weeks 0   PHQ-9 Total Score 16   Difficulty at work, home, or with people Very difficult     HAMIDA-7  2019   1. Feeling nervous, anxious, or on edge 2   2. Not being able to stop or control worrying 3   3. Worrying too much about different things 3   4. Trouble  relaxing 3   5. Being so restless that it is hard to sit still 1   6. Becoming easily annoyed or irritable 3   7. Feeling afraid, as if something awful might happen 3   HAMIDA-7 Total Score 18   If you checked any problems, how difficult have they made it for you to do your work, take care of things at home, or get along with other people? Very difficult         Suicide Assessment Five-step Evaluation and Treatment (SAFE-T)      How many servings of fruits and vegetables do you eat daily?  0-1    On average, how many sweetened beverages do you drink each day (Examples: soda, juice, sweet tea, etc.  Do NOT count diet or artificially sweetened beverages)?   2    How many days per week do you miss taking your medication? 0    Pt's past medical history, family history, habits, medications and allergies were reviewed with the patient today.  See snap shot for  HCM status. Most recent lab results reviewed with pt. Problem list and histories reviewed & adjusted, as indicated.  Additional history as below:     Sleeping better for longer period of time with Lunesta. Still some awakenings.    No hangoverr in AM with Lunesta compared to previous Remeron   Tic disorder  still the same. Pt states taking Ingrezza but would appear pt would be out of med per timing of last refill approval by Neurology in chart. HAs appt with Neuro for f/u 12/31/19   SLight better anxiety.   PHQ and HAMIDA till elevated. See scores above  State going through divorce and custody roque with  but hopes to have resolved  In next 1-2 mos. Admits stress with this  Patient continues to decline counseling therapy at this time.  Previously on buspirone with some improvement in anxiety symptoms.  Has not seen much improvement in the past with antipsychotics like Risperdal.  Has been on multiple meds for tics disorder that have not helped including Gabapentin  Pt currently working and has been missing work much less and also less tardiness at start of work  "day since recent med change         Additional ROS:   Constitutional, HEENT, Cardiovascular, Pulmonary, GI and , Neuro, MSK and Psych review of systems/symptoms are otherwise negative or unchanged from previous, except as noted above.      OBJECTIVE:  /60   Pulse 86   Temp 97.6  F (36.4  C) (Tympanic)   Resp 14   Ht 1.651 m (5' 5\")   Wt 62.1 kg (137 lb)   SpO2 99%   Breastfeeding No   BMI 22.80 kg/m     Estimated body mass index is 22.8 kg/m  as calculated from the following:    Height as of this encounter: 1.651 m (5' 5\").    Weight as of this encounter: 62.1 kg (137 lb).     Neck: no adenopathy. Thyroid normal to palpation. No bruits  Pulm: Lungs clear to auscultation   CV: Regular rates and rhythm  GI: Soft, nontender, Normal active bowel sounds, No hepatosplenomegaly or masses palpable  Ext: Peripheral pulses intact. No edema.  Neuro: Normal strength and tone, sensory exam grossly normal. Intermittent tic movements with head and body  Gen: Slight calmer affect and more eye contact than previous. Normal dress and thought content    Assessment/Plan: (See plan discussion below for further details)    1. Anxiety   Slight improvement., Will increase Escitalopram and reassess in 3-4 weeks. Possible trial again of adding Buspar 15mg BID if not much better with higher Escitalopram  - escitalopram (LEXAPRO) 20 MG tablet; Take 1 tablet (20 mg) by mouth daily  Dispense: 30 tablet; Refill: 1    2. Insomnia, unspecified type   Improved and no hangover feeling now but needs better sleep still which may also help #4. Will increase Lunesta  - eszopiclone (LUNESTA) 3 MG tablet; Take 1 tablet (3 mg) by mouth At Bedtime  Dispense: 30 tablet; Refill: 1    3. Major depressive disorder, single episode, mild (H)  Slight improvement., Will increase Escitalopram as above  - escitalopram (LEXAPRO) 20 MG tablet; Take 1 tablet (20 mg) by mouth daily  Dispense: 30 tablet; Refill: 1    4. Tic disorder   Chronic and " exacerbated by #1-3. Some improvement with treatment for #1-3. Pt to f/u with neuro later this month for further eval and treat      Plan discussion:   Increase Lunesta to 3mg tab, 1 tab at bedtime for insomnia/ Sleep   Increase Escitalopram to 10mg tab, 2 tabs (20mg) daily until run out. Then change  to 20mg tab, 1 tab daily for anxiety/depression   See me in 1 month [facility] status and sleep. Possible retrial of Buspar in addition with Escitalopram   See Neurology in a couple weeks as scheduled. Bring in bottle of Ingrezza taking currently to that appointment  Possible addition of Buspar if anxiety not much better. Prefer not to  use longterm benzo therapy for tic/anxiety unless other options exhausted though previous hx polysubstance abuse was  > 15 years ago       Mario Joseph MD  Internal Medicine Department  [facility]    (Chart documentation was completed, in part, with Emgo voice-recognition software. Even though reviewed, some grammatical, spelling, and word errors may remain.)

## 2019-12-18 ASSESSMENT — ANXIETY QUESTIONNAIRES: GAD7 TOTAL SCORE: 18

## 2019-12-31 ENCOUNTER — OFFICE VISIT (OUTPATIENT)
Dept: NEUROLOGY | Facility: CLINIC | Age: 34
End: 2019-12-31
Payer: COMMERCIAL

## 2019-12-31 VITALS
RESPIRATION RATE: 16 BRPM | HEIGHT: 65 IN | SYSTOLIC BLOOD PRESSURE: 102 MMHG | OXYGEN SATURATION: 98 % | DIASTOLIC BLOOD PRESSURE: 69 MMHG | HEART RATE: 78 BPM | WEIGHT: 135 LBS | BODY MASS INDEX: 22.49 KG/M2

## 2019-12-31 DIAGNOSIS — F95.1 CHRONIC MOTOR TIC DISORDER: Primary | ICD-10-CM

## 2019-12-31 ASSESSMENT — MIFFLIN-ST. JEOR: SCORE: 1308.24

## 2019-12-31 ASSESSMENT — PAIN SCALES - GENERAL: PAINLEVEL: NO PAIN (0)

## 2019-12-31 NOTE — NURSING NOTE
Chief Complaint   Patient presents with     RECHECK     [facility] RETURN MOVEMENT DISORDER F/U TIC DISORDER       Mario Monreal, EMT

## 2019-12-31 NOTE — LETTER
12/31/2019       RE: Kamilah Dee  [address] [facility] [id number]     Dear Colleague,    Thank you for referring your patient, Kamilah Dee, to the [facility] NEUROLOGY at [facility]. Please see a copy of my visit note below.    Movement Disorder Clinic follow up note    Patient: Kamilah Dee  Medical Record Number: [id number]  Encounter Date: December 31, 2019  PCP:Mario Joseph    CC: Chronic motor tic disorder    Impression:  1.  Chronic motor tic disorder  2.  Chronic Ingrezza therapy    Recommendations:  1.  The patient has a hard time telling if the Ingrezza is still working.  She had initially a good response.  She feels it is wearing off after the past 2 months however this corresponds to a period of severe distress.  I would have to say looking at her in the office her tics are much better than what I remember.  This is just 1 point in time however.  It would be my impression that the Ingrezza is working.  I am not inclined to start her on a neuroleptic which has more significant side effects.  I have asked her to continue the Ingrezza 40 mg a day and she is willing to do so.  2.  I will follow her up at 6-month intervals.    Return to clinic: 6 months    Interval Hx:: Ms. Kamilah Dee returns to clinic today for follow-up of her chronic motor tic disorder.  She was not able to obtain Ingrezza through the hard work of Dr. aLtoya Juarez.  She started this several months ago.  Initially she thought it was very good and it was suppressing her tic disorder.  In the past 2 months it seems to be getting worse.  During this time she has undergone severe stress she says.  She is going through divorce and custody roque.  She is having trouble getting to work.  Her finances are a source of stress as well.  In this setting she recognizes that her tics would normally be quite bad.  She thinks the Ingrezza may be mitigating her tics to some degree.    She has  "chronic depression and this is being treated by her internist Dr. Joseph she feels this is getting better.  She still has severe insomnia however..    In terms of the Ingrezza she does not have progressive parkinsonism.      Current medications    Movement Disorder-related Medications                    daily       Ingrezza 40 mg                                                 1                                                                                   Current Outpatient Medications   Medication     escitalopram (LEXAPRO) 20 MG tablet     eszopiclone (LUNESTA) 3 MG tablet     LORazepam (ATIVAN) 0.5 MG tablet     Multiple Vitamins-Minerals (MULTIVITAMIN PO)     omeprazole (PRILOSEC) 20 MG DR capsule     valbenazine (INGREZZA) 40 MG capsule     Current Facility-Administered Medications   Medication     medroxyPROGESTERone (DEPO-PROVERA) syringe 150 mg       Examination:  /69   Pulse 78   Resp 16   Ht 1.651 m (5' 5\")   Wt 61.2 kg (135 lb)   SpO2 98%   BMI 22.47 kg/m     General- no distress, no rash, good pulses, no edema  Respiratory-auscultation over the anterior lung fields is clear  Cardiac- regular rate and rhythm    Neurologic  Mental status  Patient is alert, appropriate, speech is fluent and comprehension is intact    Cranial nerve testing  Pupils are equal and reactive to light, visual fields are full to confrontation bilaterally  Extraocular movements are full  Facial sensation is intact, face is symmetric with rest and activation  Palate rises symmetrically, tongue protrudes at midline with normal movements  Sterocleidomastoid and trapezius strength is normal and symmetric    Motor  Bulk and tone are normal  Strength is 5/5 shoulder abduction, finger abduction, arm flexion and extension, hip flexion, plantar and dorsiflexion  Rare headshaking tic  Sensation  Intact to pin, vibration   Proprioception intact in great toes and fingers    Tendon reflexes  Testing at brachioradialis, biceps, " triceps, patella and achilles bilaterally showed normal reflexes, symmetrically, without Babinski or Morales signs    Coordination  Finger nose finger testing: normalRapid alternating movements are normal.  Gait and Station: normal    15 minutes of total time was spent face-to-face with the patient over 50% of which was counseling..        Again, thank you for allowing me to participate in the care of your patient.      Sincerely,    Ravi Powell MD

## 2020-01-02 ENCOUNTER — ALLIED HEALTH/NURSE VISIT (OUTPATIENT)
Dept: NURSING | Facility: CLINIC | Age: 35
End: 2020-01-02
Payer: COMMERCIAL

## 2020-01-02 DIAGNOSIS — Z30.42 DEPO-PROVERA CONTRACEPTIVE STATUS: Primary | ICD-10-CM

## 2020-01-02 DIAGNOSIS — Z30.42 ENCOUNTER FOR SURVEILLANCE OF INJECTABLE CONTRACEPTIVE: Primary | ICD-10-CM

## 2020-01-02 LAB — HCG UR QL: NEGATIVE

## 2020-01-02 PROCEDURE — 81025 URINE PREGNANCY TEST: CPT | Performed by: INTERNAL MEDICINE

## 2020-01-02 PROCEDURE — 96372 THER/PROPH/DIAG INJ SC/IM: CPT

## 2020-01-02 RX ADMIN — MEDROXYPROGESTERONE ACETATE 150 MG: 150 INJECTION, SUSPENSION INTRAMUSCULAR at 14:33

## 2020-01-02 NOTE — PROGRESS NOTES
Per nurse, pt on Deproprova but missed window to continue without first getting UPT to be sure pt not pregnant. Test ordered

## 2020-01-02 NOTE — PROGRESS NOTES
BP: Data Unavailable    LAST PAP/EXAM:   Lab Results   Component Value Date    PAP ASC-US 09/06/2018     URINE HCG:negative    The following medication was given:     MEDICATION: Depo Provera 150mg  ROUTE: IM  SITE: Deltoid - Right  : [employer]   LOT #: [id number]  EXP:07/1/2020  NEXT INJECTION DUE: 3/19/20 - 4/2/20   Deepa Luque CMA on 1/2/2020 at 2:32 PM    Due to injection administration, patient instructed to remain in clinic for 15 minutes  afterwards, and to report any adverse reaction to me immediately.

## 2020-01-23 NOTE — TELEPHONE ENCOUNTER
Repeat denials so PAP application faxed to [facility].   Medical Necessity Information: It is in your best interest to select a reason for this procedure from the list below. All of these items fulfill various CMS LCD requirements except the new and changing color options. Pared With?: curette Number Of Freeze-Thaw Cycles: 2 freeze-thaw cycles Detail Level: Detailed Post-Care Instructions: I reviewed with the patient in detail post-care instructions. Patient is to wear sunprotection, and avoid picking at any of the treated lesions. Pt may apply Vaseline to crusted or scabbing areas. Include Z78.9 (Other Specified Conditions Influencing Health Status) As An Associated Diagnosis?: No Consent: The patient's consent was obtained including but not limited to risks of crusting, scabbing, blistering, scarring, darker or lighter pigmentary change, recurrence, incomplete removal and infection. Medical Necessity Clause: This procedure was medically necessary because the lesions that were treated were:

## 2020-02-06 ENCOUNTER — TELEPHONE (OUTPATIENT)
Dept: OBGYN | Facility: CLINIC | Age: 35
End: 2020-02-06

## 2020-02-06 ENCOUNTER — OFFICE VISIT (OUTPATIENT)
Dept: OBGYN | Facility: CLINIC | Age: 35
End: 2020-02-06
Payer: COMMERCIAL

## 2020-02-06 VITALS
HEIGHT: 65 IN | DIASTOLIC BLOOD PRESSURE: 60 MMHG | WEIGHT: 136 LBS | SYSTOLIC BLOOD PRESSURE: 102 MMHG | HEART RATE: 53 BPM | BODY MASS INDEX: 22.66 KG/M2

## 2020-02-06 DIAGNOSIS — Z01.419 ENCOUNTER FOR GYNECOLOGICAL EXAMINATION WITHOUT ABNORMAL FINDING: Primary | ICD-10-CM

## 2020-02-06 DIAGNOSIS — Z80.3 FAMILY HISTORY OF BREAST CANCER: ICD-10-CM

## 2020-02-06 PROCEDURE — 99395 PREV VISIT EST AGE 18-39: CPT | Performed by: OBSTETRICS & GYNECOLOGY

## 2020-02-06 SDOH — HEALTH STABILITY: MENTAL HEALTH: HOW OFTEN DO YOU HAVE 6 OR MORE DRINKS ON ONE OCCASION?: LESS THAN MONTHLY

## 2020-02-06 SDOH — HEALTH STABILITY: MENTAL HEALTH: HOW OFTEN DO YOU HAVE A DRINK CONTAINING ALCOHOL?: 2-4 TIMES A MONTH

## 2020-02-06 SDOH — HEALTH STABILITY: MENTAL HEALTH: HOW MANY STANDARD DRINKS CONTAINING ALCOHOL DO YOU HAVE ON A TYPICAL DAY?: 1 OR 2

## 2020-02-06 ASSESSMENT — ANXIETY QUESTIONNAIRES
1. FEELING NERVOUS, ANXIOUS, OR ON EDGE: MORE THAN HALF THE DAYS
2. NOT BEING ABLE TO STOP OR CONTROL WORRYING: MORE THAN HALF THE DAYS
IF YOU CHECKED OFF ANY PROBLEMS ON THIS QUESTIONNAIRE, HOW DIFFICULT HAVE THESE PROBLEMS MADE IT FOR YOU TO DO YOUR WORK, TAKE CARE OF THINGS AT HOME, OR GET ALONG WITH OTHER PEOPLE: SOMEWHAT DIFFICULT
7. FEELING AFRAID AS IF SOMETHING AWFUL MIGHT HAPPEN: SEVERAL DAYS
GAD7 TOTAL SCORE: 11
5. BEING SO RESTLESS THAT IT IS HARD TO SIT STILL: SEVERAL DAYS
3. WORRYING TOO MUCH ABOUT DIFFERENT THINGS: MORE THAN HALF THE DAYS
6. BECOMING EASILY ANNOYED OR IRRITABLE: SEVERAL DAYS

## 2020-02-06 ASSESSMENT — MIFFLIN-ST. JEOR: SCORE: 1312.77

## 2020-02-06 ASSESSMENT — PATIENT HEALTH QUESTIONNAIRE - PHQ9
SUM OF ALL RESPONSES TO PHQ QUESTIONS 1-9: 11
5. POOR APPETITE OR OVEREATING: MORE THAN HALF THE DAYS

## 2020-02-06 NOTE — PROGRESS NOTES
Kamilah is a 34 year old  female who presents for annual exam.     Besides routine health maintenance,  she would like to discuss depo Injection side effects    HPI:  The patient's PCP is  Mario Joseph MD.     Using depo provera. Last 20.  Has questions about length of time she has been on it.   Has been considering BTL.  Before went on BC, had normal regular periods every month. Not particularly heavy/painful.     Takes MTV off/on  Not exercising regularly.     Still has not found out her sisters history.  Has noted in the past that she thinks she had a double mastectomy but is not sure why.  Her sister will tell her.  Other family members have said they think that she had breast cancer.        GYNECOLOGIC HISTORY:    No LMP recorded. Patient has had an injection.    Regular menses? na  Menses every na days.  Length of menses: NA days    Her current contraception method is: depo or other injectable.  She  reports that she quit smoking about 9 years ago. She has never used smokeless tobacco.    Patient is sexually active.  STD testing offered?  Declined  Last PHQ-9 score on record =   PHQ-9 SCORE 2020   PHQ-9 Total Score -   PHQ-9 Total Score 11     Last GAD7 score on record =   HAMIDA-7 SCORE 2020   Total Score -   Total Score 11     Alcohol Score = 2    HEALTH MAINTENANCE:  Cholesterol: (No results found for: CHOL   Last Mammo: Not applicable, Result: Not applicable, Next Mammo: Due at age 39   Pap:   Lab Results   Component Value Date    PAP ASC-US HPV- 2018    PAP NIL 2016    PAP NIL 2015      Colonoscopy:  never, Result: Not applicable, Next Colonoscopy: Due at age 49 years.  Dexa:  never    Health maintenance updated:  yes    HISTORY:  OB History    Para Term  AB Living   2 2 2 0 0 2   SAB TAB Ectopic Multiple Live Births   0 0 0 0 2      # Outcome Date GA Lbr Eddie/2nd Weight Sex Delivery Anes PTL Lv   2 Term 10/29/14 39w6d 04:40 / 00:21 3.33 kg (7 lb 5.5 oz) F  Vag-Spont EPI N LEAH      Name: Clary      Apgar1: 8  Apgar5: 9   1 Term 11 40w1d 07:34 / 01:28 3.56 kg (7 lb 13.6 oz) M Vag-Spont   LEAH      Name: Bandar      Apgar5: 9       Patient Active Problem List   Diagnosis     Tic disorder     CARDIOVASCULAR SCREENING; LDL GOAL LESS THAN 160     Health Care Home     Migraine     HAMIDA (generalized anxiety disorder)     Verruca     ASCUS of cervix with negative high risk HPV     Major depressive disorder, single episode, mild (H)     Past Surgical History:   Procedure Laterality Date     HC REMOVAL OF TONSILS,11+ Y/O      Tonsils 11+y.o.      Social History     Tobacco Use     Smoking status: Former Smoker     Last attempt to quit: 2010     Years since quittin.7     Smokeless tobacco: Never Used   Substance Use Topics     Alcohol use: Yes     Alcohol/week: 0.0 standard drinks     Frequency: 2-4 times a month     Drinks per session: 1 or 2     Binge frequency: Less than monthly     Comment: occasionally 1-2 etoh a month/      Problem (# of Occurrences) Relation (Name,Age of Onset)    Alcohol/Drug (1) Mother: b:1964 addicted to crack    Cervical Cancer (1) Sister (35): 10/2015    Family History Negative (4) Father: b: , Sister: b:, Sister: b:, Brother: b:            Current Outpatient Medications   Medication Sig     escitalopram (LEXAPRO) 20 MG tablet Take 1 tablet (20 mg) by mouth daily     eszopiclone (LUNESTA) 3 MG tablet Take 1 tablet (3 mg) by mouth At Bedtime     LORazepam (ATIVAN) 0.5 MG tablet Take 1 tablet (0.5 mg) by mouth 2 times daily as needed for anxiety     Multiple Vitamins-Minerals (MULTIVITAMIN PO) Take by mouth daily     valbenazine (INGREZZA) 40 MG capsule Take 1 capsule (40 mg) by mouth daily     omeprazole (PRILOSEC) 20 MG DR capsule Take 1 capsule (20 mg) by mouth daily (Patient not taking: Reported on 2020)     Current Facility-Administered Medications   Medication     medroxyPROGESTERone (DEPO-PROVERA) syringe 150  "mg     Allergies   Allergen Reactions     Remeron [Mirtazapine]      Excess sedation even with 7.5mg dose       Past medical, surgical, social and family histories were reviewed and updated in EPIC.    ROS:   12 point review of systems negative other than symptoms noted below or in the HPI.      EXAM:  /60   Pulse 53   Ht 1.651 m (5' 5\")   Wt 61.7 kg (136 lb)   BMI 22.63 kg/m     BMI: Body mass index is 22.63 kg/m .    PHYSICAL EXAM:  Constitutional:   Appearance: Well nourished, well developed, alert, in no acute distress  Neck:  Lymph Nodes:  No lymphadenopathy present    Thyroid:  Gland size normal, nontender, no nodules or masses present  on palpation  Chest:  Respiratory Effort:  Breathing unlabored  Cardiovascular:    Heart: Auscultation:  Regular rate, normal rhythm, no murmurs present  Breasts: Inspection of Breasts:  No lymphadenopathy present., Palpation of Breasts and Axillae:  No masses present on palpation, no breast tenderness., Axillary Lymph Nodes:  No lymphadenopathy present. and No nodularity, asymmetry or nipple discharge bilaterally.  Gastrointestinal:   Abdominal Examination:  Abdomen nontender to palpation, tone normal without rigidity or guarding, no masses present, umbilicus without lesions   Liver and Spleen:  No hepatomegaly present, liver nontender to palpation    Hernias:  No hernias present  Lymphatic: Lymph Nodes:  No other lymphadenopathy present  Skin:  General Inspection:  No rashes present, no lesions present, no areas of  discoloration  Neurologic:    Mental Status:  Oriented X3.  Normal strength and tone, sensory exam                grossly normal, mentation intact and speech normal.    Psychiatric:   Mentation appears normal and affect normal/bright.         Pelvic Exam:  External Genitalia:     Normal appearance for age, no discharge present, no tenderness present, no inflammatory lesions present, color normal  Vagina:     Normal vaginal vault without central or " paravaginal defects, no discharge present, no inflammatory lesions present, no masses present  Bladder:     Nontender to palpation  Urethra:   Urethral Body:  Urethra palpation normal, urethra structural support normal   Urethral Meatus:  No erythema or lesions present  Cervix:     Appearance healthy, no lesions present, nontender to palpation, no bleeding present  Uterus:     Uterus: firm, normal sized and nontender, anteverted in position.   Adnexa:     No adnexal tenderness present, no adnexal masses present  Perineum:     Perineum within normal limits, no evidence of trauma, no rashes or skin lesions present  Anus:     Anus within normal limits, no hemorrhoids present  Inguinal Lymph Nodes:     No lymphadenopathy present  Pubic Hair:     Normal pubic hair distribution for age  Genitalia and Groin:     No rashes present, no lesions present, no areas of discoloration, no masses present      COUNSELING:   Reviewed preventive health counseling, as reflected in patient instructions       Contraception       Osteoporosis Prevention/Bone Health    BMI: Body mass index is 22.63 kg/m .      ASSESSMENT:  34 year old female with satisfactory annual exam.    ICD-10-CM    1. Encounter for gynecological examination without abnormal finding Z01.419        PLAN:  -UTD for cervical cancer screening. Due 2021  -Breast self awareness discussed. Due for mammogram.  Discussed how her sister's history of breast cancer could impact her in her children.  Recommend she get a mammogram now at the very least.  Discussed how genetic counseling would also be indicated to further determine her risk, and possible need for genetic testing.  She is to talk with her sister and let me know.  -Discussed exercise-making plan, strength training. Nutrition encouraged.  -Osteoporosis prevention discussed.  -May switch from depo provera to something else.  Handouts for NuvaRing and progestin IUDs given.  Also discussed possibility for tubal ligation.   Patient is very sure she no longer desires further children.  Return for consultation on sterilization if she decides to move forward with this.  -Return one year for next annual exam          Alejandrina Bridges Masters, DO

## 2020-02-06 NOTE — PATIENT INSTRUCTIONS
-Talk with your sister and find out her history, did she have breast cancer or a lump or what was it that she had.  This could affect your risk of getting multiple different types of cancers or your kids chances of having multiple different types of cancers.  Please let me know so that I can put in a referral to a genetic counselor to discuss your family history and risks and then we can decide what special type of screening we need to do for you.    -Schedule a mammogram    -Daily total calcium intake (between food/supplements) should be 1200mg which equates to 5 servings calcium containing food per day; VItamin D 1000IU.   Foods rich in calcium are: milk, cheese, yogurt, seafood, sardines and canned salmon, leafy green vegetables such as crutis greens, spinach and kale, beans and lentils, almonds, seeds (poppy, sesame, celery, hansa), rhubarb, dried fruit such as figs, whey protein, tofu and edamame, amaranth, other foods with added calcium such as orange juice and some cereals.   If adequate amount not taken in diet, then a supplement may be needed.     -I also recommend increasing your dietary fiber by starting Metamucil (powder mixed in glass of water) twice daily

## 2020-02-06 NOTE — TELEPHONE ENCOUNTER
Patient called to update Dr. Louie said breast cancer runs in her family and that her sister had it. Ok to call back if there are questions and ok to leave message.

## 2020-02-07 ASSESSMENT — ANXIETY QUESTIONNAIRES: GAD7 TOTAL SCORE: 11

## 2020-02-07 NOTE — TELEPHONE ENCOUNTER
-Breast self awareness discussed. Due for mammogram.  Discussed how her sister's history of breast cancer could impact her in her children.  Recommend she get a mammogram now at the very least.  Discussed how genetic counseling would also be indicated to further determine her risk, and possible need for genetic testing.  She is to talk with her sister and let me know.      LMTCB to get additional information about sisters cancer and to discuss if patient would like to get a mammogram scheduled.  Karolyn Vargas RN on 2/7/2020 at 9:35 AM

## 2020-02-07 NOTE — TELEPHONE ENCOUNTER
Patient has no more information on sisters. Need order for mammogram.  Ok to leave message on vm.

## 2020-02-12 ENCOUNTER — TELEPHONE (OUTPATIENT)
Dept: OBGYN | Facility: CLINIC | Age: 35
End: 2020-02-12

## 2020-02-12 DIAGNOSIS — Z80.3 FAMILY HISTORY OF BREAST CANCER: Primary | ICD-10-CM

## 2020-02-12 NOTE — TELEPHONE ENCOUNTER
Patient calling back regarding mammogram, screening or diagnostic mammogram, need orders.  See previous note.

## 2020-02-12 NOTE — TELEPHONE ENCOUNTER
Patient would like order placed for mammogram.  Wondering if it will be diagnostics or screening given her family history.  -Breast self awareness discussed. Due for mammogram.  Discussed how her sister's history of breast cancer could impact her in her children.  Recommend she get a mammogram now at the very least.  Discussed how genetic counseling would also be indicated to further determine her risk, and possible need for genetic testing.  She is to talk with her sister and let me know.  Sister did have cancer but has no other information.    Routing to provider to advise.  Karolyn Vargas, RN on 2/12/2020 at 12:27 PM

## 2020-02-13 NOTE — TELEPHONE ENCOUNTER
Screening mammo orders placed.  Referral for cancer risk management counseling placed.    Alejandrina Bridges Masters, DO

## 2020-02-20 ENCOUNTER — ANCILLARY PROCEDURE (OUTPATIENT)
Dept: MAMMOGRAPHY | Facility: CLINIC | Age: 35
End: 2020-02-20
Payer: COMMERCIAL

## 2020-02-20 DIAGNOSIS — Z80.3 FAMILY HISTORY OF BREAST CANCER: ICD-10-CM

## 2020-02-20 PROCEDURE — 77067 SCR MAMMO BI INCL CAD: CPT | Mod: TC

## 2020-03-11 NOTE — PROGRESS NOTES
SUBJECTIVE:                                                   Kamilah Dee is a 34 year old female who presents to clinic today for the following health issue(s):  Patient presents with:  Consult: here to discuss tubal        HPI:  Is very certain she does not want more children.   Last depo provera in .     No prior issues with anesthesia. No familial history anesthesia.     Works at [employer].     Takes [employer] and lexapro and ingrezza in morning. But hasn't taken ingrezza in week or two. Needs to go back to doctor to talk about this med.     No prior issues with surgery      Review of PMH, SocHx, SurHx, FHx, medications completed. Epic updated.      No LMP recorded. Patient has had an injection..     Patient is sexually active, .  Using depo or other injectable for contraception.    reports that she quit smoking about 9 years ago. She has never used smokeless tobacco.    STD testing offered?  Declined    Health maintenance updated:  yes    Today's PHQ-2 Score:   PHQ-2 (  [employer]) 3/13/2020   Q1: Little interest or pleasure in doing things 2   Q2: Feeling down, depressed or hopeless 2   PHQ-2 Score 4     Today's PHQ-9 Score:   PHQ-9 SCORE 3/13/2020   PHQ-9 Total Score -   PHQ-9 Total Score 10     Today's HAMIDA-7 Score:   HAMIDA-7 SCORE 2020   Total Score -   Total Score 11       Problem list and histories reviewed & adjusted, as indicated.  Additional history: as documented.    Patient Active Problem List   Diagnosis     Tic disorder     CARDIOVASCULAR SCREENING; LDL GOAL LESS THAN 160     Health Care Home     Migraine     HAMIDA (generalized anxiety disorder)     Verruca     ASCUS of cervix with negative high risk HPV     Major depressive disorder, single episode, mild (H)     Family history of breast cancer     Past Surgical History:   Procedure Laterality Date     HC REMOVAL OF TONSILS,11+ Y/O      Tonsils 11+y.o.      Social History     Tobacco Use     Smoking status: Former Smoker     Last  "attempt to quit: 2010     Years since quittin.8     Smokeless tobacco: Never Used   Substance Use Topics     Alcohol use: Yes     Alcohol/week: 0.0 standard drinks     Frequency: 2-4 times a month     Drinks per session: 1 or 2     Binge frequency: Less than monthly     Comment: occasionally 1-2 etoh a month/      Problem (# of Occurrences) Relation (Name,Age of Onset)    Alcohol/Drug (1) Mother: b:1964 addicted to crack    Breast Cancer (1) Sister: no information    Cervical Cancer (1) Sister (35): 10/2015    Family History Negative (4) Father: b: , Sister: b:, Sister: b:, Brother: b:            Current Outpatient Medications   Medication Sig     escitalopram (LEXAPRO) 20 MG tablet Take 1 tablet (20 mg) by mouth daily     eszopiclone (LUNESTA) 3 MG tablet Take 1 tablet (3 mg) by mouth At Bedtime     LORazepam (ATIVAN) 0.5 MG tablet Take 1 tablet (0.5 mg) by mouth 2 times daily as needed for anxiety     Multiple Vitamins-Minerals (MULTIVITAMIN PO) Take by mouth daily     omeprazole (PRILOSEC) 20 MG DR capsule Take 1 capsule (20 mg) by mouth daily     valbenazine (INGREZZA) 40 MG capsule Take 1 capsule (40 mg) by mouth daily     Current Facility-Administered Medications   Medication     medroxyPROGESTERone (DEPO-PROVERA) syringe 150 mg     Allergies   Allergen Reactions     Remeron [Mirtazapine]      Excess sedation even with 7.5mg dose       ROS:  12 point review of systems negative other than symptoms noted below or in the HPI.        OBJECTIVE:     /62   Pulse 68   Ht 1.651 m (5' 5\")   Wt 59.9 kg (132 lb)   BMI 21.97 kg/m    Body mass index is 21.97 kg/m .    Exam:  Constitutional:  Appearance: Well nourished, well developed alert, in no acute distress  Neck:  Lymph Nodes:  No lymphadenopathy present; Thyroid:  Gland size normal, nontender, no nodules or masses present on palpation  Chest:  Respiratory Effort:  Breathing unlabored. Clear to auscultation bilaterally. "   Cardiovascular: Heart: Auscultation:  Regular rate, normal rhythm, no murmurs present  Gastrointestinal:  Abdominal Examination:  Abdomen nontender to palpation, tone normal without rigidity or guarding, no masses present, umbilicus without lesions; Liver/Spleen:  No hepatomegaly present, liver nontender to palpation; Hernias:  No hernias present  Skin: General Inspection:  No rashes present, no lesions present, no areas of discoloration.  Neurologic:  Mental Status:  Oriented X3.  Normal strength and tone, sensory exam grossly normal, mentation intact and speech normal.    Psychiatric:  Mentation appears normal and affect normal/bright.       ASSESSMENT/PLAN:                                                        ICD-10-CM    1. Sterilization  Z30.2        -Patient desires permanent sterilization. Discussed standard BTL vs b/l salpingectomy, pros and cons. Pt accepting of salpingectomy. Discussed procedure specifics, risks including vascular injury, bowel injury; discussed aftercare and time off work.   Quesitons answered.   Will not need additional visit prior to surgery, can update most recent physical exam.            Alejandrina Bridges Masters, DO  [facility] FOR [facility]

## 2020-03-13 ENCOUNTER — OFFICE VISIT (OUTPATIENT)
Dept: OBGYN | Facility: CLINIC | Age: 35
End: 2020-03-13
Payer: COMMERCIAL

## 2020-03-13 VITALS
HEIGHT: 65 IN | SYSTOLIC BLOOD PRESSURE: 114 MMHG | BODY MASS INDEX: 21.99 KG/M2 | WEIGHT: 132 LBS | HEART RATE: 68 BPM | DIASTOLIC BLOOD PRESSURE: 62 MMHG

## 2020-03-13 DIAGNOSIS — Z30.2 STERILIZATION: Primary | ICD-10-CM

## 2020-03-13 DIAGNOSIS — Z01.818 PREOP EXAMINATION: ICD-10-CM

## 2020-03-13 PROCEDURE — 99213 OFFICE O/P EST LOW 20 MIN: CPT | Performed by: OBSTETRICS & GYNECOLOGY

## 2020-03-13 ASSESSMENT — MIFFLIN-ST. JEOR: SCORE: 1294.63

## 2020-03-13 ASSESSMENT — PATIENT HEALTH QUESTIONNAIRE - PHQ9: SUM OF ALL RESPONSES TO PHQ QUESTIONS 1-9: 10

## 2020-03-19 ENCOUNTER — OFFICE VISIT (OUTPATIENT)
Dept: INTERNAL MEDICINE | Facility: CLINIC | Age: 35
End: 2020-03-19
Payer: COMMERCIAL

## 2020-03-19 VITALS
TEMPERATURE: 98.1 F | SYSTOLIC BLOOD PRESSURE: 106 MMHG | BODY MASS INDEX: 21.94 KG/M2 | DIASTOLIC BLOOD PRESSURE: 68 MMHG | OXYGEN SATURATION: 99 % | HEIGHT: 65 IN | WEIGHT: 131.7 LBS | HEART RATE: 82 BPM

## 2020-03-19 DIAGNOSIS — R10.11 ABDOMINAL PAIN, RIGHT UPPER QUADRANT: Primary | ICD-10-CM

## 2020-03-19 DIAGNOSIS — R10.83 ABDOMINAL COLIC: ICD-10-CM

## 2020-03-19 LAB
ALBUMIN SERPL-MCNC: 4.1 G/DL (ref 3.4–5)
ALP SERPL-CCNC: 37 U/L (ref 40–150)
ALT SERPL W P-5'-P-CCNC: 24 U/L (ref 0–50)
ANION GAP SERPL CALCULATED.3IONS-SCNC: 3 MMOL/L (ref 3–14)
AST SERPL W P-5'-P-CCNC: 10 U/L (ref 0–45)
BILIRUB SERPL-MCNC: 0.6 MG/DL (ref 0.2–1.3)
BUN SERPL-MCNC: 14 MG/DL (ref 7–30)
CALCIUM SERPL-MCNC: 9.2 MG/DL (ref 8.5–10.1)
CHLORIDE SERPL-SCNC: 106 MMOL/L (ref 94–109)
CO2 SERPL-SCNC: 29 MMOL/L (ref 20–32)
CREAT SERPL-MCNC: 0.65 MG/DL (ref 0.52–1.04)
ERYTHROCYTE [DISTWIDTH] IN BLOOD BY AUTOMATED COUNT: 12.8 % (ref 10–15)
GFR SERPL CREATININE-BSD FRML MDRD: >90 ML/MIN/{1.73_M2}
GLUCOSE SERPL-MCNC: 95 MG/DL (ref 70–99)
HCT VFR BLD AUTO: 41.8 % (ref 35–47)
HGB BLD-MCNC: 14 G/DL (ref 11.7–15.7)
MCH RBC QN AUTO: 29.9 PG (ref 26.5–33)
MCHC RBC AUTO-ENTMCNC: 33.5 G/DL (ref 31.5–36.5)
MCV RBC AUTO: 89 FL (ref 78–100)
PLATELET # BLD AUTO: 257 10E9/L (ref 150–450)
POTASSIUM SERPL-SCNC: 4.1 MMOL/L (ref 3.4–5.3)
PROT SERPL-MCNC: 7.7 G/DL (ref 6.8–8.8)
RBC # BLD AUTO: 4.69 10E12/L (ref 3.8–5.2)
SODIUM SERPL-SCNC: 138 MMOL/L (ref 133–144)
WBC # BLD AUTO: 5.1 10E9/L (ref 4–11)

## 2020-03-19 PROCEDURE — 85027 COMPLETE CBC AUTOMATED: CPT | Performed by: INTERNAL MEDICINE

## 2020-03-19 PROCEDURE — 99214 OFFICE O/P EST MOD 30 MIN: CPT | Performed by: INTERNAL MEDICINE

## 2020-03-19 PROCEDURE — 36415 COLL VENOUS BLD VENIPUNCTURE: CPT | Performed by: INTERNAL MEDICINE

## 2020-03-19 PROCEDURE — 80053 COMPREHEN METABOLIC PANEL: CPT | Performed by: INTERNAL MEDICINE

## 2020-03-19 ASSESSMENT — MIFFLIN-ST. JEOR: SCORE: 1293.27

## 2020-03-19 NOTE — PATIENT INSTRUCTIONS
"*  Your abdominal pain may be due to your gall bladder or a gall stone within your gall bladder.     *  Labs tests to check the liver and kidneys.     *  Upper abdominal ultrasound to evaluate the gall bladder and liver.     *  Call me the next day after the ultrasound for the results and for further directions.      *  Until this issue it fully evaluated, try to avoid fatty foods as much as possible.  Try to keep the diet as bland as possible.  Fat in foods can signal the gall bladder to \"sqeeze\" causing more pain.     *  If the gall bladder is found to be the source of your pain, then it will need to be removed via surgery.  We will send you to a surgeon as indicated.     *  If you ever develop very severe, unrelenting pain, then go to the Emergency room immediately for an evaluation.        *  [facility] consultation:  [facility] [phone]  "

## 2020-03-19 NOTE — NURSING NOTE
"Chief Complaint   Patient presents with     Abdominal Pain     /68   Pulse 82   Temp 98.1  F (36.7  C) (Temporal)   Ht 1.651 m (5' 5\")   Wt 59.7 kg (131 lb 11.2 oz)   LMP  (LMP Unknown)   SpO2 99%   BMI 21.92 kg/m   Estimated body mass index is 21.92 kg/m  as calculated from the following:    Height as of this encounter: 1.651 m (5' 5\").    Weight as of this encounter: 59.7 kg (131 lb 11.2 oz).        Health Maintenance that is potentially due pending provider review:  NONE    n/a    YELENA Trinidad  "

## 2020-03-20 ENCOUNTER — TELEPHONE (OUTPATIENT)
Dept: INTERNAL MEDICINE | Facility: CLINIC | Age: 35
End: 2020-03-20

## 2020-03-20 ENCOUNTER — ANCILLARY PROCEDURE (OUTPATIENT)
Dept: ULTRASOUND IMAGING | Facility: CLINIC | Age: 35
End: 2020-03-20
Attending: INTERNAL MEDICINE
Payer: COMMERCIAL

## 2020-03-20 DIAGNOSIS — K80.20 CALCULUS OF GALLBLADDER WITHOUT CHOLECYSTITIS WITHOUT OBSTRUCTION: Primary | ICD-10-CM

## 2020-03-20 DIAGNOSIS — R10.11 ABDOMINAL PAIN, RIGHT UPPER QUADRANT: ICD-10-CM

## 2020-03-20 DIAGNOSIS — K80.50 BILIARY COLIC: ICD-10-CM

## 2020-03-20 DIAGNOSIS — R10.83 ABDOMINAL COLIC: ICD-10-CM

## 2020-03-20 PROCEDURE — 76705 ECHO EXAM OF ABDOMEN: CPT

## 2020-03-20 NOTE — TELEPHONE ENCOUNTER
Pt is in constant pain, sometimes not as bad, they can't see her and can't get the surgery scheduled until end of April. They said they might do it if it emergency with a doctor note. If it is ok to wait, please write a note for work excusing her until she can be seen and after surgery.   [phone], ok to lv detailed message  Mail to home address

## 2020-03-20 NOTE — TELEPHONE ENCOUNTER
"Please call the patient.    Her ultrasound from this morning did show that she has a gallstone within her gallbladder.  This ultrasound would confirm that her recent episode of severe abdominal pain is almost certainly from this gallstone becoming temporarily lodged in the neck of her gallbladder causing temporary obstruction, but then sliding back out.    The only treatment for this is to remove the gallbladder  I would recommend she visit with the surgeons as we discussed to discuss removal of the gallbladder.  This is not an acute or emergent procedure, however should be considered to be done sooner rather than later.  The timing this procedure can be negotiated with the surgeons after their exam and review of your symptoms and ultrasound.  It is common to have intermittent episodes of abdominal pain, sometimes severe, sometimes more mild.  Until the gallbladder is managed, maintain a low-fat diet as best able, this will reduce the need for the gallbladder to \"squeeze \".  If she ever develops severe abdominal pain that does NOT go away, go to the emergency room.    Referral given already:    --[facility] [phone]  --[facility], [place] [phone]    "

## 2020-03-20 NOTE — TELEPHONE ENCOUNTER
Pt called to ck on note for work.  Please see message below. Pt was told the request was sent to the doctor. Please call pt back.

## 2020-03-20 NOTE — TELEPHONE ENCOUNTER
I do not think that they understood to what degree the patient was having symptoms.     Due to the degree of her symptoms with the pain is so severe as to prevent her from working, then this is a more urgent matter.  It is not Emergent (as in the gall bladder needs to come out today), but it is not elective either.      I called the surgery office directly about getting her seen.   ([facility] [place] [phone])    The surgery clinic are seeing people who meet certain criteria of urgency, putting off non urgent matters until the social distancing is no longer recommended.      I told them about the semi-urgent need to see the surgeons.    There was no surgeon in the office for me to directly speak with.  On Monday, the surgeon will look at her imaging and my notes and determine a plan for her, starting with an appointment, where they will evaluate her more thoroughly and decide what the best course of action is.  The surgery office should contact her on Monday and I anticipate that she will be seen early next week.      Given the fact that her pain is bad enough that she cannot work, this cannot wait until end of April.      As I said earlier:  -  Avoid fatty foods.    -  if she develops worsening abdominal pain that does not go away, then she should go to the ER without hesitation.      I will write a letter stating no work for the next one week, until she sees the surgeon.     Left at [facility], either mail it or fax it to to location of her choosing.

## 2020-03-20 NOTE — LETTER
March 20, 2020      Kamilah Dee  [address] [place] [id number]        To whom it may concern:    We are writing to inform you of your test results.    Kamilah Dee has been dealing with acute abdominal pain most likely from a gall stone causing intermittent obstruction.  Due to the degree of her pain and discomfort, she is not able to work when she has pain.  She will be seeing a surgeon to evaluate this issue next week and should not work for the next one week until she is seen in the surgery clinic.      If you have any questions or concerns, please call the clinic at the number listed above.       Sincerely,        Marshall Jovel M.D.  [facility]

## 2020-03-20 NOTE — TELEPHONE ENCOUNTER
Patient informed and will wait for call from surgeons office. Letter placed in outgoing mail to patients address as requested by patient.   Deepa Luque CMA on 3/20/2020 at 3:51 PM

## 2020-03-20 NOTE — TELEPHONE ENCOUNTER
Patient informed and given numbers to call and schedule.   Deepa Luque CMA on 3/20/2020 at 11:07 AM

## 2020-03-23 ENCOUNTER — PREP FOR PROCEDURE (OUTPATIENT)
Dept: OBGYN | Facility: CLINIC | Age: 35
End: 2020-03-23

## 2020-03-23 DIAGNOSIS — Z30.2 STERILIZATION: Primary | ICD-10-CM

## 2020-03-23 NOTE — PROGRESS NOTES
[facility]  -  Progress Note Patient: Inderjit Ly MRN: [id number]  SSN: [id number] YOB: 1969  Age: 47 y.o. Sex: male Admit Date: 10/3/2018 Assessment:  
 
Hospital Problems  Date Reviewed: 10/4/2018 Codes Class Noted POA Metabolic acidosis TAF-65-TE: E87.2 ICD-9-CM: 276.2  10/4/2018 Yes Bacteria in urine ICD-10-CM: R82.71 ICD-9-CM: 791.9  10/3/2018 Yes  
   
 ICD (implantable cardioverter-defibrillator) discharge ICD-10-CM: Z45.02 
ICD-9-CM: V71.89  10/29/2015 Yes Overview Addendum 10/29/2015 10:13 AM by Estella Sutton MD  
  20 shocks, battery life 8.5 years CAD (coronary artery disease) ICD-10-CM: I25.10 ICD-9-CM: 414.00  10/29/2015 Yes Overview Signed 10/29/2015 10:11 AM by Estella Sutton MD  
  S/P PCI LAD Cardiomyopathy ([facility] [address] ICD-10-CM: I42.9 ICD-9-CM: 425.4  4/23/2014 Yes Overview Signed 11/1/2015  9:07 AM by Estella Sutton MD  
  EF 25 to 39% this admission JESSICA (obstructive sleep apnea) ICD-10-CM: G47.33 
ICD-9-CM: 327.23  12/17/2013 Yes HTN (hypertension) (Chronic) ICD-10-CM: I10 
ICD-9-CM: 401.9  7/24/2013 Yes Acute on chronic renal failure (HCC) ICD-10-CM: N17.9, N18.9 ICD-9-CM: 584.9, 585.9  7/24/2013 Yes Obesity ICD-10-CM: E66.9 ICD-9-CM: 278.00  1/29/2012 Yes Plan:  
 
Patient with ARF on CRI. Cardiac status remains stable. No new cardiac recommendations. Continue outpatient cardiac medications as tolerated. Subjective: No new complaints. Objective:  
  
Patient Vitals for the past 8 hrs: 
 Temp Pulse Resp BP SpO2  
10/07/18 1419 98 °F (36.7 °C) 60 15 146/84 95 % 10/07/18 1240 98.2 °F (36.8 °C) (!) 56 16 148/80 95 % 10/07/18 1013 98.3 °F (36.8 °C) (!) 56 18 (!) 168/95 96 % Patient Vitals for the past 96 hrs: 
 Weight 10/05/18 1404 118.4 kg (261 lb) 10/05/18 1340 118.4 kg (261 lb) Ordering surgery. Will be deferred until nonessential surgical scheduling resumes.    Alejandrina Grandas, DO     10/04/18 0642 118.7 kg (261 lb 11 oz) Intake/Output Summary (Last 24 hours) at 10/07/18 1446 Last data filed at 10/07/18 1353 Gross per 24 hour Intake             2705 ml Output             1550 ml Net             1155 ml Physical Exam: 
General:  alert, cooperative, no distress, appears stated age Neck:  no JVD Lungs:  clear to auscultation bilaterally Heart:  regular rate and rhythm Abdomen:  no guarding or rigidity Extremities:  no edema Data Review:  
 
Labs: Results:  
   
Chemistry Recent Labs 10/07/18 
 9751  10/06/18 
 0408  10/05/18 
 0410 GLU  81  82  84 NA  137  136  139  
K  4.3  4.4  5.0  
CL  103  103  106 CO2  20*  18*  18* BUN  75*  73*  74* CREA  12.00*  11.70*  11.40* CA  7.1*  6.9*  7.2* AGAP  14  15  15 BUCR  6*  6*  6* AP  69  73  74  
TP  5.4*  5.8*  5.9* ALB  1.9*  2.2*  2.3*  
GLOB  3.5  3.6  3.6 AGRAT  0.5*  0.6*  0.6* CBC w/Diff Recent Labs 10/07/18 
 1424  10/06/18 
 0408  10/05/18 
 0410 WBC  5.9  6.3  7.2 RBC  3.18*  3.47*  3.47* HGB  8.5*  9.2*  9.6* HCT  26.4*  29.0*  30.3* PLT  155  176  156 GRANS  66  66  74 LYMPH  22  22  18* EOS  5  4  3 Cardiac Enzymes No results found for: CPK, CK, CKMMB, CKMB, RCK3, CKMBT, CKNDX, CKND1, MARQUES, TROPT, TROIQ, NOELLE, TROPT, TNIPOC, BNP, BNPP Coagulation No results for input(s): PTP, INR, APTT in the last 72 hours. No lab exists for component: INREXT Lipid Panel Lab Results Component Value Date/Time Cholesterol, total 136 04/21/2015 04:55 AM  
 HDL Cholesterol 28 (L) 04/21/2015 04:55 AM  
 LDL, calculated 60.2 04/21/2015 04:55 AM  
 VLDL, calculated 47.8 04/21/2015 04:55 AM  
 Triglyceride 239 (H) 04/21/2015 04:55 AM  
 CHOL/HDL Ratio 4.9 04/21/2015 04:55 AM  
  
BNP No results found for: BNP, BNPP, XBNPT Liver Enzymes Recent Labs 10/07/18 
 7558 TP  5.4* ALB  1.9* AP  69 SGOT  69* Digoxin Thyroid Studies Lab Results Component Value Date/Time  T4, Total 7.1 04/04/2013 12:00 AM  
 T3 Uptake 27 04/04/2013 12:00 AM  
 TSH 1.42 12/27/2015 04:35 AM

## 2020-03-25 ENCOUNTER — TELEPHONE (OUTPATIENT)
Dept: OBGYN | Facility: CLINIC | Age: 35
End: 2020-03-25

## 2020-04-15 ENCOUNTER — VIRTUAL VISIT (OUTPATIENT)
Dept: OBGYN | Facility: CLINIC | Age: 35
End: 2020-04-15
Payer: COMMERCIAL

## 2020-04-15 DIAGNOSIS — Z30.018 ENCOUNTER FOR INITIAL PRESCRIPTION OF OTHER CONTRACEPTIVES: Primary | ICD-10-CM

## 2020-04-15 PROCEDURE — 99213 OFFICE O/P EST LOW 20 MIN: CPT | Mod: 95 | Performed by: OBSTETRICS & GYNECOLOGY

## 2020-04-15 RX ORDER — NORELGESTROMIN AND ETHINYL ESTRADIOL 35; 150 UG/MG; UG/MG
PATCH TRANSDERMAL
Qty: 9 PATCH | Refills: 1 | Status: SHIPPED | OUTPATIENT
Start: 2020-04-15 | End: 2020-07-27

## 2020-04-15 NOTE — PROGRESS NOTES
"Kamilah Dee is a 35 year old female who is being evaluated via a billable telephone visit.      The patient has been notified of following:     \"This telephone visit will be conducted via a call between you and your physician/provider. We have found that certain health care needs can be provided without the need for a physical exam.  This service lets us provide the care you need with a short phone conversation.  If a prescription is necessary we can send it directly to your pharmacy.  If lab work is needed we can place an order for that and you can then stop by our lab to have the test done at a later time.    Telephone visits are billed at different rates depending on your insurance coverage. During this emergency period, for some insurers they may be billed the same as an in-person visit.  Please reach out to your insurance provider with any questions.    If during the course of the call the physician/provider feels a telephone visit is not appropriate, you will not be charged for this service.\"    Patient has given verbal consent for Telephone visit?  Yes    How would you like to obtain your AVS? Reggie    Additional provider notes:      Phone call duration: 10 minutes            SUBJECTIVE:                                                   Kamilah Dee is a 35 year old female who presents to clinic today for the following health issue(s):  Patient presents with:  Consult:  birth control         HPI:  Pt was scheduled for tubal ligation but cancelled due to COVID. Finished Depoprovera 2 weeks ago. Had bleed at that time but unsure if menses. No sexual activity after stopping med.  Wants another form of birth control while waiting for the TL.      Patient's last menstrual period was 2020..     Patient is sexually active, .  Using none for contraception.    reports that she quit smoking about 9 years ago. She has never used smokeless tobacco.    STD testing offered?  Declined    Health " maintenance updated:  yes    Today's PHQ-2 Score:   PHQ-2 (  [employer]) 3/13/2020   Q1: Little interest or pleasure in doing things 2   Q2: Feeling down, depressed or hopeless 2   PHQ-2 Score 4     Today's PHQ-9 Score:   PHQ-9 SCORE 3/13/2020   PHQ-9 Total Score -   PHQ-9 Total Score 10     Today's HAMIDA-7 Score:   HAMIDA-7 SCORE 2020   Total Score -   Total Score 11       Problem list and histories reviewed & adjusted, as indicated.  Additional history: as documented.    Patient Active Problem List   Diagnosis     Tic disorder     CARDIOVASCULAR SCREENING; LDL GOAL LESS THAN 160     Health Care Home     Migraine     HAMIDA (generalized anxiety disorder)     Verruca     ASCUS of cervix with negative high risk HPV     Major depressive disorder, single episode, mild (H)     Family history of breast cancer     Past Surgical History:   Procedure Laterality Date     HC REMOVAL OF TONSILS,11+ Y/O      Tonsils 11+y.o.      Social History     Tobacco Use     Smoking status: Former Smoker     Last attempt to quit: 2010     Years since quittin.8     Smokeless tobacco: Never Used   Substance Use Topics     Alcohol use: Yes     Alcohol/week: 0.0 standard drinks     Frequency: 2-4 times a month     Drinks per session: 1 or 2     Binge frequency: Less than monthly     Comment: occasionally 1-2 etoh a month/      Problem (# of Occurrences) Relation (Name,Age of Onset)    Alcohol/Drug (1) Mother: b:1964 addicted to crack    Breast Cancer (1) Sister: no information    Cervical Cancer (1) Sister (35): 10/2015    Family History Negative (4) Father: b: , Sister: b:, Sister: b:, Brother: b:            Current Outpatient Medications   Medication Sig     Multiple Vitamins-Minerals (MULTIVITAMIN PO) Take by mouth daily     norelgestromin-ethinyl estradiol (ORTHO EVRA) 150-35 MCG/24HR patch Remove old patch and apply new patch onto the skin once a week for 3 weeks (21 days). Do not wear patch week 4 (days 22-28),  then repeat.     escitalopram (LEXAPRO) 20 MG tablet Take 1 tablet (20 mg) by mouth daily (Patient not taking: Reported on 4/15/2020)     eszopiclone (LUNESTA) 3 MG tablet Take 1 tablet (3 mg) by mouth At Bedtime (Patient not taking: Reported on 4/15/2020)     LORazepam (ATIVAN) 0.5 MG tablet Take 1 tablet (0.5 mg) by mouth 2 times daily as needed for anxiety (Patient not taking: Reported on 4/15/2020)     omeprazole (PRILOSEC) 20 MG DR capsule Take 1 capsule (20 mg) by mouth daily (Patient not taking: Reported on 4/15/2020)     valbenazine (INGREZZA) 40 MG capsule Take 1 capsule (40 mg) by mouth daily (Patient not taking: Reported on 4/15/2020)     Current Facility-Administered Medications   Medication     medroxyPROGESTERone (DEPO-PROVERA) syringe 150 mg     Allergies   Allergen Reactions     Remeron [Mirtazapine]      Excess sedation even with 7.5mg dose       ROS:  12 point review of systems negative other than symptoms noted below or in the HPI.  No urinary frequency or dysuria, bladder or kidney problems      OBJECTIVE:     LMP 04/06/2020   There is no height or weight on file to calculate BMI.    Exam:       In-Clinic Test Results:  No results found for this or any previous visit (from the past 24 hour(s)).    ASSESSMENT/PLAN:                                                        ICD-10-CM    1. Encounter for initial prescription of other contraceptives  Z30.018 norelgestromin-ethinyl estradiol (ORTHO EVRA) 150-35 MCG/24HR patch         Discussed all options. Pt interested in Ortho-Evra. Discussed timing of start and and needing Backup for 2 weeks. Can wait until first menses to start as well.   Discussed possible increased risk of DVT with patch vs pills.     Jordan Walker MD  [facility] FOR [facility] [place]

## 2020-05-01 NOTE — TELEPHONE ENCOUNTER
TIER 3    Spoke to put to touch base and advise though we can not schedule her yet. We will call her as soon as we can  Pt understood    Leilani Prince  Surgery Scheduler

## 2020-05-06 ENCOUNTER — TELEPHONE (OUTPATIENT)
Dept: PHARMACY | Facility: CLINIC | Age: 35
End: 2020-05-06

## 2020-05-06 NOTE — TELEPHONE ENCOUNTER
Received email from [employer] stating they were unable to reach the patient for her April shipment of Ingrezza. I informed them that she is still on Ingrezza as far as we know and I provided them with the patient's contact information.     Latoya Juarez, Pharm.D.  Medication Therapy Management Pharmacist  Phone: [phone]

## 2020-06-18 ENCOUNTER — HOSPITAL ENCOUNTER (OUTPATIENT)
Facility: CLINIC | Age: 35
End: 2020-06-18
Attending: OBSTETRICS & GYNECOLOGY | Admitting: OBSTETRICS & GYNECOLOGY
Payer: COMMERCIAL

## 2020-06-18 DIAGNOSIS — Z30.2 STERILIZATION: ICD-10-CM

## 2020-06-18 DIAGNOSIS — Z11.59 ENCOUNTER FOR SCREENING FOR OTHER VIRAL DISEASES: Primary | ICD-10-CM

## 2020-06-19 NOTE — TELEPHONE ENCOUNTER
Type of surgery: LSC BS  Location of surgery: [facility]  Date and time of surgery: 8/25/2020 7:30a  Surgeon: Liban krueger/Brittany  Pre-Op Appt Date: Hospital  Post-Op Appt Date: TBD   Packet sent out: MAILED 6/18/2020  Pre-cert/Authorization completed:  TBD  Date: 6/18/2020 Genny krueger/Maria Guadalupe Prince  Surgery Scheduler    DX Z30.2  CPT 71908

## 2020-06-23 ENCOUNTER — TELEPHONE (OUTPATIENT)
Dept: INTERNAL MEDICINE | Facility: CLINIC | Age: 35
End: 2020-06-23

## 2020-06-23 NOTE — TELEPHONE ENCOUNTER
Pt requests a letter stating excusing her from wearing a mask at work. 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 Has been back to work for only 2 days. Says she feels like she can't breathe, feels like she's going to pass out, it makes her tics worse and her anxiety gets worse. Says she can't go without working. She needs her job. She said her job can make an exception for her if she gfets a letter excusing her from wearing the mask. States she has a h/o breathing problems (not asthma), anxiety and tic disorder. She asks that if Marques Joseph, her PCP, can't write the letter for her, can a different doctor review her history and write the letter?   Please advise. NORMA Ruelas LPN

## 2020-06-23 NOTE — TELEPHONE ENCOUNTER
Patient called needing a note for work excusing her from wearing a mask all day at work she cannot breath gets   Headaches and cannot focus on her work wearing a mask please call patient regarding this phone   Number is [phone]

## 2020-07-03 ENCOUNTER — TELEPHONE (OUTPATIENT)
Dept: NEUROLOGY | Facility: CLINIC | Age: 35
End: 2020-07-03

## 2020-07-03 NOTE — TELEPHONE ENCOUNTER
Received phone call from [facility] (patient assistance program) inquiring if patient is still on medication. They have not been able to reach the patient to set up an order and haven't dispense it since February.    ANIKA phone #:  [phone]    Thank you,    Cyndi Krause [facility]  Specialty Pharm[facility] [address]  [address] [place] [address]  Ph: [phone] Fax: [phone]  [email or web address]

## 2020-07-06 NOTE — TELEPHONE ENCOUNTER
Kamilah stated she stopped taking it because her Tics got worse. She stopped Ingrezza 2-3 months ago. Her tics have worsened over the past 3-4 months. She has been under a lot of stress lately and is not sure if this is causing it to worsen or if it was the Ingrezza. She admits she was worried about long term side effects. She would like to make an appointment to discuss this with Dr. Powell and see what he thinks about it. I will send a [employer] message with our clinic number. She reports she will need to coordinate this around her work schedule.

## 2020-07-25 NOTE — TELEPHONE ENCOUNTER
Pt has longstanding  Tic disorder. HAS been off of meds from Neurology for tic disorder according to TE from 3 weeks ago. Pt works at [employer] that requires masks to be worn and from public health standpoint, important for her to use around other co-workers and customers. Will discuss further at upcoming appt 7/27/20

## 2020-08-11 RX ORDER — CEFAZOLIN SODIUM 1 G/3ML
1 INJECTION, POWDER, FOR SOLUTION INTRAMUSCULAR; INTRAVENOUS SEE ADMIN INSTRUCTIONS
Status: CANCELLED | OUTPATIENT
Start: 2020-08-11

## 2020-08-11 RX ORDER — CEFAZOLIN SODIUM 2 G/100ML
2 INJECTION, SOLUTION INTRAVENOUS
Status: CANCELLED | OUTPATIENT
Start: 2020-08-11

## 2020-08-11 RX ORDER — ACETAMINOPHEN 325 MG/1
975 TABLET ORAL ONCE
Status: CANCELLED | OUTPATIENT
Start: 2020-08-11 | End: 2020-08-11

## 2020-08-21 NOTE — TELEPHONE ENCOUNTER
8/20 - received call from Peggy from surgery scheduling at [facility] stating patient was trying to reach us to cancel surgery.    I reached out and left voicemail for patient 8/20 & 8/21 that if she wants to cancel she needs to call us at the clinic [phone] and speak to either Heidi or Elda to cancel this surgery.

## 2020-08-24 NOTE — TELEPHONE ENCOUNTER
Due to note below and pt not getting covid testing done this case has been CX  LVM for pt that we have CX her surgery and explained I am now back from [facility] and happy to help her get back on the schedule. Requested CB to go over dates that work for her    Genny krueger/ at [facility] for changes    Leilani Prince  Surgery Scheduler

## 2020-08-26 ENCOUNTER — DOCUMENTATION ONLY (OUTPATIENT)
Dept: NEUROLOGY | Facility: CLINIC | Age: 35
End: 2020-08-26

## 2020-08-26 NOTE — PROGRESS NOTES
[facility] it is time for Kamilah to re-enroll in the program. Per 7/3/2020 telephone encounter Kamilah reported she had been off of ingrezza for 2-3 months. No further action needed.

## 2020-09-01 ENCOUNTER — OFFICE VISIT (OUTPATIENT)
Dept: INTERNAL MEDICINE | Facility: CLINIC | Age: 35
End: 2020-09-01
Payer: COMMERCIAL

## 2020-09-01 VITALS
OXYGEN SATURATION: 98 % | HEART RATE: 85 BPM | TEMPERATURE: 98.5 F | BODY MASS INDEX: 22.31 KG/M2 | WEIGHT: 133.9 LBS | HEIGHT: 65 IN | DIASTOLIC BLOOD PRESSURE: 58 MMHG | SYSTOLIC BLOOD PRESSURE: 98 MMHG

## 2020-09-01 DIAGNOSIS — K14.1 GEOGRAPHIC TONGUE: Primary | ICD-10-CM

## 2020-09-01 PROCEDURE — 99213 OFFICE O/P EST LOW 20 MIN: CPT | Performed by: INTERNAL MEDICINE

## 2020-09-01 ASSESSMENT — MIFFLIN-ST. JEOR: SCORE: 1298.25

## 2020-09-01 NOTE — PROGRESS NOTES
"Subjective     Kamilah Dee is a 35 year old female who presents to clinic today for the following health issues:    HPI       Concern - tongue spots  Onset: ongoing, intermittently happening   Description: spots and patches on l side of tongue with some pain  Intensity: moderate  Progression of Symptoms:  same  Accompanying Signs & Symptoms: red throat, pt denies any throat pain, states redness has been ongoing for years  Previous history of similar problem: dx with geographic tongue years ago  Precipitating factors:        Worsened by: n/a  Alleviating factors:        Improved by: n/a  Therapies tried and outcome:  none       Review of Systems   Constitutional, HEENT, cardiovascular, pulmonary, gi and gu systems are negative, except as otherwise noted.      Objective    BP 98/58   Pulse 85   Temp 98.5  F (36.9  C) (Temporal)   Ht 1.651 m (5' 5\")   Wt 60.7 kg (133 lb 14.4 oz)   SpO2 98%   BMI 22.28 kg/m    Body mass index is 22.28 kg/m .  Physical Exam   GENERAL APPEARANCE: alert and no distress  HENT: normal cephalic/atraumatic and overall normal-appearing tongue.  No tongue lesions.          Assessment & Plan     Geographic tongue  She states she has a geographic tongue.  Her tongue looks pretty normal today is nothing here that warrants any further evaluation.  The patient was reassured         See Patient Instructions    No follow-ups on file.    Artem Cardoza MD  [facility]      "

## 2020-09-01 NOTE — NURSING NOTE
"Chief Complaint   Patient presents with     Tongue Lesion(S)     BP 98/58   Pulse 85   Temp 98.5  F (36.9  C) (Temporal)   Ht 1.651 m (5' 5\")   Wt 60.7 kg (133 lb 14.4 oz)   SpO2 98%   BMI 22.28 kg/m   Estimated body mass index is 22.28 kg/m  as calculated from the following:    Height as of this encounter: 1.651 m (5' 5\").    Weight as of this encounter: 60.7 kg (133 lb 14.4 oz).        Health Maintenance due pending provider review:  NONE    n/a    Keiko Paez CMA  "

## 2020-09-09 ENCOUNTER — OFFICE VISIT (OUTPATIENT)
Dept: INTERNAL MEDICINE | Facility: CLINIC | Age: 35
End: 2020-09-09
Payer: COMMERCIAL

## 2020-09-09 VITALS
TEMPERATURE: 97.2 F | BODY MASS INDEX: 22.13 KG/M2 | WEIGHT: 133 LBS | SYSTOLIC BLOOD PRESSURE: 102 MMHG | OXYGEN SATURATION: 99 % | HEART RATE: 60 BPM | DIASTOLIC BLOOD PRESSURE: 70 MMHG | RESPIRATION RATE: 18 BRPM

## 2020-09-09 DIAGNOSIS — R14.0 BLOATING SYMPTOM: ICD-10-CM

## 2020-09-09 DIAGNOSIS — R10.84 ABDOMINAL PAIN, GENERALIZED: Primary | ICD-10-CM

## 2020-09-09 DIAGNOSIS — N92.6 IRREGULAR MENSES: ICD-10-CM

## 2020-09-09 LAB
ALBUMIN SERPL-MCNC: 3.7 G/DL (ref 3.4–5)
ALBUMIN UR-MCNC: NEGATIVE MG/DL
ALP SERPL-CCNC: 35 U/L (ref 40–150)
ALT SERPL W P-5'-P-CCNC: 20 U/L (ref 0–50)
ANION GAP SERPL CALCULATED.3IONS-SCNC: 2 MMOL/L (ref 3–14)
APPEARANCE UR: CLEAR
AST SERPL W P-5'-P-CCNC: 14 U/L (ref 0–45)
BACTERIA #/AREA URNS HPF: ABNORMAL /HPF
BASOPHILS # BLD AUTO: 0 10E9/L (ref 0–0.2)
BASOPHILS NFR BLD AUTO: 0.8 %
BILIRUB SERPL-MCNC: 0.7 MG/DL (ref 0.2–1.3)
BILIRUB UR QL STRIP: NEGATIVE
BUN SERPL-MCNC: 18 MG/DL (ref 7–30)
CALCIUM SERPL-MCNC: 9 MG/DL (ref 8.5–10.1)
CHLORIDE SERPL-SCNC: 107 MMOL/L (ref 94–109)
CO2 SERPL-SCNC: 29 MMOL/L (ref 20–32)
COLOR UR AUTO: YELLOW
CREAT SERPL-MCNC: 0.71 MG/DL (ref 0.52–1.04)
DIFFERENTIAL METHOD BLD: NORMAL
EOSINOPHIL # BLD AUTO: 0.3 10E9/L (ref 0–0.7)
EOSINOPHIL NFR BLD AUTO: 6.2 %
ERYTHROCYTE [DISTWIDTH] IN BLOOD BY AUTOMATED COUNT: 13.2 % (ref 10–15)
GFR SERPL CREATININE-BSD FRML MDRD: >90 ML/MIN/{1.73_M2}
GLUCOSE SERPL-MCNC: 65 MG/DL (ref 70–99)
GLUCOSE UR STRIP-MCNC: NEGATIVE MG/DL
HCG UR QL: NEGATIVE
HCT VFR BLD AUTO: 43.7 % (ref 35–47)
HGB BLD-MCNC: 14 G/DL (ref 11.7–15.7)
HGB UR QL STRIP: ABNORMAL
KETONES UR STRIP-MCNC: NEGATIVE MG/DL
LEUKOCYTE ESTERASE UR QL STRIP: NEGATIVE
LYMPHOCYTES # BLD AUTO: 2 10E9/L (ref 0.8–5.3)
LYMPHOCYTES NFR BLD AUTO: 37.8 %
MCH RBC QN AUTO: 29.5 PG (ref 26.5–33)
MCHC RBC AUTO-ENTMCNC: 32 G/DL (ref 31.5–36.5)
MCV RBC AUTO: 92 FL (ref 78–100)
MONOCYTES # BLD AUTO: 0.4 10E9/L (ref 0–1.3)
MONOCYTES NFR BLD AUTO: 8.1 %
NEUTROPHILS # BLD AUTO: 2.4 10E9/L (ref 1.6–8.3)
NEUTROPHILS NFR BLD AUTO: 47.1 %
NITRATE UR QL: NEGATIVE
NON-SQ EPI CELLS #/AREA URNS LPF: ABNORMAL /LPF
PH UR STRIP: 6.5 PH (ref 5–7)
PLATELET # BLD AUTO: 246 10E9/L (ref 150–450)
POTASSIUM SERPL-SCNC: 4 MMOL/L (ref 3.4–5.3)
PROT SERPL-MCNC: 7.7 G/DL (ref 6.8–8.8)
RBC # BLD AUTO: 4.74 10E12/L (ref 3.8–5.2)
RBC #/AREA URNS AUTO: ABNORMAL /HPF
SODIUM SERPL-SCNC: 138 MMOL/L (ref 133–144)
SOURCE: ABNORMAL
SP GR UR STRIP: <=1.005 (ref 1–1.03)
TSH SERPL DL<=0.005 MIU/L-ACNC: 3.03 MU/L (ref 0.4–4)
UROBILINOGEN UR STRIP-ACNC: 0.2 EU/DL (ref 0.2–1)
WBC # BLD AUTO: 5.2 10E9/L (ref 4–11)
WBC #/AREA URNS AUTO: ABNORMAL /HPF

## 2020-09-09 PROCEDURE — 81025 URINE PREGNANCY TEST: CPT | Performed by: PHYSICIAN ASSISTANT

## 2020-09-09 PROCEDURE — 99214 OFFICE O/P EST MOD 30 MIN: CPT | Performed by: PHYSICIAN ASSISTANT

## 2020-09-09 PROCEDURE — 80050 GENERAL HEALTH PANEL: CPT | Performed by: PHYSICIAN ASSISTANT

## 2020-09-09 PROCEDURE — 36415 COLL VENOUS BLD VENIPUNCTURE: CPT | Performed by: PHYSICIAN ASSISTANT

## 2020-09-09 PROCEDURE — 81001 URINALYSIS AUTO W/SCOPE: CPT | Performed by: PHYSICIAN ASSISTANT

## 2020-09-09 NOTE — PROGRESS NOTES
Subjective     Kamilah Dee is a 35 year old female who presents to clinic today for the following health issues:    HPI       Abdominal/Flank Pain  Onset/Duration: 2 weeks worse  A few months with symptoms   Description:   Character: Dull ache and Fullness  Location: right lower quadrant left lower quadrant poppy-umbilical region  Radiation: None  Intensity: moderate  Progression of Symptoms:  worsening  Accompanying Signs & Symptoms:  Fever/Chills: no  Gas/Bloating: YES  Nausea: no  Vomitting: no  Diarrhea: no  Constipation: no  Dysuria or Hematuria: no  History:   Trauma: no  Previous similar pain: YES  Previous tests done: Ultrasound  Precipitating factors:   Does the pain change with:     Food: YES- worsens    Bowel Movement: YES- slight improvement    Urination: YES- slight improvement   Other factors:  no  Therapies tried and outcome: None  No LMP recorded. Patient has had an injection.    Patient with hx of gall stone and gall bladder pain in 3/2020- states this feels different   More generalized pain. Also with a lot of abdominal bloating  Notes menses irregular and having spotting yet 1 week after her period.  Not sexually active. No birth control           Review of Systems   Constitutional, HEENT, cardiovascular, pulmonary, gi and gu systems are negative, except as otherwise noted.      Objective    There were no vitals taken for this visit.  There is no height or weight on file to calculate BMI.  Physical Exam   GENERAL: healthy, alert and no distress  RESP: lungs clear to auscultation - no rales, rhonchi or wheezes  CV: regular rate and rhythm, normal S1 S2, no S3 or S4, no murmur, click or rub, no peripheral edema and peripheral pulses strong  ABDOMEN: tenderness epigastric, RUQ, LUQ, RLQ, LLQ and umbilical, bowel sounds normal and bloated   MS: no gross musculoskeletal defects noted, no edema  SKIN: no suspicious lesions or rashes    Results for orders placed or performed in visit on 09/09/20  (from the past 24 hour(s))   Comprehensive metabolic panel   Result Value Ref Range    Sodium 138 133 - 144 mmol/L    Potassium 4.0 3.4 - 5.3 mmol/L    Chloride 107 94 - 109 mmol/L    Carbon Dioxide 29 20 - 32 mmol/L    Anion Gap 2 (L) 3 - 14 mmol/L    Glucose 65 (L) 70 - 99 mg/dL    Urea Nitrogen 18 7 - 30 mg/dL    Creatinine 0.71 0.52 - 1.04 mg/dL    GFR Estimate >90 >60 mL/min/[1.73_m2]    GFR Estimate If Black >90 >60 mL/min/[1.73_m2]    Calcium 9.0 8.5 - 10.1 mg/dL    Bilirubin Total 0.7 0.2 - 1.3 mg/dL    Albumin 3.7 3.4 - 5.0 g/dL    Protein Total 7.7 6.8 - 8.8 g/dL    Alkaline Phosphatase 35 (L) 40 - 150 U/L    ALT 20 0 - 50 U/L    AST 14 0 - 45 U/L   CBC with platelets differential   Result Value Ref Range    WBC 5.2 4.0 - 11.0 10e9/L    RBC Count 4.74 3.8 - 5.2 10e12/L    Hemoglobin 14.0 11.7 - 15.7 g/dL    Hematocrit 43.7 35.0 - 47.0 %    MCV 92 78 - 100 fl    MCH 29.5 26.5 - 33.0 pg    MCHC 32.0 31.5 - 36.5 g/dL    RDW 13.2 10.0 - 15.0 %    Platelet Count 246 150 - 450 10e9/L    % Neutrophils 47.1 %    % Lymphocytes 37.8 %    % Monocytes 8.1 %    % Eosinophils 6.2 %    % Basophils 0.8 %    Absolute Neutrophil 2.4 1.6 - 8.3 10e9/L    Absolute Lymphocytes 2.0 0.8 - 5.3 10e9/L    Absolute Monocytes 0.4 0.0 - 1.3 10e9/L    Absolute Eosinophils 0.3 0.0 - 0.7 10e9/L    Absolute Basophils 0.0 0.0 - 0.2 10e9/L    Diff Method Automated Method    TSH with free T4 reflex   Result Value Ref Range    TSH 3.03 0.40 - 4.00 mU/L   UA with Microscopic reflex to Culture    Specimen: Midstream Urine   Result Value Ref Range    Color Urine Yellow     Appearance Urine Clear     Glucose Urine Negative NEG^Negative mg/dL    Bilirubin Urine Negative NEG^Negative    Ketones Urine Negative NEG^Negative mg/dL    Specific Gravity Urine <=1.005 1.003 - 1.035    pH Urine 6.5 5.0 - 7.0 pH    Protein Albumin Urine Negative NEG^Negative mg/dL    Urobilinogen Urine 0.2 0.2 - 1.0 EU/dL    Nitrite Urine Negative NEG^Negative    Blood Urine  Small (A) NEG^Negative    Leukocyte Esterase Urine Negative NEG^Negative    Source Midstream Urine     WBC Urine 0 - 5 OTO5^0 - 5 /HPF    RBC Urine O - 2 OTO2^O - 2 /HPF    Squamous Epithelial /LPF Urine Few FEW^Few /LPF    Bacteria Urine Few (A) NEG^Negative /HPF   HCG Qual, Urine ([id number])   Result Value Ref Range    HCG Qual Urine Negative NEG^Negative           Assessment & Plan     Kamilah was seen today for abdominal pain.    Diagnoses and all orders for this visit:    Abdominal pain, generalized  -     UA with Microscopic reflex to Culture  -     HCG Qual, Urine ([id number])  -     Comprehensive metabolic panel  -     CBC with platelets differential  -     CT Abdomen Pelvis w/o & w Contrast; Future    Irregular menses  -     TSH with free T4 reflex  -     CT Abdomen Pelvis w/o & w Contrast; Future    Bloating symptom  -     CT Abdomen Pelvis w/o & w Contrast; Future           Labs today all normal will notify via my chart.   Given length of symptoms bloating/pain worsening will schedule for CT scan  Possible due to gall bladder issues- but pain is different more wide spread and with additional bloating etc now.         No follow-ups on file.    Lisseth Johnson PA-C  [facility]

## 2020-09-11 ENCOUNTER — TELEPHONE (OUTPATIENT)
Dept: INTERNAL MEDICINE | Facility: CLINIC | Age: 35
End: 2020-09-11

## 2020-09-11 NOTE — TELEPHONE ENCOUNTER
Call from patient. She tried to schedule CT Scan with [facility] in [place]. She was told that they are not in network and she would need a prior-authorization to have it completed.     She has called insurance & other imaging centers, and has been ultimately referred back to PCP office.    Reviewed prior authorization process. She states she believes her insurance will [facility]. She prefers to try this first, and if not, we may need to start process for prior authorization.    Triage: Call when completed.

## 2020-09-11 NOTE — TELEPHONE ENCOUNTER
Faxed referral to [facility].     [facility] Contact    [facility]ed [facility] to confirm receipt of referral. They request demographics to be sent, as well. [facility]     They state they will run prior authorization before procedure but there is no guarantee of payment.    Patient Contact    Called patient and relayed above information. No further action needed.

## 2020-09-14 ENCOUNTER — TRANSFERRED RECORDS (OUTPATIENT)
Dept: HEALTH INFORMATION MANAGEMENT | Facility: CLINIC | Age: 35
End: 2020-09-14

## 2020-09-18 ENCOUNTER — TELEPHONE (OUTPATIENT)
Dept: INTERNAL MEDICINE | Facility: CLINIC | Age: 35
End: 2020-09-18

## 2020-09-18 NOTE — TELEPHONE ENCOUNTER
Reason for Call:  Other call back    Detailed comments: The patient called and stated that she had a CT scan done at [facility] on 9/14. She stated that the results were faxed over on 9/14 but there is no documentation of the clinic getting them from [facility]. She would like a call back to verify the clinic got the fax and also with results from the scan after Dr. Joseph gets a chance to take a look at them.     Phone Number Patient can be reached at: Cell number on file:    Telephone Information:   Mobile [phone]       Best Time: Any    Can we leave a detailed message on this number? YES    Call taken on 9/18/2020 at 4:24 PM by Maricel Ray

## 2020-09-18 NOTE — TELEPHONE ENCOUNTER
CT was ordered by Lisseth Johnson so I assume that results were sent to her via faxto review. WIll route this message to her to address

## 2020-09-21 NOTE — TELEPHONE ENCOUNTER
Ct report,  Constipation-  Only significant finding seen .   No other abnormal findings.  If further concerns about abdominal pain then see Dr Joseph ( PCP ) for follow up.

## 2020-10-13 ENCOUNTER — VIRTUAL VISIT (OUTPATIENT)
Dept: NEUROLOGY | Facility: CLINIC | Age: 35
End: 2020-10-13
Payer: COMMERCIAL

## 2020-10-13 DIAGNOSIS — F95.9 TIC DISORDER: Primary | ICD-10-CM

## 2020-10-13 PROCEDURE — 99213 OFFICE O/P EST LOW 20 MIN: CPT | Mod: 95 | Performed by: PSYCHIATRY & NEUROLOGY

## 2020-10-13 NOTE — LETTER
"10/13/2020       RE: Kamilah Dee  [address]  [place] [address]     Dear Colleague,    Thank you for referring your patient, Kamilah Dee, to the [facility] [place] [facility]. Please see a copy of my visit note below.    Kamilah Dee is a 35 year old female who is being evaluated via a billable video visit.      The patient has been notified of following:     \"This video visit will be conducted via a call between you and your physician/provider. We have found that certain health care needs can be provided without the need for an in-person physical exam.  This service lets us provide the care you need with a video conversation.  If a prescription is necessary we can send it directly to your pharmacy.  If lab work is needed we can place an order for that and you can then stop by our lab to have the test done at a later time.    Video visits are billed at different rates depending on your insurance coverage.  Please reach out to your insurance provider with any questions.    If during the course of the call the physician/provider feels a video visit is not appropriate, you will not be charged for this service.\"    Patient has given verbal consent for Video visit? Yes  How would you like to obtain your AVS? MyChart  If you are dropped from the video visit, the video invite should be resent to: Text to cell phone: [phone]  Will anyone else be joining your video visit? No        Video-Visit Details    Type of service:  Video Visit    I changed the patient's scheduled in-person visit to a virtual video visit  because of the COVID19 crisis.  The rationale was to protect the patient from unnecessary interpersonal contact.    Chief complaint: Motor tic disorder    History of present illness:  Kamilah Dee is a 35-year-old female who has a motor tic disorder.  She has battled this for many years.  For time she got benefit from " the Ingrezza 40 mg a day.  She took this under the direction of Dr. Latoya Juarez.  For time she felt the Ingrezza was helpful.  She then went through a period of severe stress and her tics got worse.  She did not know if it was the stress or the medicine making her worse so she stopped the Ingrezza.  She did not notice any great change at that time.    She has not taken Ingrezza since about March.  She now feels her tics are getting worse.  Her tics go up and down.  She clearly relates them to periods of stress.  She is taking care ofher 2 young children and home schooling them.  Naturally there are ups and downs with this.  She is also planning to go back to work in 2 weeks.  She wonders how she will do with this.    She used to have a clear premonition before her attacks but now it is less clear.  At times some people will see her and say she is having tics and she is not aware of it.  Her tics tend to involve her neck and shoulders.    She does have comorbidities of depression.  She is followed by a psychiatrist for this.  She reports taking no medicine for this at this time.  It is not clear that her depression waxed or waned related to her Ingrezza use.  The patient had not responded to the program offering her Ingrezza and I do not know if she will be reenrolled.  We would need to ask Dr. Juarez about this.    Examination:  I saw very few tics today.  At times the patient would have a rapid head rotation but other than that rare movement her tics were not prevalent.    Impression:  1.  Chronic motor tic disorder  2.  History of depression    Recommendations:  1.  I recommended to a me that she go back to work and see if her tics increase.  We could then  whether or not her tics were worsened with the stress of work.  2.  If she was having worsening tics under the stress of work we could then call Dr. Juarez and see if we could reinstitute the Ingrezza 40 mg a day.  She could then independently  whether  Ingrezza was helping her tics.  3.  We discussed neuroleptics but I am reluctant to use these in a young woman for risk of inducing tardive dyskinesia.    Ravi Powell MD    Video Start Time: 1:50  Video End Time: 2:02    Originating Location (pt. Location): Home    Distant Location (provider location):  [facility] NEUROLOGY [facility]     Platform used for Video Visit: [facility]    Ravi Powell MD          Again, thank you for allowing me to participate in the care of your patient.      Sincerely,    Ravi Powell MD

## 2020-10-13 NOTE — PROGRESS NOTES
"Kamilah Dee is a 35 year old female who is being evaluated via a billable video visit.      The patient has been notified of following:     \"This video visit will be conducted via a call between you and your physician/provider. We have found that certain health care needs can be provided without the need for an in-person physical exam.  This service lets us provide the care you need with a video conversation.  If a prescription is necessary we can send it directly to your pharmacy.  If lab work is needed we can place an order for that and you can then stop by our lab to have the test done at a later time.    Video visits are billed at different rates depending on your insurance coverage.  Please reach out to your insurance provider with any questions.    If during the course of the call the physician/provider feels a video visit is not appropriate, you will not be charged for this service.\"    Patient has given verbal consent for Video visit? Yes  How would you like to obtain your AVS? MyChart  If you are dropped from the video visit, the video invite should be resent to: Text to cell phone: [phone]  Will anyone else be joining your video visit? No        Video-Visit Details    Type of service:  Video Visit    I changed the patient's scheduled in-person visit to a virtual video visit  because of the COVID19 crisis.  The rationale was to protect the patient from unnecessary interpersonal contact.    Chief complaint: Motor tic disorder    History of present illness:  Kamilah Dee is a 35-year-old female who has a motor tic disorder.  She has battled this for many years.  For time she got benefit from the Ingrezza 40 mg a day.  She took this under the direction of Dr. Latoya Juarez.  For time she felt the Ingrezza was helpful.  She then went through a period of severe stress and her tics got worse.  She did not know if it was the stress or the medicine making her worse so she stopped the Ingrezza.  She did " not notice any great change at that time.    She has not taken Ingrezza since about March.  She now feels her tics are getting worse.  Her tics go up and down.  She clearly relates them to periods of stress.  She is taking care ofher 2 young children and home schooling them.  Naturally there are ups and downs with this.  She is also planning to go back to work in 2 weeks.  She wonders how she will do with this.    She used to have a clear premonition before her attacks but now it is less clear.  At times some people will see her and say she is having tics and she is not aware of it.  Her tics tend to involve her neck and shoulders.    She does have comorbidities of depression.  She is followed by a psychiatrist for this.  She reports taking no medicine for this at this time.  It is not clear that her depression waxed or waned related to her Ingrezza use.  The patient had not responded to the program offering her Ingrezza and I do not know if she will be reenrolled.  We would need to ask Dr. Juarez about this.    Examination:  I saw very few tics today.  At times the patient would have a rapid head rotation but other than that rare movement her tics were not prevalent.    Impression:  1.  Chronic motor tic disorder  2.  History of depression    Recommendations:  1.  I recommended to a me that she go back to work and see if her tics increase.  We could then  whether or not her tics were worsened with the stress of work.  2.  If she was having worsening tics under the stress of work we could then call Dr. Juarez and see if we could reinstitute the Ingrezza 40 mg a day.  She could then independently  whether Ingrezza was helping her tics.  3.  We discussed neuroleptics but I am reluctant to use these in a young woman for risk of inducing tardive dyskinesia.    Ravi Powell MD    Video Start Time: 1:50  Video End Time: 2:02    Originating Location (pt. Location): Home    Distant Location (provider location):    [facility]UROLOGY CL[facility] [place]     Platform used for Video Visit: Se Powell MD

## 2020-10-21 NOTE — TELEPHONE ENCOUNTER
Spoke with patient and she is opting out of surgery at this time.   Will CB if/when she changes her mind    Leilani Prince  Surgery Scheduler

## 2020-11-04 ENCOUNTER — TELEPHONE (OUTPATIENT)
Dept: INTERNAL MEDICINE | Facility: CLINIC | Age: 35
End: 2020-11-04

## 2020-11-04 NOTE — TELEPHONE ENCOUNTER
Pt calling to see if we received Mitchell form . We did and MD must have form . Check Thursday .Shawna Hagan RN

## 2020-11-13 ENCOUNTER — OFFICE VISIT (OUTPATIENT)
Dept: OBGYN | Facility: CLINIC | Age: 35
End: 2020-11-13
Payer: COMMERCIAL

## 2020-11-13 VITALS
DIASTOLIC BLOOD PRESSURE: 70 MMHG | BODY MASS INDEX: 22.63 KG/M2 | HEART RATE: 60 BPM | SYSTOLIC BLOOD PRESSURE: 102 MMHG | WEIGHT: 136 LBS

## 2020-11-13 DIAGNOSIS — R35.0 URINARY FREQUENCY: Primary | ICD-10-CM

## 2020-11-13 LAB
ALBUMIN UR-MCNC: NEGATIVE MG/DL
APPEARANCE UR: CLEAR
BILIRUB UR QL STRIP: NEGATIVE
COLOR UR AUTO: YELLOW
GLUCOSE UR STRIP-MCNC: NEGATIVE MG/DL
HGB UR QL STRIP: NEGATIVE
KETONES UR STRIP-MCNC: NEGATIVE MG/DL
LEUKOCYTE ESTERASE UR QL STRIP: NEGATIVE
NITRATE UR QL: NEGATIVE
NON-SQ EPI CELLS #/AREA URNS LPF: NORMAL /LPF
PH UR STRIP: 6 PH (ref 5–7)
RBC #/AREA URNS AUTO: NORMAL /HPF
SOURCE: NORMAL
SP GR UR STRIP: 1.01 (ref 1–1.03)
UROBILINOGEN UR STRIP-ACNC: 0.2 EU/DL (ref 0.2–1)
WBC #/AREA URNS AUTO: NORMAL /HPF

## 2020-11-13 PROCEDURE — 99213 OFFICE O/P EST LOW 20 MIN: CPT | Performed by: OBSTETRICS & GYNECOLOGY

## 2020-11-13 PROCEDURE — 81001 URINALYSIS AUTO W/SCOPE: CPT | Performed by: OBSTETRICS & GYNECOLOGY

## 2020-11-13 RX ORDER — LORAZEPAM 0.5 MG/1
0.5 TABLET ORAL PRN
COMMUNITY
Start: 2020-11-04 | End: 2021-06-08

## 2020-11-13 NOTE — PROGRESS NOTES
SUBJECTIVE:                                                   Kamilah Dee is a 35 year old female who presents to clinic today for the following health issue(s):  Patient presents with:  PAP Tracking      HPI:    Was having constipation, bloating of stomach, for about a month. Had CT scan. Found constipation and spleen cyst and calcified nodule in lung.     Going to the bathroom more often in last 2mo. From 4 times to 12 times per day.   No vaginal discharge or complaints.   No fevers.   Not sure exactly when started. Had a neg UA in sept.   No dysuria.        Patient's last menstrual period was 10/30/2020 (approximate)..     Patient is not sexually active, .  Using  Nothing for contraception.    reports that she quit smoking about 10 years ago. She has never used smokeless tobacco.    STD testing offered?  Declined    Health maintenance updated:  no    Today's PHQ-2 Score:   PHQ-2 (  [employer]) 3/13/2020   Q1: Little interest or pleasure in doing things 2   Q2: Feeling down, depressed or hopeless 2   PHQ-2 Score 4     Today's PHQ-9 Score:   PHQ-9 SCORE 3/13/2020   PHQ-9 Total Score -   PHQ-9 Total Score 10     Today's HAMIDA-7 Score:   HAMIDA-7 SCORE 2020   Total Score -   Total Score 11       Problem list and histories reviewed & adjusted, as indicated.  Additional history: as documented.    Patient Active Problem List   Diagnosis     Tic disorder     CARDIOVASCULAR SCREENING; LDL GOAL LESS THAN 160     Health Care Home     Migraine     HAMIDA (generalized anxiety disorder)     Verruca     ASCUS of cervix with negative high risk HPV     Major depressive disorder, single episode, mild (H)     Family history of breast cancer     Sterilization     Past Surgical History:   Procedure Laterality Date     HC REMOVAL OF TONSILS,11+ Y/O      Tonsils 11+y.o.      Social History     Tobacco Use     Smoking status: Former Smoker     Quit date: 2010     Years since quitting: 10.4     Smokeless tobacco: Never  Used   Substance Use Topics     Alcohol use: Yes     Alcohol/week: 0.0 standard drinks     Frequency: 2-4 times a month     Drinks per session: 1 or 2     Binge frequency: Less than monthly     Comment: occasionally 1-2 etoh a month/      Problem (# of Occurrences) Relation (Name,Age of Onset)    Alcohol/Drug (1) Mother: b:1964 addicted to crack    Breast Cancer (1) Sister: no information    Cervical Cancer (1) Sister (35): 10/2015    Family History Negative (4) Father: b: 1962, Sister: b:1994, Sister: b:1981, Brother: b:1989    No Known Problems (4) Maternal Grandmother, Maternal Grandfather, Paternal Grandmother, Other            Current Outpatient Medications   Medication Sig     Multiple Vitamins-Minerals (MULTIVITAMIN PO) Take by mouth daily     LORazepam (ATIVAN) 0.5 MG tablet Take 0.5 mg by mouth as needed     No current facility-administered medications for this visit.      Allergies   Allergen Reactions     Remeron [Mirtazapine]      Excess sedation even with 7.5mg dose       ROS:  12 point review of systems negative other than symptoms noted below or in the HPI.        OBJECTIVE:     /70   Pulse 60   Wt 61.7 kg (136 lb)   LMP 10/30/2020 (Approximate)   BMI 22.63 kg/m    Body mass index is 22.63 kg/m .    Exam:  Constitutional:  Appearance: Well nourished, well developed alert, in no acute distress  Chest:  Respiratory Effort:  Breathing unlabored. Clear to auscultation bilaterally.   Neurologic:  Mental Status:  Oriented X3.  Normal strength and tone, sensory exam grossly normal, mentation intact and speech normal.    Psychiatric:  Mentation appears normal and affect normal/bright.     In-Clinic Test Results:  Results for orders placed or performed in visit on 11/13/20 (from the past 24 hour(s))   UA with Microscopic reflex to Culture    Specimen: Midstream Urine   Result Value Ref Range    Color Urine Yellow     Appearance Urine Clear     Glucose Urine Negative NEG^Negative mg/dL    Bilirubin  Urine Negative NEG^Negative    Ketones Urine Negative NEG^Negative mg/dL    Specific Gravity Urine 1.015 1.003 - 1.035    pH Urine 6.0 5.0 - 7.0 pH    Protein Albumin Urine Negative NEG^Negative mg/dL    Urobilinogen Urine 0.2 0.2 - 1.0 EU/dL    Nitrite Urine Negative NEG^Negative    Blood Urine Negative NEG^Negative    Leukocyte Esterase Urine Negative NEG^Negative    Source Midstream Urine     WBC Urine 0 - 5 OTO5^0 - 5 /HPF    RBC Urine O - 2 OTO2^O - 2 /HPF    Squamous Epithelial /LPF Urine Few FEW^Few /LPF       ASSESSMENT/PLAN:                                                        ICD-10-CM    1. Urinary frequency  R35.0 UA with Microscopic reflex to Culture       Patient Instructions   -Annual exam with pap test due in 2021. Next annual in February.      -Reviewed causes of urinary urgency, testing available. Discussed adding GC/C if desired, hasn't had new partner in  A year. Pt opted to pass.   Discussed hydration, urinary irritants.   Will notify of UA results.   Questions answered.     Alejandrina Bridges Masters, DO  [facility] [place]

## 2020-12-15 ENCOUNTER — OFFICE VISIT (OUTPATIENT)
Dept: DERMATOLOGY | Facility: CLINIC | Age: 35
End: 2020-12-15
Payer: COMMERCIAL

## 2020-12-15 VITALS — DIASTOLIC BLOOD PRESSURE: 70 MMHG | SYSTOLIC BLOOD PRESSURE: 107 MMHG | OXYGEN SATURATION: 99 % | HEART RATE: 83 BPM

## 2020-12-15 DIAGNOSIS — L63.9 ALOPECIA AREATA: Primary | ICD-10-CM

## 2020-12-15 PROCEDURE — 99203 OFFICE O/P NEW LOW 30 MIN: CPT | Performed by: PHYSICIAN ASSISTANT

## 2020-12-15 RX ORDER — FLUOCINONIDE TOPICAL SOLUTION USP, 0.05% 0.5 MG/ML
SOLUTION TOPICAL
Qty: 50 ML | Refills: 3 | Status: SHIPPED | OUTPATIENT
Start: 2020-12-15 | End: 2021-05-24

## 2020-12-15 NOTE — PROGRESS NOTES
HPI:   Chief complaints: Kamilah Dee is a 35 year old female who presents for evaluation of hair loss. She has noticed that behind both ears she has lost hair at her hairline. She noticed this several months ago. The area does not itch or hurt. She did not previously notice significant hair shedding. No treatments tried to these areas.     Shx: lives in [place]    Review Of Systems  Eyes: negative  Ears/Nose/Throat: negative  Respiratory: No shortness of breath, dyspnea on exertion, cough, or hemoptysis  Cardiovascular: negative  Gastrointestinal: negative  Genitourinary: negative  Musculoskeletal: negative  Neurologic: negative  Psychiatric: negative  Skin: positive for hair loss      PHYSICAL EXAM:    /70   Pulse 83   SpO2 99%   Skin exam performed as follows: Type 2 skin. Mood appropriate  Alert and Oriented X 3. Well developed, well nourished in no distress.  General appearance: Normal  Head including face: Normal  Eyes: conjunctiva and lids: Normal  Mouth: Lips, teeth, gums: Normal  Neck: Normal  Chest-breast/axillae: Normal  Back: Normal  Spleen and liver: Normal  Cardiovascular: Exam of peripheral vascular system by observation for swelling, varicosities, edema: Normal  Genitalia: groin, buttocks: Normal  Extremities: digits/nails (clubbing): Normal  Eccrine and Apocrine glands: Normal  Right upper extremity: Normal  Left upper extremity: Normal  Right lower extremity: Normal  Left lower extremity: Normal  Skin: Scalp and body hair: See below    1. bald patches with minimal hair located on the right temporal scalp superior to ear and the left temporal scalp superior to ear (symmetrical)    ASSESSMENT/PLAN:     Alopecia Areata: advised on chronic, recurrent nature of condition and diffficulty in treating. Advised on risk of failure to respond to treatment.  --Start lidex BID until follow up  --Consider ILK if struggling or no response in 1 month        Follow-up: 4 weeks  CC:   Scribed By:  Jazmine Kirkland, MS, PA-C

## 2020-12-15 NOTE — LETTER
12/15/2020         RE: Kamilah Dee  [address]  [facility] [address]        Dear Colleague,    Thank you for referring your patient, Kamilah Dee, to the [facility]. Please see a copy of my visit note below.    HPI:   Chief complaints: Kamilah Dee is a 35 year old female who presents for evaluation of hair loss. She has noticed that behind both ears she has lost hair at her hairline. She noticed this several months ago. The area does not itch or hurt. She did not previously notice significant hair shedding. No treatments tried to these areas.     Shx: lives in [place]    Review Of Systems  Eyes: negative  Ears/Nose/Throat: negative  Respiratory: No shortness of breath, dyspnea on exertion, cough, or hemoptysis  Cardiovascular: negative  Gastrointestinal: negative  Genitourinary: negative  Musculoskeletal: negative  Neurologic: negative  Psychiatric: negative  Skin: positive for hair loss      PHYSICAL EXAM:    /70   Pulse 83   SpO2 99%   Skin exam performed as follows: Type 2 skin. Mood appropriate  Alert and Oriented X 3. Well developed, well nourished in no distress.  General appearance: Normal  Head including face: Normal  Eyes: conjunctiva and lids: Normal  Mouth: Lips, teeth, gums: Normal  Neck: Normal  Chest-breast/axillae: Normal  Back: Normal  Spleen and liver: Normal  Cardiovascular: Exam of peripheral vascular system by observation for swelling, varicosities, edema: Normal  Genitalia: groin, buttocks: Normal  Extremities: digits/nails (clubbing): Normal  Eccrine and Apocrine glands: Normal  Right upper extremity: Normal  Left upper extremity: Normal  Right lower extremity: Normal  Left lower extremity: Normal  Skin: Scalp and body hair: See below    1. bald patches with minimal hair located on the right temporal scalp superior to ear and the left temporal scalp superior to ear (symmetrical)    ASSESSMENT/PLAN:     Alopecia Areata:  advised on chronic, recurrent nature of condition and diffficulty in treating. Advised on risk of failure to respond to treatment.  --Start lidex BID until follow up  --Consider ILK if struggling or no response in 1 month        Follow-up: 4 weeks  CC:   Scribed By: Jazmine Kirkland, MS, PA-C          Again, thank you for allowing me to participate in the care of your patient.        Sincerely,        Jazmine Kirkland, PAJanaC

## 2020-12-17 ENCOUNTER — VIRTUAL VISIT (OUTPATIENT)
Dept: FAMILY MEDICINE | Facility: OTHER | Age: 35
End: 2020-12-17

## 2020-12-17 DIAGNOSIS — Z20.822 SUSPECTED COVID-19 VIRUS INFECTION: Primary | ICD-10-CM

## 2020-12-17 NOTE — PROGRESS NOTES
"Date: 2020 10:59:00  Clinician: Dercik Ricketts  Clinician NPI: [id number]  Patient: Kamilah Garcia  Patient : 1985  Patient Address: [address] [place] [id number]  Patient Phone: [phone]  Visit Protocol: URI  Patient Summary:  Kamilah is a 35 year old ( : 1985 ) female who initiated a [facility] Visit for COVID-19 (Coronavirus) evaluation and screening. When asked the question \"Please sign me up to receive news, health information and promotions. \", Kamilah responded \"No\".    Kamilah states her symptoms started 1-2 days ago.   Her symptoms consist of a cough, a sore throat, and rhinitis.   Symptom details     Nasal secretions: The color of her mucus is clear.    Cough: Kamilah coughs a few times an hour and her cough is not more bothersome at night. Phlegm does not come into her throat when she coughs. She does not believe her cough is caused by post-nasal drip.     Sore throat: Kamilah reports having mild throat pain (1-3 on a 10 point pain scale), does not have exudate on her tonsils, and can swallow liquids. The lymph nodes in her neck are not enlarged. A rash has not appeared on the skin since the sore throat started.      Kamilah denies having diarrhea, facial pain or pressure, myalgias, anosmia, ear pain, headache, wheezing, fever, enlarged lymph nodes, nasal congestion, nausea, chills, malaise, teeth pain, ageusia, and vomiting. She also denies having recent facial or sinus surgery in the past 60 days and taking antibiotic medication in the past month. She is not experiencing dyspnea.   Precipitating events  Within the past week, Kamilah has not been exposed to someone with strep throat. She has not recently been exposed to someone with influenza. Kamilah has not been in close contact with any high risk individuals.   Pertinent COVID-19 (Coronavirus) information  Kamilah does not work or volunteer as healthcare worker or a . In the past 14 days, Kamilah has not worked or volunteered at a " healthcare facility or group living setting.   In the past 14 days, she also has not lived in a congregate living setting.   Kamilah has not had a close contact with a laboratory-confirmed COVID-19 patient within 14 days of symptom onset.    Kamilah has not been tested for COVID-19.   Pertinent medical history  She has not been told by her provider to avoid NSAIDs.   Kamilah does not get yeast infections when she takes antibiotics.   Kamilah does not have diabetes. She denies having immunosuppressive conditions (e.g., chemotherapy, HIV, organ transplant, long-term use of steroids or other immunosuppressive medications, splenectomy). She denies having congestive heart failure and severe COPD. She does not have asthma.   Kamilah needs a return to work/school note.   Kamilah does not smoke or use smokeless tobacco.   She denies pregnancy and denies breastfeeding. She has menstruated in the past month.   Weight: 135 lbs    MEDICATIONS: No current medications, ALLERGIES: NKDA  Clinician Response:  Dear Kamilah,   Your symptoms show that you may have coronavirus (COVID-19). This illness can cause fever, cough and trouble breathing. Many people get a mild case and get better on their own. Some people can get very sick.  What should I do?  We would like to test you for this virus.   1. Please call [phone] to schedule your visit. Explain that you were referred by [facility] to have a COVID-19 test. Be ready to share your [facility] visit ID number.  * If you need to schedule in [facility] please call [phone] or for [facility] employees please call [phone].  * If you need to schedule in the [place] area please call [phone]. [facility] employees call [phone].  The following will serve as your written order for this COVID Test, ordered by me, for the indication of suspected COVID [Z20.828]: The test will be ordered in [employer], our electronic health record, after you are scheduled. It will show as ordered and authorized by Sarath Rowell MD.   "Order: COVID-19 (Coronavirus) PCR for SYMPTOMATIC testing from [facility].   2. When it's time for your COVID test:  Stay at least 6 feet away from others. (If someone will drive you to your test, stay in the backseat, as far away from the  as you can.)   Cover your mouth and nose with a mask, tissue or washcloth.  Go straight to the testing site. Don't make any stops on the way there or back.      3.Starting now: Stay home and away from others (self-isolate) until:   You've had no fever---and no medicine that reduces fever---for one full day (24 hours). And...   Your other symptoms have gotten better. For example, your cough or breathing has improved. And...   At least 10 days have passed since your symptoms started.       During this time, don't leave the house except for testing or medical care.   Stay in your own room, even for meals. Use your own bathroom if you can.   Stay away from others in your home. No hugging, kissing or shaking hands. No visitors.  Don't go to work, school or anywhere else.    Clean \"high touch\" surfaces often (doorknobs, counters, handles, etc.). Use a household cleaning spray or wipes. You'll find a full list of  on the EPA website: www.epa.gov/pesticide-registration/list-n-disinfectants-use-against-sars-cov-2.   Cover your mouth and nose with a mask, tissue or washcloth to avoid spreading germs.  Wash your hands and face often. Use soap and water.  Caregivers in these groups are at risk for severe illness due to COVID-19:  o People 65 years and older  o People who live in a nursing home or long-term care facility  o People with chronic disease (lung, heart, cancer, diabetes, kidney, liver, immunologic)  o People who have a weakened immune system, including those who:   Are in cancer treatment  Take medicine that weakens the immune system, such as corticosteroids  Had a bone marrow or organ transplant  Have an immune deficiency  Have poorly controlled HIV or AIDS  Are obese " (body mass index of 40 or higher)  Smoke regularly   o Caregivers should wear gloves while washing dishes, handling laundry and cleaning bedrooms and bathrooms.  o Use caution when washing and drying laundry: Don't shake dirty laundry, and use the warmest water setting that you can.  o For more tips, go to www.cdc.gov/coronavirus/2019-ncov/downloads/10Things.pdf.    How can I take care of myself?    Get lots of rest. Drink extra fluids (unless a doctor has told you not to).   Take Tylenol (acetaminophen) for fever or pain. If you have liver or kidney problems, ask your family doctor if it's okay to take Tylenol.   Adults can take either:    650 mg (two 325 mg pills) every 4 to 6 hours, or...   1,000 mg (two 500 mg pills) every 8 hours as needed.    Note: Don't take more than 3,000 mg in one day. Acetaminophen is found in many medicines (both prescribed and over-the-counter medicines). Read all labels to be sure you don't take too much.   For children, check the Tylenol bottle for the right dose. The dose is based on the child's age or weight.    If you have other health problems (like cancer, heart failure, an organ transplant or severe kidney disease): Call your specialty clinic if you don't feel better in the next 2 days.       Know when to call 911. Emergency warning signs include:    Trouble breathing or shortness of breath Pain or pressure in the chest that doesn't go away Feeling confused like you haven't felt before, or not being able to wake up Bluish-colored lips or face.  Where can I get more information?    [email or web address] -- About COVID-19: [email or web address]   CDC -- What to Do If You're Sick: www.cdc.gov/coronavirus/2019-ncov/about/steps-when-sick.html   CDC -- Ending Home Isolation: www.cdc.gov/coronavirus/2019-ncov/hcp/disposition-in-home-patients.html   CDC -- Caring for Someone: www.cdc.gov/coronavirus/2019-ncov/if-you-are-sick/care-for-someone.html   [facility] -- Interim Guidance for  Hospital Discharge to Home: www.health.[facility].mn.us/diseases/coronavirus/hcp/hospdischarge.pdf   [facility] clinical trials (COVID-19 research studies): clinicalaffairs.[facility]/n-clinical-trials    Below are the COVID-19 hotlines at the [place] Department of Health ([facility]). Interpreters are available.    For health questions: Call 725-715-4032 or 1-134.866.4159 (7 a.m. to 7 p.m.) For questions about schools and childcare: Call 326-463-6950 or 1-743.756.2948 (7 a.m. to 7 p.m.)    Diagnosis: Contact with and (suspected) exposure to other viral communicable diseases  Diagnosis ICD: Z20.828

## 2020-12-18 DIAGNOSIS — Z20.822 SUSPECTED COVID-19 VIRUS INFECTION: ICD-10-CM

## 2020-12-18 PROCEDURE — U0003 INFECTIOUS AGENT DETECTION BY NUCLEIC ACID (DNA OR RNA); SEVERE ACUTE RESPIRATORY SYNDROME CORONAVIRUS 2 (SARS-COV-2) (CORONAVIRUS DISEASE [COVID-19]), AMPLIFIED PROBE TECHNIQUE, MAKING USE OF HIGH THROUGHPUT TECHNOLOGIES AS DESCRIBED BY CMS-2020-01-R: HCPCS | Performed by: FAMILY MEDICINE

## 2020-12-19 LAB
SARS-COV-2 RNA SPEC QL NAA+PROBE: NOT DETECTED
SPECIMEN SOURCE: NORMAL

## 2021-01-04 ENCOUNTER — OFFICE VISIT (OUTPATIENT)
Dept: URGENT CARE | Facility: URGENT CARE | Age: 36
End: 2021-01-04
Payer: COMMERCIAL

## 2021-01-04 VITALS
RESPIRATION RATE: 20 BRPM | WEIGHT: 130 LBS | OXYGEN SATURATION: 99 % | BODY MASS INDEX: 20.89 KG/M2 | HEIGHT: 66 IN | TEMPERATURE: 99.1 F | SYSTOLIC BLOOD PRESSURE: 114 MMHG | DIASTOLIC BLOOD PRESSURE: 73 MMHG | HEART RATE: 64 BPM

## 2021-01-04 DIAGNOSIS — R53.83 OTHER FATIGUE: Primary | ICD-10-CM

## 2021-01-04 DIAGNOSIS — F41.9 ANXIETY: ICD-10-CM

## 2021-01-04 DIAGNOSIS — R68.83 CHILLS: ICD-10-CM

## 2021-01-04 LAB
ALBUMIN UR-MCNC: NEGATIVE MG/DL
ANION GAP SERPL CALCULATED.3IONS-SCNC: 4 MMOL/L (ref 3–14)
APPEARANCE UR: CLEAR
BASOPHILS # BLD AUTO: 0 10E9/L (ref 0–0.2)
BASOPHILS NFR BLD AUTO: 0.3 %
BILIRUB UR QL STRIP: NEGATIVE
BUN SERPL-MCNC: 20 MG/DL (ref 7–30)
CALCIUM SERPL-MCNC: 8.7 MG/DL (ref 8.5–10.1)
CHLORIDE SERPL-SCNC: 105 MMOL/L (ref 94–109)
CO2 SERPL-SCNC: 28 MMOL/L (ref 20–32)
COLOR UR AUTO: YELLOW
CREAT SERPL-MCNC: 0.7 MG/DL (ref 0.52–1.04)
DIFFERENTIAL METHOD BLD: NORMAL
EOSINOPHIL # BLD AUTO: 0.2 10E9/L (ref 0–0.7)
EOSINOPHIL NFR BLD AUTO: 2.8 %
ERYTHROCYTE [DISTWIDTH] IN BLOOD BY AUTOMATED COUNT: 12.4 % (ref 10–15)
GFR SERPL CREATININE-BSD FRML MDRD: >90 ML/MIN/{1.73_M2}
GLUCOSE SERPL-MCNC: 85 MG/DL (ref 70–99)
GLUCOSE UR STRIP-MCNC: NEGATIVE MG/DL
HCT VFR BLD AUTO: 40.2 % (ref 35–47)
HGB BLD-MCNC: 13.3 G/DL (ref 11.7–15.7)
HGB UR QL STRIP: ABNORMAL
KETONES UR STRIP-MCNC: ABNORMAL MG/DL
LEUKOCYTE ESTERASE UR QL STRIP: NEGATIVE
LYMPHOCYTES # BLD AUTO: 2.2 10E9/L (ref 0.8–5.3)
LYMPHOCYTES NFR BLD AUTO: 31.2 %
MCH RBC QN AUTO: 29.8 PG (ref 26.5–33)
MCHC RBC AUTO-ENTMCNC: 33.1 G/DL (ref 31.5–36.5)
MCV RBC AUTO: 90 FL (ref 78–100)
MONOCYTES # BLD AUTO: 0.4 10E9/L (ref 0–1.3)
MONOCYTES NFR BLD AUTO: 5.8 %
NEUTROPHILS # BLD AUTO: 4.2 10E9/L (ref 1.6–8.3)
NEUTROPHILS NFR BLD AUTO: 59.9 %
NITRATE UR QL: NEGATIVE
NON-SQ EPI CELLS #/AREA URNS LPF: NORMAL /LPF
PH UR STRIP: 5.5 PH (ref 5–7)
PLATELET # BLD AUTO: 257 10E9/L (ref 150–450)
POTASSIUM SERPL-SCNC: 3.6 MMOL/L (ref 3.4–5.3)
RBC # BLD AUTO: 4.47 10E12/L (ref 3.8–5.2)
RBC #/AREA URNS AUTO: NORMAL /HPF
SODIUM SERPL-SCNC: 137 MMOL/L (ref 133–144)
SOURCE: ABNORMAL
SP GR UR STRIP: 1.02 (ref 1–1.03)
TSH SERPL DL<=0.005 MIU/L-ACNC: 2.28 MU/L (ref 0.4–4)
UROBILINOGEN UR STRIP-ACNC: 0.2 EU/DL (ref 0.2–1)
WBC # BLD AUTO: 7 10E9/L (ref 4–11)
WBC #/AREA URNS AUTO: NORMAL /HPF

## 2021-01-04 PROCEDURE — 99214 OFFICE O/P EST MOD 30 MIN: CPT | Performed by: FAMILY MEDICINE

## 2021-01-04 PROCEDURE — 87389 HIV-1 AG W/HIV-1&-2 AB AG IA: CPT | Performed by: FAMILY MEDICINE

## 2021-01-04 PROCEDURE — 85025 COMPLETE CBC W/AUTO DIFF WBC: CPT | Performed by: FAMILY MEDICINE

## 2021-01-04 PROCEDURE — 36415 COLL VENOUS BLD VENIPUNCTURE: CPT | Performed by: FAMILY MEDICINE

## 2021-01-04 PROCEDURE — 84443 ASSAY THYROID STIM HORMONE: CPT | Performed by: FAMILY MEDICINE

## 2021-01-04 PROCEDURE — 81001 URINALYSIS AUTO W/SCOPE: CPT | Performed by: FAMILY MEDICINE

## 2021-01-04 PROCEDURE — 80048 BASIC METABOLIC PNL TOTAL CA: CPT | Performed by: FAMILY MEDICINE

## 2021-01-04 RX ORDER — ALPRAZOLAM 0.5 MG
0.5 TABLET ORAL 3 TIMES DAILY PRN
Qty: 9 TABLET | Refills: 0 | Status: SHIPPED | OUTPATIENT
Start: 2021-01-04 | End: 2021-01-07

## 2021-01-04 ASSESSMENT — MIFFLIN-ST. JEOR: SCORE: 1296.43

## 2021-01-04 NOTE — PROGRESS NOTES
"SUBJECTIVE: Kamilah Dee is a 35 year old female presenting with a chief complaint of fatigue and chills.  Onset of symptoms was day(s) ago.  Course of illness is worsening.    Severity moderate  Current and Associated symptoms: none  Treatment measures tried include None tried.  Predisposing factors include Anxiety.    Past Medical History:   Diagnosis Date     Anemia     with pregnancy     Anxiety 1/21/2014     Depo-Provera contraceptive status      Depression      History of varicella-documented immunity 7/21/2011     Migraine 8/3/2012     Other, mixed, or unspecified nondependent drug abuse, episodic      Tic age 14     Allergies   Allergen Reactions     Remeron [Mirtazapine]      Excess sedation even with 7.5mg dose     Social History     Tobacco Use     Smoking status: Former Smoker     Quit date: 5/25/2010     Years since quitting: 10.6     Smokeless tobacco: Never Used   Substance Use Topics     Alcohol use: Yes     Alcohol/week: 0.0 standard drinks     Frequency: 2-4 times a month     Drinks per session: 1 or 2     Binge frequency: Less than monthly     Comment: occasionally 1-2 etoh a month/       ROS:  SKIN: no rash  GI: no vomiting    OBJECTIVE:  /73   Pulse 64   Temp 99.1  F (37.3  C)   Resp 20   Ht 1.676 m (5' 6\")   Wt 59 kg (130 lb)   LMP 12/25/2020   SpO2 99%   BMI 20.98 kg/m  GENERAL APPEARANCE: healthy, alert and no distress  EYES: EOMI,  PERRL, conjunctiva clear  HENT: ear canals and TM's normal.  Nose and mouth without ulcers, erythema or lesions  NECK: supple, nontender, no lymphadenopathy  RESP: lungs clear to auscultation - no rales, rhonchi or wheezes  CV: regular rates and rhythm, normal S1 S2, no murmur noted  ABDOMEN:  soft, nontender  NEURO: Normal strength and tone, normal speech and mentation  SKIN: no suspicious lesions or rashes      ICD-10-CM    1. Other fatigue  R53.83 CBC with platelets differential     Basic metabolic panel     TSH with free T4 reflex     *UA " reflex to Microscopic and Culture ([place] [facility] (except [place] and [place])     HIV Antigen Antibody Combo     Urine Microscopic   2. Chills  R68.83 CBC with platelets differential     Basic metabolic panel     TSH with free T4 reflex     *UA reflex to Microscopic and Culture ([place] [facility] (except [place] and [place])     HIV Antigen Antibody Combo   3. Anxiety  F41.9 ALPRAZolam (XANAX) 0.5 MG tablet     Fluids/Rest, f/u if worse/not any better

## 2021-01-05 LAB — HIV 1+2 AB+HIV1 P24 AG SERPL QL IA: NONREACTIVE

## 2021-01-14 ENCOUNTER — HEALTH MAINTENANCE LETTER (OUTPATIENT)
Age: 36
End: 2021-01-14

## 2021-01-26 ENCOUNTER — OFFICE VISIT (OUTPATIENT)
Dept: DERMATOLOGY | Facility: CLINIC | Age: 36
End: 2021-01-26
Payer: COMMERCIAL

## 2021-01-26 VITALS — OXYGEN SATURATION: 98 % | SYSTOLIC BLOOD PRESSURE: 112 MMHG | HEART RATE: 79 BPM | DIASTOLIC BLOOD PRESSURE: 72 MMHG

## 2021-01-26 DIAGNOSIS — L63.9 ALOPECIA AREATA: Primary | ICD-10-CM

## 2021-01-26 PROCEDURE — 11900 INJECT SKIN LESIONS </W 7: CPT | Performed by: PHYSICIAN ASSISTANT

## 2021-01-26 PROCEDURE — 99207 PR NO CHARGE LOS: CPT | Performed by: PHYSICIAN ASSISTANT

## 2021-01-26 NOTE — LETTER
1/26/2021         RE: Kamilah Dee  [address]  [place] [facility] [address]        Dear Colleague,    Thank you for referring your patient, Kamilah Dee, to the [facility]. Please see a copy of my visit note below.    HPI:   Chief complaints: Kamilah Dee is a 35 year old female who presents for recheck of hair loss. She has been using lidex BID and has not noticed any hair regrowth. No pain or itching.    From initial visit: She has noticed that behind both ears she has lost hair at her hairline. She noticed this several months ago.    PHYSICAL EXAM:    There were no vitals taken for this visit.  Skin exam performed as follows: Type 2 skin. Mood appropriate  Alert and Oriented X 3. Well developed, well nourished in no distress.  General appearance: Normal  Head including face: Normal  Eyes: conjunctiva and lids: Normal  Mouth: Lips, teeth, gums: Normal  Neck: Normal  Chest-breast/axillae: Normal  Back: Normal  Spleen and liver: Normal  Cardiovascular: Exam of peripheral vascular system by observation for swelling, varicosities, edema: Normal  Genitalia: groin, buttocks: Normal  Extremities: digits/nails (clubbing): Normal  Eccrine and Apocrine glands: Normal  Right upper extremity: Normal  Left upper extremity: Normal  Right lower extremity: Normal  Left lower extremity: Normal  Skin: Scalp and body hair: See below    1. bald patches with minimal hair located on the right temporal scalp superior to ear and the left temporal scalp superior to ear (symmetric)    ASSESSMENT/PLAN:     Alopecia Areata: advised on chronic, recurrent nature of condition and diffficulty in treating. Advised on risk of failure to respond to treatment. Discussed biopsy to confirm dx; will consider this if no regrowth in 1-2 months  --Kenalog 5 mg/cc injected to scalp. Total of 0.5 cc's used.  Advised on risk of atrophy and failure to respond. Advised on aftercare.   --Continue lidex -  decrease to QD          Follow-up: 4 weeks  CC:   Scribed By: Jazmine Kirkland, MS, PABROCK          Again, thank you for allowing me to participate in the care of your patient.        Sincerely,        Jazimne Kirkland PA-C

## 2021-01-26 NOTE — PROGRESS NOTES
HPI:   Chief complaints: Kamilah Dee is a 35 year old female who presents for recheck of hair loss. She has been using lidex BID and has not noticed any hair regrowth. No pain or itching.    From initial visit: She has noticed that behind both ears she has lost hair at her hairline. She noticed this several months ago.    PHYSICAL EXAM:    There were no vitals taken for this visit.  Skin exam performed as follows: Type 2 skin. Mood appropriate  Alert and Oriented X 3. Well developed, well nourished in no distress.  General appearance: Normal  Head including face: Normal  Eyes: conjunctiva and lids: Normal  Mouth: Lips, teeth, gums: Normal  Neck: Normal  Chest-breast/axillae: Normal  Back: Normal  Spleen and liver: Normal  Cardiovascular: Exam of peripheral vascular system by observation for swelling, varicosities, edema: Normal  Genitalia: groin, buttocks: Normal  Extremities: digits/nails (clubbing): Normal  Eccrine and Apocrine glands: Normal  Right upper extremity: Normal  Left upper extremity: Normal  Right lower extremity: Normal  Left lower extremity: Normal  Skin: Scalp and body hair: See below    1. bald patches with minimal hair located on the right temporal scalp superior to ear and the left temporal scalp superior to ear (symmetric)    ASSESSMENT/PLAN:     Alopecia Areata: advised on chronic, recurrent nature of condition and diffficulty in treating. Advised on risk of failure to respond to treatment. Discussed biopsy to confirm dx; will consider this if no regrowth in 1-2 months  --Kenalog 5 mg/cc injected to scalp. Total of 0.5 cc's used.  Advised on risk of atrophy and failure to respond. Advised on aftercare.   --Continue lidex - decrease to QD          Follow-up: 4 weeks  CC:   Scribed By: Jazmine Kirkland, MS, PA-C

## 2021-01-29 ENCOUNTER — VIRTUAL VISIT (OUTPATIENT)
Dept: INTERNAL MEDICINE | Facility: CLINIC | Age: 36
End: 2021-01-29
Payer: COMMERCIAL

## 2021-01-29 ENCOUNTER — TELEPHONE (OUTPATIENT)
Dept: INTERNAL MEDICINE | Facility: CLINIC | Age: 36
End: 2021-01-29

## 2021-01-29 DIAGNOSIS — J06.9 VIRAL URI WITH COUGH: Primary | ICD-10-CM

## 2021-01-29 DIAGNOSIS — J06.9 VIRAL URI WITH COUGH: ICD-10-CM

## 2021-01-29 LAB
SARS-COV-2 RNA RESP QL NAA+PROBE: NOT DETECTED
SPECIMEN SOURCE: NORMAL

## 2021-01-29 PROCEDURE — 99207 PR NO CHARGE LOS: CPT

## 2021-01-29 PROCEDURE — 99213 OFFICE O/P EST LOW 20 MIN: CPT | Mod: 95 | Performed by: INTERNAL MEDICINE

## 2021-01-29 PROCEDURE — U0003 INFECTIOUS AGENT DETECTION BY NUCLEIC ACID (DNA OR RNA); SEVERE ACUTE RESPIRATORY SYNDROME CORONAVIRUS 2 (SARS-COV-2) (CORONAVIRUS DISEASE [COVID-19]), AMPLIFIED PROBE TECHNIQUE, MAKING USE OF HIGH THROUGHPUT TECHNOLOGIES AS DESCRIBED BY CMS-2020-01-R: HCPCS | Performed by: INTERNAL MEDICINE

## 2021-01-29 PROCEDURE — U0005 INFEC AGEN DETEC AMPLI PROBE: HCPCS | Performed by: INTERNAL MEDICINE

## 2021-01-29 NOTE — TELEPHONE ENCOUNTER
Reason for call:  Other   Patient called regarding (reason for call): Orders  Additional comments: Pt is looking for orders for covid test. She needs to be tested before returning to work. Pt is having mild sx.  Phone number to reach patient:  Home number on file [phone] (home)        Can we leave a detailed message on this number?  YES

## 2021-01-29 NOTE — LETTER
January 29, 2021      Kamilah Dee  [address]  [facility] [address]        To whom it may concern:    Kamilah Dee sought care today for symptoms of an acute viral upper respiratory infection starting on 1/28/21.  This is a common respiratory viral infection at a minimum, but Covid 19 infection needs to be considered.  Covid-19 testing has been ordered and should hopefully be completed within the next 48 hours.  They were instructed to relatively self isolate until the Covid testing results return.   If the Covid-19 test is positive, they may return to work once they have met all of these criteria: at least 10 days from the start of symptoms, no fever for at least 3 days without the help of fever reducing medicines, and clearly improving symptoms (but not necessarily completely back to normal).    If the Covid-19 test is negative, they may return to work once fever free for 24 hours without the help of fever reducing medications and improving symptoms.      If you have any questions or concerns, please call the clinic at the number listed above.     Sincerely,     Marshall Jovel M.D.  Dept. of Internal Medicine  [facility]       cc: [email or web address]

## 2021-01-29 NOTE — TELEPHONE ENCOUNTER
Patient informed. She does not like [facility]. Scheduled for virtual visit with Dr. Jovel this afternoon instead.

## 2021-01-29 NOTE — TELEPHONE ENCOUNTER
"This message states that patient is having \"mild sx\".  Does not mention what those symptoms are or how long they have been present.  Therefore not clear if Covid test is needed and whether patient should be under quarantine or not or can be working.   Having a test alone without other information provided would not be appropriate.  For example, could have a potentially false negative Covid vaccine and still require quarantining depending on what symptoms she is having and when they began.  Would therefore recommend patient go to [email or web address] and fill out evaluation to determine if testing appropriate and to also be given proper management advice depending on further information  "

## 2021-01-29 NOTE — PROGRESS NOTES
Kamilah is a 35 year old who is being evaluated via a billable telephone visit.      What phone number would you like to be contacted at? [phone]  How would you like to obtain your AVS? MyChart  Assessment & Plan     Viral URI with cough  Hopefully just a simple upper respiratory viral infection, however COVID-19 infection should definitely be considered, and screen.  Based on the reported symptoms and/or exposure, Covid 19 infection is possible.    Covid testing definitely recommended, at any location easiest for the patient.    Order placed in the [employer] system.  Patient was told they will be contacted by central , possibly through a blocked number.  They were told to attend the test at the time, date, and location and follow instructions at the testing site  Results typically take 24 to 48 hours.  Self isolate/quarantine until the test results are known.  Regardless of test results, continue to follow routine social distancing and face mask protocols.  We discussed the basics of COVID-19 infection known at this time.  Go to the emergency room if they develops any severe respiratory difficulties.    Letter written for work.     If Covid -19 test positive:    --There are no specific treatments for most Covid-19 infections, the treatments you have heard about are reserved for more acutely ill patients in hospitals.   --Supportive cares (over the counter medications for fever reduction, body aches; rest, hydration)  --Self quarantine and not to return to work/public until 10 days from the start of symptoms, and 3 days without a fever (without the help of fever reducing medicines), and clearly improving symptoms (but not necessarily complete resolution).  --Go to the emergency room immediately if you develop any significant breathing difficulties or respiratory distress or any other severe concerning symptoms.    If Covid-19 test negative:    --OK to return to work when no fever for over 24 hours  (without the help of fever reducing medications) and generally feeling better (but not necessarily completely back to normal).      They were told to contact us with any questions or concerns.    - Symptomatic COVID-19 Virus (Coronavirus) by PCR; Future          Return in about 1 week (around 2/5/2021) for Your Primary MD, recheck, if symptoms fail to improve.    Marshall Jovel MD  [facility] BASSEM Triana is a 35 year old who presents to clinic today for the following health issues     HPI         Concern for COVID-19  About how many days ago did these symptoms start? x1 day  Is this your first visit for this illness? Yes  In the 14 days before your symptoms started, have you had close contact with someone with COVID-19 (Coronavirus)? I do not know.  Do you have a fever or chills? Yes, I felt feverish or had chills  Are you having new or worsening difficulty breathing? No  Do you have new or worsening cough? No  Have you had any new or unexplained body aches? YES    Have you experienced any of the following NEW symptoms?    Headache: No    Sore throat: No    Loss of taste or smell: No    Chest pain: No    Diarrhea: No    Rash: No  What treatments have you tried? none  Who do you live with? 2 kids  Are you, or a household member, a healthcare worker or a ? No  Do you live in a nursing home, group home, or shelter? No  Do you have a way to get food/medications if quarantined? Yes, I have a friend or family member who can help me.    Fevers and chills startgin 1/28.   Body aches as well  No coughing, no shortness of breath, no loss of taste or smell.   Works at [employer].     No known exposures.         **I reviewed the information recorded in the patient's EPIC chart (including but not limited to medical history, surgical history, family history, problem list, medication list, and allergy list) and updated the information as indicated based on the  "patients reported information.         Review of Systems   Constitutional, HEENT, cardiovascular, pulmonary, gi and gu systems are negative, except as otherwise noted.      Objective    Vitals - Patient Reported  Weight (Patient Reported): 61.2 kg (135 lb)  Height (Patient Reported): 167.6 cm (5' 6\")  BMI (Based on Pt Reported Ht/Wt): 21.79      Vitals:  No vitals were obtained today due to virtual visit.    Physical Exam   healthy, alert and no distress  PSYCH: Alert and oriented times 3; coherent speech, normal   rate and volume, able to articulate logical thoughts, able   to abstract reason, no tangential thoughts, no hallucinations   or delusions  Her affect is normal  RESP: No cough, no audible wheezing, able to talk in full sentences  Remainder of exam unable to be completed due to telephone visits                Phone call duration: 11:35 minutes  "

## 2021-01-29 NOTE — PATIENT INSTRUCTIONS
Based on your symptoms and/or exposure, you should pursue Covid-19 testing.      You can get the testing through [facility] or else through any other agency or clinic location, or [facility], etc.   For testing through [facility], you will be contacted by the central scheduling department to arrange Covid-19 testing at one of our testing sites (including [facility]).  This phone call may come from a blocked number so please answer all phone calls until you have been contacted.    You may also call and schedule a test.  Please call [phone] to arrange Covid testing appointment in [place].    Please show up at the time date and location of the testing site and follow instructions as given.    Typical test results take 24 to 48 hours.  You will be contacted directly if a positive result, otherwise via letter or MyChart communication will be sent.    If there was significant exposure to Covid-19, then relatively quarantine and self isolate until test results are known.   Regardless of test results, continue to follow routine social distancing and face mask protocols.    If Covid -19 test positive:    --There are no specific treatments for most Covid-19 infections, the treatments you have heard about are reserved for more acutely ill patients in hospitals.   --Supportive cares (over the counter medications for fever reduction, body aches; rest, hydration)  --Self quarantine and not to return to work/public until 10 days from the start of symptoms, and 3 days without a fever (without the help of fever reducing medicines), and clearly improving symptoms (but not necessarily complete resolution).  --Go to the emergency room immediately if you develop any significant breathing difficulties or respiratory distress or any other severe concerning symptoms.    If Covid-19 test negative:    --OK to return to work when no fever for over 24 hours (without the help of fever  "reducing medications) and generally feeling better (but not necessarily completely back to normal).      No specific cares needed for Covid 19 infection.    Most people require symptomatic cares only:    *  Tylenol for fevers or headaches;     *  Motrin/Advil for any fevers, body/muscle aches.     *  Cough suppressant dextromethorphan, i.e. Delsym (or any cough medication with a \"DM\"), follow directions on bottle    *  Be sure to drink lots of fluids.  If the appetite and intake of food is way down, then at least try to eat soup.  Dehydration is the thing that causes people to end up in the hospital with viral infections and the flu.     *  For sore throat:  Try lozenges (Cepostat, Cepacol, hard candies, Zinc lozenges, etc)    *  Call the clinic if any changes in the symptoms such as worsening fevers, or the amount and quality of the sputum changes, or if you have any major difficulties breathing, or the symptoms fail to clear for a few weeks.    *  Most upper respiratory infection will clear 1-2 weeks, but it is not uncommon for post viral coughs to last for several weeks, even rarely the entire winter.    "

## 2021-03-09 ENCOUNTER — OFFICE VISIT (OUTPATIENT)
Dept: DERMATOLOGY | Facility: CLINIC | Age: 36
End: 2021-03-09
Payer: COMMERCIAL

## 2021-03-09 VITALS — DIASTOLIC BLOOD PRESSURE: 66 MMHG | OXYGEN SATURATION: 99 % | HEART RATE: 71 BPM | SYSTOLIC BLOOD PRESSURE: 99 MMHG

## 2021-03-09 DIAGNOSIS — D48.5 NEOPLASM OF UNCERTAIN BEHAVIOR OF SKIN: Primary | ICD-10-CM

## 2021-03-09 PROCEDURE — 88305 TISSUE EXAM BY PATHOLOGIST: CPT | Performed by: DERMATOLOGY

## 2021-03-09 PROCEDURE — 99207 PR DROP WITH A PROCEDURE: CPT | Performed by: PHYSICIAN ASSISTANT

## 2021-03-09 PROCEDURE — 11104 PUNCH BX SKIN SINGLE LESION: CPT | Performed by: PHYSICIAN ASSISTANT

## 2021-03-09 NOTE — PATIENT INSTRUCTIONS
Wound Care Instructions     FOR SUPERFICIAL WOUNDS     [facility] [phone]                 AFTER 24 HOURS YOU SHOULD REMOVE THE BANDAGE AND BEGIN DAILY DRESSING CHANGES AS FOLLOWS:     1) Remove Dressing.     2) Clean and dry the area with tap water using a Q-tip or sterile gauze pad.     3) Apply Vaseline, Aquaphor, Polysporin ointment or Bacitracin ointment over entire wound.  Do NOT use Neosporin ointment.     4) Cover the wound with a band-aid, or a sterile non-stick gauze pad and micropore paper tape    REPEAT THESE INSTRUCTIONS AT LEAST ONCE A DAY UNTIL THE WOUND HAS COMPLETELY HEALED.    It is an old wives tale that a wound heals better when it is exposed to air and allowed to dry out. The wound will heal faster with a better cosmetic result if it is kept moist with ointment and covered with a bandage.    **Do not let the wound dry out.**    Supplies Needed:      *Cotton tipped applicators (Q-tips)    *Vaseline, Aquaphor, Polysporin or Bacitracin Ointment (NOT NEOSPORIN)    *Band-aids or non-stick gauze pads and micropore paper tape.      PATIENT INFORMATION:    During the healing process you will notice a number of changes. All wounds develop a small halo of redness surrounding the wound.  This means healing is occurring. Severe itching with extensive redness usually indicates sensitivity to the ointment or bandage tape used to dress the wound.  You should call our office if this develops.      Swelling  and/or discoloration around your surgical site is common, particularly when performed around the eye.    All wounds normally drain.  The larger the wound the more drainage there will be.  After 7-10 days, you will notice the wound beginning to shrink and new skin will begin to grow.  The wound is healed when you can see skin has formed over the entire area.  A healed wound has a healthy, shiny look to the surface and is red to dark pink in color to normalize.  Wounds may take approximately  4-6 weeks to heal.  Larger wounds may take 6-8 weeks.  After the wound is healed you may discontinue dressing changes.    You may experience a sensation of tightness as your wound heals. This is normal and will gradually subside.    Your healed wound may be sensitive to temperature changes. This sensitivity improves with time, but if you re having a lot of discomfort, try to avoid temperature extremes.    Patients frequently experience itching after their wound appears to have healed because of the continue healing under the skin.  Plain Vaseline will help relieve the itching.      POSSIBLE COMPLICATIONS    BLEEDIN. Leave the bandage in place.  2. Use tightly rolled up gauze or a cloth to apply direct pressure over the bandage for 30  minutes.  3. Reapply pressure for an additional 30 minutes if necessary  4. Use additional gauze and tape to maintain pressure once the bleeding has stopped.

## 2021-03-09 NOTE — LETTER
3/9/2021         RE: Kamilah Dee  [address]  [place] [address]        Dear Colleague,    Thank you for referring your patient, Kamilah Dee, to the [facility]. Please see a copy of my visit note below.    HPI:   Chief complaints: Kamilah Dee is a 35 year old female who presents for recheck of hair loss. S/p ILK x 2 and she has been using lidex BID and has not noticed any hair regrowth. No pain or itching.      Shx: has 2 children 8 and 6     PHYSICAL EXAM:    BP 99/66   Pulse 71   SpO2 99%   Breastfeeding No   Skin exam performed as follows: Type 2 skin. Mood appropriate  Alert and Oriented X 3. Well developed, well nourished in no distress.  General appearance: Normal  Head including face: Normal  Eyes: conjunctiva and lids: Normal  Mouth: Lips, teeth, gums: Normal  Neck: Normal  Chest-breast/axillae: Normal  Back: Normal  Spleen and liver: Normal  Cardiovascular: Exam of peripheral vascular system by observation for swelling, varicosities, edema: Normal  Genitalia: groin, buttocks: Normal  Extremities: digits/nails (clubbing): Normal  Eccrine and Apocrine glands: Normal  Right upper extremity: Normal  Left upper extremity: Normal  Right lower extremity: Normal  Left lower extremity: Normal  Skin: Scalp and body hair: See below    1. bald patches with minimal hair located on the right temporal scalp superior to ear and the left temporal scalp superior to ear (symmetric)    ASSESSMENT/PLAN:     Alopecia areata vs androgenic alopecia vs other on the left post auricular scalp.  1% Lidocaine with epinephrine for anesthetic, with sterile technique a 4 mm punch biopsy was used to obtain a biopsy specimen of the lesion. Hemostasis was obtained by pressure and wound was sutured with two 4-0 sutures. Antibiotic dressing was applied, and wound care instructions provided. The specimen is labeled and sent to pathology for evaluation. The procedure was well  tolerated without complications  --Hold any medications until pathology has returned.   --Suture removal in 8-10 days        Follow-up:pending path  Scribed By: Jazmine Kirkland, MS, [place]          Again, thank you for allowing me to participate in the care of your patient.        Sincerely,        Jazmine Kirkland PA-C

## 2021-03-09 NOTE — PROGRESS NOTES
HPI:   Chief complaints: Kamilah Dee is a 35 year old female who presents for recheck of hair loss. S/p ILK x 2 and she has been using lidex BID and has not noticed any hair regrowth. No pain or itching.      Shx: has 2 children 8 and 6     PHYSICAL EXAM:    BP 99/66   Pulse 71   SpO2 99%   Breastfeeding No   Skin exam performed as follows: Type 2 skin. Mood appropriate  Alert and Oriented X 3. Well developed, well nourished in no distress.  General appearance: Normal  Head including face: Normal  Eyes: conjunctiva and lids: Normal  Mouth: Lips, teeth, gums: Normal  Neck: Normal  Chest-breast/axillae: Normal  Back: Normal  Spleen and liver: Normal  Cardiovascular: Exam of peripheral vascular system by observation for swelling, varicosities, edema: Normal  Genitalia: groin, buttocks: Normal  Extremities: digits/nails (clubbing): Normal  Eccrine and Apocrine glands: Normal  Right upper extremity: Normal  Left upper extremity: Normal  Right lower extremity: Normal  Left lower extremity: Normal  Skin: Scalp and body hair: See below    1. bald patches with minimal hair located on the right temporal scalp superior to ear and the left temporal scalp superior to ear (symmetric)    ASSESSMENT/PLAN:     Alopecia areata vs androgenic alopecia vs other on the left post auricular scalp.  1% Lidocaine with epinephrine for anesthetic, with sterile technique a 4 mm punch biopsy was used to obtain a biopsy specimen of the lesion. Hemostasis was obtained by pressure and wound was sutured with two 4-0 sutures. Antibiotic dressing was applied, and wound care instructions provided. The specimen is labeled and sent to pathology for evaluation. The procedure was well tolerated without complications  --Hold any medications until pathology has returned.   --Suture removal in 8-10 days        Follow-up:pending path  Scribed By: Jazmine Kirkland, MS, PAJanaC

## 2021-03-14 ENCOUNTER — HEALTH MAINTENANCE LETTER (OUTPATIENT)
Age: 36
End: 2021-03-14

## 2021-03-17 ENCOUNTER — ALLIED HEALTH/NURSE VISIT (OUTPATIENT)
Dept: DERMATOLOGY | Facility: CLINIC | Age: 36
End: 2021-03-17
Payer: COMMERCIAL

## 2021-03-17 DIAGNOSIS — Z48.01 ENCOUNTER FOR CHANGE OR REMOVAL OF SURGICAL WOUND DRESSING: Primary | ICD-10-CM

## 2021-03-17 PROCEDURE — 99207 PR NO CHARGE NURSE ONLY: CPT

## 2021-03-17 NOTE — PATIENT INSTRUCTIONS
WOUND CARE INSTRUCTIONS  for  SUTURE REMOVAL      If there are any open or bleeding areas at the incision site you should begin to cover the area with a bandage daily as follows:    1) Clean and dry the area with plain tap water using a Q-tip or sterile gauze pad.  2) Apply Vaseline, Aquaphor, Polysporin or Bacitracin ointment to the open area.  3) Cover the wound with a band-aid or a sterile non-stick gauze pad and micropore paper tape.       *Once the bandages are removed, the scar will be red and firm (especially in the lip/chin area). This is normal and will fade in time. It might take 6-12 months for this to happen.     *Massaging the area will help the scar soften and fade quicker. Begin to massage the area in one month. To massage apply pressure directly and firmly over the scar with the fingertips and move in a circular motion. Massage the area for a few minutes several times a day. Continue to massage the site for several months.    *Numbness in the surgical area is expected. It might take 12-18 months for the feeling to return to normal. During this time sensations of itchiness, tingling and occasional sharp pains might be noted. These feelings are normal and will subside once the nerves have completely healed.

## 2021-03-17 NOTE — NURSING NOTE
Pt returned to clinic for suture removal. Pt has no complaints, denies pain. Sutures removed from left post auricular scalp. Site is healing and wound edges approximating well. Ointment applied (pt declined bandage).     Advised to watch for signs/sx of infection; spreading redness, drainage, odor, fever. Call or report promptly to clinic. Pt given written instructions and informed to rtc as needed. Patient verbalized understanding.     FRANCISCO J Hurley-BSN-PHN  [facility]  [phone]

## 2021-03-24 ENCOUNTER — TELEPHONE (OUTPATIENT)
Dept: DERMATOLOGY | Facility: CLINIC | Age: 36
End: 2021-03-24

## 2021-03-24 LAB — COPATH REPORT: NORMAL

## 2021-03-24 NOTE — TELEPHONE ENCOUNTER
Called and spoke to patient.    Educated patient on biopsy results- normal appearing scalp, no evidence of any abnormal hair loss process.     Informed patient the hair loss she noticed was likely a normal process, no treatment needed at this time.     Patient voiced understanding.    FRANCISCO J Hurley-BSN-N  [facility]  [phone]

## 2021-03-24 NOTE — TELEPHONE ENCOUNTER
----- Message from Jazmine Kirkland PA-C sent at 3/24/2021  1:20 PM CDT -----  The biopsy revealed normal scalp. There is no evidence of any abnormal hair loss process. The hair loss she noticed was likely a normal process. There is no treatment that is needed at this time.

## 2021-03-31 ENCOUNTER — TELEPHONE (OUTPATIENT)
Dept: INTERNAL MEDICINE | Facility: CLINIC | Age: 36
End: 2021-03-31

## 2021-03-31 NOTE — TELEPHONE ENCOUNTER
Reason for Call:  Form, our goal is to have forms completed with 72 hours, however, some forms may require a visit or additional information.    Type of letter, form or note:  FMLA    Who is the form from?: [employer] (if other please explain)    Where did the form come from: form was faxed in    What clinic location was the form placed at?: Internal Medicine    Where the form was placed: Pcp's Folder    What number is listed as a contact on the form?: [phone]       Additional comments:     Call taken on 3/31/2021 at 11:56 AM by Keanu Lundberg

## 2021-04-05 ENCOUNTER — VIRTUAL VISIT (OUTPATIENT)
Dept: URGENT CARE | Facility: CLINIC | Age: 36
End: 2021-04-05
Payer: COMMERCIAL

## 2021-04-05 ENCOUNTER — VIRTUAL VISIT (OUTPATIENT)
Dept: INTERNAL MEDICINE | Facility: CLINIC | Age: 36
End: 2021-04-05
Payer: COMMERCIAL

## 2021-04-05 DIAGNOSIS — R42 DIZZINESS: ICD-10-CM

## 2021-04-05 DIAGNOSIS — Z20.822 SUSPECTED COVID-19 VIRUS INFECTION: Primary | ICD-10-CM

## 2021-04-05 DIAGNOSIS — F41.9 ANXIETY: ICD-10-CM

## 2021-04-05 DIAGNOSIS — F95.9 TIC DISORDER: ICD-10-CM

## 2021-04-05 PROCEDURE — 99213 OFFICE O/P EST LOW 20 MIN: CPT | Mod: TEL | Performed by: FAMILY MEDICINE

## 2021-04-05 PROCEDURE — 99214 OFFICE O/P EST MOD 30 MIN: CPT | Mod: 95 | Performed by: INTERNAL MEDICINE

## 2021-04-05 RX ORDER — ESCITALOPRAM OXALATE 10 MG/1
10 TABLET ORAL DAILY
COMMUNITY
End: 2021-04-05 | Stop reason: DRUGHIGH

## 2021-04-05 RX ORDER — ESCITALOPRAM OXALATE 20 MG/1
20 TABLET ORAL DAILY
Qty: 30 TABLET | Refills: 11 | Status: SHIPPED | OUTPATIENT
Start: 2021-04-05 | End: 2022-05-05

## 2021-04-05 NOTE — PROGRESS NOTES
Subjective   HPI    3 days ago started with runny nose  Feels like a regular cold  But needs to rule out for work  Slight sore throat initially now is gone  No fevers  chest pain or shortness of breath   No abdominal pain no nausea vomiting or diarrhea   Some chills and headache   No loss of taste or smell  No known exposure        Patient Active Problem List   Diagnosis     Tic disorder     CARDIOVASCULAR SCREENING; LDL GOAL LESS THAN 160     Health Care Home     Migraine     HAMIDA (generalized anxiety disorder)     Verruca     ASCUS of cervix with negative high risk HPV     Major depressive disorder, single episode, mild (H)     Family history of breast cancer     Sterilization     Past Surgical History:   Procedure Laterality Date     HC REMOVAL OF TONSILS,11+ Y/O  1996    Tonsils 11+y.o.       Social History     Tobacco Use     Smoking status: Former Smoker     Quit date: 5/25/2010     Years since quitting: 10.8     Smokeless tobacco: Never Used   Substance Use Topics     Alcohol use: Yes     Alcohol/week: 0.0 standard drinks     Frequency: 2-4 times a month     Drinks per session: 1 or 2     Binge frequency: Less than monthly     Comment: occasionally 1-2 etoh a month/     Family History   Problem Relation Age of Onset     Alcohol/Drug Mother         b:1964 addicted to crack     Family History Negative Father         b: 1962     Family History Negative Sister         b:1994     Cervical Cancer Sister 35        10/2015     Breast Cancer Sister         no information     Family History Negative Sister         b:1981     Family History Negative Brother         b:1989     No Known Problems Maternal Grandmother      No Known Problems Maternal Grandfather      No Known Problems Paternal Grandmother      No Known Problems Other            Reviewed and updated as needed this visit by Provider   Allergies               Review of Systems   Constitutional, HEENT, cardiovascular, pulmonary, GI, , musculoskeletal, neuro,  skin, endocrine and psych systems are negative, except as otherwise noted.       Objective   Reported vitals:  There were no vitals taken for this visit.   healthy, alert and no distress  PSYCH: Alert and oriented times 3; coherent speech, normal   rate and volume, able to articulate logical thoughts, able   to abstract reason, no tangential thoughts, no hallucinations   or delusions  Her affect is normal  RESP: No cough, no audible wheezing, able to talk in full sentences  Additional exam:  none  Remainder of exam unable to be completed due to telephone visits    Diagnostic Test Results:  Labs reviewed in Epic  none         Assessment/Plan:      ICD-10-CM    1. Suspected COVID-19 virus infection  Z20.822 Symptomatic COVID-19 Virus (Coronavirus) by PCR     Per patient she feels her symptoms are mild and she just wants to rule out covid  If with any symptoms of chest pain or shortness of breath, lightheadedness, palpitations, feeling like passing out or change and worsening in the quality of your symptoms, please proceed to ER. Recommend follow up with PCP in a few days for re-evaluation.   Self isolation until symptoms improve for 24 hours and covid test is negative   call duration:  7 minutes  Video: valentino Saunders MD

## 2021-04-05 NOTE — PROGRESS NOTES
Kamilah is a 35 year old who is being evaluated via a billable telephone visit.      What phone number would you like to be contacted at? [phone]  How would you like to obtain your AVS? MyChart    ASSESSMENT:   1. Tic disorder  Uncontrolled.  Patient follow-up with neurology to discuss possible reinitiation of Ingrezza versus other medication.  Chronic issue    2. Anxiety  Needs improved control.  Continue Lexapro for 3 more days at 10 g daily and then increase to 20 mg daily.  Will follow up in 1 month.  Patient denies suicidal ideation  - escitalopram (LEXAPRO) 20 MG tablet; Take 1 tablet (20 mg) by mouth daily  Dispense: 30 tablet; Refill: 11    3. Dizziness  Vague and chronic.  No true vertigo issues.  Denies general sinus pressure/pain.  Patient states symptoms present since she was a teenager.  Follow-up with neurology      PLAN:  Take Lexapro/escitalopram 10 mg daily for 3 more days.  Then change to 20 mg tablet, 1 tablet daily for chronic anxiety.  Do not stop the Lexapro/escitalopram in the future if mood is better as  thismedication is important to help prevent recurrence of anxiety issues  Contact Dr Powell's office ( [facility]) to get follow-up appointment scheduled to discuss possible reinitiation of Ingrezza versus other treatment for chronic disorder  FMLA form will be completed allowing for missing 3 days of work per month along with being late for work 2-3 times per month for the next 4 months while anxiety and tic symptoms hopefully improve with treatment as above  Follow-up with me in 4 weeks to review status of anxiety control    Phone call contact time  Call Started at 3:07pm  Call Ended at 3:27pm  Total minutes: 20 min    (Chart documentation was completed, in part, with [employer] voice-recognition software. Even though reviewed, some grammatical, spelling, and word errors may remain.)    Mario Joseph MD  Internal Medicine Department  [facility]            Chinmay Triana is a 35 year old who presents for the following health issues     Chief Complaint   Patient presents with     Dizziness     occ off balance, having a flare up of tics, anxiety     Forms     FMLA paperwork        Most recent lab results reviewed with pt.      Chronic tic disorder.  Has been seen by multiple neurologists including Dr Powell from [facility]. Last seen by him 6 mos ago. Previously on Ingrezza that pt thought had helped tremor.   She then went off the medication.  Plan per neurology was to see if things would remain stable or flare once patient was working more to suggest that anxiety may be more of the driving factor for her tic disorder.  Patient states she has had slight balance issue going back to when she was a teenager.  Will feel occasional lightheaded.  No true vertigo.  No falls.  Patient denies upper respiratory infection symptoms.  No change in symptoms now compared to a year or 2 ago.  Patient had been doing better with her anxiety and ended up stopping her Lexapro but noticed anxiety worsened and started again 1 week ago at 10 mg/day.  Rare use of lorazepam.  Patient has been missing 1 to 2 days of work per month.  Patient also shows up late for work about 4-5 times per month.  She is scheduled work from 7 AM to 4 PM but sometimes does not make it to work for 2 to 3 hours after she is scheduled to start for example if she is overslept because of her tic bothering her at night and affecting her sleep.  When tic issues are doing better, patient is able to otherwise do the functions at her job.  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  Denies acute vision changes, current headache, chest pain, shortness of breath or abdominal pain.        Additional ROS:   Constitutional, HEENT, Cardiovascular, Pulmonary, GI and , Neuro, MSK and Psych review of systems/symptoms are  otherwise negative or unchanged from previous, except as noted above.           Objective:  Any reported vitals from today were reviewed in chart. See nursing documentation for details    RESP: No cough, no audible wheezing, able to talk in full sentences  GEN: Normal affect. Normal though content/speech  Remainder of exam unable to be completed due to telephone visits

## 2021-04-06 DIAGNOSIS — Z20.822 SUSPECTED COVID-19 VIRUS INFECTION: ICD-10-CM

## 2021-04-06 PROCEDURE — U0005 INFEC AGEN DETEC AMPLI PROBE: HCPCS | Performed by: FAMILY MEDICINE

## 2021-04-06 PROCEDURE — U0003 INFECTIOUS AGENT DETECTION BY NUCLEIC ACID (DNA OR RNA); SEVERE ACUTE RESPIRATORY SYNDROME CORONAVIRUS 2 (SARS-COV-2) (CORONAVIRUS DISEASE [COVID-19]), AMPLIFIED PROBE TECHNIQUE, MAKING USE OF HIGH THROUGHPUT TECHNOLOGIES AS DESCRIBED BY CMS-2020-01-R: HCPCS | Performed by: FAMILY MEDICINE

## 2021-04-06 NOTE — PATIENT INSTRUCTIONS
Take Lexapro/escitalopram 10 mg daily for 3 more days.  Then change to 20 mg tablet, 1 tablet daily for chronic anxiety.  Do not stop the Lexapro/escitalopram in the future if mood is better as  thismedication is important to help prevent recurrence of anxiety issues  Contact Dr Powell's office ( [facility]) to get follow-up appointment scheduled to discuss possible reinitiation of Ingrezza versus other treatment for chronic disorder  FMLA form will be completed allowing for missing 3 days of work per month along with being late for work 2-3 times per month for the next 4 months while anxiety and tic symptoms hopefully improve with treatment as above  Follow-up with me in 4 weeks to review status of anxiety control

## 2021-04-07 LAB
SARS-COV-2 RNA RESP QL NAA+PROBE: NOT DETECTED
SPECIMEN SOURCE: NORMAL

## 2021-04-12 NOTE — TELEPHONE ENCOUNTER
Patient needs forms done in 2 days or will are going to close her claim.  Any questions please call her at  [phone].

## 2021-04-13 ENCOUNTER — VIRTUAL VISIT (OUTPATIENT)
Dept: NEUROLOGY | Facility: CLINIC | Age: 36
End: 2021-04-13
Payer: COMMERCIAL

## 2021-04-13 DIAGNOSIS — F32.0 CURRENT MILD EPISODE OF MAJOR DEPRESSIVE DISORDER, UNSPECIFIED WHETHER RECURRENT (H): ICD-10-CM

## 2021-04-13 DIAGNOSIS — F95.9 TIC DISORDER: Primary | ICD-10-CM

## 2021-04-13 DIAGNOSIS — F95.1 CHRONIC MOTOR TIC DISORDER: ICD-10-CM

## 2021-04-13 DIAGNOSIS — F41.9 ANXIETY: ICD-10-CM

## 2021-04-13 PROCEDURE — 99215 OFFICE O/P EST HI 40 MIN: CPT | Mod: GT | Performed by: PSYCHIATRY & NEUROLOGY

## 2021-04-13 PROCEDURE — 99217 PR OBSERVATION CARE DISCHARGE: CPT | Performed by: PSYCHIATRY & NEUROLOGY

## 2021-04-13 NOTE — TELEPHONE ENCOUNTER
Pt calling checking on status of FMLA forms.  She needs form completed ASAP, it is time sensitive. Needs form completed this week.   Please call her when it is completed.

## 2021-04-13 NOTE — LETTER
2021       RE: Kamilah Dee  [address]  [place] [address]     Dear Colleague,    Thank you for referring your patient, Kamilah Dee, to the [facility]. Please see a copy of my visit note below.    MOVEMENT DISORDERS TELEMEDICINE (video and/or telephone) VISIT:     PATIENT: Kamilah Dee    : 1985    DATE: 21    REASON FOR VISIT:  Motor tic disorder follow up     After a review of the patient s situation, this visit was changed from an in-person visit to a Telemedicine visit to reduce the risk of COVID-19 exposure. The patient is being evaluated via a billable Telemedicine visit.    Ms. Dee is a 36 year old female that presents via Telemedicine for follow up.    Patient was last seen on 10/13/20 with Dr. Powell for Motor tic disorder. She had reported benefit from the Ingrezza 40 mg a day.  She took this under the direction of Dr. Latoya Juarez. She then went through a period of severe stress and her tics got worse.  She did not know if it was the stress or the medicine making her worse so she stopped the Ingrezza.  She has remained off this med since.    Dr. Joseph documented that patient's tics are worsening on her 21 follow up and thus patient was asked to follow up with Neurology.     History obtained from patient. Patient reports her tics were not bad for the last couple years thus had not scheduled follow up.  She states that her tics have been worse in the past few weeks to months and has been missing work as a result.  She currently works at [employer] and has been for the past 17 years.  She is worried about losing her job.  She is in the process of applying for intermittent leave of absence from work.  She shares that her stress has been worse lately.  She acknowledges that stress and anxiety make her tics worse and it becomes a vicious cycle in which the tics worsen  which then also make her stress and anxiety worse as a result.  She is not following with mental health at this time. She denies history of a subsequent compulsive disorder.  She has 2 kids with special needs.  She has had to put them through home schooling due to COVID-19.  She reports that the tics make it embarrassing for her to be around friends and family.  She also reports that her children innocently point out her tics which can be distressing.  She states that the tics occur every day and make it to that she is an uncomfortable lifestyle.  She finds it hard to concentrate when tics are bad. Has a hard time sleeping when tics are bad due to constant movements.    Her tics involve hyperkinetic movements of her head, shoulders, eyes.  When her tics are bad, these movements are consistent.  She denies loss of consciousness associate with these events.      She is currently worried about starting any medication due to possible side effects.  With Ingrezza, she reported feeling sick and weak on this medication but is uncertain if this was due to the medication or something else.  She equated the symptoms to the Ingrezza and was part of the reason why she stopped this medication.     I have reviewed and updated the patient's Past Medical History, Social History, Family History and Medication List.    Medications:  Current Outpatient Medications   Medication Sig Dispense Refill     escitalopram (LEXAPRO) 20 MG tablet Take 1 tablet (20 mg) by mouth daily 30 tablet 11     fluocinonide (LIDEX) 0.05 % external solution Apply to scalp BID x 3-4 weeks PRN 50 mL 3     LORazepam (ATIVAN) 0.5 MG tablet Take 0.5 mg by mouth as needed       Multiple Vitamins-Minerals (MULTIVITAMIN PO) Take by mouth daily        Allergies:  Remeron [mirtazapine]    Physical Exam:  GENERAL: alert, active, attentive, appropriately groomed   HEENT: normocephalic, eyes open with no discharge  CHEST: non labored breathing   PSYCH: mood stable      Neurologic Exam:  MENTAL STATUS: Alert and oriented to person, place, time, and situation. Follows commands. Recent and remote memory intact. Attention span and concentration intact. Fund of knowledge intact to current events.  SPEECH: Fluent, intact comprehension and articulation  CN: visual fields intact, EOMIB, facial movement symmetric, hearing grossly intact to conversation   MOTOR: Moves BUE extremities equally against gravity without difficulty  Involuntary movements:  Intermittent and repetitive eye blinking, head swaying side to side, shoulder shrugging  COORDINATION: no dysmetria with FTN    Assessment:  Kamilah Dee is a 36 year old female with chronic motor tic disorder with worsening in the past weeks to months. We discussed therapeutic options for the treatment of tic disorders, however, she doesn't want to start a new medication at this time due to concerns of potential side effects.    1.  Chronic motor tic disorder: She has battled this since 14 years old. She got benefit from the Ingrezza 40 mg a day.  She took this under the direction of Dr. Latoya Juarez. She then went through a period of severe stress and her tics got worse.  She did not know if it was the stress or the medicine making her worse so she stopped the Ingrezza.  She has remained off this med since.  2.  History of depression and anxiety and is not following with mental health    Plan:   - Will refer patient to Dr. Yennifer Bush, PhD, LP, for treatment of chronic motor tic disorder.   - Psychiatry referral for management of mood disorders  - If she were to become interested in starting therapeutic treatment for her tics, would consider reinstituting the Ingrezza or considering topiramate.  I would be reluctant to use neuroleptics in a young woman due to risk of developing tardive dyskinesia.      Patient to return in 3 months, for in-person visit, 30 minutes.     I have reviewed the note as documented above.  This accurately  captures the substance of my conversation with the patient.    64 minutes spent on date of encounter doing chart reviews and exam and documentation and further activities as noted above.     Contact time:  Start: 04/13/2021 01:09 pm   Stop: 04/13/2021 01:32 pm    Medina Ackerman DO, MA   of Neurology   [facility]     Kamilah is a 36 year old who is being evaluated via a billable video visit.      How would you like to obtain your AVS? MYCHART  If the video visit is dropped, the invitation should be resent by: PLEASE SEND LINK [phone]  Will anyone else be joining your video visit? NO      Video Start/End Time:  Start: 04/13/2021 01:09 pm   Stop: 04/13/2021 01:32 pm    Video-Visit Details    Type of service:  Video Visit      Originating Location (pt. Location): Other car     Distant Location (provider location):  [facility] [place]     Platform used for Video Visit: [facility]

## 2021-04-13 NOTE — PROGRESS NOTES
Kamilah is a 36 year old who is being evaluated via a billable video visit.      How would you like to obtain your AVS? [employer]  If the video visit is dropped, the invitation should be resent by: PLEASE SEND LINK [phone]  Will anyone else be joining your video visit? NO      Video Start/End Time:  Start: 04/13/2021 01:09 pm   Stop: 04/13/2021 01:32 pm    Video-Visit Details    Type of service:  Video Visit      Originating Location (pt. Location): Other car     Distant Location (provider location):  [facility]OLOG[facility] [place]     Platform used for Video Visit: [employer]

## 2021-04-13 NOTE — PATIENT INSTRUCTIONS
Plan:   - Will refer patient to Dr. Yennifer Bush, PhD, LP, for treatment of chronic motor tic disorder.   - Psychiatry referral for management of mood disorders, namely depression and anxiety       Patient to return in 3 months, for in-person visit, 30 minutes.

## 2021-04-13 NOTE — PROGRESS NOTES
MOVEMENT DISORDERS TELEMEDICINE (video and/or telephone) VISIT:     PATIENT: Kamilah Dee    : 1985    DATE: 21    REASON FOR VISIT:  Motor tic disorder follow up     After a review of the patient s situation, this visit was changed from an in-person visit to a Telemedicine visit to reduce the risk of COVID-19 exposure. The patient is being evaluated via a billable Telemedicine visit.    Ms. Dee is a 36 year old female that presents via Telemedicine for follow up.    Patient was last seen on 10/13/20 with Dr. Powell for Motor tic disorder. She had reported benefit from the Ingrezza 40 mg a day.  She took this under the direction of Dr. Latoya Juarez. She then went through a period of severe stress and her tics got worse.  She did not know if it was the stress or the medicine making her worse so she stopped the Ingrezza.  She has remained off this med since.    Dr. Joseph documented that patient's tics are worsening on her 21 follow up and thus patient was asked to follow up with Neurology.     History obtained from patient. Patient reports her tics were not bad for the last couple years thus had not scheduled follow up.  She states that her tics have been worse in the past few weeks to months and has been missing work as a result.  She currently works at [employer] and has been for the past 17 years.  She is worried about losing her job.  She is in the process of applying for intermittent leave of absence from work.  She shares that her stress has been worse lately.  She acknowledges that stress and anxiety make her tics worse and it becomes a vicious cycle in which the tics worsen which then also make her stress and anxiety worse as a result.  She is not following with mental health at this time. She denies history of a subsequent compulsive disorder.  She has 2 kids with special needs.  She has had to put them through home schooling due to COVID-19.  She reports that the tics make it  embarrassing for her to be around friends and family.  She also reports that her children innocently point out her tics which can be distressing.  She states that the tics occur every day and make it to that she is an uncomfortable lifestyle.  She finds it hard to concentrate when tics are bad. Has a hard time sleeping when tics are bad due to constant movements.    Her tics involve hyperkinetic movements of her head, shoulders, eyes.  When her tics are bad, these movements are consistent.  She denies loss of consciousness associate with these events.      She is currently worried about starting any medication due to possible side effects.  With Ingrezza, she reported feeling sick and weak on this medication but is uncertain if this was due to the medication or something else.  She equated the symptoms to the Ingrezza and was part of the reason why she stopped this medication.     I have reviewed and updated the patient's Past Medical History, Social History, Family History and Medication List.    Medications:  Current Outpatient Medications   Medication Sig Dispense Refill     escitalopram (LEXAPRO) 20 MG tablet Take 1 tablet (20 mg) by mouth daily 30 tablet 11     fluocinonide (LIDEX) 0.05 % external solution Apply to scalp BID x 3-4 weeks PRN 50 mL 3     LORazepam (ATIVAN) 0.5 MG tablet Take 0.5 mg by mouth as needed       Multiple Vitamins-Minerals (MULTIVITAMIN PO) Take by mouth daily        Allergies:  Remeron [mirtazapine]    Physical Exam:  GENERAL: alert, active, attentive, appropriately groomed   HEENT: normocephalic, eyes open with no discharge  CHEST: non labored breathing   PSYCH: mood stable     Neurologic Exam:  MENTAL STATUS: Alert and oriented to person, place, time, and situation. Follows commands. Recent and remote memory intact. Attention span and concentration intact. Fund of knowledge intact to current events.  SPEECH: Fluent, intact comprehension and articulation  CN: visual fields intact,  EOMIB, facial movement symmetric, hearing grossly intact to conversation   MOTOR: Moves BUE extremities equally against gravity without difficulty  Involuntary movements:  Intermittent and repetitive eye blinking, head swaying side to side, shoulder shrugging  COORDINATION: no dysmetria with FTN    Assessment:  Kamilah Dee is a 36 year old female with chronic motor tic disorder with worsening in the past weeks to months. We discussed therapeutic options for the treatment of tic disorders, however, she doesn't want to start a new medication at this time due to concerns of potential side effects.    1.  Chronic motor tic disorder: She has battled this since 14 years old. She got benefit from the Ingrezza 40 mg a day.  She took this under the direction of Dr. Latoya Juarez. She then went through a period of severe stress and her tics got worse.  She did not know if it was the stress or the medicine making her worse so she stopped the Ingrezza.  She has remained off this med since.  2.  History of depression and anxiety and is not following with mental health    Plan:   - Will refer patient to Dr. Yennifer Bush, PhD, LP, for treatment of chronic motor tic disorder.   - Psychiatry referral for management of mood disorders  - If she were to become interested in starting therapeutic treatment for her tics, would consider reinstituting the Ingrezza or considering topiramate.  I would be reluctant to use neuroleptics in a young woman due to risk of developing tardive dyskinesia.      Patient to return in 3 months, for in-person visit, 30 minutes.     I have reviewed the note as documented above.  This accurately captures the substance of my conversation with the patient.    64 minutes spent on date of encounter doing chart reviews and exam and documentation and further activities as noted above.     Contact time:  Start: 04/13/2021 01:09 pm   Stop: 04/13/2021 01:32 pm    Medina Ackerman DO, MA   of  Neurology   [facility]

## 2021-04-14 NOTE — TELEPHONE ENCOUNTER
Form faxed to [employer] at [phone]. Copy made and sent to be scanned into patients chart. Original placed at IM  for .

## 2021-04-14 NOTE — TELEPHONE ENCOUNTER
Left message for pt to call back and let us know if she wants form faxed to [employer] or if she wants to pick them up.

## 2021-05-24 ENCOUNTER — OFFICE VISIT (OUTPATIENT)
Dept: INTERNAL MEDICINE | Facility: CLINIC | Age: 36
End: 2021-05-24
Payer: COMMERCIAL

## 2021-05-24 VITALS
HEART RATE: 90 BPM | BODY MASS INDEX: 22.42 KG/M2 | DIASTOLIC BLOOD PRESSURE: 70 MMHG | TEMPERATURE: 98.7 F | OXYGEN SATURATION: 98 % | SYSTOLIC BLOOD PRESSURE: 110 MMHG | WEIGHT: 138.9 LBS

## 2021-05-24 DIAGNOSIS — R14.3 FLATULENCE: ICD-10-CM

## 2021-05-24 DIAGNOSIS — K59.00 CONSTIPATION, UNSPECIFIED CONSTIPATION TYPE: Primary | ICD-10-CM

## 2021-05-24 DIAGNOSIS — L98.9 SKIN LESION: ICD-10-CM

## 2021-05-24 PROCEDURE — 99214 OFFICE O/P EST MOD 30 MIN: CPT | Performed by: INTERNAL MEDICINE

## 2021-05-24 RX ORDER — POLYETHYLENE GLYCOL 3350 17 G/17G
17 POWDER, FOR SOLUTION ORAL DAILY
Qty: 510 G | Refills: 0 | Status: SHIPPED | OUTPATIENT
Start: 2021-05-24 | End: 2021-07-30

## 2021-05-24 NOTE — PROGRESS NOTES
ASSESSMENT/PLAN                                                      (K59.00) Constipation, unspecified constipation type  (primary encounter diagnosis)  (R14.3) Flatulence  Plan: mag citrate x 1 dose prescribed to clear out colon fully; followed by daily Miralax and Metamucil for 2-4 weeks to achieve regularity; may try coming off of Miralax and Metamucil after that, but may continue indefinitely, safely, if needed.    (L98.9) Skin lesion  Comment: anterior to right ear - suspect benign vascular malformation.   Plan: dermatology referral placed - patient to schedule.     La Ferris MD   [place] [address] [address] [place] [address]  T: [phone], F: [phone]    SUBJECTIVE                                                      Kamilah Dee is a very pleasant 36 year old female who presents with constipation and excess gas:    Ongoing for the last 6 months or so. Patient has daily bowel movements, but these are often smaller and harder than they were before. Patient also reports a loose/watery bowel movement at least daily or every other day. Associated abdominal cramping, excessive flatulence, and rectal urgency, but no fecal incontinence. Patient has been using Dulcolax intermittently with modest, but brief improvement in symptoms.    No melena, hematochezia, or mucus.  No nausea or vomiting.  No anorexia, unintentional weight loss, or night sweats.    No recent change in diet, medications (OTC or prescription), or activity level.  Patient believes she gets plenty of fiber in her diet.    No history of abdominal surgeries.    Unrelated to above, patient inquires about a skin lesion just anterior to her right ear. Skin lesion was initially reddish/purplish, but is now dark purple/blue. Has always been asymptomatic.     OBJECTIVE                                                      /70   Pulse 90   Temp 98.7  F (37.1  C) (Tympanic)   Wt 63 kg (138 lb 14.4 oz)   LMP  05/20/2021 (Exact Date)   SpO2 98%   BMI 22.42 kg/m    Constitutional: well-appearing  Gastrointestinal: soft, non-tender, and non-distended; no organomegaly or masses  Integumentary: slightly raised area anterior to right tragus with central, dark blue subcutaneous pigmentation    ---    (Note documentation was completed, in part, with my[employer] voice-recognition software. Documentation was reviewed, but some grammatical, spelling, and word errors may remain.)

## 2021-05-24 NOTE — PATIENT INSTRUCTIONS
Drink chilled magnesium citrate all at once on a day you can be home and near the bathroom for ~6-8 hours.    ---    Starting 2-3 days later please start the following regimen:    Miralax daily + Metamucil daily.    Continue consistently for at least 2-4 weeks.    Can try coming off at that point, but safe to continue longer if needed.

## 2021-06-08 ENCOUNTER — VIRTUAL VISIT (OUTPATIENT)
Dept: INTERNAL MEDICINE | Facility: CLINIC | Age: 36
End: 2021-06-08
Payer: COMMERCIAL

## 2021-06-08 DIAGNOSIS — F32.0 MILD MAJOR DEPRESSION (H): ICD-10-CM

## 2021-06-08 DIAGNOSIS — F41.9 ANXIETY: ICD-10-CM

## 2021-06-08 DIAGNOSIS — F95.9 TIC DISORDER: ICD-10-CM

## 2021-06-08 PROCEDURE — 99214 OFFICE O/P EST MOD 30 MIN: CPT | Mod: TEL | Performed by: INTERNAL MEDICINE

## 2021-06-08 RX ORDER — LORAZEPAM 0.5 MG/1
TABLET ORAL
Qty: 30 TABLET | Refills: 0 | Status: SHIPPED | OUTPATIENT
Start: 2021-06-08 | End: 2021-09-29

## 2021-06-08 ASSESSMENT — ANXIETY QUESTIONNAIRES
3. WORRYING TOO MUCH ABOUT DIFFERENT THINGS: SEVERAL DAYS
GAD7 TOTAL SCORE: 4
5. BEING SO RESTLESS THAT IT IS HARD TO SIT STILL: NOT AT ALL
2. NOT BEING ABLE TO STOP OR CONTROL WORRYING: SEVERAL DAYS
IF YOU CHECKED OFF ANY PROBLEMS ON THIS QUESTIONNAIRE, HOW DIFFICULT HAVE THESE PROBLEMS MADE IT FOR YOU TO DO YOUR WORK, TAKE CARE OF THINGS AT HOME, OR GET ALONG WITH OTHER PEOPLE: SOMEWHAT DIFFICULT
7. FEELING AFRAID AS IF SOMETHING AWFUL MIGHT HAPPEN: NOT AT ALL
6. BECOMING EASILY ANNOYED OR IRRITABLE: NOT AT ALL
1. FEELING NERVOUS, ANXIOUS, OR ON EDGE: SEVERAL DAYS

## 2021-06-08 ASSESSMENT — PATIENT HEALTH QUESTIONNAIRE - PHQ9
5. POOR APPETITE OR OVEREATING: SEVERAL DAYS
SUM OF ALL RESPONSES TO PHQ QUESTIONS 1-9: 12

## 2021-06-08 NOTE — PATIENT INSTRUCTIONS
Here a list of psychiatrists you can try depending on insurance coverage:    1.  [facility]  [address] [place]  [address]  [phone]    2. [facility]  [address], [address]  [place]  [address]  Phone:  [phone]    3. [facility] [employer]  Multiple locations  [phone]    Call the psychiatry clinics to get an appointment scheduled for them in the next few weeks.  After your appointment is made, then send me a note in [employer] updating me regarding when you will see a psychiatrist.  Lorazepam has been refilled for #30 tablets to use 1 tablet twice a day as needed for severe anxiety.  Do not drive for 3 hours after taking this medication or longer if feeling increasingly fatigued  Follow-up with neurology (Dr Ackerman) in July for 3-month follow-up  Continue current medications  Consider counseling/therapy for mental health.  Either arrange through  Psychiatrist clinic or I can refer you to [facility] counseling   Would recommend you receive a coronavirus/COVID-19 vaccination. Schedule the vaccination through [facility] by calling [phone]  or through local pharmacy websites or through the [place] Dept of Health website

## 2021-06-08 NOTE — PROGRESS NOTES
"Kamilah is a 36 year old  who is being evaluated via a billable telephone visit.   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 We have found that certain health care needs can be provided without the need for a physical exam.  This service lets us provide the care you need with a short phone conversation.  If a prescription is necessary we can send it directly to your pharmacy.  If lab work is needed we can place an order for that and you can then stop by our lab to have the test done at a later time.    Telephone visits are billed at different rates depending on your insurance coverage. During this emergency period, for some insurers they may be billed the same as an in-person visit.  Please reach out to your insurance provider with any questions.    If during the course of the call the physician/provider feels a telephone visit is not appropriate, you will not be charged for this service.\"    Patient has given verbal consent for Telephone visit?  Yes    What phone number would you like to be contacted at? [phone]    How would you like to obtain your AVS? Mychart       ASSESSMENT:   1. Tic disorder  Chronic issue with occasional flares would anxiety seems to worsen.  Patient will follow up with neurology in July.  If symptoms persisting as mental health hopefully improves, neurology discussing possible retrial of Ingrezza.  Lorazepam refilled for short-term use  - LORazepam (ATIVAN) 0.5 MG tablet; Take 1 tablet (0.5 mg) by mouth 2 times daily as needed for anxiety  Dispense: 30 tablet; Refill: 0    2. Anxiety  Occasional flares.  On Lexapro 20 mg daily.  Patient to see psychiatry to establish care.  Lorazepam refilled short-term.  Continue " Lexapro.  Will defer more long-term management of mental health issues to the patient psychiatrist.  Patient declines referral to [facility] at this time to work with a therapist due to constraints with fitting appointments and with her work schedule  - LORazepam (ATIVAN) 0.5 MG tablet; Take 1 tablet (0.5 mg) by mouth 2 times daily as needed for anxiety  Dispense: 30 tablet; Refill: 0    3. Mild major depression (H)  PHQ elevated.  No suicidal ideation.  Continue Lexapro.  Patient to see psychiatry as below to establish care      PLAN:   Here a list of psychiatrists you can try depending on insurance coverage:    1.  [facility]  [address] [place] [address]  [phone]    2. [facility]  [address], [address]  [place]  [address]  Phone:  [phone]    3. [facility]  Multiple locations  [phone]    Call the psychiatry clinics to get an appointment scheduled for them in the next few weeks.  After your appointment is made, then send me a note in [employer] updating me regarding when you will see a psychiatrist.  Lorazepam has been refilled for #30 tablets to use 1 tablet twice a day as needed for severe anxiety.  Do not drive for 3 hours after taking this medication or longer if feeling increasingly fatigued  Follow-up with neurology (Dr Ackerman) in July for 3-month follow-up  Continue current medications  Consider counseling/therapy for mental health.  Either arrange through  Psychiatrist clinic or I can refer you to [facility]  Would recommend you receive a coronavirus/COVID-19 vaccination. Schedule the vaccination through [facility] by calling [phone]  or through local pharmacy websites or through the [place] Dept of Health website          Phone call contact time  Call Started at 2:20pm  Call Ended at 2:38 pm  Total minutes: 18 min    (Chart documentation was completed, in part, with [employer] voice-recognition software. Even though  reviewed, some grammatical, spelling, and word errors may remain.)    Mario Joseph MD  Internal Medicine Department  [facility] Triaan is a 36 year old who presents for the following health issues     HPI   Chief Complaint   Patient presents with     Anxiety     Patient has been having Panic/Anxiety  Attacks     Movement Disorder     Gets worse with Anxiety     Depression and Anxiety Follow-Up    How are you doing with your depression since your last visit? No change    How are you doing with your anxiety since your last visit?  No change    Are you having other symptoms that might be associated with depression or anxiety? Yes:  SOB, Dizziness, Tics get worse    Have you had a significant life event? No     Do you have any concerns with your use of alcohol or other drugs? No    Social History     Tobacco Use     Smoking status: Former Smoker     Quit date: 2010     Years since quittin.0     Smokeless tobacco: Never Used   Substance Use Topics     Alcohol use: Yes     Alcohol/week: 0.0 standard drinks     Frequency: 2-4 times a month     Drinks per session: 1 or 2     Binge frequency: Less than monthly     Comment: occasionally 1-2 etoh a month/     Drug use: No     Comment: marijuana hasn't used in 3 years, prior use of ampht, LSD, crack & cocaine. Sober since age 18      PHQ 2020 3/13/2020 2021   PHQ-9 Total Score 11 10 12   Q9: Thoughts of better off dead/self-harm past 2 weeks Not at all Not at all Not at all     HAMIDA-7 SCORE 2019   Total Score - - -   Total Score 18 11 4     Last PHQ-9 2021   1.  Little interest or pleasure in doing things 1   2.  Feeling down, depressed, or hopeless 1   3.  Trouble falling or staying asleep, or sleeping too much 3   4.  Feeling tired or having little energy 3   5.  Poor appetite or overeating 1   6.  Feeling bad about yourself 0   7.  Trouble concentrating 3   8.  Moving slowly  or restless 0   Q9: Thoughts of better off dead/self-harm past 2 weeks 0   PHQ-9 Total Score 12   Difficulty at work, home, or with people Somewhat difficult     HAMIDA-7  6/8/2021   1. Feeling nervous, anxious, or on edge 1   2. Not being able to stop or control worrying 1   3. Worrying too much about different things 1   4. Trouble relaxing 1   5. Being so restless that it is hard to sit still 0   6. Becoming easily annoyed or irritable 0   7. Feeling afraid, as if something awful might happen 0   HAMIDA-7 Total Score 4   If you checked any problems, how difficult have they made it for you to do your work, take care of things at home, or get along with other people? Somewhat difficult        Most recent lab results reviewed with pt.      Patient last saw neurology in April.  Note reviewed.  Patient was referred on to psychiatry and also to a tic movement disorder provider by that neurologist.  Patient states that she received a phone call from someone stating that the provider only care of pediatric patients.  Patient thinks this was related to the tic movement disorder issue and not related to mental health but she states that she was dizzy at the time of the phone call and does not remember details well and never followed up with it.  Patient has not established care with psychiatry.  Patient feels that she might benefit from counseling in the future regarding mental health but states she is generally too busy with working and caring for her 2 children who have special needs and also have mental health issues that patient states would benefit from family counseling also.  Patient has not brought this up with the children's doctor.  Take issues are chronic and tend to flare when patient has increasing stress.  Patient states she had been using lorazepam fairly sparingly until last week when she used it once a day 4 days in a row when she was having increased stress with her children and work and states that her tic was  significantly better after that.  Patient's tic movements have been much better the last 3 days now without use of lorazepam.  Patient requests refill lorazepam to use as needed.  Has previously not had anxiety symptoms controlled with use of buspirone in addition to SNRI.  Patient had gabapentin previously but only at 300 mg at bedtime and dose was never increased further before she discontinued it without side effects.  Patient denies any suicidal ideation  Currently on Lexapro 20 mg daily.  Denies side effects of medication         Additional ROS:   Constitutional, HEENT, Cardiovascular, Pulmonary, GI and , Neuro, MSK and Psych review of systems/symptoms are otherwise negative or unchanged from previous, except as noted above.           Objective:  Any reported vitals from today were reviewed in chart. See nursing documentation for details    RESP: No cough, no audible wheezing, able to talk in full sentences  GEN: Normal affect. Normal though content/speech  Remainder of exam unable to be completed due to telephone visits            Here a list of psychiatrists you can try depending on insurance coverage:    1.  [facility]  [address] [place]  [address]  [phone]    2. [facility]  [address], [address]  [place]  [address]  Phone:  [phone]    3. [facility]  Multiple locations  [phone]

## 2021-06-09 PROBLEM — R42 DIZZINESS: Status: RESOLVED | Noted: 2021-04-05 | Resolved: 2021-06-09

## 2021-06-09 ASSESSMENT — ANXIETY QUESTIONNAIRES: GAD7 TOTAL SCORE: 4

## 2021-06-11 ENCOUNTER — MYC MEDICAL ADVICE (OUTPATIENT)
Dept: INTERNAL MEDICINE | Facility: CLINIC | Age: 36
End: 2021-06-11

## 2021-06-11 DIAGNOSIS — F32.0 MAJOR DEPRESSIVE DISORDER, SINGLE EPISODE, MILD (H): Primary | ICD-10-CM

## 2021-06-11 DIAGNOSIS — F95.9 TIC DISORDER: ICD-10-CM

## 2021-06-11 DIAGNOSIS — F41.9 ANXIETY: ICD-10-CM

## 2021-06-11 NOTE — TELEPHONE ENCOUNTER
PCP please see Mychart message. Patient having hard time trying to find a psychiatrist even after this weeks visit. Triage advised calling insurance for more in network facilities. Any further suggestions. Should we also attach referrals?    Dulce Maria CANTU, RN, PHN

## 2021-06-14 NOTE — TELEPHONE ENCOUNTER
See MyChart response to patient.  Referral for counseling and psychiatry placed in the epic chart so central schedulers will contact patient

## 2021-07-30 ENCOUNTER — OFFICE VISIT (OUTPATIENT)
Dept: INTERNAL MEDICINE | Facility: CLINIC | Age: 36
End: 2021-07-30
Payer: COMMERCIAL

## 2021-07-30 VITALS
SYSTOLIC BLOOD PRESSURE: 112 MMHG | HEART RATE: 72 BPM | HEIGHT: 66 IN | WEIGHT: 139.6 LBS | RESPIRATION RATE: 18 BRPM | OXYGEN SATURATION: 100 % | BODY MASS INDEX: 22.43 KG/M2 | TEMPERATURE: 98.9 F | DIASTOLIC BLOOD PRESSURE: 74 MMHG

## 2021-07-30 DIAGNOSIS — F41.1 GAD (GENERALIZED ANXIETY DISORDER): ICD-10-CM

## 2021-07-30 DIAGNOSIS — F95.9 TIC DISORDER: ICD-10-CM

## 2021-07-30 DIAGNOSIS — Z01.818 PREOP GENERAL PHYSICAL EXAM: Primary | ICD-10-CM

## 2021-07-30 DIAGNOSIS — F32.0 MAJOR DEPRESSIVE DISORDER, SINGLE EPISODE, MILD (H): ICD-10-CM

## 2021-07-30 DIAGNOSIS — L98.7 EXCESS SKIN OF ABDOMEN: ICD-10-CM

## 2021-07-30 LAB — HGB BLD-MCNC: 14.4 G/DL (ref 11.7–15.7)

## 2021-07-30 PROCEDURE — 36415 COLL VENOUS BLD VENIPUNCTURE: CPT | Performed by: PHYSICIAN ASSISTANT

## 2021-07-30 PROCEDURE — 85018 HEMOGLOBIN: CPT | Performed by: PHYSICIAN ASSISTANT

## 2021-07-30 PROCEDURE — 99214 OFFICE O/P EST MOD 30 MIN: CPT | Performed by: PHYSICIAN ASSISTANT

## 2021-07-30 ASSESSMENT — MIFFLIN-ST. JEOR: SCORE: 1334.97

## 2021-07-30 NOTE — PROGRESS NOTES
[address] [address]  Phone: [phone]  Primary Provider: Mario Joseph  Pre-op Performing Provider: KAREN BOLIVAR      PREOPERATIVE EVALUATION:  Today's date: 7/30/2021    Kamilah Dee is a 36 year old female who presents for a preoperative evaluation.    Surgical Information:  Surgery/Procedure:-abdominoplasty   Surgery Location: [facility]  Surgeon: Dr Yoon  Surgery Date: 08/26/2021  Time of Surgery: TBD  Where patient plans to recover: At home with family  Fax number for surgical facility: [phone]    Type of Anesthesia Anticipated: General    Assessment & Plan     The proposed surgical procedure is considered INTERMEDIATE risk.    Preop general physical exam    - Hemoglobin; Future  - Hemoglobin    Excess skin of abdomen    - Hemoglobin; Future  - Hemoglobin    HAMIDA (generalized anxiety disorder)      Major depressive disorder, single episode, mild (H)    Tic disorder            Risks and Recommendations:  The patient has the following additional risks and recommendations for perioperative complications:   - No identified additional risk factors other than previously addressed    Medication Instructions:  Patient is to take all scheduled medications on the day of surgery    RECOMMENDATION:  APPROVAL GIVEN to proceed with proposed procedure, without further diagnostic evaluation.                      Subjective     HPI related to upcoming procedure: loose skin abdominal wall        Preop Questions 7/30/2021   1. Have you ever had a heart attack or stroke? No   2. Have you ever had surgery on your heart or blood vessels, such as a stent placement, a coronary artery bypass, or surgery on an artery in your head, neck, heart, or legs? No   3. Do you have chest pain with activity? No   4. Do you have a history of  heart failure? No   5. Do you currently have a cold, bronchitis or symptoms of other infection? No   6.  Do you have a cough, shortness of breath, or wheezing? No   7. Do you or anyone in your family have previous history of blood clots? UNKNOWN - no personal hx    8. Do you or does anyone in your family have a serious bleeding problem such as prolonged bleeding following surgeries or cuts? No   9. Have you ever had problems with anemia or been told to take iron pills? No   10. Have you had any abnormal blood loss such as black, tarry or bloody stools, or abnormal vaginal bleeding? No   11. Have you ever had a blood transfusion? No   12. Are you willing to have a blood transfusion if it is medically needed before, during, or after your surgery? Yes   13. Have you or any of your relatives ever had problems with anesthesia? No   14. Do you have sleep apnea, excessive snoring or daytime drowsiness? No hx of sleep apnea    15. Do you have any artifical heart valves or other implanted medical devices like a pacemaker, defibrillator, or continuous glucose monitor? No   16. Do you have artificial joints? No   17. Are you allergic to latex? No   18. Is there any chance that you may be pregnant? No     Health Care Directive:  Patient does not have a Health Care Directive or Living Will:     Preoperative Review of :   reviewed - controlled substances reflected in medication list.      Status of Chronic Conditions:  DEPRESSION/anxiety  - Patient has a long history of Depression of moderate severity requiring medication for control with recent symptoms being stable..Current symptoms of depression include none.       Review of Systems  CONSTITUTIONAL: NEGATIVE for fever, chills, change in weight  INTEGUMENTARY/SKIN: NEGATIVE for worrisome rashes, moles or lesions  EYES: NEGATIVE for vision changes or irritation  ENT/MOUTH: NEGATIVE for ear, mouth and throat problems  RESP: NEGATIVE for significant cough or SOB  CV: NEGATIVE for chest pain, palpitations or peripheral edema  GI: NEGATIVE for nausea, abdominal pain, heartburn,  or change in bowel habits  MUSCULOSKELETAL: NEGATIVE for significant arthralgias or myalgia  ENDOCRINE: NEGATIVE for temperature intolerance, skin/hair changes  HEME/ALLERGY/IMMUNE: NEGATIVE for bleeding problems  PSYCHIATRIC: NEGATIVE for changes in mood or affect  ROS otherwise negative    Patient Active Problem List    Diagnosis Date Noted     Sterilization 06/18/2020     Priority: Medium     Added automatically from request for surgery [id number]       Family history of breast cancer 02/06/2020     Priority: Medium     ASCUS of cervix with negative high risk HPV 09/06/2018     Priority: Medium     '03, '05 , '11 NIL pap  4/15/14 NIL pap, Neg HPV  12/11/15 NIL pap, + HR HPV (not 16 or 18) >> 1/11/16 Colpo: ECC-neg  12/12/16 NIL pap, Neg HPV  9/6/18 ASCUS pap, Neg HPV.  Plan: cotest in 3 years per guidelines, but pt may request annual pap         Major depressive disorder, single episode, mild (H) 04/11/2016     Priority: Medium     HAMIDA (generalized anxiety disorder) 01/21/2014     Priority: Medium     Migraine 08/03/2012     Priority: Medium     Health Care Home 01/18/2012     Priority: Medium     X  EMERGENCY CARE PLAN  Presenting Problem Signs and Symptoms Treatment Plan    Questions or concerns during clinic hours    I will call the clinic directly     Questions or concerns outside clinic hours    I will call the 24 hour nurse line at [phone]    Patient needs to schedule an appointment    I will call the 24 hour scheduling team at [phone] or clinic directly    Same day treatment     I will call the clinic first, nurse line if after hours, urgent care and express care if needed                            DX V65.8 REPLACED WITH [id number] HEALTH CARE HOME (04/08/2013)       Tic disorder 06/14/2010     Priority: Medium      Past Medical History:   Diagnosis Date     Anemia     with pregnancy     Anxiety 1/21/2014     Depo-Provera contraceptive status      Depression      History of varicella-documented immunity  "2011     Migraine 8/3/2012     Other, mixed, or unspecified nondependent drug abuse, episodic      Tic age 14     Past Surgical History:   Procedure Laterality Date     HC REMOVAL OF TONSILS,11+ Y/O      Tonsils 11+y.o.     Current Outpatient Medications   Medication Sig Dispense Refill     escitalopram (LEXAPRO) 20 MG tablet Take 1 tablet (20 mg) by mouth daily 30 tablet 11     LORazepam (ATIVAN) 0.5 MG tablet Take 1 tablet (0.5 mg) by mouth 2 times daily as needed for anxiety 30 tablet 0     psyllium (METAMUCIL/KONSYL) capsule Take 1 capsule by mouth daily (Patient not taking: Reported on 2021) 90 capsule 3       Allergies   Allergen Reactions     Remeron [Mirtazapine]      Excess sedation even with 7.5mg dose        Social History     Tobacco Use     Smoking status: Former Smoker     Quit date: 2010     Years since quittin.1     Smokeless tobacco: Never Used   Substance Use Topics     Alcohol use: Yes     Alcohol/week: 0.0 standard drinks     Comment: occasionally 1-2 etoh a month/       History   Drug Use No     Comment: marijuana hasn't used in 3 years, prior use of ampht, LSD, crack & cocaine. Sober since age 18          Objective     /74   Pulse 72   Temp 98.9  F (37.2  C) (Tympanic)   Resp 18   Ht 1.676 m (5' 6\")   Wt 63.3 kg (139 lb 9.6 oz)   LMP 2021 (Approximate)   SpO2 100%   BMI 22.53 kg/m      Physical Exam  GENERAL APPEARANCE: healthy, alert and no distress  HENT: ear canals and TM's normal and nose and mouth without ulcers or lesions  RESP: lungs clear to auscultation - no rales, rhonchi or wheezes  CV: regular rate and rhythm, normal S1 S2, no S3 or S4 and no murmur, click or rub   ABDOMEN: soft, nontender, no HSM or masses and bowel sounds normal  NEURO: Normal strength and tone, sensory exam grossly normal, mentation intact and speech normal  PSYCH: mentation appears normal and affect normal/bright    Recent Labs   Lab Test 21  1746 20  1143 "   HGB 13.3 14.0    246    138   POTASSIUM 3.6 4.0   CR 0.70 0.71        Diagnostics:  Recent Results (from the past 24 hour(s))   Hemoglobin    Collection Time: 07/30/21  3:39 PM   Result Value Ref Range    Hemoglobin 14.4 11.7 - 15.7 g/dL      No EKG required, no history of coronary heart disease, significant arrhythmia, peripheral arterial disease or other structural heart disease.    Revised Cardiac Risk Index (RCRI):  The patient has the following serious cardiovascular risks for perioperative complications:   - No serious cardiac risks = 0 points     RCRI Interpretation: 0 points: Class I (very low risk - 0.4% complication rate)           Signed Electronically by: Lisseth Johnson PA-C  Copy of this evaluation report is provided to requesting physician.

## 2021-07-30 NOTE — PATIENT INSTRUCTIONS
Take usual morning medications with a sip of water  Preparing for Your Surgery  Getting started  A nurse will call you to review your health history and instructions. They will give you an arrival time based on your scheduled surgery time.  Please be ready to share the following:    Your doctor's clinic name and phone number    Your medical, surgical and anesthesia history    A list of allergies and sensitivities    A list of medicines, including herbal treatments and over-the-counter drugs    Whether the patient has a legal guardian (ask how to send us the papers in advance)  If you have a child who's having surgery, please ask for a copy of Preparing for Your Child's Surgery.    Preparing for surgery    Within 30 days of surgery: Have a pre-op exam (sometimes called an H&P, or History and Physical). This can be done at a clinic or pre-operative center.  ? If you're having a , you may not need this exam. Talk to your care team    At your pre-op exam, talk to your care team about all medicines you take. If you need to stop any medicines before surgery, ask when to start taking them again.  ? We do this for your safety. Many medicines can make you bleed too much during surgery. Some change how well surgery (anesthesia) drugs work.    Call your insurance company to let them know you're having surgery. (If you don't have insurance, call [phone].)    Call your clinic if there's any change in your health. This includes signs of a cold or flu (sore throat, runny nose, cough, rash, fever). It also includes a scrape or scratch near the surgery site.    If you have questions on the day of surgery, call your hospital or surgery center.  Eating and drinking guidelines  For your safety: Unless your surgeon tells you otherwise, follow the guidelines below.    Eat and drink as usual until 8 hours before surgery. After that, no food or milk.    Drink clear liquids until 2 hours before surgery. These are liquids you  can see through, like water, Gatorade and Propel Water. You may also have black coffee and tea (no cream or milk).    Nothing by mouth within 2 hours of surgery. This includes gum, candy and breath mints.    If you drink, stop drinking alcohol the night before surgery.    If your care team tells you to take medicine on the morning of surgery, it's okay to take it with a sip of water.  Preventing infection    Shower or bathe the night before and morning of your surgery. Follow the instructions your clinic gave you. (If no instructions, use regular soap.)    Don't shave or clip hair near your surgery site. We'll remove the hair if needed.    Don't smoke or vape the morning of surgery. You may chew nicotine gum up to 2 hours before surgery. A nicotine patch is okay.  ? Note: Some surgeries require you to completely quit smoking and nicotine. Check with your surgeon.    Your care team will make every effort to keep you safe from infection. We will:  ? Clean our hands often with soap and water (or an alcohol-based hand rub).  ? Clean the skin at your surgery site with a special soap that kills germs.  ? Give you a special gown to keep you warm. (Cold raises the risk of infection.)  ? Wear special hair covers, masks, gowns and gloves during surgery.  ? Give antibiotic medicine, if prescribed. Not all surgeries need antibiotics.  What to bring on the day of surgery    Photo ID and insurance card    Copy of your health care directive, if you have one    Glasses and hearing aides (bring cases)  ? You can't wear contacts during surgery    Inhaler and eye drops, if you use them (tell us about these when you arrive)    CPAP machine or breathing device, if you use them    A few personal items, if spending the night    If you have . . .  ? A pacemaker or ICD (cardiac defibrillator): Bring the ID card.  ? An implanted stimulator: Bring the remote control.  ? A legal guardian: Bring a copy of the certified (court-stamped)  guardianship papers.  Please remove any jewelry, including body piercings. Leave jewelry and other valuables at home.  If you're going home the day of surgery  Important: If you don't follow the rules below, we must cancel your surgery.     Arrange for someone to drive you home after surgery. You may not drive, take a taxi or take public transportation by yourself (unless you'll have local anesthesia only).    Arrange for a responsible adult to stay with you overnight. If you don't, we may keep you in the hospital overnight, and you may need to pay the costs yourself.  Questions?   If you have any questions for your care team, list them here: _________________________________________________________________________________________________________________________________________________________________________________________________________________________________________________________________________________________________________________________  For informational purposes only. Not to replace the advice of your health care provider. Copyright   2003, 2019 [employer]. All rights reserved. Clinically reviewed by Dinora Early MD. [employer] 459273 - REV 4/20.

## 2021-08-09 ENCOUNTER — TELEPHONE (OUTPATIENT)
Dept: NEUROLOGY | Facility: CLINIC | Age: 36
End: 2021-08-09

## 2021-08-12 ENCOUNTER — ANCILLARY PROCEDURE (OUTPATIENT)
Dept: GENERAL RADIOLOGY | Facility: CLINIC | Age: 36
End: 2021-08-12
Attending: PHYSICIAN ASSISTANT
Payer: COMMERCIAL

## 2021-08-12 ENCOUNTER — OFFICE VISIT (OUTPATIENT)
Dept: URGENT CARE | Facility: URGENT CARE | Age: 36
End: 2021-08-12
Payer: COMMERCIAL

## 2021-08-12 VITALS
HEART RATE: 89 BPM | RESPIRATION RATE: 18 BRPM | BODY MASS INDEX: 22.44 KG/M2 | OXYGEN SATURATION: 97 % | WEIGHT: 139 LBS | TEMPERATURE: 100 F | DIASTOLIC BLOOD PRESSURE: 76 MMHG | SYSTOLIC BLOOD PRESSURE: 115 MMHG

## 2021-08-12 DIAGNOSIS — R52 PAIN: ICD-10-CM

## 2021-08-12 DIAGNOSIS — K21.00 GASTROESOPHAGEAL REFLUX DISEASE WITH ESOPHAGITIS WITHOUT HEMORRHAGE: ICD-10-CM

## 2021-08-12 DIAGNOSIS — Z32.00 ENCOUNTER FOR PREGNANCY TEST: ICD-10-CM

## 2021-08-12 DIAGNOSIS — R30.0 DYSURIA: Primary | ICD-10-CM

## 2021-08-12 DIAGNOSIS — R07.89 ATYPICAL CHEST PAIN: ICD-10-CM

## 2021-08-12 LAB
ALBUMIN SERPL-MCNC: 4.1 G/DL (ref 3.4–5)
ALBUMIN UR-MCNC: NEGATIVE MG/DL
ALP SERPL-CCNC: 34 U/L (ref 40–150)
ALT SERPL W P-5'-P-CCNC: 25 U/L (ref 0–50)
ANION GAP SERPL CALCULATED.3IONS-SCNC: 1 MMOL/L (ref 3–14)
APPEARANCE UR: CLEAR
AST SERPL W P-5'-P-CCNC: 19 U/L (ref 0–45)
BASOPHILS # BLD AUTO: 0 10E3/UL (ref 0–0.2)
BASOPHILS NFR BLD AUTO: 1 %
BILIRUB SERPL-MCNC: 0.5 MG/DL (ref 0.2–1.3)
BILIRUB UR QL STRIP: NEGATIVE
BUN SERPL-MCNC: 20 MG/DL (ref 7–30)
CALCIUM SERPL-MCNC: 8.9 MG/DL (ref 8.5–10.1)
CHLORIDE BLD-SCNC: 106 MMOL/L (ref 94–109)
CO2 SERPL-SCNC: 31 MMOL/L (ref 20–32)
COLOR UR AUTO: YELLOW
CREAT SERPL-MCNC: 0.83 MG/DL (ref 0.52–1.04)
D DIMER PPP FEU-MCNC: <0.27 UG/ML FEU (ref 0–0.5)
EOSINOPHIL # BLD AUTO: 0.2 10E3/UL (ref 0–0.7)
EOSINOPHIL NFR BLD AUTO: 3 %
ERYTHROCYTE [DISTWIDTH] IN BLOOD BY AUTOMATED COUNT: 12.6 % (ref 10–15)
ERYTHROCYTE [SEDIMENTATION RATE] IN BLOOD BY WESTERGREN METHOD: 5 MM/HR (ref 0–20)
GFR SERPL CREATININE-BSD FRML MDRD: 90 ML/MIN/1.73M2
GLUCOSE BLD-MCNC: 105 MG/DL (ref 70–99)
GLUCOSE UR STRIP-MCNC: NEGATIVE MG/DL
HCG UR QL: NEGATIVE
HCT VFR BLD AUTO: 40.6 % (ref 35–47)
HGB BLD-MCNC: 13.5 G/DL (ref 11.7–15.7)
HGB UR QL STRIP: NEGATIVE
KETONES UR STRIP-MCNC: NEGATIVE MG/DL
LEUKOCYTE ESTERASE UR QL STRIP: NEGATIVE
LYMPHOCYTES # BLD AUTO: 1.8 10E3/UL (ref 0.8–5.3)
LYMPHOCYTES NFR BLD AUTO: 32 %
MCH RBC QN AUTO: 30 PG (ref 26.5–33)
MCHC RBC AUTO-ENTMCNC: 33.3 G/DL (ref 31.5–36.5)
MCV RBC AUTO: 90 FL (ref 78–100)
MONOCYTES # BLD AUTO: 0.5 10E3/UL (ref 0–1.3)
MONOCYTES NFR BLD AUTO: 8 %
NEUTROPHILS # BLD AUTO: 3.1 10E3/UL (ref 1.6–8.3)
NEUTROPHILS NFR BLD AUTO: 56 %
NITRATE UR QL: NEGATIVE
PH UR STRIP: 6.5 [PH] (ref 5–7)
PLATELET # BLD AUTO: 240 10E3/UL (ref 150–450)
POTASSIUM BLD-SCNC: 4 MMOL/L (ref 3.4–5.3)
PROT SERPL-MCNC: 7.5 G/DL (ref 6.8–8.8)
RBC # BLD AUTO: 4.5 10E6/UL (ref 3.8–5.2)
RBC #/AREA URNS AUTO: NORMAL /HPF
SODIUM SERPL-SCNC: 138 MMOL/L (ref 133–144)
SP GR UR STRIP: 1.02 (ref 1–1.03)
TROPONIN I SERPL-MCNC: <0.015 UG/L (ref 0–0.04)
UROBILINOGEN UR STRIP-ACNC: 0.2 E.U./DL
WBC # BLD AUTO: 5.6 10E3/UL (ref 4–11)
WBC #/AREA URNS AUTO: NORMAL /HPF

## 2021-08-12 PROCEDURE — 84484 ASSAY OF TROPONIN QUANT: CPT | Performed by: PHYSICIAN ASSISTANT

## 2021-08-12 PROCEDURE — 36415 COLL VENOUS BLD VENIPUNCTURE: CPT | Performed by: PHYSICIAN ASSISTANT

## 2021-08-12 PROCEDURE — 85025 COMPLETE CBC W/AUTO DIFF WBC: CPT | Performed by: PHYSICIAN ASSISTANT

## 2021-08-12 PROCEDURE — 93000 ELECTROCARDIOGRAM COMPLETE: CPT | Performed by: PHYSICIAN ASSISTANT

## 2021-08-12 PROCEDURE — 80053 COMPREHEN METABOLIC PANEL: CPT | Performed by: PHYSICIAN ASSISTANT

## 2021-08-12 PROCEDURE — 81001 URINALYSIS AUTO W/SCOPE: CPT | Performed by: PHYSICIAN ASSISTANT

## 2021-08-12 PROCEDURE — 85652 RBC SED RATE AUTOMATED: CPT | Performed by: PHYSICIAN ASSISTANT

## 2021-08-12 PROCEDURE — 99215 OFFICE O/P EST HI 40 MIN: CPT | Performed by: PHYSICIAN ASSISTANT

## 2021-08-12 PROCEDURE — 85379 FIBRIN DEGRADATION QUANT: CPT | Performed by: PHYSICIAN ASSISTANT

## 2021-08-12 PROCEDURE — 71046 X-RAY EXAM CHEST 2 VIEWS: CPT | Performed by: RADIOLOGY

## 2021-08-12 PROCEDURE — 81025 URINE PREGNANCY TEST: CPT | Performed by: PHYSICIAN ASSISTANT

## 2021-08-12 RX ORDER — ACETAMINOPHEN 500 MG
1000 TABLET ORAL ONCE
Status: COMPLETED | OUTPATIENT
Start: 2021-08-12 | End: 2021-08-12

## 2021-08-12 RX ORDER — NAPROXEN 500 MG/1
500 TABLET ORAL 2 TIMES DAILY WITH MEALS
Qty: 30 TABLET | Refills: 3 | Status: SHIPPED | OUTPATIENT
Start: 2021-08-12 | End: 2021-09-07

## 2021-08-12 RX ORDER — OMEPRAZOLE 40 MG/1
40 CAPSULE, DELAYED RELEASE ORAL DAILY
Qty: 30 CAPSULE | Refills: 0 | Status: SHIPPED | OUTPATIENT
Start: 2021-08-12 | End: 2021-09-07

## 2021-08-12 RX ADMIN — Medication 500 MG: at 17:38

## 2021-08-12 NOTE — PROGRESS NOTES
Clinic Administered Medication Documentation    Administrations This Visit     acetaminophen (TYLENOL) tablet 1,000 mg     Admin Date  08/12/2021 Action  Given Dose  500 mg Route  Oral Site   Administered By  Antoine Rowell CMA    Ordering Provider: Derick Ricketts PA-C    Patient Supplied?: No                Oral Medication Documentation    Patient was given Acetaminophen. Prior to medication administration, verified patients identity using patient s name and date of birth. Please see MAR and medication order for additional information.     Was entire amount of medication used? Yes  Expiration Date: 06/2022

## 2021-08-12 NOTE — PROGRESS NOTES
Assessment & Plan     GERD  D-dimer negative for PE  Troponin and EKG neg for cardiac event  EKG and ESR do not show signs of pericarditis  - CBC with platelets and differential; Future  - Comprehensive metabolic panel (BMP + Alb, Alk Phos, ALT, AST, Total. Bili, TP); Future  - Troponin I; Future  - D dimer, quantitative; Future  - ESR: Erythrocyte sedimentation rate; Future  - acetaminophen (TYLENOL) tablet 1,000 mg  - CBC with platelets and differential  - Comprehensive metabolic panel (BMP + Alb, Alk Phos, ALT, AST, Total. Bili, TP)  - Troponin I  - D dimer, quantitative  - ESR: Erythrocyte sedimentation rate    Pain  Chest xay Negative for acute findings of pneumonia or pneumothorax, read by Derick LOERA at time of visit.    - EKG 12-lead complete w/read - Clinics  - CBC with platelets and differential; Future  - Comprehensive metabolic panel (BMP + Alb, Alk Phos, ALT, AST, Total. Bili, TP); Future  - Troponin I; Future  - D dimer, quantitative; Future  - ESR: Erythrocyte sedimentation rate; Future  - acetaminophen (TYLENOL) tablet 1,000 mg  - CBC with platelets and differential  - Comprehensive metabolic panel (BMP + Alb, Alk Phos, ALT, AST, Total. Bili, TP)  - Troponin I  - D dimer, quantitative  - ESR: Erythrocyte sedimentation rate  - naproxen (NAPROSYN) 500 MG tablet; Take 1 tablet (500 mg) by mouth 2 times daily (with meals)    Atypical chest pain  CBC neg for anemia  D-dimer negative for PE  Troponin and EKG negative for cardiac event  - CBC with platelets and differential; Future  - Comprehensive metabolic panel (BMP + Alb, Alk Phos, ALT, AST, Total. Bili, TP); Future  - Troponin I; Future  - D dimer, quantitative; Future  - ESR: Erythrocyte sedimentation rate; Future  - acetaminophen (TYLENOL) tablet 1,000 mg  - XR Chest 2 Views  - CBC with platelets and differential  - Comprehensive metabolic panel (BMP + Alb, Alk Phos, ALT, AST, Total. Bili, TP)  - Troponin I  - D dimer, quantitative  -  ESR: Erythrocyte sedimentation rate  - naproxen (NAPROSYN) 500 MG tablet; Take 1 tablet (500 mg) by mouth 2 times daily (with meals)    Gastroesophageal reflux disease with esophagitis without hemorrhage  Omeprazole for GERD  - omeprazole (PRILOSEC) 40 MG DR capsule; Take 1 capsule (40 mg) by mouth daily    Review of external notes as documented elsewhere in note        No follow-ups on file.    FOUZ[facility] Triana is a 36 year old who presents for the following health issues     HPI     Chest pressure  Pain with breathing and movements at times  Episode of reflux this morning    Review of Systems   Constitutional, HEENT, cardiovascular, pulmonary, GI, , musculoskeletal, neuro, skin, endocrine and psych systems are negative, except as otherwise noted.      Objective    /76   Pulse 89   Temp 100  F (37.8  C)   Resp 18   Wt 63 kg (139 lb)   LMP 07/13/2021 (Approximate)   SpO2 97%   BMI 22.44 kg/m    Body mass index is 22.44 kg/m .  Physical Exam   GENERAL: healthy, alert and no distress  EYES: Eyes grossly normal to inspection, PERRL and conjunctivae and sclerae normal  HENT: ear canals and TM's normal, nose and mouth without ulcers or lesions  NECK: no adenopathy, no asymmetry, masses, or scars and thyroid normal to palpation  RESP: lungs clear to auscultation - no rales, rhonchi or wheezes  CV: regular rate and rhythm, normal S1 S2, no S3 or S4, no murmur, click or rub, no peripheral edema and peripheral pulses strong  ABDOMEN: soft, nontender, no hepatosplenomegaly, no masses and bowel sounds normal  MS: no gross musculoskeletal defects noted, no edema  SKIN: no suspicious lesions or rashes  NEURO: Normal strength and tone, mentation intact and speech normal  PSYCH: mentation appears normal, affect normal/bright    CXR - Reviewed and interpreted by me Normal- no infiltrates, effusions, pneumothoraces, cardiomegaly or masses  Pneumothorax  negative  Xray - Reviewed and interpreted by me.  n    Results for orders placed or performed in visit on 08/12/21   XR Chest 2 Views     Status: None    Narrative    XR CHEST 2 VW   8/12/2021 4:16 PM     HISTORY: Atypical chest pain    COMPARISON: Chest x-ray 1/24/2014.      Impression    IMPRESSION: PA and lateral views of the chest. Lungs are clear. Heart  is normal in size. No effusions are evident. No pneumothorax.    SAMEER BRADFORD MD         SYSTEM ID:  [id number]   CBC with platelets and differential     Status: None    Narrative    The following orders were created for panel order CBC with platelets and differential.  Procedure                               Abnormality         Status                     ---------                               -----------         ------                     CBC with platelets and d...[[id number]]                      Final result                 Please view results for these tests on the individual orders.   Comprehensive metabolic panel (BMP + Alb, Alk Phos, ALT, AST, Total. Bili, TP)     Status: Abnormal   Result Value Ref Range    Sodium 138 133 - 144 mmol/L    Potassium 4.0 3.4 - 5.3 mmol/L    Chloride 106 94 - 109 mmol/L    Carbon Dioxide (CO2) 31 20 - 32 mmol/L    Anion Gap 1 (L) 3 - 14 mmol/L    Urea Nitrogen 20 7 - 30 mg/dL    Creatinine 0.83 0.52 - 1.04 mg/dL    Calcium 8.9 8.5 - 10.1 mg/dL    Glucose 105 (H) 70 - 99 mg/dL    Alkaline Phosphatase 34 (L) 40 - 150 U/L    AST 19 0 - 45 U/L    ALT 25 0 - 50 U/L    Protein Total 7.5 6.8 - 8.8 g/dL    Albumin 4.1 3.4 - 5.0 g/dL    Bilirubin Total 0.5 0.2 - 1.3 mg/dL    GFR Estimate 90 >60 mL/min/1.73m2   Troponin I     Status: Normal   Result Value Ref Range    Troponin I <0.015 0.000 - 0.045 ug/L   D dimer, quantitative     Status: Normal   Result Value Ref Range    D-Dimer Quantitative <0.27 0.00 - 0.50 ug/mL FEU    Narrative    This D-dimer assay is intended for use in conjunction with a clinical pretest probability  assessment model to exclude pulmonary embolism (PE) and deep venous thrombosis (DVT) in outpatients suspected of PE or DVT. The cut-off value is 0.50 ug/mL FEU.   ESR: Erythrocyte sedimentation rate     Status: Normal   Result Value Ref Range    Erythrocyte Sedimentation Rate 5 0 - 20 mm/hr   CBC with platelets and differential     Status: None   Result Value Ref Range    WBC Count 5.6 4.0 - 11.0 10e3/uL    RBC Count 4.50 3.80 - 5.20 10e6/uL    Hemoglobin 13.5 11.7 - 15.7 g/dL    Hematocrit 40.6 35.0 - 47.0 %    MCV 90 78 - 100 fL    MCH 30.0 26.5 - 33.0 pg    MCHC 33.3 31.5 - 36.5 g/dL    RDW 12.6 10.0 - 15.0 %    Platelet Count 240 150 - 450 10e3/uL    % Neutrophils 56 %    % Lymphocytes 32 %    % Monocytes 8 %    % Eosinophils 3 %    % Basophils 1 %    Absolute Neutrophils 3.1 1.6 - 8.3 10e3/uL    Absolute Lymphocytes 1.8 0.8 - 5.3 10e3/uL    Absolute Monocytes 0.5 0.0 - 1.3 10e3/uL    Absolute Eosinophils 0.2 0.0 - 0.7 10e3/uL    Absolute Basophils 0.0 0.0 - 0.2 10e3/uL

## 2021-08-31 ENCOUNTER — HOSPITAL ENCOUNTER (EMERGENCY)
Facility: CLINIC | Age: 36
Discharge: HOME | End: 2021-08-31
Attending: EMERGENCY MEDICINE | Admitting: EMERGENCY MEDICINE
Payer: COMMERCIAL

## 2021-08-31 ENCOUNTER — APPOINTMENT (OUTPATIENT)
Dept: CT IMAGING | Facility: CLINIC | Age: 36
End: 2021-08-31
Attending: EMERGENCY MEDICINE
Payer: COMMERCIAL

## 2021-08-31 VITALS
OXYGEN SATURATION: 98 % | SYSTOLIC BLOOD PRESSURE: 118 MMHG | HEIGHT: 66 IN | HEART RATE: 102 BPM | BODY MASS INDEX: 23.01 KG/M2 | DIASTOLIC BLOOD PRESSURE: 73 MMHG | WEIGHT: 143.2 LBS | TEMPERATURE: 98.2 F | RESPIRATION RATE: 18 BRPM

## 2021-08-31 DIAGNOSIS — J95.89 POST-OP PNEUMONIA: ICD-10-CM

## 2021-08-31 DIAGNOSIS — J18.9 POST-OP PNEUMONIA: ICD-10-CM

## 2021-08-31 DIAGNOSIS — J18.9 PNEUMONIA OF LEFT LOWER LOBE DUE TO INFECTIOUS ORGANISM: ICD-10-CM

## 2021-08-31 DIAGNOSIS — E86.0 DEHYDRATION: ICD-10-CM

## 2021-08-31 LAB
ANION GAP SERPL CALCULATED.3IONS-SCNC: 3 MMOL/L (ref 3–14)
ATRIAL RATE - MUSE: 107 BPM
BASOPHILS # BLD AUTO: 0 10E3/UL (ref 0–0.2)
BASOPHILS NFR BLD AUTO: 0 %
BUN SERPL-MCNC: 8 MG/DL (ref 7–30)
CALCIUM SERPL-MCNC: 8 MG/DL (ref 8.5–10.1)
CHLORIDE BLD-SCNC: 101 MMOL/L (ref 94–109)
CO2 SERPL-SCNC: 32 MMOL/L (ref 20–32)
CREAT SERPL-MCNC: 0.64 MG/DL (ref 0.52–1.04)
D DIMER PPP FEU-MCNC: 0.64 UG/ML FEU (ref 0–0.5)
DIASTOLIC BLOOD PRESSURE - MUSE: NORMAL MMHG
EOSINOPHIL # BLD AUTO: 0.1 10E3/UL (ref 0–0.7)
EOSINOPHIL NFR BLD AUTO: 2 %
ERYTHROCYTE [DISTWIDTH] IN BLOOD BY AUTOMATED COUNT: 12 % (ref 10–15)
GFR SERPL CREATININE-BSD FRML MDRD: >90 ML/MIN/1.73M2
GLUCOSE BLD-MCNC: 124 MG/DL (ref 70–99)
HCT VFR BLD AUTO: 35.7 % (ref 35–47)
HGB BLD-MCNC: 11.7 G/DL (ref 11.7–15.7)
IMM GRANULOCYTES # BLD: 0 10E3/UL
IMM GRANULOCYTES NFR BLD: 1 %
INTERPRETATION ECG - MUSE: NORMAL
LYMPHOCYTES # BLD AUTO: 1 10E3/UL (ref 0.8–5.3)
LYMPHOCYTES NFR BLD AUTO: 15 %
MCH RBC QN AUTO: 28.8 PG (ref 26.5–33)
MCHC RBC AUTO-ENTMCNC: 32.8 G/DL (ref 31.5–36.5)
MCV RBC AUTO: 88 FL (ref 78–100)
MONOCYTES # BLD AUTO: 0.6 10E3/UL (ref 0–1.3)
MONOCYTES NFR BLD AUTO: 9 %
NEUTROPHILS # BLD AUTO: 5.1 10E3/UL (ref 1.6–8.3)
NEUTROPHILS NFR BLD AUTO: 73 %
NRBC # BLD AUTO: 0 10E3/UL
NRBC BLD AUTO-RTO: 0 /100
P AXIS - MUSE: 59 DEGREES
PLATELET # BLD AUTO: 156 10E3/UL (ref 150–450)
POTASSIUM BLD-SCNC: 3.7 MMOL/L (ref 3.4–5.3)
PR INTERVAL - MUSE: 122 MS
QRS DURATION - MUSE: 86 MS
QT - MUSE: 332 MS
QTC - MUSE: 443 MS
R AXIS - MUSE: 26 DEGREES
RBC # BLD AUTO: 4.06 10E6/UL (ref 3.8–5.2)
SODIUM SERPL-SCNC: 136 MMOL/L (ref 133–144)
SYSTOLIC BLOOD PRESSURE - MUSE: NORMAL MMHG
T AXIS - MUSE: 13 DEGREES
TROPONIN I SERPL-MCNC: <0.015 UG/L (ref 0–0.04)
VENTRICULAR RATE- MUSE: 107 BPM
WBC # BLD AUTO: 6.8 10E3/UL (ref 4–11)

## 2021-08-31 PROCEDURE — 85379 FIBRIN DEGRADATION QUANT: CPT | Performed by: EMERGENCY MEDICINE

## 2021-08-31 PROCEDURE — 71275 CT ANGIOGRAPHY CHEST: CPT

## 2021-08-31 PROCEDURE — 99285 EMERGENCY DEPT VISIT HI MDM: CPT | Mod: 25

## 2021-08-31 PROCEDURE — 93005 ELECTROCARDIOGRAM TRACING: CPT

## 2021-08-31 PROCEDURE — 94640 AIRWAY INHALATION TREATMENT: CPT

## 2021-08-31 PROCEDURE — 250N000009 HC RX 250: Performed by: EMERGENCY MEDICINE

## 2021-08-31 PROCEDURE — 96360 HYDRATION IV INFUSION INIT: CPT | Mod: 59

## 2021-08-31 PROCEDURE — 85025 COMPLETE CBC W/AUTO DIFF WBC: CPT | Performed by: EMERGENCY MEDICINE

## 2021-08-31 PROCEDURE — 84484 ASSAY OF TROPONIN QUANT: CPT | Performed by: EMERGENCY MEDICINE

## 2021-08-31 PROCEDURE — 80048 BASIC METABOLIC PNL TOTAL CA: CPT | Performed by: EMERGENCY MEDICINE

## 2021-08-31 PROCEDURE — 36415 COLL VENOUS BLD VENIPUNCTURE: CPT | Performed by: EMERGENCY MEDICINE

## 2021-08-31 PROCEDURE — 250N000011 HC RX IP 250 OP 636: Performed by: EMERGENCY MEDICINE

## 2021-08-31 PROCEDURE — 250N000013 HC RX MED GY IP 250 OP 250 PS 637: Performed by: EMERGENCY MEDICINE

## 2021-08-31 PROCEDURE — 258N000003 HC RX IP 258 OP 636: Performed by: EMERGENCY MEDICINE

## 2021-08-31 RX ORDER — AZITHROMYCIN 250 MG/1
250 TABLET, FILM COATED ORAL DAILY
Qty: 4 TABLET | Refills: 0 | Status: SHIPPED | OUTPATIENT
Start: 2021-08-31 | End: 2021-09-04

## 2021-08-31 RX ORDER — ALBUTEROL SULFATE 90 UG/1
6 AEROSOL, METERED RESPIRATORY (INHALATION) EVERY 6 HOURS PRN
Status: DISCONTINUED | OUTPATIENT
Start: 2021-08-31 | End: 2021-08-31 | Stop reason: HOSPADM

## 2021-08-31 RX ORDER — IOPAMIDOL 755 MG/ML
58 INJECTION, SOLUTION INTRAVASCULAR ONCE
Status: COMPLETED | OUTPATIENT
Start: 2021-08-31 | End: 2021-08-31

## 2021-08-31 RX ORDER — AZITHROMYCIN 250 MG/1
500 TABLET, FILM COATED ORAL ONCE
Status: COMPLETED | OUTPATIENT
Start: 2021-08-31 | End: 2021-08-31

## 2021-08-31 RX ADMIN — ALBUTEROL SULFATE 6 PUFF: 90 AEROSOL, METERED RESPIRATORY (INHALATION) at 02:38

## 2021-08-31 RX ADMIN — AZITHROMYCIN MONOHYDRATE 500 MG: 250 TABLET ORAL at 04:12

## 2021-08-31 RX ADMIN — IOPAMIDOL 58 ML: 755 INJECTION, SOLUTION INTRAVENOUS at 02:23

## 2021-08-31 RX ADMIN — AMOXICILLIN AND CLAVULANATE POTASSIUM 1 TABLET: 875; 125 TABLET, FILM COATED ORAL at 04:12

## 2021-08-31 RX ADMIN — SODIUM CHLORIDE 85 ML: 9 INJECTION, SOLUTION INTRAVENOUS at 02:23

## 2021-08-31 RX ADMIN — SODIUM CHLORIDE 1000 ML: 9 INJECTION, SOLUTION INTRAVENOUS at 04:12

## 2021-08-31 ASSESSMENT — MIFFLIN-ST. JEOR: SCORE: 1351.3

## 2021-08-31 NOTE — ED NOTES
Pt verbalizes understanding on how to take Rx antibiotics at home and how and when to use the incentive spirometer.

## 2021-08-31 NOTE — ED PROVIDER NOTES
History     Chief Complaint:    Cough and Post-op Problem      HPI   Kamilah Dee is a 36 year old female who presents with cough for the last 4 days.  She states she feels generalized weakness and a sore throat as well.  Patient is 5 days status post outpatient tummy tuelis at [facility].  She states procedure went well.  She had no postoperative complications including no vomiting or anesthesia reaction.  Her drainage has been stable.  She did not use her incentive spirometer today due to having difficulty with shortness of breath.  She denies any personal history of pulmonary embolism or DVT but states her sisters had a pulmonary embolism in the past.  Patient states she has been taking pain medications.  She states she has been constant coughing and unable to sleep due to her cough.  Patient is unvaccinated for COVID-19.  Covid test prior to procedure was negative.  No sick contacts.  Denies fever.  Patient states she will not take a Covid test here as it is an NP swab and she refuses.    Review of Systems  Constitutional: No fever sweats or chills.  Fatigue and generalized weakness  Respiratory: Cough with production of sputum and shortness of breath  GI: No nausea, vomiting, diarrhea  HEENT: Sore throat    Allergies:    Remeron [Mirtazapine]      Medications:    Norco  escitalopram (LEXAPRO) 20 MG tablet  LORazepam (ATIVAN) 0.5 MG tablet  naproxen (NAPROSYN) 500 MG tablet  omeprazole (PRILOSEC) 40 MG DR capsule  psyllium (METAMUCIL/KONSYL) capsule        Past Medical History:    Anemia   Anxiety  Depression  Migraine headaches  Generalized anxiety disorder  Tic disorder    Past Surgical History:    Tonsillectomy  Tummy tuck last Thursday    Family History:    Family History   Problem Relation Age of Onset     Alcohol/Drug Mother         b:1964 addicted to crack     Family History Negative Father         b: 1962     Family History Negative Sister         b:1994     Cervical Cancer Sister 35       "  10/2015     Breast Cancer Sister         no information     Family History Negative Sister         b:1981     Family History Negative Brother         b:1989     No Known Problems Maternal Grandmother      No Known Problems Maternal Grandfather      No Known Problems Paternal Grandmother      No Known Problems Other      Social History:  Lives at home with her family    Physical Exam     Patient Vitals for the past 24 hrs:   BP Temp Temp src Pulse Resp SpO2 Height Weight   08/31/21 0512 118/73 -- -- 102 18 98 % -- --   08/31/21 0430 -- -- -- -- -- 99 % -- --   08/31/21 0329 104/77 -- -- 112 18 98 % -- --   08/31/21 0111 118/71 98.2  F (36.8  C) Oral 111 16 99 % 1.676 m (5' 6\") 65 kg (143 lb 3.2 oz)     Physical Exam  General: Patient is alert and normal appearing.  HEENT: Head atraumatic    Eyes: pupils equal and reactive. Conjunctiva clear   Nares: patent   Oropharynx: no lesions, uvula midline, no palatal draping, normal voice, no trismus  Neck: Supple without lymphadenopathy, no meningismus  Chest: Tachycardic but regular  Lungs: Worse rales bilaterally and wet cough  Abdomen: Drainage tube in place with serosanguineous drainage.  Dressings in place with no significant drainage.  Generalized tenderness to palpation  Back: No costovertebral angle tenderness, no midline C, T or L spine tenderness  Neuro: Grossly nonfocal, normal speech, strength equal bilaterally, CN 2-12 intact  Extremities: Compression stockings in place no deformities, equal radial and DP pulses. No clubbing, cyanosis.  No edema  Skin: Warm and dry with no rash.     Emergency Department Course     ECG:  ECG taken at 0205, ECG read at 0214  Sinus tachycardia  Otherwise normal ECG  No significant change compared to EKG dated 3/10/14  Rate 107 bpm. UT interval 122 ms. QRS duration 86 ms. QT/QTc 332/443 ms. P-R-T axes 59 26 13.    Imaging:  CT Chest Pulmonary Embolism w Contrast   Final Result   IMPRESSION:   1.  No pulmonary embolus, aortic " dissection, or aneurysm.   2.  Left lower lobe consolidation with mucous plugging of left lower lobe bronchi, correlate to exclude aspiration and/or pneumonia.      Imaging features are atypical or uncommonly reported for (COVID-19) pneumonia. Alternative diagnoses should be considered.          Laboratory:  Labs Ordered and Resulted from Time of ED Arrival Up to the Time of Departure from the ED   D DIMER QUANTITATIVE - Abnormal; Notable for the following components:       Result Value    D-Dimer Quantitative 0.64 (*)     All other components within normal limits    Narrative:     This D-dimer assay is intended for use in conjunction with a clinical pretest probability assessment model to exclude pulmonary embolism (PE) and deep venous thrombosis (DVT) in outpatients suspected of PE or DVT. The cut-off value is 0.50 ug/mL FEU.   BASIC METABOLIC PANEL - Abnormal; Notable for the following components:    Calcium 8.0 (*)     Glucose 124 (*)     All other components within normal limits   TROPONIN I - Normal   CBC WITH PLATELETS & DIFFERENTIAL    Narrative:     The following orders were created for panel order CBC with platelets differential.  Procedure                               Abnormality         Status                     ---------                               -----------         ------                     CBC with platelets and d...[[id number]]                      Final result                 Please view results for these tests on the individual orders.   CBC WITH PLATELETS AND DIFFERENTIAL         Emergency Department Course:    Reviewed:    I reviewed nursing notes, vitals, past history and care everywhere    Assessments:  0137 I obtained history and examined the patient as noted above.   0300 I rechecked the patient and explained findings.   0400 I rechecked the patient    Interventions:    Medications   albuterol (PROAIR HFA/PROVENTIL HFA/VENTOLIN HFA) 108 (90 Base) MCG/ACT inhaler 6 puff (6 puffs  Inhalation Given 8/31/21 0238)   iopamidol (ISOVUE-370) solution 58 mL (58 mLs Intravenous Given 8/31/21 0223)   Saline flush (85 mLs Intravenous Given 8/31/21 0223)   amoxicillin-clavulanate (AUGMENTIN) 875-125 MG per tablet 1 tablet (1 tablet Oral Given 8/31/21 0412)   azithromycin (ZITHROMAX) tablet 500 mg (500 mg Oral Given 8/31/21 0412)   0.9% sodium chloride BOLUS (0 mLs Intravenous Stopped 8/31/21 0503)       Disposition:  The patient was discharged to home.    Impression & Plan      Medical Decision Making:  Kamilah Dee is a 36 year old female who presents for evaluation of postoperative cough and generalized weakness.  History, physical exam and imaging studies are consistent with pneumonia.  First dose of antibiotics given in ED within 2 hours of diagnosis.  There are no signs of complications of pneumonia at this point such as septic shock, bacteremia, empyema, hypoxia, respiratory failure or compromise.  Supportive outpatient management is therefore indicated.  I will not therefore hospitalize for further cares or IV antibiotics.  This seems to be community acquired pneumonia and there are no risk factors at this point for coverage of antibiotic-resistant strains.  Very remote possibility of aspiration pneumonia.  I suspect that this is related to the patient having poor inspiratory effort postoperatively due to pain.  She is encouraged to stay on top of her pain.  She was shown how to splint for cough.  She is not having an effective cough due to pain and encouraged how to do this.  She also stopped using her incentive spirometer and was encouraged to start using that every 2 hours and discussed how to do that with her.  The patient refused COVID-19 swab here in the emergency department but states she will do an outpatient test as she does not want to use the NP swabs here available at the hospital.  She is encouraged to follow-up for Covid test to ensure that this is not related to Covid.  I doubt  PE, dissection, acute coronary syndrome, or other worrisome etiology for patients symptoms. Patient will follow-up with primary care physician regardless of disease course within 2 days maximum.  Signs for return visit to ED were discussed with patient and pneumonia precautions given for home.  At this time the patient felt improved with improvement of her heart rate following fluids.  She remained with normal O2 saturations.  She has follow-up with her surgeon and no significant change in her drainage.  Is with reasonable clinical certainty that I feel she is safe for her outpatient treatment with antibiotics.  First dose given here in the emergency department.  Very strict return precautions were reviewed with the patient who expressed agreement understanding of the plan.    Covid-19  Kamilah Dee was evaluated during a global COVID-19 pandemic, which necessitated consideration that the patient might be at risk for infection with the SARS-CoV-2 virus that causes COVID-19.   Applicable protocols for evaluation were followed during the patient's care.   COVID-19 was considered as part of the patient's evaluation. The plan for testing is:  a test was obtained at a previous visit and reviewed & considered today.  the patient was referred for outpatient testing.      Diagnosis:    ICD-10-CM    1. Pneumonia of left lower lobe due to infectious organism  J18.9    2. Post-op pneumonia  J95.89     J18.9    3. Dehydration  E86.0        Discharge Medications:  New Prescriptions    No medications on file              Karla Palma MD  08/31/21 0541

## 2021-08-31 NOTE — DISCHARGE INSTRUCTIONS
Use your incentive spirometer every 2 hours. Splint on your abdomen as shown in the emergency department to allow you to cough. Return to the emergency department for fever greater than 101, increased shortness of breath, weakness. Drink plenty of fluids.

## 2021-09-07 ENCOUNTER — OFFICE VISIT (OUTPATIENT)
Dept: FAMILY MEDICINE | Facility: CLINIC | Age: 36
End: 2021-09-07
Payer: COMMERCIAL

## 2021-09-07 VITALS
BODY MASS INDEX: 21.73 KG/M2 | WEIGHT: 135.2 LBS | HEIGHT: 66 IN | HEART RATE: 83 BPM | RESPIRATION RATE: 15 BRPM | DIASTOLIC BLOOD PRESSURE: 71 MMHG | OXYGEN SATURATION: 100 % | TEMPERATURE: 97.7 F | SYSTOLIC BLOOD PRESSURE: 110 MMHG

## 2021-09-07 DIAGNOSIS — J15.9 COMMUNITY ACQUIRED BACTERIAL PNEUMONIA: Primary | ICD-10-CM

## 2021-09-07 DIAGNOSIS — R73.9 ELEVATED BLOOD SUGAR: ICD-10-CM

## 2021-09-07 LAB — HBA1C MFR BLD: 5.3 % (ref 0–5.6)

## 2021-09-07 PROCEDURE — 99213 OFFICE O/P EST LOW 20 MIN: CPT | Performed by: INTERNAL MEDICINE

## 2021-09-07 PROCEDURE — 83036 HEMOGLOBIN GLYCOSYLATED A1C: CPT | Performed by: INTERNAL MEDICINE

## 2021-09-07 PROCEDURE — 36415 COLL VENOUS BLD VENIPUNCTURE: CPT | Performed by: INTERNAL MEDICINE

## 2021-09-07 ASSESSMENT — MIFFLIN-ST. JEOR: SCORE: 1315.01

## 2021-09-07 NOTE — PROGRESS NOTES
"    Assessment & Plan     Community acquired bacterial pneumonia  Improved s/p ab therapy. Residual cough as expected. Encouraged her to consider COVID vaccine, she is hesitant. Education provided.    Elevated blood sugar: likely stress reactant in ED. She would be interested in follow-up test. Discussed a1c for diabetes test. She would appreciate having this checked today.    Return if symptoms worsen or fail to improve.    Chelsie Zhong, DO  [facility]    Chinmay Triana is a 36 year old who presents for the following health issues     HPI     ED/UC Followup:    Facility:  [facility]  Date of visit: 08/31/21  Reason for visit: Pneumonia of left lower lobe  Current Status: Finished antibiotics but has noticed still having a lingering cough while laying down. Had elevated labs while in ER and wanted to follow up on those as well     Kamilah is here for ER follow-up. She was diagnosed with CAP, she declined COVID test in ED. She states after zpak and augmentin, symptoms have improved. She is back to baseline except for mild residual cough, worse at night. Denies f/c. Denies worsening sob. She inquires about her elevated blood sugar.      Review of Systems   Constitutional, HEENT, cardiovascular, pulmonary, gi and gu systems are negative, except as otherwise noted.      Objective    /71 (BP Location: Right arm, Patient Position: Sitting, Cuff Size: Adult Regular)   Pulse 83   Temp 97.7  F (36.5  C) (Temporal)   Resp 15   Ht 1.676 m (5' 6\")   Wt 61.3 kg (135 lb 3.2 oz)   SpO2 100%   BMI 21.82 kg/m    Body mass index is 21.82 kg/m .  Physical Exam     GENERAL APPEARANCE: AAOx3, no distress. Well developed.    RESP: Lungs CTA bilaterally. No w/r/r. No distress     PSYCH: appropriate mood and affect.               "

## 2021-09-24 ENCOUNTER — OFFICE VISIT (OUTPATIENT)
Dept: NEUROLOGY | Facility: CLINIC | Age: 36
End: 2021-09-24
Payer: COMMERCIAL

## 2021-09-24 VITALS
SYSTOLIC BLOOD PRESSURE: 111 MMHG | HEART RATE: 80 BPM | RESPIRATION RATE: 16 BRPM | DIASTOLIC BLOOD PRESSURE: 70 MMHG | OXYGEN SATURATION: 98 %

## 2021-09-24 DIAGNOSIS — F95.1 CHRONIC MOTOR TIC DISORDER: Primary | ICD-10-CM

## 2021-09-24 DIAGNOSIS — F95.9 TIC DISORDER: ICD-10-CM

## 2021-09-24 PROCEDURE — 99215 OFFICE O/P EST HI 40 MIN: CPT | Performed by: PSYCHIATRY & NEUROLOGY

## 2021-09-24 PROCEDURE — 99417 PROLNG OP E/M EACH 15 MIN: CPT | Performed by: PSYCHIATRY & NEUROLOGY

## 2021-09-24 ASSESSMENT — PAIN SCALES - GENERAL: PAINLEVEL: NO PAIN (0)

## 2021-09-24 NOTE — PROGRESS NOTES
"Department of Neurology  Movement Disorders Division     Patient: Kamilah Dee   MRN: [id number]   : 1985   Date of Visit: 21    Reason for visit: Motor tic disorder follow up     History of Present Illness  Ms. Dee is a 36 year old female that presents to Neurology Movement clinic for follow up regarding management of motor tic disorder. Patient was last seen on 2021 for worsening in the past weeks to months. We discussed therapeutic options for the treatment of tic disorders, however, she doesn't want to start a new medication at this time due to concerns of potential side effects. She was referred to Dr. Bush, PhD, for treatment of tics and psychiatry for treatment of mood disorders.     History obtained from patient. Patient reports she is \"doing okay.\" She is stressed due to appointments both for her and kids. She is having paresthesias in head like \"water dripping down\" occurs a few times a year. This is not associated with pain. This sensation is not new. First time this happened many years ago, she was on meth and felt like this may have triggered this issue.  No changes to her tics; she can suppress tics a little bit. Tics occur and she is not aware of them many of the times. Her children point them out to her. She is self conscious of this and this causes her anxiety. She would like to have better control of her tics, anxiety. Was following with family therapist but now is not as she has gotten too busy with kids and \"life.\"    Review of Systems:  Other than that mentioned above, the remainder of 12 systems reviewed were negative.    PMH: unchanged  PSH: unchanged  FH: unchanged  SH: unchanged    Medications:  Current Outpatient Medications   Medication Sig Dispense Refill     escitalopram (LEXAPRO) 20 MG tablet Take 1 tablet (20 mg) by mouth daily 30 tablet 11     LORazepam (ATIVAN) 0.5 MG tablet Take 1 tablet (0.5 mg) by mouth 2 times daily as needed for anxiety 30 tablet 0    "    Allergies   Allergen Reactions     Remeron [Mirtazapine]      Excess sedation even with 7.5mg dose          Physical Exam:  The patient's  blood pressure is 111/70 and her pulse is 80. Her respiration is 16 and oxygen saturation is 98%.    Tics with shoulder shrugging R > L, looks to left, head jerks and shaking    Impression:  Kamilah Dee is a 36 year old female with chronic motor tic disorder and anxiety. She would like to try habit reversal therapy and comprehensive behavioral intervention in Tourette's (CBIT). She is not interested in medical therapy for tics at this time.     1.  Chronic motor tic disorder: She has battled this since 14 years old. She got benefit from the Ingrezza 40 mg a day.  She took this under the direction of Dr. Latoya Juarez. She then went through a period of severe stress and her tics got worse.  She did not know if it was the stress or the medicine making her worse so she stopped the Ingrezza.  She has remained off this med since.  2.  History of depression and anxiety and is not following with mental health     Plan:   - Referral to Dr. Lawrence, PhD, for treatment of chronic motor tic disorder with habit reversal therapy and comprehensive behavioral intervention in Tourette's (CBIT).   - Referral to psychiatry for management of anxiety.   - Not interested in medical therapy for now and would like to try CBIT first.   - Continue with talk therapy.   - If she were to become interested in starting medication treatment for her tics, would consider reinstituting the Ingrezza or considering topiramate.  I would be reluctant to use neuroleptics in a young woman due to risk of developing tardive dyskinesia.  - Read more about Tourettes at [email or web address].    Follow up in 3 months in person, 30 mins.     Medina Ackerman DO MA   of Neurology   [facility]    71 minutes spent on date of encounter doing chart reviews and exam and documentation and further  activities as noted above.

## 2021-09-24 NOTE — PATIENT INSTRUCTIONS
Plan:   - Referral to behavioral psychologist for treatment of chronic motor tic disorder with habit reversal therapy and comprehensive behavioral intervention in Tourette's (CBIT).   - Not interested in medical therapy for now and would like to try CBIT first.   - Continue with talk therapy.   - Read more about tics at [email or web address].    Follow up in 3 months in person, 30 mins.

## 2021-09-24 NOTE — LETTER
"2021       RE: Kamilah Dee  [address]  [place] [facility] [address]     Dear Colleague,    Thank you for referring your patient, Kamilah Dee, to the [facility] [place] [facility]. Please see a copy of my visit note below.    Department of Neurology  Movement Disorders Division     Patient: Kamilah Dee   MRN: [id number]   : 1985   Date of Visit: 21    Reason for visit: Motor tic disorder follow up     History of Present Illness  Ms. Dee is a 36 year old female that presents to Neurology Movement clinic for follow up regarding management of motor tic disorder. Patient was last seen on 2021 for worsening in the past weeks to months. We discussed therapeutic options for the treatment of tic disorders, however, she doesn't want to start a new medication at this time due to concerns of potential side effects. She was referred to Dr. Bush, PhD, for treatment of tics and psychiatry for treatment of mood disorders.     History obtained from patient. Patient reports she is \"doing okay.\" She is stressed due to appointments both for her and kids. She is having paresthesias in head like \"water dripping down\" occurs a few times a year. This is not associated with pain. This sensation is not new. First time this happened many years ago, she was on meth and felt like this may have triggered this issue.  No changes to her tics; she can suppress tics a little bit. Tics occur and she is not aware of them many of the times. Her children point them out to her. She is self conscious of this and this causes her anxiety. She would like to have better control of her tics, anxiety. Was following with family therapist but now is not as she has gotten too busy with kids and \"life.\"    Review of Systems:  Other than that mentioned above, the remainder of 12 systems reviewed were negative.    PMH: unchanged  PSH: unchanged  FH: " unchanged  SH: unchanged    Medications:  Current Outpatient Medications   Medication Sig Dispense Refill     escitalopram (LEXAPRO) 20 MG tablet Take 1 tablet (20 mg) by mouth daily 30 tablet 11     LORazepam (ATIVAN) 0.5 MG tablet Take 1 tablet (0.5 mg) by mouth 2 times daily as needed for anxiety 30 tablet 0       Allergies   Allergen Reactions     Remeron [Mirtazapine]      Excess sedation even with 7.5mg dose          Physical Exam:  The patient's  blood pressure is 111/70 and her pulse is 80. Her respiration is 16 and oxygen saturation is 98%.    Tics with shoulder shrugging R > L, looks to left, head jerks and shaking    Impression:  Kamilah Dee is a 36 year old female with chronic motor tic disorder and anxiety. She would like to try habit reversal therapy and comprehensive behavioral intervention in Tourette's (CBIT). She is not interested in medical therapy for tics at this time.     1.  Chronic motor tic disorder: She has battled this since 14 years old. She got benefit from the Ingrezza 40 mg a day.  She took this under the direction of Dr. Latoya Juarez. She then went through a period of severe stress and her tics got worse.  She did not know if it was the stress or the medicine making her worse so she stopped the Ingrezza.  She has remained off this med since.  2.  History of depression and anxiety and is not following with mental health     Plan:   - Referral to Dr. Lawrence, PhD, for treatment of chronic motor tic disorder with habit reversal therapy and comprehensive behavioral intervention in Tourette's (CBIT).   - Referral to psychiatry for management of anxiety.   - Not interested in medical therapy for now and would like to try CBIT first.   - Continue with talk therapy.   - If she were to become interested in starting medication treatment for her tics, would consider reinstituting the Ingrezza or considering topiramate.  I would be reluctant to use neuroleptics in a young woman due to risk of  developing tardive dyskinesia.  - Read more about Tourettes at [email or web address].    Follow up in 3 months in person, 30 mins.     Medina Ackerman DO, MA   of Neurology   [facility]    71 minutes spent on date of encounter doing chart reviews and exam and documentation and further activities as noted above.             Again, thank you for allowing me to participate in the care of your patient.      Sincerely,    Medina Ackerman DO

## 2021-09-27 DIAGNOSIS — F41.9 ANXIETY: ICD-10-CM

## 2021-09-27 DIAGNOSIS — F95.9 TIC DISORDER: ICD-10-CM

## 2021-09-28 NOTE — TELEPHONE ENCOUNTER
Routing refill request to provider for review/approval because:  Drug not on the [facility] refill protocol     Pending Prescriptions:                       Disp   Refills    LORazepam (ATIVAN) 0.5 MG tablet           30 tab*0        Sig: Take 1 tablet (0.5 mg) by mouth 2 times daily as           needed for anxiety    Last Written Prescription Date:  6/8/21  Last Fill Quantity: 30,  # refills: 0   Last office visit: 7/30/2021 with prescribing provider:     Future Office Visit:        Amando Roth RN  [facility]

## 2021-09-29 RX ORDER — LORAZEPAM 0.5 MG/1
TABLET ORAL
Qty: 30 TABLET | Refills: 0 | Status: SHIPPED | OUTPATIENT
Start: 2021-09-29 | End: 2022-08-02

## 2021-09-29 NOTE — TELEPHONE ENCOUNTER
Pt last seen with virtual visit 6/8/21. Was instructed to establish care with psychiatrist and given 3 different options re: groups at that time to establish care and was supposed to send me note in Mychart with time of future psych appt and who was seeing. See plan instructions from that visit for details. Never heard back from pt. Call pt to find out if she called psych to arrange for future care and, if done, who she is scheduled to see and, if not done, why has not followed through with recommendation. Then route RF request back to me to review further

## 2021-09-29 NOTE — TELEPHONE ENCOUNTER
Called patient she states she was having trouble getting scheduled with someone and when she saw her neurologist last week they were able to put in another mental health referral so she is waiting for them to call her and schedule. infromed patient I could provider the scheduling number for her as well. Patient would like this sent via [employer].     Patient is hoping Dr. Joseph can fill this one time until she is able to establish care due to being completely out of medication    Amando Roth RN

## 2021-09-29 NOTE — TELEPHONE ENCOUNTER
Patient was referred by neurology to  Dr. Lawrence, PhD, for treatment of chronic motor tic disorder with habit reversal therapy and comprehensive behavioral intervention in Tourette's (CBIT).  That provider is not a psychiatrist and will not be managing patient's ongoing mental health prescription management.  Patient was instructed over 3 months ago to schedule an appointment with psychiatry.  It is not clear to me why she would be having trouble scheduling an appointment over that greater than 3 months time period.  I will fill lorazepam only once more for patient.  Make that very clear to her.  She needs to follow instructions I have given her and to establish care with a psychiatrist for ongoing prescription management.  She may reach out to the 3 clinics I have listed below or she may contact her insurance to get names of other psychiatry groups within her insurance.     Here a list of psychiatrists you can try depending on insurance coverage:     1.  [facility]  [address] [place]  [address]  [phone]     2. [facility]  [address], [address]  [place]  [address]  Phone:  [phone]     3. [facility]  Multiple locations  [phone]    network

## 2021-09-29 NOTE — TELEPHONE ENCOUNTER
Called and relayed message to patient she verbalized understand   Phone number given for 3 clinics listed below, as well as scheduling number for the [facility] referral     Amando Roth RN

## 2021-10-11 ENCOUNTER — OFFICE VISIT (OUTPATIENT)
Dept: INTERNAL MEDICINE | Facility: CLINIC | Age: 36
End: 2021-10-11
Payer: COMMERCIAL

## 2021-10-11 VITALS
BODY MASS INDEX: 22.34 KG/M2 | RESPIRATION RATE: 14 BRPM | HEART RATE: 78 BPM | SYSTOLIC BLOOD PRESSURE: 116 MMHG | OXYGEN SATURATION: 97 % | DIASTOLIC BLOOD PRESSURE: 70 MMHG | TEMPERATURE: 97.6 F | WEIGHT: 138.4 LBS

## 2021-10-11 DIAGNOSIS — F95.9 TIC DISORDER: ICD-10-CM

## 2021-10-11 DIAGNOSIS — R53.83 OTHER FATIGUE: Primary | ICD-10-CM

## 2021-10-11 LAB
ANION GAP SERPL CALCULATED.3IONS-SCNC: 7 MMOL/L (ref 3–14)
BUN SERPL-MCNC: 13 MG/DL (ref 7–30)
CALCIUM SERPL-MCNC: 9.1 MG/DL (ref 8.5–10.1)
CHLORIDE BLD-SCNC: 107 MMOL/L (ref 94–109)
CO2 SERPL-SCNC: 24 MMOL/L (ref 20–32)
CREAT SERPL-MCNC: 0.66 MG/DL (ref 0.52–1.04)
GFR SERPL CREATININE-BSD FRML MDRD: >90 ML/MIN/1.73M2
GLUCOSE BLD-MCNC: 79 MG/DL (ref 70–99)
HGB BLD-MCNC: 12 G/DL (ref 11.7–15.7)
POTASSIUM BLD-SCNC: 3.4 MMOL/L (ref 3.4–5.3)
SODIUM SERPL-SCNC: 138 MMOL/L (ref 133–144)
TSH SERPL DL<=0.005 MIU/L-ACNC: 0.86 MU/L (ref 0.4–4)

## 2021-10-11 PROCEDURE — 85018 HEMOGLOBIN: CPT | Performed by: INTERNAL MEDICINE

## 2021-10-11 PROCEDURE — 84443 ASSAY THYROID STIM HORMONE: CPT | Performed by: INTERNAL MEDICINE

## 2021-10-11 PROCEDURE — 36415 COLL VENOUS BLD VENIPUNCTURE: CPT | Performed by: INTERNAL MEDICINE

## 2021-10-11 PROCEDURE — 80048 BASIC METABOLIC PNL TOTAL CA: CPT | Performed by: INTERNAL MEDICINE

## 2021-10-11 PROCEDURE — 99214 OFFICE O/P EST MOD 30 MIN: CPT | Performed by: INTERNAL MEDICINE

## 2021-10-11 NOTE — PROGRESS NOTES
Assessment & Plan     Other fatigue  Discussed issues with patient in regards to nonspecific complaints.  She does not appear to be distinctly anemic although her hemoglobin appears to be slightly lower from January but falls within the low normal range..  It is unclear how much of her symptoms may be related to her underlying anxiety.  She does appear quite anxious.  We did discuss the fact that sometimes after being treated for pneumonia people take a little while to resolve from their symptoms.  I suggested we recheck her basic metabolic panel and her electrolytes.  We will recheck her hgb and thyroid function panel.  I offered her the opportunity to do a leadless EKG as well as an echocardiogram to evaluate her atypical lightheadedness symptoms to rule out a secondary source but she is not interested at present  - TSH with free T4 reflex; Future, hgb, future- Basic metabolic panel  (Ca, Cl, CO2, Creat, Gluc, K, Na, BUN); Future    Tic disorder  Following up with neurology as directed.     See Patient Instructions    Return in about 1 month (around 11/11/2021) for if symptoms recur or worsen, follow-up with regular primary care provider.    Brooks Ford MD  [facility]    Chinmay Triana is a 36 year old who presents for the following health issues     HPI     Patient normally sees Dr. ABRAHAM    Follow up on glucose, D-dimer and requesting to update other routine labs. Patient states she has had continued weakness and fatigue since 8/31 ER visit.    Patient has very nonspecific constitutional complaints.  She is scheduled to see a psychologist and psychiatrist due to her underlying anxiety disorder.  She was apparently seen in the emergency room about a month and a half ago for some nonspecific complaints and felt to have a little bit of pneumonia.  Her D-dimer test was slightly elevated.  A CT scan was done which showed no pulmonary embolism.  She was treated.  She has very  nonspecific complaints of being tired and fatigued.  She does not believe she is pregnant.  Her last menstrual cycle was intact although she has been slightly irregular over the last several months.  We discussed this and the benefit of seeing her gynecologist.    She also has a chronic tic disorder that she has been battling since 14 years of age.  She notes that when she is under stress her tics get worse.  She was seen and evaluated by the neurologist.    She has very nonspecific complaints of occasionally feeling lightheaded and dizzy.  These are intermittent and not associated with syncopal events.  She states she has been trying to drink more water.  She denies any issues of an eating disorder.  She questions whether or not this may be related to her anxiety.  We reviewed her lab work as a possible source.  She does not believe that this is in relationship to the timing of starting her medications, lexapro.      She is not interested in any vaccination update      Review of Systems   CONSTITUTIONAL: NEGATIVE for fever, chills, change in weight  EYES: NEGATIVE for vision changes or irritation  ENT/MOUTH: NEGATIVE for ear, mouth and throat problems  RESP: NEGATIVE for significant cough or SOB  CV: NEGATIVE for chest pain, palpitations or peripheral edema  GI: NEGATIVE for nausea, abdominal pain, heartburn, or change in bowel habits  : NEGATIVE for frequency, dysuria, or hematuria  MUSCULOSKELETAL: NEGATIVE for significant arthralgias or myalgia  HEME: NEGATIVE for bleeding problems      Objective    /70   Pulse 78   Temp 97.6  F (36.4  C) (Temporal)   Resp 14   Wt 62.8 kg (138 lb 6.4 oz)   LMP  (LMP Unknown)   SpO2 97%   BMI 22.34 kg/m    There is no height or weight on file to calculate BMI.  Physical Exam   GENERAL: alert and no distress  EYES: Eyes grossly normal to inspection, PERRL and conjunctivae and sclerae normal  HENT: ear canals and TM's normal, nose and mouth without ulcers or  lesions  NECK: no adenopathy, no asymmetry, masses, or scars and thyroid normal to palpation  RESP: lungs clear to auscultation - no rales, rhonchi or wheezes  CV: regular rate and rhythm, normal S1 S2, no S3 or S4, no click or rub, no peripheral edema and peripheral pulses strong  NEURO: Numerous tics are noted to the head neck and torso.  PSYCH: Patient is alert and oriented and appropriate but is anxious.    Hemoglobin   Date Value Ref Range Status   08/31/2021 11.7 11.7 - 15.7 g/dL Final   01/04/2021 13.3 11.7 - 15.7 g/dL Final   ]

## 2021-10-14 ENCOUNTER — OFFICE VISIT (OUTPATIENT)
Dept: FAMILY MEDICINE | Facility: CLINIC | Age: 36
End: 2021-10-14
Payer: COMMERCIAL

## 2021-10-14 VITALS — DIASTOLIC BLOOD PRESSURE: 60 MMHG | SYSTOLIC BLOOD PRESSURE: 108 MMHG

## 2021-10-14 DIAGNOSIS — L65.9 ALOPECIA: Primary | ICD-10-CM

## 2021-10-14 PROCEDURE — 99213 OFFICE O/P EST LOW 20 MIN: CPT | Performed by: PHYSICIAN ASSISTANT

## 2021-10-14 NOTE — PATIENT INSTRUCTIONS
Start men's Rogaine daily.    Female pattern alopecia or androgenetic alopecia is mediated by dihydrotestosterone causing miniaturization of the hair follicles. Treatment options to prevent further hair loss are Finasteride, Spironolactone, and Minoxidil. Treatment will not cause hair to regrow. Once treatment is discontinued hair loss will progress.    Side effects of oral Finasteride include and are not limited to impotence, decreased libido, abnormal ejaculation, sexual dysfunction and gynecomastia.     Side effects of Minoxidil (Rogaine) include contact dermatitis, pruritis (itch), and skin irritation. Recommend using men's Rogaine once a day.     Side effects of Spironolactone: This oral medication blocks androgens (hormones that can aggravate acne).  Since this medication is also used as a diuretic, baseline blood work is needed to check your electrolytes and kidney function. Do not get pregnant while on this medication. Drink plenty of water to avoid dehydration. May feel lightheaded from medication and may increase urination.

## 2021-10-14 NOTE — PROGRESS NOTES
HPI:  Kamilah Dee is a 36 year old female patient here today for hair loss supra and post auricular scalp and frontotemporal scalp. Per pt thinner in area.  .  Patient states this has been present for months.  Patient reports the following symptoms: none .  Patient reports the following previous treatments: il tac and topical steroid with no change. Punch bx shows normal appearing scalp.  Patient reports the following modifying factors: none.  Associated symptoms: none.  Patient has no other skin complaints today.  Remainder of the HPI, Meds, PMH, Allergies, FH, and SH was reviewed in chart.      Past Medical History:   Diagnosis Date     Anemia     with pregnancy     Anxiety 1/21/2014     Depo-Provera contraceptive status      Depression      History of varicella-documented immunity 7/21/2011     Migraine 8/3/2012     Other, mixed, or unspecified nondependent drug abuse, episodic      Tic age 14       Past Surgical History:   Procedure Laterality Date     HC REMOVAL OF TONSILS,11+ Y/O  1996    Tonsils 11+y.o.        Family History   Problem Relation Age of Onset     Alcohol/Drug Mother         b:1964 addicted to crack     Family History Negative Father         b: 1962     Family History Negative Sister         b:1994     Cervical Cancer Sister 35        10/2015     Breast Cancer Sister         no information     Family History Negative Sister         b:1981     Family History Negative Brother         b:1989     No Known Problems Maternal Grandmother      No Known Problems Maternal Grandfather      No Known Problems Paternal Grandmother      No Known Problems Other        Social History     Socioeconomic History     Marital status:      Spouse name: Miky     Number of children: 1     Years of education: Not on file     Highest education level: Not on file   Occupational History     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 Sober since age 18      Sexual activity: Not Currently   Other Topics Concern      Service Not Asked     Blood Transfusions Not Asked     Caffeine Concern Not Asked     Occupational Exposure Not Asked     Hobby Hazards Not Asked     Sleep Concern Not Asked     Stress Concern Not Asked     Weight Concern Not Asked     Special Diet Not Asked     Back Care Not Asked     Exercise Not Asked     Bike Helmet Not Asked     Seat Belt Not Asked     Self-Exams Not Asked     Parent/sibling w/ CABG, MI or angioplasty before 65F 55M? Not Asked   Social History Narrative    Living arrangements - the patient lives with her , Miky and son     Social Determinants of Health     Financial Resource Strain:      Difficulty of Paying Living Expenses:    Food Insecurity:      Worried About Running Out of Food in the Last Year:      Ran Out of Food in the Last Year:    Transportation Needs:      Lack of Transportation (Medical):      Lack of Transportation (Non-Medical):    Physical Activity:      Days of Exercise per Week:      Minutes of Exercise per Session:    Stress:      Feeling of Stress :    Social Connections:      Frequency of Communication with Friends and Family:      Frequency of Social Gatherings with Friends and Family:      Attends Mosque Services:      Active Member of Clubs or Organizations:      Attends Club or Organization Meetings:      Marital Status:    Intimate Partner Violence:      Fear of Current or Ex-Partner:      Emotionally Abused:      Physically Abused:      Sexually Abused:        Outpatient Encounter Medications as of 10/14/2021   Medication Sig Dispense Refill      escitalopram (LEXAPRO) 20 MG tablet Take 1 tablet (20 mg) by mouth daily 30 tablet 11     LORazepam (ATIVAN) 0.5 MG tablet Take 1 tablet (0.5 mg) by mouth 2 times daily as needed for anxiety 30 tablet 0     No facility-administered encounter medications on file as of 10/14/2021.       Review Of Systems:  Skin: alopecia  Eyes: negative  Ears/Nose/Throat: negative  Respiratory: No shortness of breath, dyspnea on exertion, cough, or hemoptysis  Cardiovascular: negative  Gastrointestinal: negative  Genitourinary: negative  Musculoskeletal: negative  Neurologic: negative  Psychiatric: negative  Hematologic/Lymphatic/Immunologic: negative  Endocrine: negative      Objective:     /60   LMP  (LMP Unknown)   Eyes: Conjunctivae/lids: Normal   ENT: Lips:  Normal  MSK: Normal  Cardiovascular: Peripheral edema none  Pulm: Breathing Normal  Neuro/Psych: Orientation: A/O x 3 Normal; Mood/Affect: Normal, NAD, WDWN  Pt accompanied by: self  Following areas examined: face, neck, scalp, ears. Pt wearing mask  Angeles skin type:i   Findings:  Thinning of scalp at frontotemporal scalp with short terminal hairs within  Normal appearing scalp post auricular  Assessment and Plan:  1)alopecia   female pattern hair loss?  Disc another biopsy  Disc il tac, topical calcineurin inhibitor, lab work up, spironolactone, finasteride and rogain  Pt defers all except rogain    Start men's Rogaine daily.    Female pattern alopecia or androgenetic alopecia is mediated by dihydrotestosterone causing miniaturization of the hair follicles. Treatment options to prevent further hair loss are Finasteride, Spironolactone, and Minoxidil. Treatment will not cause hair to regrow. Once treatment is discontinued hair loss will progress.    Side effects of oral Finasteride include and are not limited to impotence, decreased libido, abnormal ejaculation, sexual dysfunction and gynecomastia.     Side effects of Minoxidil (Rogaine) include contact  dermatitis, pruritis (itch), and skin irritation. Recommend using men's Rogaine once a day.     Side effects of Spironolactone: This oral medication blocks androgens (hormones that can aggravate acne).  Since this medication is also used as a diuretic, baseline blood work is needed to check your electrolytes and kidney function. Do not get pregnant while on this medication. Drink plenty of water to avoid dehydration. May feel lightheaded from medication and may increase urination.       It was a pleasure speaking with Kamilah Dee today.   Follow up in prn

## 2021-10-14 NOTE — LETTER
10/14/2021         RE: Kamilah Dee  [address]  [place] [address]        Dear Colleague,    Thank you for referring your patient, Kamilah Dee, [facility]. Please see a copy of my visit note below.    HPI:  Kamilah Dee is a 36 year old female patient here today for hair loss supra and post auricular scalp and frontotemporal scalp. Per pt thinner in area.  .  Patient states this has been present for months.  Patient reports the following symptoms: none .  Patient reports the following previous treatments: il tac and topical steroid with no change. Punch bx shows normal appearing scalp.  Patient reports the following modifying factors: none.  Associated symptoms: none.  Patient has no other skin complaints today.  Remainder of the HPI, Meds, PMH, Allergies, FH, and SH was reviewed in chart.      Past Medical History:   Diagnosis Date     Anemia     with pregnancy     Anxiety 1/21/2014     Depo-Provera contraceptive status      Depression      History of varicella-documented immunity 7/21/2011     Migraine 8/3/2012     Other, mixed, or unspecified nondependent drug abuse, episodic      Tic age 14       Past Surgical History:   Procedure Laterality Date     HC REMOVAL OF TONSILS,12+ Y/O  1996    Tonsils 11+y.o.        Family History   Problem Relation Age of Onset     Alcohol/Drug Mother         b:1964 addicted to crack     Family History Negative Father         b: 1962     Family History Negative Sister         b:1994     Cervical Cancer Sister 35        10/2015     Breast Cancer Sister         no information     Family History Negative Sister         b:1981     Family History Negative Brother         b:1989     No Known Problems Maternal Grandmother      No Known Problems Maternal Grandfather      No Known Problems Paternal Grandmother      No Known Problems Other        Social History     Socioeconomic History     Marital status:      Spouse name:  Miky     Number of children: 1     Years of education: Not on file     Highest education level: Not on file   Occupational History     [place]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 Sober since age 18      Sexual activity: Not Currently   Other Topics Concern      Service Not Asked     Blood Transfusions Not Asked     Caffeine Concern Not Asked     Occupational Exposure Not Asked     Hobby Hazards Not Asked     Sleep Concern Not Asked     Stress Concern Not Asked     Weight Concern Not Asked     Special Diet Not Asked     Back Care Not Asked     Exercise Not Asked     Bike Helmet Not Asked     Seat Belt Not Asked     Self-Exams Not Asked     Parent/sibling w/ CABG, MI or angioplasty before 65F 55M? Not Asked   Social History Narrative    Living arrangements - the patient lives with her , Miky and son     Social Determinants of Health     Financial Resource Strain:      Difficulty of Paying Living Expenses:    Food Insecurity:      Worried About Running Out of Food in the Last Year:      Ran Out of Food in the Last Year:    Transportation Needs:      Lack of Transportation (Medical):      Lack of Transportation (Non-Medical):    Physical Activity:      Days of Exercise per Week:      Minutes of Exercise per Session:    Stress:      Feeling of Stress :    Social Connections:      Frequency of Communication with Friends and Family:      Frequency of Social Gatherings with Friends and Family:      Attends Taoist Services:      Active Member of Clubs or Organizations:      Attends Club or Organization Meetings:       Marital Status:    Intimate Partner Violence:      Fear of Current or Ex-Partner:      Emotionally Abused:      Physically Abused:      Sexually Abused:        Outpatient Encounter Medications as of 10/14/2021   Medication Sig Dispense Refill     escitalopram (LEXAPRO) 20 MG tablet Take 1 tablet (20 mg) by mouth daily 30 tablet 11     LORazepam (ATIVAN) 0.5 MG tablet Take 1 tablet (0.5 mg) by mouth 2 times daily as needed for anxiety 30 tablet 0     No facility-administered encounter medications on file as of 10/14/2021.       Review Of Systems:  Skin: alopecia  Eyes: negative  Ears/Nose/Throat: negative  Respiratory: No shortness of breath, dyspnea on exertion, cough, or hemoptysis  Cardiovascular: negative  Gastrointestinal: negative  Genitourinary: negative  Musculoskeletal: negative  Neurologic: negative  Psychiatric: negative  Hematologic/Lymphatic/Immunologic: negative  Endocrine: negative      Objective:     /60   LMP  (LMP Unknown)   Eyes: Conjunctivae/lids: Normal   ENT: Lips:  Normal  MSK: Normal  Cardiovascular: Peripheral edema none  Pulm: Breathing Normal  Neuro/Psych: Orientation: A/O x 3 Normal; Mood/Affect: Normal, NAD, WDWN  Pt accompanied by: self  Following areas examined: face, neck, scalp, ears. Pt wearing mask  Angeles skin type:i   Findings:  Thinning of scalp at frontotemporal scalp with short terminal hairs within  Normal appearing scalp post auricular  Assessment and Plan:  1)alopecia   female pattern hair loss?  Disc another biopsy  Disc il tac, topical calcineurin inhibitor, lab work up, spironolactone, finasteride and rogain  Pt defers all except rogain    Start men's Rogaine daily.    Female pattern alopecia or androgenetic alopecia is mediated by dihydrotestosterone causing miniaturization of the hair follicles. Treatment options to prevent further hair loss are Finasteride, Spironolactone, and Minoxidil. Treatment will not cause hair to regrow. Once treatment is  discontinued hair loss will progress.    Side effects of oral Finasteride include and are not limited to impotence, decreased libido, abnormal ejaculation, sexual dysfunction and gynecomastia.     Side effects of Minoxidil (Rogaine) include contact dermatitis, pruritis (itch), and skin irritation. Recommend using men's Rogaine once a day.     Side effects of Spironolactone: This oral medication blocks androgens (hormones that can aggravate acne).  Since this medication is also used as a diuretic, baseline blood work is needed to check your electrolytes and kidney function. Do not get pregnant while on this medication. Drink plenty of water to avoid dehydration. May feel lightheaded from medication and may increase urination.       It was a pleasure speaking with Kamilah Dee today.   Follow up in prn        Again, thank you for allowing me to participate in the care of your patient.        Sincerely,        Deepa Guy PA-C

## 2021-10-18 ENCOUNTER — TELEPHONE (OUTPATIENT)
Dept: NEUROLOGY | Facility: CLINIC | Age: 36
End: 2021-10-18

## 2021-10-18 NOTE — TELEPHONE ENCOUNTER
[facility] Call Center    Phone Message    May a detailed message be left on voicemail: yes     Reason for Call: Form or Letter   Type or form/letter needing completion: Patient is faxing [employer] paper work to re-qualify her work that need to be completed by Dr. Ackerman  Provider: Dr. Ackerman  Date form needed: asap  Once completed: Fax form to: Fax will be provided on form    Patient states [facility]yla will be faxing over [employer] forms in regards to her Tic's diagnosis  Patient would like Dr. Ackerman to reach out to her with any questions she has in order to get FMLA.    Action Taken: Message routed to:  [facility]    Travel Screening: Not Applicable

## 2021-10-19 NOTE — TELEPHONE ENCOUNTER
Patient calling to add that she is requesting 3-4 absences a month and at least 1 drs appointment per month.

## 2021-10-19 NOTE — TELEPHONE ENCOUNTER
Patient was called and VM was left letting patient know that we have not received any FMLA paper work from her, fax number was left on patient Vm as well as clinic number if patient has questions

## 2021-10-24 ENCOUNTER — HEALTH MAINTENANCE LETTER (OUTPATIENT)
Age: 36
End: 2021-10-24

## 2021-10-25 ENCOUNTER — TELEPHONE (OUTPATIENT)
Dept: NEUROLOGY | Facility: CLINIC | Age: 36
End: 2021-10-25

## 2021-10-25 NOTE — TELEPHONE ENCOUNTER
[facility] Call Center    Phone Message    May a detailed message be left on voicemail: yes     Reason for Call: Other: Patient is requesting a call back to discuss if documents were received for patient FMLA, please call patient at [phone] to advise.      Action Taken: Message routed to:  [facility]    Travel Screening: Not Applicable

## 2021-10-28 ENCOUNTER — OFFICE VISIT (OUTPATIENT)
Dept: URGENT CARE | Facility: URGENT CARE | Age: 36
End: 2021-10-28
Payer: COMMERCIAL

## 2021-10-28 ENCOUNTER — APPOINTMENT (OUTPATIENT)
Dept: ULTRASOUND IMAGING | Facility: CLINIC | Age: 36
End: 2021-10-28
Attending: PHYSICIAN ASSISTANT
Payer: COMMERCIAL

## 2021-10-28 ENCOUNTER — HOSPITAL ENCOUNTER (EMERGENCY)
Facility: CLINIC | Age: 36
Discharge: HOME | End: 2021-10-28
Attending: PHYSICIAN ASSISTANT | Admitting: PHYSICIAN ASSISTANT
Payer: COMMERCIAL

## 2021-10-28 VITALS
SYSTOLIC BLOOD PRESSURE: 121 MMHG | TEMPERATURE: 98.9 F | DIASTOLIC BLOOD PRESSURE: 77 MMHG | RESPIRATION RATE: 18 BRPM | HEART RATE: 80 BPM | OXYGEN SATURATION: 99 %

## 2021-10-28 VITALS
RESPIRATION RATE: 18 BRPM | OXYGEN SATURATION: 98 % | BODY MASS INDEX: 21.69 KG/M2 | TEMPERATURE: 98 F | HEIGHT: 66 IN | HEART RATE: 68 BPM | WEIGHT: 135 LBS | SYSTOLIC BLOOD PRESSURE: 138 MMHG | DIASTOLIC BLOOD PRESSURE: 66 MMHG

## 2021-10-28 DIAGNOSIS — Z53.9 ERRONEOUS ENCOUNTER--DISREGARD: Primary | ICD-10-CM

## 2021-10-28 DIAGNOSIS — M79.661 RIGHT CALF PAIN: ICD-10-CM

## 2021-10-28 PROCEDURE — 99284 EMERGENCY DEPT VISIT MOD MDM: CPT | Mod: 25

## 2021-10-28 PROCEDURE — 93971 EXTREMITY STUDY: CPT | Mod: RT

## 2021-10-28 ASSESSMENT — ENCOUNTER SYMPTOMS
SHORTNESS OF BREATH: 1
MYALGIAS: 1

## 2021-10-28 ASSESSMENT — MIFFLIN-ST. JEOR: SCORE: 1314.11

## 2021-10-28 NOTE — ED PROVIDER NOTES
History   Chief Complaint:  Leg Pain       The history is provided by the patient.      Kamilah Dee is a 36 year old female with history of generalized anxiety disorder, anemia, and unspecified nondependent drug abuse who presents with right leg pain. In addition, the patient underwent a tummy tuck about three months ago (July 2021) and afterwards she developed pneumonia and her D-dimer was elevated. At the time, her CT scan displayed no blood clots and she was advised to follow-up for another D-dimer. Now recently, the patient has noticed cramping to her right calf as well as aches to her bilateral arms and the rest of her body. Today, the patient went to Urgent Care but they did not have an ultrasound at hand, so they advised her to visit the emergency department. Here, she also describes some shortness of breath that is worse than her baseline. She denies any chest pain.    Of note, the patient thinks that her sister may have had a blood clot in the past.     Review of Systems   Respiratory: Positive for shortness of breath (worsening from baseline).    Cardiovascular: Negative for chest pain.   Musculoskeletal: Positive for myalgias (right calf).        Aches to bilateral arms and rest of her body   All other systems reviewed and are negative.    Allergies:  Remeron [Mirtazapine]    Medications:  escitalopram (LEXAPRO) 20 MG tablet  LORazepam (ATIVAN) 0.5 MG tablet    Past Medical History:     Anemia  History of varicella-documented immunity   Migraine   Other, mixed, or unspecified nondependent drug abuse, episodic   Tic disorder  Health care home  Generalized anxiety disorder   ASCUS of cervix and negative high risk HPV  Major depressive disorder, single episode, mild   Sterilization       Past Surgical History:    Tonsillectomy      Family History:    Alcohol/drug (mother)   Cervical cancer (sister)   Breast cancer (sister)     Physical Exam     Patient Vitals for the past 24 hrs:   BP Temp Temp src  "Pulse Resp SpO2 Height Weight   10/28/21 1612 (!) 145/76 98  F (36.7  C) Temporal 76 18 98 % 1.676 m (5' 6\") 61.2 kg (135 lb)       Physical Exam  General: Alert and interactive. Appears well.   Head: Atraumatic, without obvious lesion, abrasion, hematoma.   Eyes: The pupils are equal and round. No scleral icterus.   ENT: No obvious abnormalities to the ears or nose. Mucous membranes moist.   Neck:Trachea is in the midline. No obvious swelling to the neck. Full range of motion.   CV: Regular rate. Extremities well perfused. Capillary refill brisk in toes. Pedal pulses are normal.   Resp: Non-labored, no retractions or accessory muscle use.     GI: Abdomen is not distended.   MS: Moving all extremities well. Patient has mild tenderness in the right calf without obvious swelling.  Skin: No rash. No open wounds.   Neuro: Alert and oriented x 3. Non-focal examination.    Psych: Awake. Alert.  Normal affect. Appropriate interactions.    Emergency Department Course     Imaging:  US Lower Extremity Venous Duplex Right   Final Result   IMPRESSION:   1.  No deep venous thrombosis in the right lower extremity.        Report per radiology    Emergency Department Course:  Reviewed:  I reviewed nursing notes, vitals, past medical history, Care Everywhere and [facility]    Assessments:  1626 I obtained history and examined the patient as noted above.   1802 I rechecked the patient and explained findings.     Disposition:  The patient was discharged to home.     Impression & Plan     Medical Decision Making:  Kamilah Dee is a 36 year old female who presents with right calf pain and concern for a DVT. The patient states that her D-dimer was elevated during her last visit to the emergency department, which time she was diagnosed with pneumonia. Her calf has been hurting for the past few days. Fortunately, her ultrasound is negative. She has normal pedal pulses and her extremities are well perfused, making arterial occlusion less " likely. There are no external skin changes that would suggest cellulitis. The patient asked me to repeat her D-dimer, which I think is unnecessary, as it was likely elevated because of pneumonia during her last visit, and an ultrasound is definitive work-up for her calf pain. She has no chest pain or shortness of breath to suggest pulmonary embolism. I suggested compression, elevation, ibuprofen or Tylenol and close follow-up with primary care should her symptoms persist.     Diagnosis:    ICD-10-CM    1. Right calf pain  M79.661      Scribe Disclosure:  I, Jazmine Burns, am serving as a scribe at 4:26 PM on 10/28/2021 to document services personally performed by Mattie Graff PA-C based on my observations and the provider's statements to me.      Mattie Graff PA-C  10/28/21 1823

## 2021-10-28 NOTE — PROGRESS NOTES
R calf pain and chest pressure.  Patient worried about DVT/PE.  Referred to ED for eval and imaging prn.    No charge for visit.  Lissy Augustin MD  [facility]

## 2021-10-28 NOTE — DISCHARGE INSTRUCTIONS
NO DVT.   Try Ibuprofen/Tylenol, ACE bandage.   See primary care in a few days should symptoms persist.

## 2021-10-28 NOTE — TELEPHONE ENCOUNTER
Patient calling to request to see if the forms have been received. Please call to discuss thank you.

## 2021-10-28 NOTE — ED TRIAGE NOTES
Pt was seen at PCP but did not have US available. Pt reports d-dimer was elevated about 3 months ago.

## 2021-10-28 NOTE — TELEPHONE ENCOUNTER
Patient was called and staff let her know that we have not received any forms on her behalf. patient stated that she will have them re fax the forms, fax number was given to patient again

## 2021-11-02 NOTE — TELEPHONE ENCOUNTER
"[facility] Call Center    Phone Message    May a detailed message be left on voicemail: yes     Reason for Call: Other: Kamilah calling to see if the clinic has recieved paperwork for her intermittent leave.  She is wondering if also the paperwork would come across as Kamilah \"Jose\" vs Kamilah \"Strainlirwin\" as Luiz was her  name and she has changed it back to her maiden name.  Please call her with any updates    Action Taken: Message routed to:  [facility]  [facility]rology    Travel Screening: Not Applicable                                                                      "

## 2021-11-04 ENCOUNTER — DOCUMENTATION ONLY (OUTPATIENT)
Dept: NEUROLOGY | Facility: CLINIC | Age: 36
End: 2021-11-04

## 2021-11-04 ENCOUNTER — TELEPHONE (OUTPATIENT)
Dept: NEUROLOGY | Facility: CLINIC | Age: 36
End: 2021-11-04

## 2021-11-04 NOTE — TELEPHONE ENCOUNTER
[facility] Call Center    Phone Message    May a detailed message be left on voicemail: yes     Reason for Call: Form or Letter     Patient called to check if we've received the FMLA forms that were being sent over. Last message she sent about it was on 11/2/21 in the 10/25/21 encounter that is now closed. She hadn't heard back from that last message she sent. The forms are due on Monday, so she is hoping to have this completed by tomorrow. Please reach to confirm if the forms were received.        Action Taken: Message routed to:  [facility]    Travel Screening: Not Applicable

## 2021-11-04 NOTE — PROGRESS NOTES
Received FMLA form from [employer], form was completed and fax to [phone], form did not require provider signature, did require patient signature, patient wanted me to just fax without her signature, she stated that she would call the company and let them know

## 2021-11-04 NOTE — TELEPHONE ENCOUNTER
Staff called [facility] and asked if they were able to fax the FMLA from over to our office, Staff spoke with patient and let patient know that I did call the company, and that they will fax form over to our clinic

## 2021-11-23 ENCOUNTER — OFFICE VISIT (OUTPATIENT)
Dept: INTERNAL MEDICINE | Facility: CLINIC | Age: 36
End: 2021-11-23
Payer: COMMERCIAL

## 2021-11-23 VITALS
WEIGHT: 143.2 LBS | SYSTOLIC BLOOD PRESSURE: 115 MMHG | RESPIRATION RATE: 16 BRPM | OXYGEN SATURATION: 100 % | DIASTOLIC BLOOD PRESSURE: 70 MMHG | HEART RATE: 88 BPM | TEMPERATURE: 98.1 F | BODY MASS INDEX: 23.11 KG/M2

## 2021-11-23 DIAGNOSIS — K92.89 GAS BLOAT SYNDROME: Primary | ICD-10-CM

## 2021-11-23 DIAGNOSIS — K58.1 IRRITABLE BOWEL SYNDROME WITH CONSTIPATION: ICD-10-CM

## 2021-11-23 DIAGNOSIS — R19.8 IRREGULAR BOWEL HABITS: ICD-10-CM

## 2021-11-23 PROCEDURE — 99214 OFFICE O/P EST MOD 30 MIN: CPT | Performed by: PHYSICIAN ASSISTANT

## 2021-11-23 PROCEDURE — 87506 IADNA-DNA/RNA PROBE TQ 6-11: CPT | Performed by: PHYSICIAN ASSISTANT

## 2021-11-23 PROCEDURE — 87338 HPYLORI STOOL AG IA: CPT | Performed by: PHYSICIAN ASSISTANT

## 2021-11-23 ASSESSMENT — PATIENT HEALTH QUESTIONNAIRE - PHQ9
SUM OF ALL RESPONSES TO PHQ QUESTIONS 1-9: 12
SUM OF ALL RESPONSES TO PHQ QUESTIONS 1-9: 12
10. IF YOU CHECKED OFF ANY PROBLEMS, HOW DIFFICULT HAVE THESE PROBLEMS MADE IT FOR YOU TO DO YOUR WORK, TAKE CARE OF THINGS AT HOME, OR GET ALONG WITH OTHER PEOPLE: VERY DIFFICULT

## 2021-11-23 NOTE — PROGRESS NOTES
Assessment & Plan     Gas bloat syndrome      - Adult Gastro Ref - Consult Only; Future  - Enteric Bacteria and Virus Panel by [facility] Stool  - Helicobacter pylori Antigen Stool    Irritable bowel syndrome with constipation    - Adult Gastro Ref - Consult Only; Future    Irregular bowel habits    - Adult Gastro Ref - Consult Only; Future      I spent a total of 30 minutes on the day of the visit.   Time spent doing chart review, history and exam, documentation and further activities per the note       Patient Instructions   Gas x- for gas and bloating    Miralax- for constipation    Metamucil to regulate the stool.        Patient Education     Diet and Lifestyle Tips for Irritable Bowel Syndrome (IBS)   Your healthcare professional may suggest some lifestyle changes to help control your IBS. Changing your diet and managing stress are 2 of the most important. Follow your healthcare provider s instructions and try some of the suggestions below.   Change your diet   Your diet may be an important cause of IBS symptoms. You may want to try the following:     Pay attention to what foods bother you, and stay away from them. For example, dairy products are hard for some people to digest. Lactose-free dairy products may be better for your symptoms.    Don't eat high FODMAP foods. Other common foods that can cause symptoms contain carbohydrates called FODMAPs. These are poorly digested by your body. High FODMAP foods include some fruits such as apples, vegetables such as cabbage, and some dairy. They also include certain sweeteners such as high-fructose corn syrup, sorbitol, and xylitol. Many people find that eating a diet low in FODMAPs can ease symptoms. Talk with your healthcare provider about a low FODMAP diet.    Drink 6 to 8 glasses of water a day.    Don't have caffeine or tobacco. These are muscle stimulants and can affect the working of your digestive tract.    Don't drink alcohol. It can irritate your digestive tract  and make your symptoms worse.    Eat more fiber if constipation is a problem. Fiber makes the stool softer and easier to pass through the colon.    Eat more fiber if diarrhea is a problem. Fiber also helps to bind water. This can help to firm up loose stool.   Reduce stress   If stress or anxiety makes your IBS symptoms worse, learning how to manage stress may help you feel better. Try these tips:     Identify the causes of stress in your life.    Learn new ways to cope with them.    Regular exercise is a great way to relieve stress. It can also help ease constipation. For adults, the CDC recommends 150 minutes of moderate activity each week. It also recommends muscle-strengthening activities 2 days a week. If this sounds like a lot of time, the CDC suggests breaking physical activity into 10-minute blocks and spreading it out over a week. Developing a schedule that works for you is the key to a successful exercise program.  [employer] last reviewed this educational content on 11/1/2019 2000-2021 [employer]. All rights reserved. This information is not intended as a substitute for professional medical care. Always follow your healthcare professional's instructions.               No follow-ups on file.    Lisseth Johnson PA-C  [facility]    Chinmay Triana is a 36 year old who presents for the following health issues     History of Present Illness       She eats 0-1 servings of fruits and vegetables daily.She consumes 1 sweetened beverage(s) daily.She exercises with enough effort to increase her heart rate 20 to 29 minutes per day.  She exercises with enough effort to increase her heart rate 5 days per week.   She is taking medications regularly.       Pt is here with possible IBS symptoms. Pt has tried to drink lemon water and imodium which does not help. Pt has had foul breath in the past two weeks. Pt is having different bowel patterns but, feel constipated  regardless of how many times she goes. Pt feels bloated and gassy (smelly gas). Pt says that her urine and stool smell as well. Has soreness in lower abdomen area which started a few days ago.   Tried to fast- cleanse with Lemon water.     Patient seen in 5/2021 with Dr Ferris for similar symptoms. Recommendations then for Magnesium Citrate and then to follow with Miralax and Metamucil to regulate bowels. Patient has not done this. Concerns mostly about gas/ bloating and bad breath.   Blood work has been done in the interim since visit with Dr Ferris- normal        Constipation  Onset/Duration:   Description:  Frequency of bowel movements: on a daily basis  Consistency of stool: soft multiple times, but feeling that she is constipated   Progression of Symptoms: worsening and same  Accompanying signs and symptoms:    Abdominal pain: no   Rectal pain: no   Blood in stool: no   Nausea/Vomiting: no   Weight loss or gain: no  History:   Similar problems in past: YES  History of abdominal surgery: no  Chronic laxative use: no  New medications: no  Precipitating or alleviating factors:   Therapies tried and outcome:         Review of Systems   Constitutional, HEENT, cardiovascular, pulmonary, gi and gu systems are negative, except as otherwise noted.      Objective    /70 (BP Location: Left arm, Patient Position: Chair, Cuff Size: Adult Regular)   Pulse 88   Temp 98.1  F (36.7  C) (Oral)   Resp 16   Wt 65 kg (143 lb 3.2 oz)   LMP 11/20/2021   SpO2 100%   BMI 23.11 kg/m    Body mass index is 23.11 kg/m .  Physical Exam   GENERAL: healthy, alert and no distress  RESP: lungs clear to auscultation - no rales, rhonchi or wheezes  CV: regular rates and rhythm and normal S1 S2, no S3 or S4  ABDOMEN: soft, nontender, no hepatosplenomegaly, no masses and bowel sounds normal  MS: no gross musculoskeletal defects noted, no edema  SKIN: no suspicious lesions or rashes                Answers for HPI/ROS submitted by the  patient on 11/23/2021  If you checked off any problems, how difficult have these problems made it for you to do your work, take care of things at home, or get along with other people?: Very difficult  PHQ9 TOTAL SCORE: 12

## 2021-11-23 NOTE — PATIENT INSTRUCTIONS
Gas x- for gas and bloating    Miralax- for constipation    Metamucil to regulate the stool.        Patient Education     Diet and Lifestyle Tips for Irritable Bowel Syndrome (IBS)   Your healthcare professional may suggest some lifestyle changes to help control your IBS. Changing your diet and managing stress are 2 of the most important. Follow your healthcare provider s instructions and try some of the suggestions below.   Change your diet   Your diet may be an important cause of IBS symptoms. You may want to try the following:     Pay attention to what foods bother you, and stay away from them. For example, dairy products are hard for some people to digest. Lactose-free dairy products may be better for your symptoms.    Don't eat high FODMAP foods. Other common foods that can cause symptoms contain carbohydrates called FODMAPs. These are poorly digested by your body. High FODMAP foods include some fruits such as apples, vegetables such as cabbage, and some dairy. They also include certain sweeteners such as high-fructose corn syrup, sorbitol, and xylitol. Many people find that eating a diet low in FODMAPs can ease symptoms. Talk with your healthcare provider about a low FODMAP diet.    Drink 6 to 8 glasses of water a day.    Don't have caffeine or tobacco. These are muscle stimulants and can affect the working of your digestive tract.    Don't drink alcohol. It can irritate your digestive tract and make your symptoms worse.    Eat more fiber if constipation is a problem. Fiber makes the stool softer and easier to pass through the colon.    Eat more fiber if diarrhea is a problem. Fiber also helps to bind water. This can help to firm up loose stool.   Reduce stress   If stress or anxiety makes your IBS symptoms worse, learning how to manage stress may help you feel better. Try these tips:     Identify the causes of stress in your life.    Learn new ways to cope with them.    Regular exercise is a great way to  relieve stress. It can also help ease constipation. For adults, the CDC recommends 150 minutes of moderate activity each week. It also recommends muscle-strengthening activities 2 days a week. If this sounds like a lot of time, the CDC suggests breaking physical activity into 10-minute blocks and spreading it out over a week. Developing a schedule that works for you is the key to a successful exercise program.  [employer] last reviewed this educational content on 11/1/2019 2000-2021 [employer]. All rights reserved. This information is not intended as a substitute for professional medical care. Always follow your healthcare professional's instructions.

## 2021-11-24 LAB
C COLI+JEJUNI+LARI FUSA STL QL NAA+PROBE: NOT DETECTED
EC STX1 GENE STL QL NAA+PROBE: NOT DETECTED
EC STX2 GENE STL QL NAA+PROBE: NOT DETECTED
H PYLORI AG STL QL IA: NEGATIVE
NOROV GI+II ORF1-ORF2 JNC STL QL NAA+PR: NOT DETECTED
RVA NSP5 STL QL NAA+PROBE: NOT DETECTED
SALMONELLA SP RPOD STL QL NAA+PROBE: NOT DETECTED
SHIGELLA SP+EIEC IPAH STL QL NAA+PROBE: NOT DETECTED
V CHOL+PARA RFBL+TRKH+TNAA STL QL NAA+PR: NOT DETECTED
Y ENTERO RECN STL QL NAA+PROBE: NOT DETECTED

## 2021-11-24 ASSESSMENT — PATIENT HEALTH QUESTIONNAIRE - PHQ9: SUM OF ALL RESPONSES TO PHQ QUESTIONS 1-9: 12

## 2021-11-30 ENCOUNTER — TRANSFERRED RECORDS (OUTPATIENT)
Dept: HEALTH INFORMATION MANAGEMENT | Facility: CLINIC | Age: 36
End: 2021-11-30
Payer: COMMERCIAL

## 2021-12-03 ENCOUNTER — TRANSFERRED RECORDS (OUTPATIENT)
Dept: HEALTH INFORMATION MANAGEMENT | Facility: CLINIC | Age: 36
End: 2021-12-03
Payer: COMMERCIAL

## 2021-12-09 ENCOUNTER — OFFICE VISIT (OUTPATIENT)
Dept: NEUROLOGY | Facility: CLINIC | Age: 36
End: 2021-12-09
Payer: COMMERCIAL

## 2021-12-09 VITALS
SYSTOLIC BLOOD PRESSURE: 131 MMHG | WEIGHT: 146 LBS | OXYGEN SATURATION: 99 % | RESPIRATION RATE: 16 BRPM | DIASTOLIC BLOOD PRESSURE: 73 MMHG | HEART RATE: 66 BPM | HEIGHT: 66 IN | BODY MASS INDEX: 23.46 KG/M2

## 2021-12-09 DIAGNOSIS — F32.0 CURRENT MILD EPISODE OF MAJOR DEPRESSIVE DISORDER, UNSPECIFIED WHETHER RECURRENT (H): ICD-10-CM

## 2021-12-09 DIAGNOSIS — F41.9 ANXIETY: ICD-10-CM

## 2021-12-09 DIAGNOSIS — F95.1 CHRONIC MOTOR TIC DISORDER: ICD-10-CM

## 2021-12-09 DIAGNOSIS — F95.9 TIC DISORDER: Primary | ICD-10-CM

## 2021-12-09 PROCEDURE — 99215 OFFICE O/P EST HI 40 MIN: CPT | Mod: GC | Performed by: PSYCHIATRY & NEUROLOGY

## 2021-12-09 ASSESSMENT — MIFFLIN-ST. JEOR: SCORE: 1364

## 2021-12-09 NOTE — LETTER
12/9/2021       RE: Kamilah Dee  [address]  [place] [address]     Dear Colleague,    Thank you for referring your patient, Kamilah Dee, to the [facility] [place] at [facility]. Please see a copy of my visit note below.    Movement Disorder Clinic follow up note    Patient: Kamilah Dee  Medical Record Number: [id number]  Encounter Date: December 9, 2021  PCP:Mario Joseph    CC: Motor tic disorder follow up     Interval Hx:   MsMandy Dee is a 36 year old Right-handed woman with pertinent history of anxiety and chronic motor tic disorder that presents to Neurology Movement clinic for follow up regarding management of motor tic disorder.     She was last seen on 9/24/2021 and wanted to try habit reversal therapy and comprehensive behavioral intervention Tourette's (CBIT) with Dr. Lawrence, PhD for her motor tics at that time.  She was not interested in medical therapy for tics during that appointment.  She was also referred to psychiatry for management of anxiety.    History is obtained from patient.     She was unable to find a provider for the CBIT as above, mostly due to limitations from insurance and availability with the various facilities (15-20 attempts), or unable to cater to adults in otherwise adolescent settings. Some of these services were full or unable to accommodate at this time. She felt stressed and anxious from the search for a provider and did not continue pursuing this. She was also unable to establish with a general psychiatrist since the last visit.     Behavioral:   Has ongoing reduced ability to stay focused and lose attention, although most days this is not prominent. She has trouble with concentration and attention occasionally, and this may worsen with increase in Tics. This has not changed much throughout the years.     Tics:  - Motor: jerking of shoulder, head twisting, proximal BUEs,  blinking/twitching of eyes  -- Exacerbated by subjective shortness of breath  -- Stable and mostly unchanged since last visit; happens daily and throughout the entire day, she doesn't notice it as much as children, family and people in social interactions, but this sometimes causes anxiety  - Phonic: Denies grunting, clearing throat, or vocal components, and these have never occurred his her history.       Current medications  Current Outpatient Medications   Medication     escitalopram (LEXAPRO) 20 MG tablet     LORazepam (ATIVAN) 0.5 MG tablet     No current facility-administered medications for this visit.       On exam  LMP 11/20/2021   General- no distress, no rash, good pulses, no edema  Respiratory-no resp distress  Cardiac- regular rate and rhythm  Psych- Slightly reserved but otherwise appropriate    Neurologic  Mental status  Patient is alert, appropriate, speech is fluent without paraphasic errors  Repetition, naming and comprehension intact    Cranial nerve testing  Pupils are equal and reactive to light, visual fields are full to confrontation bilaterally  Extraocular movements are full  Facial sensation is intact, face is symmetric with rest and activation  Palate rises symmetrically, tongue protrudes at midline with normal movements  Sterocleidomastoid and trapezius strength is normal and symmetric    Motor  Bulk and tone are normal  Strength is 5/5 shoulder abduction, finger abduction, arm flexion and extension, hip flexion , plantar and dorsiflexion  Spontaneous movement: jerking and myoclonus of the shoulders, proximal BUE's, head (twisting, flexion, tilting), and blinking that was random and unpredictable although persistent throughout the encounter.     Sensation  Intact to pin, vibration, temperature (cool tuning fork)   Proprioception intact in great toes and fingers    Tendon reflexes  Testing at brachioradialis, biceps, triceps, patella and achilles bilaterally showed normal reflexes,  symmetrically, without Babinski or Morales signs        Impression:   Kamilah Dee is a 36 year old Right-handed Woman with pertinent history of anxiety and chronic motor tic disorder that presents to Neurology Movement clinic for follow up regarding management of motor tic disorder.     1.  Chronic motor tic disorder: She has battled this since 14 years old. She got benefit from the Ingrezza 40 mg a day, which was tried about 2-3 years ago (~2018).  She took this under the direction of Dr. Latoya Juarez. She then went through a period of severe stress and her tics got worse.  She did not know if it was the stress or the medicine making her worse so she stopped the Ingrezza.  She has remained off this med since.  She was recently referred to treatment of chronic motor tic disorder with habit reversal therapy and comprehensive behavioral intervention and Tourette's (CBIT).   2.  History of depression and anxiety and recently referred back to psychiatry    Plan:   - Referral to Dr. Lawrence, PhD, for treatment of chronic motor tic disorder with habit reversal therapy and comprehensive behavioral intervention in Tourette's (CBIT)  - Referral placed for general psychology  - Encouraged starting mindfulness exercises such as yoga and meditation  - Not interested in medical therapy for now and would like to try CBIT first.   - Continue with talk therapy.   - If she were to become interested in starting medication treatment for her tics, would consider reinstituting the Ingrezza or considering topiramate.  I would be reluctant to use neuroleptics in a young woman due to risk of developing tardive dyskinesia.  - Read more about Tourettes at [email or web address].       Return to clinic in 5 months, after established care with psychology and psychiatry with CBIT    Patient was seen and discussed with Attending, Dr. Tyron Lee MD  PGY-3 Neurology Resident  12/09/2021        Attestation signed by Medina Ackerman  DO [place] at 12/9/2021 12:26 PM:    I, Medina Ackerman DO, personally saw this patient with the Neurology resident and agree with the Dr. Riley Lee's findings and plan of care as documented in the Dr. Riley Lee's note. I personally performed salient aspects of the history and neurological examination.     I personally reviewed the medications and labs. I personally viewed the imaging, and agree with the interpretation documented by the resident.    Date of Service (when I saw the patient): 12/09/21    Time spent with patient: 20 minutes  40 minutes spent on date of encounter doing chart reviews and exam and documentation and further activities as noted above.     Medina Ackerman DO, MA   of Neurology   [facility]               Again, thank you for allowing me to participate in the care of your patient.      Sincerely,    Medina Ackerman DO

## 2021-12-09 NOTE — PROGRESS NOTES
Movement Disorder Clinic follow up note    Patient: Kamilah Dee  Medical Record Number: [id number]  Encounter Date: December 9, 2021  PCP:Mario Joseph    CC: Motor tic disorder follow up     Interval Hx:   Ms. Kamilah Dee is a 36 year old Right-handed woman with pertinent history of anxiety and chronic motor tic disorder that presents to Neurology Movement clinic for follow up regarding management of motor tic disorder.     She was last seen on 9/24/2021 and wanted to try habit reversal therapy and comprehensive behavioral intervention Tourette's (CBIT) with Dr. Lawrence, PhD for her motor tics at that time.  She was not interested in medical therapy for tics during that appointment.  She was also referred to psychiatry for management of anxiety.    History is obtained from patient.     She was unable to find a provider for the CBIT as above, mostly due to limitations from insurance and availability with the various facilities (15-20 attempts), or unable to cater to adults in otherwise adolescent settings. Some of these services were full or unable to accommodate at this time. She felt stressed and anxious from the search for a provider and did not continue pursuing this. She was also unable to establish with a general psychiatrist since the last visit.     Behavioral:   Has ongoing reduced ability to stay focused and lose attention, although most days this is not prominent. She has trouble with concentration and attention occasionally, and this may worsen with increase in Tics. This has not changed much throughout the years.     Tics:  - Motor: jerking of shoulder, head twisting, proximal BUEs, blinking/twitching of eyes  -- Exacerbated by subjective shortness of breath  -- Stable and mostly unchanged since last visit; happens daily and throughout the entire day, she doesn't notice it as much as children, family and people in social interactions, but this sometimes causes anxiety  - Phonic: Denies  grunting, clearing throat, or vocal components, and these have never occurred his her history.       Current medications  Current Outpatient Medications   Medication     escitalopram (LEXAPRO) 20 MG tablet     LORazepam (ATIVAN) 0.5 MG tablet     No current facility-administered medications for this visit.       On exam  LMP 11/20/2021   General- no distress, no rash, good pulses, no edema  Respiratory-no resp distress  Cardiac- regular rate and rhythm  Psych- Slightly reserved but otherwise appropriate    Neurologic  Mental status  Patient is alert, appropriate, speech is fluent without paraphasic errors  Repetition, naming and comprehension intact    Cranial nerve testing  Pupils are equal and reactive to light, visual fields are full to confrontation bilaterally  Extraocular movements are full  Facial sensation is intact, face is symmetric with rest and activation  Palate rises symmetrically, tongue protrudes at midline with normal movements  Sterocleidomastoid and trapezius strength is normal and symmetric    Motor  Bulk and tone are normal  Strength is 5/5 shoulder abduction, finger abduction, arm flexion and extension, hip flexion , plantar and dorsiflexion  Spontaneous movement: jerking and myoclonus of the shoulders, proximal BUE's, head (twisting, flexion, tilting), and blinking that was random and unpredictable although persistent throughout the encounter.     Sensation  Intact to pin, vibration, temperature (cool tuning fork)   Proprioception intact in great toes and fingers    Tendon reflexes  Testing at brachioradialis, biceps, triceps, patella and achilles bilaterally showed normal reflexes, symmetrically, without Babinski or Morales signs        Impression:   Kamilah Dee is a 36 year old Right-handed Woman with pertinent history of anxiety and chronic motor tic disorder that presents to Neurology Movement clinic for follow up regarding management of motor tic disorder.     1.  Chronic motor tic  disorder: She has battled this since 14 years old. She got benefit from the Ingrezza 40 mg a day, which was tried about 2-3 years ago (~2018).  She took this under the direction of Dr. Latoya Juarez. She then went through a period of severe stress and her tics got worse.  She did not know if it was the stress or the medicine making her worse so she stopped the Ingrezza.  She has remained off this med since.  She was recently referred to treatment of chronic motor tic disorder with habit reversal therapy and comprehensive behavioral intervention and Tourette's (CBIT).   2.  History of depression and anxiety and recently referred back to psychiatry    Plan:   - Referral to Dr. Lawrence, PhD, for treatment of chronic motor tic disorder with habit reversal therapy and comprehensive behavioral intervention in Tourette's (CBIT)  - Referral placed for general psychology  - Encouraged starting mindfulness exercises such as yoga and meditation  - Not interested in medical therapy for now and would like to try CBIT first.   - Continue with talk therapy.   - If she were to become interested in starting medication treatment for her tics, would consider reinstituting the Ingrezza or considering topiramate.  I would be reluctant to use neuroleptics in a young woman due to risk of developing tardive dyskinesia.  - Read more about Tourettes at [email or web address].       Return to clinic in 5 months, after established care with psychology and psychiatry with CBIT    Patient was seen and discussed with Attending, Dr. Tyron Lee MD  PGY-3 Neurology Resident  12/09/2021

## 2021-12-09 NOTE — PATIENT INSTRUCTIONS
- Referral to Dr. Lawrence, PhD, for treatment of chronic motor tic disorder with habit reversal therapy and comprehensive behavioral intervention in Tourette's (CBIT).   - Referral placed for psychology  - Encouraged starting mindfulness exercises such as yoga and meditation  - Not interested in medical therapy for now and would like to try CBIT first.   - Continue with talk therapy.   - If she were to become interested in starting medication treatment for her tics, would consider reinstituting the Ingrezza or considering topiramate.  I would be reluctant to use neuroleptics in a young woman due to risk of developing tardive dyskinesia.  - Read more about Tourettes at [email or web address].

## 2021-12-10 NOTE — PROGRESS NOTES
Kamilah is a 36 year old  female who presents for annual exam.     Besides routine health maintenance, she has no other health concerns today .    HPI:  The patient's PCP is  Mario Joseph MD.      Working with GI on issues.    Had tummy tuck 3 mo ago.     Periods are changing some, getting shorter and lighter. Then skipped around time of surgery. Then returned and heavy. Now is back to lighter again.       GYNECOLOGIC HISTORY:    Patient's last menstrual period was 2021.    Regular menses? yes  Menses every 30 days.  Length of menses: 5 days    Her current contraception method is: none.  She  reports that she quit smoking about 11 years ago. She has never used smokeless tobacco.    Patient is not sexually active.  STD testing offered?  Declined  Last PHQ-9 score on record =   PHQ-9 SCORE 2021   PHQ-9 Total Score -   PHQ-9 Total Score MyChart -   PHQ-9 Total Score 16     Last GAD7 score on record =   HAMIDA-7 SCORE 2021   Total Score -   Total Score 4     Alcohol Score =1    HEALTH MAINTENANCE:  Cholesterol: (No results found for: CHOL   Last Mammo: Not applicable, Result: Not applicable, Next Mammo: Due at age 39   Pap: (  Lab Results   Component Value Date    PAP ASC-US HPV- 2018    PAP NIL 2016    PAP NILOther HR HPV 2015      Colonoscopy:  NEVER, Result: Not applicable, Next Colonoscopy: Due at age 45-50 years.  Dexa:  never    Health maintenance updated:  yes    HISTORY:  OB History    Para Term  AB Living   2 2 2 0 0 2   SAB IAB Ectopic Multiple Live Births   0 0 0 0 2      # Outcome Date GA Lbr Eddie/2nd Weight Sex Delivery Anes PTL Lv   2 Term 10/29/14 39w6d 04:40 / 00:21 3.33 kg (7 lb 5.5 oz) F Vag-Spont EPI N LEAH      Name: Clary      Apgar1: 8  Apgar5: 9   1 Term 11 40w1d 07:34 / 01:28 3.56 kg (7 lb 13.6 oz) M Vag-Spont   LEAH      Name: Bandar      Apgar5: 9       Patient Active Problem List   Diagnosis     Tic disorder     Health Care Home      "Migraine     HAMIDA (generalized anxiety disorder)     ASCUS of cervix with negative high risk HPV     Major depressive disorder, single episode, mild (H)     Family history of breast cancer     Sterilization     Past Surgical History:   Procedure Laterality Date     ABDOMINOPLASTY  2021     HC REMOVAL OF TONSILS,12+ Y/O  1996    Tonsils 11+y.o.      Social History     Tobacco Use     Smoking status: Former Smoker     Quit date: 2010     Years since quittin.5     Smokeless tobacco: Never Used   Substance Use Topics     Alcohol use: Yes     Alcohol/week: 1.0 standard drink     Types: 1 Standard drinks or equivalent per week     Comment: occasionally 1-2 etoh a month/      Problem (# of Occurrences) Relation (Name,Age of Onset)    Alcohol/Drug (1) Mother: b: addicted to crack    Breast Cancer (1) Sister: no information    Cervical Cancer (1) Sister (35): 10/2015    Family History Negative (4) Father: b: , Sister: b:, Sister: b:, Brother: b:    No Known Problems (4) Maternal Grandmother, Maternal Grandfather, Paternal Grandmother, Other            Current Outpatient Medications   Medication Sig     escitalopram (LEXAPRO) 20 MG tablet Take 1 tablet (20 mg) by mouth daily     LORazepam (ATIVAN) 0.5 MG tablet Take 1 tablet (0.5 mg) by mouth 2 times daily as needed for anxiety     No current facility-administered medications for this visit.     Allergies   Allergen Reactions     Remeron [Mirtazapine]      Excess sedation even with 7.5mg dose       Past medical, surgical, social and family histories were reviewed and updated in EPIC.    ROS:   12 point review of systems negative other than symptoms noted below or in the HPI.  No urinary frequency or dysuria, bladder or kidney problems    EXAM:  /68   Pulse 62   Ht 1.676 m (5' 6\")   Wt 66.7 kg (147 lb)   LMP 2021   BMI 23.73 kg/m     BMI: Body mass index is 23.73 kg/m .    PHYSICAL EXAM:  Constitutional:   Appearance: Well " nourished, well developed, alert, in no acute distress  Neck:  Lymph Nodes:  No lymphadenopathy present    Thyroid:  Gland size normal, nontender, no nodules or masses present  on palpation  Chest:  Respiratory Effort:  Breathing unlabored  Cardiovascular:    Heart: Auscultation:  Regular rate, normal rhythm, no murmurs present  Breasts: Inspection of Breasts:  No lymphadenopathy present., Palpation of Breasts and Axillae:  No masses present on palpation, no breast tenderness., Axillary Lymph Nodes:  No lymphadenopathy present. and No nodularity, asymmetry or nipple discharge bilaterally.  Gastrointestinal:   Abdominal Examination:  Abdomen nontender to palpation, tone normal without rigidity or guarding, no masses present, umbilicus without lesions   Liver and Spleen:  No hepatomegaly present, liver nontender to palpation    Hernias:  No hernias present  Lymphatic: Lymph Nodes:  No other lymphadenopathy present  Skin:  General Inspection:  No rashes present, no lesions present, no areas of  discoloration  Neurologic:    Mental Status:  Oriented X3.  Normal strength and tone, sensory exam                grossly normal, mentation intact and speech normal.    Psychiatric:   Mentation appears normal and affect normal/bright.         Pelvic Exam:  External Genitalia:     Normal appearance for age, no discharge present, no tenderness present, no inflammatory lesions present, color normal  Vagina:     Normal vaginal vault without central or paravaginal defects, no discharge present, no inflammatory lesions present, no masses present  Bladder:     Nontender to palpation  Urethra:   Urethral Body:  Urethra palpation normal, urethra structural support normal   Urethral Meatus:  No erythema or lesions present  Cervix:     Appearance healthy, no lesions present, nontender to palpation, no bleeding present  Uterus:     Uterus: firm, normal sized and nontender, midplane in position.   Adnexa:     No adnexal tenderness present,  no adnexal masses present  Perineum:     Perineum within normal limits, no evidence of trauma, no rashes or skin lesions present  Anus:     Anus within normal limits, no hemorrhoids present  Inguinal Lymph Nodes:     No lymphadenopathy present  Pubic Hair:     Normal pubic hair distribution for age  Genitalia and Groin:     No rashes present, no lesions present, no areas of discoloration, no masses present      COUNSELING:   Reviewed preventive health counseling, as reflected in patient instructions       Osteoporosis prevention/bone health    BMI: Body mass index is 23.73 kg/m .      ASSESSMENT:  36 year old female with satisfactory annual exam.    ICD-10-CM    1. Encounter for gynecological examination without abnormal finding  Z01.419 Pap thin layer screen with HPV - recommended age 30 - 65 years       PLAN:  -pap/hpv obtained for cervical cancer screening.  Manage per guidelines  -Breast self awareness discussed. Age 40 for mammogram.  -Discussed exercise-making plan, strength training. Nutrition encouraged.  -Colonoscopy age 45  -Osteoporosis prevention discussed.  -Return one year for next annual exam          Alejandrina Bridges Masters, DO

## 2021-12-13 ENCOUNTER — OFFICE VISIT (OUTPATIENT)
Dept: OBGYN | Facility: CLINIC | Age: 36
End: 2021-12-13
Payer: COMMERCIAL

## 2021-12-13 VITALS
WEIGHT: 147 LBS | HEIGHT: 66 IN | SYSTOLIC BLOOD PRESSURE: 118 MMHG | DIASTOLIC BLOOD PRESSURE: 68 MMHG | HEART RATE: 62 BPM | BODY MASS INDEX: 23.63 KG/M2

## 2021-12-13 DIAGNOSIS — Z01.419 ENCOUNTER FOR GYNECOLOGICAL EXAMINATION WITHOUT ABNORMAL FINDING: Primary | ICD-10-CM

## 2021-12-13 PROCEDURE — G0124 SCREEN C/V THIN LAYER BY MD: HCPCS

## 2021-12-13 PROCEDURE — 99395 PREV VISIT EST AGE 18-39: CPT | Performed by: OBSTETRICS & GYNECOLOGY

## 2021-12-13 PROCEDURE — G0145 SCR C/V CYTO,THINLAYER,RESCR: HCPCS | Performed by: OBSTETRICS & GYNECOLOGY

## 2021-12-13 PROCEDURE — 87624 HPV HI-RISK TYP POOLED RSLT: CPT | Performed by: OBSTETRICS & GYNECOLOGY

## 2021-12-13 ASSESSMENT — ANXIETY QUESTIONNAIRES
5. BEING SO RESTLESS THAT IT IS HARD TO SIT STILL: SEVERAL DAYS
3. WORRYING TOO MUCH ABOUT DIFFERENT THINGS: MORE THAN HALF THE DAYS
1. FEELING NERVOUS, ANXIOUS, OR ON EDGE: MORE THAN HALF THE DAYS
2. NOT BEING ABLE TO STOP OR CONTROL WORRYING: MORE THAN HALF THE DAYS
IF YOU CHECKED OFF ANY PROBLEMS ON THIS QUESTIONNAIRE, HOW DIFFICULT HAVE THESE PROBLEMS MADE IT FOR YOU TO DO YOUR WORK, TAKE CARE OF THINGS AT HOME, OR GET ALONG WITH OTHER PEOPLE: SOMEWHAT DIFFICULT
6. BECOMING EASILY ANNOYED OR IRRITABLE: SEVERAL DAYS

## 2021-12-13 ASSESSMENT — PATIENT HEALTH QUESTIONNAIRE - PHQ9
SUM OF ALL RESPONSES TO PHQ QUESTIONS 1-9: 16
5. POOR APPETITE OR OVEREATING: NOT AT ALL

## 2021-12-13 ASSESSMENT — MIFFLIN-ST. JEOR: SCORE: 1368.54

## 2021-12-13 NOTE — PATIENT INSTRUCTIONS
-Daily total calcium intake (between food/supplements) should be 1000mg which equates to 3-4 servings calcium containing food per day; VItamin D 1000IU.   Foods rich in calcium are: milk, cheese, yogurt, seafood, sardines and canned salmon, leafy green vegetables such as curtis greens, spinach and kale, beans and lentils, almonds, seeds (poppy, sesame, celery, hansa), rhubarb, dried fruit such as figs, whey protein, tofu and edamame, amaranth, other foods with added calcium such as orange juice and some cereals.   If adequate amount not taken in diet, then a supplement may be needed.     -I also recommend increasing your dietary fiber by starting Metamucil (powder mixed in glass of water) twice daily

## 2021-12-19 ENCOUNTER — HOSPITAL ENCOUNTER (EMERGENCY)
Facility: CLINIC | Age: 36
Discharge: HOME | End: 2021-12-19
Attending: EMERGENCY MEDICINE | Admitting: EMERGENCY MEDICINE
Payer: COMMERCIAL

## 2021-12-19 ENCOUNTER — APPOINTMENT (OUTPATIENT)
Dept: GENERAL RADIOLOGY | Facility: CLINIC | Age: 36
End: 2021-12-19
Attending: EMERGENCY MEDICINE
Payer: COMMERCIAL

## 2021-12-19 VITALS
OXYGEN SATURATION: 100 % | TEMPERATURE: 97.8 F | DIASTOLIC BLOOD PRESSURE: 73 MMHG | HEIGHT: 66 IN | HEART RATE: 93 BPM | SYSTOLIC BLOOD PRESSURE: 135 MMHG | RESPIRATION RATE: 18 BRPM | WEIGHT: 140 LBS | BODY MASS INDEX: 22.5 KG/M2

## 2021-12-19 DIAGNOSIS — R07.89 CHEST WALL PAIN: ICD-10-CM

## 2021-12-19 LAB
ATRIAL RATE - MUSE: 78 BPM
BASOPHILS # BLD AUTO: 0.1 10E3/UL (ref 0–0.2)
BASOPHILS NFR BLD AUTO: 1 %
D DIMER PPP FEU-MCNC: <0.27 UG/ML FEU (ref 0–0.5)
DIASTOLIC BLOOD PRESSURE - MUSE: NORMAL MMHG
EOSINOPHIL # BLD AUTO: 0.2 10E3/UL (ref 0–0.7)
EOSINOPHIL NFR BLD AUTO: 4 %
ERYTHROCYTE [DISTWIDTH] IN BLOOD BY AUTOMATED COUNT: 12.8 % (ref 10–15)
HCT VFR BLD AUTO: 39.7 % (ref 35–47)
HGB BLD-MCNC: 13.1 G/DL (ref 11.7–15.7)
HOLD SPECIMEN: NORMAL
IMM GRANULOCYTES # BLD: 0 10E3/UL
IMM GRANULOCYTES NFR BLD: 0 %
INTERPRETATION ECG - MUSE: NORMAL
LYMPHOCYTES # BLD AUTO: 1.9 10E3/UL (ref 0.8–5.3)
LYMPHOCYTES NFR BLD AUTO: 39 %
MCH RBC QN AUTO: 29 PG (ref 26.5–33)
MCHC RBC AUTO-ENTMCNC: 33 G/DL (ref 31.5–36.5)
MCV RBC AUTO: 88 FL (ref 78–100)
MONOCYTES # BLD AUTO: 0.5 10E3/UL (ref 0–1.3)
MONOCYTES NFR BLD AUTO: 11 %
NEUTROPHILS # BLD AUTO: 2.1 10E3/UL (ref 1.6–8.3)
NEUTROPHILS NFR BLD AUTO: 45 %
NRBC # BLD AUTO: 0 10E3/UL
NRBC BLD AUTO-RTO: 0 /100
P AXIS - MUSE: 69 DEGREES
PLATELET # BLD AUTO: 218 10E3/UL (ref 150–450)
PR INTERVAL - MUSE: 132 MS
QRS DURATION - MUSE: 78 MS
QT - MUSE: 384 MS
QTC - MUSE: 437 MS
R AXIS - MUSE: 28 DEGREES
RBC # BLD AUTO: 4.51 10E6/UL (ref 3.8–5.2)
SYSTOLIC BLOOD PRESSURE - MUSE: NORMAL MMHG
T AXIS - MUSE: 38 DEGREES
VENTRICULAR RATE- MUSE: 78 BPM
WBC # BLD AUTO: 4.7 10E3/UL (ref 4–11)

## 2021-12-19 PROCEDURE — 85025 COMPLETE CBC W/AUTO DIFF WBC: CPT | Performed by: EMERGENCY MEDICINE

## 2021-12-19 PROCEDURE — 36415 COLL VENOUS BLD VENIPUNCTURE: CPT | Performed by: EMERGENCY MEDICINE

## 2021-12-19 PROCEDURE — 250N000013 HC RX MED GY IP 250 OP 250 PS 637: Performed by: EMERGENCY MEDICINE

## 2021-12-19 PROCEDURE — 93005 ELECTROCARDIOGRAM TRACING: CPT

## 2021-12-19 PROCEDURE — 85379 FIBRIN DEGRADATION QUANT: CPT | Performed by: EMERGENCY MEDICINE

## 2021-12-19 PROCEDURE — 99285 EMERGENCY DEPT VISIT HI MDM: CPT | Mod: 25

## 2021-12-19 PROCEDURE — 71046 X-RAY EXAM CHEST 2 VIEWS: CPT

## 2021-12-19 RX ORDER — IBUPROFEN 600 MG/1
600 TABLET, FILM COATED ORAL ONCE
Status: COMPLETED | OUTPATIENT
Start: 2021-12-19 | End: 2021-12-19

## 2021-12-19 RX ORDER — ACETAMINOPHEN 325 MG/1
650 TABLET ORAL ONCE
Status: COMPLETED | OUTPATIENT
Start: 2021-12-19 | End: 2021-12-19

## 2021-12-19 RX ADMIN — ACETAMINOPHEN 650 MG: 325 TABLET, FILM COATED ORAL at 09:07

## 2021-12-19 RX ADMIN — IBUPROFEN 600 MG: 600 TABLET ORAL at 08:05

## 2021-12-19 ASSESSMENT — MIFFLIN-ST. JEOR: SCORE: 1336.79

## 2021-12-19 NOTE — LETTER
December 19, 2021      To Whom It May Concern:      Kamilah Dee was seen in our Emergency Department today, 12/19/21.  I expect her condition to improve over the next day.  She may return to work/school when improved.    Sincerely,        Rachelle OROSCO RN

## 2021-12-19 NOTE — ED PROVIDER NOTES
"Visit Date: 12/19/2021    CHIEF COMPLAINT:  Chest pain.    HISTORY OF PRESENT ILLNESS:  This is a 36-year-old female who presents to the ED with right-sided chest discomfort.  The patient states she has had this intermittently for a few days, but today when she went to work, her pain got worse.  She noticed it in her right lateral chest and up above the shoulder.  The patient denies fevers.  She denies shaking chills.  She denies any significant shortness of breath or diaphoresis.  She has no productive cough.      RISK FACTORS:  No history of heart disease, blood clots, hypertension, diabetes, smoking, or marijuana use.    PAST MEDICAL HISTORY:  \"Anxiety disorder,\" depression, migraines.    MEDICATIONS:    1.  Lexapro.  2.  Ativan.    FAMILY AND SOCIAL HISTORY:  The patient lives with her children.  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     ALLERGIES:  SHE IS ALLERGIC TO Remeron.    REVIEW OF SYSTEMS:  She states she has been having a lot of gas problems lately and is using quite a few over-the-counter medications for that.    PHYSICAL EXAMINATION:    GENERAL:  Reveals a white female, sitting in no respiratory distress.  VITAL SIGNS:  Blood pressure 135/73, temperature 97, pulse 93, respirations 18, pulse oximetry 100% on room air.  HEENT:  Pupils equal, reactive.  Extraocular movements intact.  Nares and oropharynx clear.  NECK:  The neck veins are flat.  LUNGS:  Clear.  HEART:  Tones are regular.  No murmurs, rubs, or gallops.  CHEST WALL:  There is tenderness in palpation in the suprascapular area on the right.  There is some tenderness also in the right lateral chest wall.  There is no crepitance or flail.  ABDOMEN:  Soft, no tenderness or masses.  MUSCULOSKELETAL:  No swelling or tenderness.  SKIN:  No rash.  NEUROLOGIC:  Awake, alert, appropriate.  Fluent speech.  No facial asymmetry.  Normal motor sensation and coordination.  PSYCHIATRIC:  Slightly anxious, but good " insight.    EMERGENCY DEPARTMENT COURSE:  The patient was evaluated in the ED with EKG, chest x-ray and blood work.  She received ibuprofen as well.  Her D-dimer was normal.  Her white count 47, hemoglobin 13.1, and platelet count is 218, and chest x-ray is read as negative by Radiology.  EKG revealed a normal sinus rhythm with possible left atrial enlargement, no acute ischemic or hypertrophic changes, rate 78, , QRS 78, QTc 437.  The patient did feel some improvement with the ibuprofen.  Given the fact that she has palpable tenderness which is reproduced, she has normal chest x-ray, normal EKG, normal labs and a normal D-dimer, I think this is likely chest wall pain.  I doubt coronary disease, pneumonia, pneumothorax, PE.  I have recommended ibuprofen and cold compresses.  Follow up with her primary if symptoms are not improving, and recheck in the ED if they worsen.    Impression:  Chest wall pain    Tom Khanna MD        D: 2021   T: 2021   MT: ANA M    Name:     MAYLIN TOUSSAINT  MRN:      [id number]        Account:    [id number]   :      1985           Visit Date: 2021     Document: [id number]

## 2021-12-20 LAB
BKR LAB AP GYN ADEQUACY: ABNORMAL
BKR LAB AP GYN INTERPRETATION: ABNORMAL
BKR LAB AP HPV REFLEX: ABNORMAL
BKR LAB AP PREVIOUS ABNL DX: ABNORMAL
BKR LAB AP PREVIOUS ABNORMAL: ABNORMAL
PATH REPORT.COMMENTS IMP SPEC: ABNORMAL
PATH REPORT.COMMENTS IMP SPEC: ABNORMAL
PATH REPORT.RELEVANT HX SPEC: ABNORMAL

## 2021-12-21 ENCOUNTER — PATIENT OUTREACH (OUTPATIENT)
Dept: OBGYN | Facility: CLINIC | Age: 36
End: 2021-12-21
Payer: COMMERCIAL

## 2021-12-22 LAB
HUMAN PAPILLOMA VIRUS 16 DNA: NEGATIVE
HUMAN PAPILLOMA VIRUS 18 DNA: NEGATIVE
HUMAN PAPILLOMA VIRUS FINAL DIAGNOSIS: NORMAL
HUMAN PAPILLOMA VIRUS OTHER HR: NEGATIVE

## 2022-01-03 ENCOUNTER — TRANSFERRED RECORDS (OUTPATIENT)
Dept: HEALTH INFORMATION MANAGEMENT | Facility: CLINIC | Age: 37
End: 2022-01-03
Payer: COMMERCIAL

## 2022-01-24 ENCOUNTER — TRANSFERRED RECORDS (OUTPATIENT)
Dept: HEALTH INFORMATION MANAGEMENT | Facility: CLINIC | Age: 37
End: 2022-01-24
Payer: COMMERCIAL

## 2022-02-02 NOTE — PROGRESS NOTES
INDICATIONS:                                                    Is a pregnancy test required: Yes.  Was it positive or negative?  Negative  Was a consent obtained?  Yes    PT is on her period currently.       Today's PHQ-2 Score:   PHQ-2 ( 1999 Pfizer) 12/13/2021   Q1: Little interest or pleasure in doing things 0   Q2: Feeling down, depressed or hopeless 0   PHQ-2 Score 0   PHQ-2 Total Score (12-17 Years)- Positive if 3 or more points; Administer PHQ-A if positive -   Q1: Little interest or pleasure in doing things -   Q2: Feeling down, depressed or hopeless -   PHQ-2 Score -     Today's PHQ-9 Score:    PHQ-9 SCORE 12/13/2021   PHQ-9 Total Score -   PHQ-9 Total Score MyChart -   PHQ-9 Total Score 16         Kamilah Dee, is a 36 year old female, who had a recent atypical glandular cells pap.  HPV Negative Yes prior history of abnormal pap. Here today for EMB/ colposcopy. Discussed indication, risks of infection and bleeding.    Her last pap was   Lab Results   Component Value Date    PAP ASC-US 09/06/2018    '03, '05 , '11 NIL pap  4/15/14 NIL pap, Neg HPV  12/11/15 NIL pap, + HR HPV (not 16 or 18) >> 1/11/16 Colpo: ECC-neg  12/12/16 NIL pap, Neg HPV  9/6/18 ASCUS pap, Neg HPV.  Plan: cotest in 3 years per guidelines, but pt may request annual pap  12/13/21 AGC pap, neg HPV. Plan: colposcopy and endometrial biopsy. Due by 3/13/22.     PROCEDURE:                                                      EMB and Colposcopy    Speculum placed. Cervix is stained with acetic acid and viewed colposcopically. Squamocolumnar junction is visualized in it's entirety. acetowhite lesion(s) noted at 12 o'clock .   Cervix prepped with betadine. A tenaculum was attached to the cervix. A small plastic 5 mm Pipelle syringe curette was inserted into the cervical canal. The uterus was sounded to 8 cm's. A vigorous four quadrant biopsy was performed, removing amount moderate of tissue. This tissue was placed in Formalin and sent to  pathology.  Attention turned back to external cervix for biopsy, performed at 12:00. Endocervical curretage Done. This tissue was placed in Formalin and sent to pathology.  The speculum was removed.          POST PROCEDURE:                                                      IMPRESSION: Patient tolerated procedure well, colposcopy adequate and MAHNZA 1    PLAN : Await the results of the biopsies.  She tolerated the procedure well. There were no complications. Patient was discharged in stable condition.    Patient advised to call the clinic if excessive bleeding, pelvic pain, or fever.     Follow-up based on pathology results.    Alejandrina Bridges Masters, DO

## 2022-02-03 ENCOUNTER — OFFICE VISIT (OUTPATIENT)
Dept: OBGYN | Facility: CLINIC | Age: 37
End: 2022-02-03
Payer: COMMERCIAL

## 2022-02-03 VITALS
WEIGHT: 144 LBS | BODY MASS INDEX: 23.14 KG/M2 | HEART RATE: 66 BPM | DIASTOLIC BLOOD PRESSURE: 66 MMHG | HEIGHT: 66 IN | SYSTOLIC BLOOD PRESSURE: 108 MMHG

## 2022-02-03 DIAGNOSIS — Z01.812 PRE-PROCEDURE LAB EXAM: ICD-10-CM

## 2022-02-03 DIAGNOSIS — R87.619 ATYPICAL GLANDULAR CELLS ON CERVICAL PAP SMEAR: Primary | ICD-10-CM

## 2022-02-03 LAB — HCG UR QL: NEGATIVE

## 2022-02-03 PROCEDURE — 58110 BX DONE W/COLPOSCOPY ADD-ON: CPT | Performed by: OBSTETRICS & GYNECOLOGY

## 2022-02-03 PROCEDURE — 81025 URINE PREGNANCY TEST: CPT | Performed by: OBSTETRICS & GYNECOLOGY

## 2022-02-03 PROCEDURE — 57454 BX/CURETT OF CERVIX W/SCOPE: CPT | Performed by: OBSTETRICS & GYNECOLOGY

## 2022-02-03 PROCEDURE — 88305 TISSUE EXAM BY PATHOLOGIST: CPT | Performed by: PATHOLOGY

## 2022-02-03 ASSESSMENT — MIFFLIN-ST. JEOR: SCORE: 1354.93

## 2022-02-07 LAB
PATH REPORT.COMMENTS IMP SPEC: NORMAL
PATH REPORT.COMMENTS IMP SPEC: NORMAL
PATH REPORT.FINAL DX SPEC: NORMAL
PATH REPORT.GROSS SPEC: NORMAL
PATH REPORT.MICROSCOPIC SPEC OTHER STN: NORMAL
PATH REPORT.RELEVANT HX SPEC: NORMAL
PHOTO IMAGE: NORMAL

## 2022-02-08 ENCOUNTER — TELEPHONE (OUTPATIENT)
Dept: OBGYN | Facility: CLINIC | Age: 37
End: 2022-02-08
Payer: COMMERCIAL

## 2022-02-08 NOTE — TELEPHONE ENCOUNTER
Pt reports a lump on her butt (L) that is sore. Pt forgot to mention it while at visit with Dr Louie on 2/3/22  Reports the lump on the bottom part of her left butt cheek. Its been there around 6 months. Has not been painful up until the last few days, but now has been sore. Reports 'lump' is soft and is the size of a marble. Does not appear to be growing over this time.    Pt wondering if she should be seen here? PCP?   Pt also inquiring about her atypical results noted from colposcopy and when you would like her to follow-up    Routing [facility] message to provider to advise.  Eva Pressley RN on 2/8/2022 at 3:00 PM

## 2022-02-10 ENCOUNTER — PATIENT OUTREACH (OUTPATIENT)
Dept: OBGYN | Facility: CLINIC | Age: 37
End: 2022-02-10
Payer: COMMERCIAL

## 2022-02-14 ENCOUNTER — TRANSFERRED RECORDS (OUTPATIENT)
Dept: HEALTH INFORMATION MANAGEMENT | Facility: CLINIC | Age: 37
End: 2022-02-14
Payer: COMMERCIAL

## 2022-02-14 LAB
CREATININE (EXTERNAL): 0.61 MG/DL (ref 0.57–1)
GFR ESTIMATED (EXTERNAL): 116 ML/MIN/1.73
GFR ESTIMATED (IF AFRICAN AMERICAN) (EXTERNAL): 134 ML/MIN/1.73
GLUCOSE (EXTERNAL): 94 MG/DL (ref 65–99)
POTASSIUM (EXTERNAL): 4.1 MMOL/L (ref 3.5–5.2)
TSH SERPL-ACNC: 1.88 ULU/ML (ref 0.45–4.5)

## 2022-02-24 NOTE — PROGRESS NOTES
"  SUBJECTIVE:                                                   Kamilah Dee is a 36 year old female who presents to clinic today for the following health issue(s):  Patient presents with:  Breast Problem: Pt states she has a few lumps bilaterally on her breast, does have some breast tenderness.       Additional information: Pt also has concerns about a bump on her left buttocks.    HPI:  Here today for 2 different concerns.  Has noticed more breast lumps in the last couple of months.  Lumps seem to fluctuate a bit and are bilateral.  Fluctuate in size.  No pain or tenderness.  Occ generalized breast tenderness during certain times of the month.  No rash, skin changes, nipple involvement, etc.  +fam hx of breast cancer in her sister at age 35 --Kamilah had mammo in  but has not repeated.  Breasts were heterogeneously dense at that time    Also noticed a soft lump on her right buttock.  Has been present for ~6 months.  No pain or tenderness but \"knows its there\".  No redness.  No drainage.  Doesn't think it has changed in size or shape at all      Patient's last menstrual period was 2022..     Patient is not sexually active, .  Using not sexually active for contraception.    reports that she quit smoking about 11 years ago. She has never used smokeless tobacco.    STD testing offered?  Declined    Health maintenance updated:  yes    Today's PHQ-2 Score:   PHQ-2 (  [employer]) 2021   Q1: Little interest or pleasure in doing things 0   Q2: Feeling down, depressed or hopeless 0   PHQ-2 Score 0   PHQ-2 Total Score (12-17 Years)- Positive if 3 or more points; Administer PHQ-A if positive -   Q1: Little interest or pleasure in doing things -   Q2: Feeling down, depressed or hopeless -   PHQ-2 Score -     Today's PHQ-9 Score:   PHQ-9 SCORE 2021   PHQ-9 Total Score -   PHQ-9 Total Score MyChart -   PHQ-9 Total Score 16     Today's HAMIDA-7 Score:   HAMIDA-7 SCORE 2021   Total Score -   Total Score " 4       Problem list and histories reviewed & adjusted, as indicated.  Additional history: as documented.    Patient Active Problem List   Diagnosis     Tic disorder     Health Care Home     Migraine     HAMIDA (generalized anxiety disorder)     Atypical glandular cells on cervical Pap smear     Major depressive disorder, single episode, mild (H)     Family history of breast cancer     Sterilization     Past Surgical History:   Procedure Laterality Date     ABDOMINOPLASTY  2021     HC REMOVAL OF TONSILS,12+ Y/O  1996    Tonsils 11+y.o.      Social History     Tobacco Use     Smoking status: Former Smoker     Quit date: 2010     Years since quittin.7     Smokeless tobacco: Never Used   Substance Use Topics     Alcohol use: Yes     Alcohol/week: 1.0 standard drink     Types: 1 Standard drinks or equivalent per week     Comment: occasionally 1-2 etoh a month/      Problem (# of Occurrences) Relation (Name,Age of Onset)    Alcohol/Drug (1) Mother: b:1964 addicted to crack    Breast Cancer (1) Sister: no information    Cervical Cancer (1) Sister (35): 10/2015    Family History Negative (4) Father: b: , Sister: b:, Sister: b:, Brother: b:    No Known Problems (4) Maternal Grandmother, Maternal Grandfather, Paternal Grandmother, Other            Current Outpatient Medications   Medication Sig     LORazepam (ATIVAN) 0.5 MG tablet Take 1 tablet (0.5 mg) by mouth 2 times daily as needed for anxiety     escitalopram (LEXAPRO) 20 MG tablet Take 1 tablet (20 mg) by mouth daily (Patient not taking: Reported on 2022)     No current facility-administered medications for this visit.     Allergies   Allergen Reactions     Remeron [Mirtazapine]      Excess sedation even with 7.5mg dose       ROS:  12 point review of systems negative other than symptoms noted below or in the HPI.  Breast: Lumps and Tenderness  No urinary frequency or dysuria, bladder or kidney problems      OBJECTIVE:     /60   " Ht 1.676 m (5' 6\")   Wt 64 kg (141 lb 3.2 oz)   LMP 02/01/2022   Breastfeeding No   BMI 22.79 kg/m    Body mass index is 22.79 kg/m .    Exam:  Constitutional:  Appearance: Well nourished, well developed alert, in no acute distress  Breasts:  Inspection of Breasts:  Symmetric bilaterally.  No puckering.  No skin changes.  Palpation of Breasts and Axillae:  +FIBROCYSTIC BREASTS BILATERALLY WITH SEVERAL SMALL NODULES THROUGHOUT BOTH BREASTS; ALL MOBILE, NONTENDER; NO SKIN CHANGES no breast tenderness Axillary Lymph Nodes:  No lymphadenopathy present  Lymphatic: Lymph Nodes:  No other lymphadenopathy present  Skin: General Inspection:  No rashes present, no lesions present, no areas of discoloration.  SMALL 1CM SOFT MOBILE MASS NOTED ON LEFT BUTT CHEEK JUST LATERAL TO GLUTEAL CLEFT; ROUND/OVOID, NONTENDER; NO REDNESS/IRRITATION; NO FLUCTUANCE  Neurologic:  Mental Status:  Oriented X3.  Normal strength and tone, sensory exam grossly normal, mentation intact and speech normal.    Psychiatric:  Mentation appears normal and affect normal/bright.     In-Clinic Test Results:  No results found for this or any previous visit (from the past 24 hour(s)).    ASSESSMENT/PLAN:                                                        ICD-10-CM    1. Fibrocystic breast disease (FCBD), unspecified laterality  N60.19    2. Lipoma of buttock  D17.1        Patient Instructions   Reassurance given with breast exam today; Kamilah has more fibrocystic breasts which is common in women her age.  With her sister's history of breast cancer, I would recommend yearly mammogram which she has not yet done this year.  Reassurance also given with lipoma on left buttock.  Will continue to monitor.      Raquel Dickerson MD  [facility] [place]  "

## 2022-02-25 ENCOUNTER — OFFICE VISIT (OUTPATIENT)
Dept: OBGYN | Facility: CLINIC | Age: 37
End: 2022-02-25
Payer: COMMERCIAL

## 2022-02-25 VITALS
SYSTOLIC BLOOD PRESSURE: 102 MMHG | HEIGHT: 66 IN | DIASTOLIC BLOOD PRESSURE: 60 MMHG | BODY MASS INDEX: 22.69 KG/M2 | WEIGHT: 141.2 LBS

## 2022-02-25 DIAGNOSIS — N60.19 FIBROCYSTIC BREAST DISEASE (FCBD), UNSPECIFIED LATERALITY: Primary | ICD-10-CM

## 2022-02-25 DIAGNOSIS — D17.1 LIPOMA OF BUTTOCK: ICD-10-CM

## 2022-02-25 PROCEDURE — 99213 OFFICE O/P EST LOW 20 MIN: CPT | Performed by: OBSTETRICS & GYNECOLOGY

## 2022-02-25 NOTE — PATIENT INSTRUCTIONS
Reassurance given with breast exam today; Kamilah has more fibrocystic breasts which is common in women her age.  With her sister's history of breast cancer, I would recommend yearly mammogram which she has not yet done this year.  Reassurance also given with lipoma on left buttock.  Will continue to monitor.

## 2022-03-17 ENCOUNTER — TRANSFERRED RECORDS (OUTPATIENT)
Dept: HEALTH INFORMATION MANAGEMENT | Facility: CLINIC | Age: 37
End: 2022-03-17
Payer: COMMERCIAL

## 2022-03-18 ENCOUNTER — TRANSFERRED RECORDS (OUTPATIENT)
Dept: HEALTH INFORMATION MANAGEMENT | Facility: CLINIC | Age: 37
End: 2022-03-18
Payer: COMMERCIAL

## 2022-03-18 LAB
ALT SERPL-CCNC: 15 IU/L (ref 0–32)
AST SERPL-CCNC: 19 IU/L (ref 0–40)
CREATININE (EXTERNAL): 0.83 MG/DL (ref 0.57–1)
GFR ESTIMATED (EXTERNAL): 93 ML/MIN/1.73
GLUCOSE (EXTERNAL): 89 MG/DL (ref 65–99)
POTASSIUM (EXTERNAL): 4.4 MMOL/L (ref 3.5–5.2)

## 2022-03-28 ENCOUNTER — TELEPHONE (OUTPATIENT)
Dept: OBGYN | Facility: CLINIC | Age: 37
End: 2022-03-28
Payer: COMMERCIAL

## 2022-03-28 DIAGNOSIS — Z80.3 FAMILY HISTORY OF BREAST CANCER: Primary | ICD-10-CM

## 2022-03-28 NOTE — TELEPHONE ENCOUNTER
Pt called in about her mammogram on Thursday. Insurance should cover 3d if there is an order and it specifies that 3d is requested. Please advise

## 2022-03-31 ENCOUNTER — ANCILLARY PROCEDURE (OUTPATIENT)
Dept: MAMMOGRAPHY | Facility: CLINIC | Age: 37
End: 2022-03-31
Payer: COMMERCIAL

## 2022-03-31 DIAGNOSIS — Z80.3 FAMILY HISTORY OF BREAST CANCER: ICD-10-CM

## 2022-03-31 PROCEDURE — 77063 BREAST TOMOSYNTHESIS BI: CPT | Mod: TC | Performed by: RADIOLOGY

## 2022-03-31 PROCEDURE — 77067 SCR MAMMO BI INCL CAD: CPT | Mod: TC | Performed by: RADIOLOGY

## 2022-05-03 ENCOUNTER — TELEPHONE (OUTPATIENT)
Dept: FAMILY MEDICINE | Facility: CLINIC | Age: 37
End: 2022-05-03
Payer: COMMERCIAL

## 2022-05-03 NOTE — TELEPHONE ENCOUNTER
I have no opening 11:10 on Wednesday.  Do you mean Wednesday, 1:10?(not 11:10). If so, PLEASE have her to check in at no later than 1pm.      thx

## 2022-05-03 NOTE — TELEPHONE ENCOUNTER
1:10 check-in appt time for Wednesday 5/4/22 no longer available. Will see if another provider is available to see pt and call pt to update.

## 2022-05-03 NOTE — TELEPHONE ENCOUNTER
Pt had an appt scheduled for 5/3 with Julian, but Julian did not OK a double book at that time. Appt had to be canceled. Pt would like to be seen for tension headaches and abdominal pain.     Pt sees PCP in [place], but working in [place] and would like to see a provider during her lunch break. Dr. Cardenas has a Same-Day slot available tomorrow (5/4) at 11:10 am. Is it okay to use Same-Day slot to see pt? Please advise.    Team to call pt back once Dr. Cardenas advises. Okay to leave detailed message.    Kamilah Paredes,  [facility]

## 2022-05-04 NOTE — PROGRESS NOTES
Assessment & Plan     (R53.83) Other fatigue  (primary encounter diagnosis)  Comment:   Plan: REVIEW OF HEALTH MAINTENANCE PROTOCOL ORDERS,         escitalopram (LEXAPRO) 20 MG tablet, CBC with         platelets, Comprehensive metabolic panel,         Vitamin D Deficiency        Discussed possible differential diagnosis for her symptoms.       Check labs , anxiety and stress could be contributing. Cares and symptomatic treatment discussed.  follow up if problem       (G44.209) Tension headache  Comment: stress related   Plan: escitalopram (LEXAPRO) 20 MG tablet            (F41.1) HAMIDA (generalized anxiety disorder)  Comment:   Plan: escitalopram (LEXAPRO) 20 MG tablet            (K63.89) Small intestinal bacterial overgrowth (SIBO)  Comment: Diagnosed per GI -  Plan: seeing [place] gastro , so may need f/u for treatment trail     :   Plan:   Discussed cares, talked about signs and symptoms of anxiety/ depression and treatment options. Stress could be contributing to HA, and possible GI sx as well.     Willing to try citalopram to 20 mg again   . Pros/ cons of med's discussed .   encouraged to see  to help and referral given . spent sometimes counseling patient. Follow up in 2-3 months, sooner if problem.     Check labs. refill sent.Cares and  treatment discussed.  follow up if problem   Patient expressed understanding and agreement with treatment plan. All patient's questions were answered, will let me know if has more later.  Medications: Rx's: Reviewed the potential side effects/complications of medications prescribed.       Cortney Alexandra MD  [facility] ALEJANDRINA Triana is a 37 year old who presents for the following health issues       Today's PHQ-9         PHQ-9 Total Score: 8  PHQ-9 Q9 Thoughts of better off dead/self-harm past 2 weeks :   (P) Not at all    How difficult have these problems made it for you to do your work, take care of things at home, or get along with  other people: Very difficult        Reason for visit:  Stomach  Symptoms diagnosed with SIBO( small intestinal overgrowth of bacteria) by GI - but she declined treatment trial  for now   Symptom onset:  More than a month  Symptoms include:  Multiple/ stress, anxiety   Symptom intensity:  Moderate  Symptom progression:  Worsening  Had these symptoms before:  Yes  Has tried/received treatment for these symptoms:  Yes  Previous treatment was successful:  No    She eats 0-1 servings of fruits and vegetables daily.She consumes 1 sweetened beverage(s) daily.She exercises with enough effort to increase her heart rate 30 to 60 minutes per day.  She exercises with enough effort to increase her heart rate 3 or less days per week.        -ongoing issues with her SIBO diagnosis ( over growth of bacteria in her bowl)  - has stomach unset pain / cramps - comes and goes, feels liek her stomach is twisted inside. No nausea committing, no diarrhea but very constipated al the time but f goes once or twice  Day, takes longer to move the bowl. No nausea vomit no hear but gassy bloated feeling sometimes . She thinks it also adds up stress .  As gi told her nothing much  they can  Do for that.  ,   Also she  is currently having issues with tension headaches everyday and abdominal pain - lower abdomen - all over pain. Admits feeling stressed sx coming back  and may need to go back on medication   Missed work last night as she was not able to concentrate at work     Depression and Anxiety Follow-Up    How are you doing with your depression since your last visit? Worsened but has stopped taking lexapro bc  x she thought  se was doing and may not need it.     , not sleeping too well now.  hard to fall asleep but sometimes wakes up at  least once through night     How are you doing with your anxiety since your last visit?  Worsened     Are you having other symptoms that might be associated with depression or anxiety? Yes:  see epic     Have  you had a significant life event? OTHER: financial burden bc of everything expensive now since  COVID  .      Do you have any concerns with your use of alcohol or other drugs? No    Social History     Tobacco Use     Smoking status: Former Smoker     Quit date: 2010     Years since quittin.9     Smokeless tobacco: Never Used   Substance Use Topics     Alcohol use: Yes     Alcohol/week: 1.0 standard drink     Types: 1 Standard drinks or equivalent per week     Comment: occasionally 1-2 etoh a month/     Drug use: No     Comment: marijuana hasn't used in 3 years, prior use of ampht, LSD, crack & cocaine. Sober since age 18      PHQ 2021   PHQ-9 Total Score 12 16 8   Q9: Thoughts of better off dead/self-harm past 2 weeks Not at all Not at all Not at all     HAMIDA-7 SCORE 2019   Total Score - - -   Total Score 18 11 4     Last PHQ-9 2022   1.  Little interest or pleasure in doing things 1   2.  Feeling down, depressed, or hopeless 1   3.  Trouble falling or staying asleep, or sleeping too much 1   4.  Feeling tired or having little energy 3   5.  Poor appetite or overeating 1   6.  Feeling bad about yourself 0   7.  Trouble concentrating 1   8.  Moving slowly or restless 0   Q9: Thoughts of better off dead/self-harm past 2 weeks 0   PHQ-9 Total Score 8   Difficulty at work, home, or with people -     HAMIDA-7  2021   1. Feeling nervous, anxious, or on edge 2   2. Not being able to stop or control worrying 2   3. Worrying too much about different things 2   4. Trouble relaxing 0   5. Being so restless that it is hard to sit still 1   6. Becoming easily annoyed or irritable 1   7. Feeling afraid, as if something awful might happen -   HAMIDA-7 Total Score -   If you checked any problems, how difficult have they made it for you to do your work, take care of things at home, or get along with other people? Somewhat difficult       Suicide Assessment Five-step  Evaluation and Treatment (SAFE-T)        Review of Systems   Constitutional, HEENT, cardiovascular, pulmonary, GI, , musculoskeletal, neuro, skin, endocrine and psych systems are negative, except as otherwise noted.      Objective    There were no vitals taken for this visit.  There is no height or weight on file to calculate BMI.  Physical Exam   GENERAL: healthy, alert and no distress  EYES: Eyes grossly normal to inspection, PERRL and conjunctivae and sclerae normal  HENT: ear canals and TM's normal, oropharynx clear and oral mucous membranes moist  NECK: no adenopathy, no asymmetry, masses, or scars and thyroid normal to palpation  RESP: lungs clear to auscultation - no rales, rhonchi or wheezes  CV: regular rate and rhythm, normal S1 S2, no S3 or S4, no murmur,   ABDOMEN: soft, nontender, no hepatosplenomegaly, no masses and bowel sounds normal  MS: no edema  SKIN: no suspicious lesions or rashes  NEURO: Normal strength and tone, mentation intact and speech normal  PSYCH: mentation appears normal, affect normal/ but slightly  anxious, speech pressured, judgement and insight intact and appearance well groomed

## 2022-05-04 NOTE — TELEPHONE ENCOUNTER
Appt made with Dr. Alexandra for Thursday 5/5/22 at 11:00am (10:40 check-in).     Kamilah Paredes,  [facility]

## 2022-05-05 ENCOUNTER — OFFICE VISIT (OUTPATIENT)
Dept: FAMILY MEDICINE | Facility: CLINIC | Age: 37
End: 2022-05-05
Payer: COMMERCIAL

## 2022-05-05 VITALS
WEIGHT: 141 LBS | TEMPERATURE: 98 F | BODY MASS INDEX: 22.66 KG/M2 | OXYGEN SATURATION: 98 % | HEIGHT: 66 IN | HEART RATE: 75 BPM | DIASTOLIC BLOOD PRESSURE: 58 MMHG | SYSTOLIC BLOOD PRESSURE: 104 MMHG

## 2022-05-05 DIAGNOSIS — G44.209 TENSION HEADACHE: ICD-10-CM

## 2022-05-05 DIAGNOSIS — R53.83 OTHER FATIGUE: Primary | ICD-10-CM

## 2022-05-05 DIAGNOSIS — K63.8219 SMALL INTESTINAL BACTERIAL OVERGROWTH (SIBO): ICD-10-CM

## 2022-05-05 DIAGNOSIS — F41.1 GAD (GENERALIZED ANXIETY DISORDER): ICD-10-CM

## 2022-05-05 LAB
ERYTHROCYTE [DISTWIDTH] IN BLOOD BY AUTOMATED COUNT: 13.1 % (ref 10–15)
HCT VFR BLD AUTO: 39.9 % (ref 35–47)
HGB BLD-MCNC: 13.3 G/DL (ref 11.7–15.7)
MCH RBC QN AUTO: 29.6 PG (ref 26.5–33)
MCHC RBC AUTO-ENTMCNC: 33.3 G/DL (ref 31.5–36.5)
MCV RBC AUTO: 89 FL (ref 78–100)
PLATELET # BLD AUTO: 234 10E3/UL (ref 150–450)
RBC # BLD AUTO: 4.5 10E6/UL (ref 3.8–5.2)
WBC # BLD AUTO: 5.2 10E3/UL (ref 4–11)

## 2022-05-05 PROCEDURE — 80053 COMPREHEN METABOLIC PANEL: CPT | Performed by: FAMILY MEDICINE

## 2022-05-05 PROCEDURE — 36415 COLL VENOUS BLD VENIPUNCTURE: CPT | Performed by: FAMILY MEDICINE

## 2022-05-05 PROCEDURE — 85027 COMPLETE CBC AUTOMATED: CPT | Performed by: FAMILY MEDICINE

## 2022-05-05 PROCEDURE — 99214 OFFICE O/P EST MOD 30 MIN: CPT | Performed by: FAMILY MEDICINE

## 2022-05-05 PROCEDURE — 82306 VITAMIN D 25 HYDROXY: CPT | Performed by: FAMILY MEDICINE

## 2022-05-05 RX ORDER — ESCITALOPRAM OXALATE 20 MG/1
20 TABLET ORAL DAILY
Qty: 30 TABLET | Refills: 3 | Status: SHIPPED | OUTPATIENT
Start: 2022-05-05 | End: 2022-08-02

## 2022-05-05 ASSESSMENT — PATIENT HEALTH QUESTIONNAIRE - PHQ9
SUM OF ALL RESPONSES TO PHQ QUESTIONS 1-9: 8
10. IF YOU CHECKED OFF ANY PROBLEMS, HOW DIFFICULT HAVE THESE PROBLEMS MADE IT FOR YOU TO DO YOUR WORK, TAKE CARE OF THINGS AT HOME, OR GET ALONG WITH OTHER PEOPLE: VERY DIFFICULT
SUM OF ALL RESPONSES TO PHQ QUESTIONS 1-9: 8

## 2022-05-06 LAB
ALBUMIN SERPL-MCNC: 4.1 G/DL (ref 3.4–5)
ALP SERPL-CCNC: 40 U/L (ref 40–150)
ALT SERPL W P-5'-P-CCNC: 22 U/L (ref 0–50)
ANION GAP SERPL CALCULATED.3IONS-SCNC: 6 MMOL/L (ref 3–14)
AST SERPL W P-5'-P-CCNC: 17 U/L (ref 0–45)
BILIRUB SERPL-MCNC: 0.6 MG/DL (ref 0.2–1.3)
BUN SERPL-MCNC: 17 MG/DL (ref 7–30)
CALCIUM SERPL-MCNC: 8.5 MG/DL (ref 8.5–10.1)
CHLORIDE BLD-SCNC: 105 MMOL/L (ref 94–109)
CO2 SERPL-SCNC: 24 MMOL/L (ref 20–32)
CREAT SERPL-MCNC: 0.74 MG/DL (ref 0.52–1.04)
DEPRECATED CALCIDIOL+CALCIFEROL SERPL-MC: 30 UG/L (ref 20–75)
GFR SERPL CREATININE-BSD FRML MDRD: >90 ML/MIN/1.73M2
GLUCOSE BLD-MCNC: 86 MG/DL (ref 70–99)
POTASSIUM BLD-SCNC: 4.1 MMOL/L (ref 3.4–5.3)
PROT SERPL-MCNC: 7.3 G/DL (ref 6.8–8.8)
SODIUM SERPL-SCNC: 135 MMOL/L (ref 133–144)

## 2022-05-12 ENCOUNTER — OFFICE VISIT (OUTPATIENT)
Dept: NEUROLOGY | Facility: CLINIC | Age: 37
End: 2022-05-12
Payer: COMMERCIAL

## 2022-05-12 VITALS
SYSTOLIC BLOOD PRESSURE: 120 MMHG | OXYGEN SATURATION: 99 % | WEIGHT: 143 LBS | RESPIRATION RATE: 16 BRPM | BODY MASS INDEX: 23.08 KG/M2 | DIASTOLIC BLOOD PRESSURE: 68 MMHG | HEART RATE: 65 BPM

## 2022-05-12 DIAGNOSIS — F41.9 ANXIETY: ICD-10-CM

## 2022-05-12 DIAGNOSIS — Z86.59 HISTORY OF DEPRESSION: ICD-10-CM

## 2022-05-12 DIAGNOSIS — F95.9 TIC DISORDER: Primary | ICD-10-CM

## 2022-05-12 PROCEDURE — 99215 OFFICE O/P EST HI 40 MIN: CPT | Performed by: PSYCHIATRY & NEUROLOGY

## 2022-05-12 ASSESSMENT — PAIN SCALES - GENERAL: PAINLEVEL: MILD PAIN (3)

## 2022-05-12 NOTE — LETTER
2022       RE: Kamilah Dee  [address]  [place] [address]     Dear Colleague,    Thank you for referring your patient, Kamilah Dee, to the [facility] [place] [facility]. Please see a copy of my visit note below.    Department of Neurology  Movement Disorders Division     Patient: Kamilah Dee   MRN: [id number]   : 1985   Date of Visit: May 12, 2022    History of Present Illness  Ms. Dee is a 37 year old R handed female that presents to Neurology Movement clinic for follow up regarding  management of motor tic disorder.  Patient was last seen on 2021 and wanted to try habit reversal therapy and comprehensive behavioral intervention Tourette's (CBIT) with Dr. Lawrence, PhD, for her motor tics at that time but was unable due to limitations from insurance and availability with the various facilities (15-20 attempts), or unable to cater to adults in otherwise adolescent settings.  She was not interested in medical therapy for tics during that appointment.  She was also referred to psychiatry for management of anxiety but was unable to establish with a general psychiatrist.    History obtained from patient.   Patient reports tics bother her but has learned to live with them since she was 14 years old. There has not been much change to the character of her tics. Continue to have jerking of shoulders and proximal BUEs,  head twisting, and blinking/twitching of eyes. Denies grunting, clearing throat, or vocal components, and these have never occurred his her history. Tics are exacerbated by stress, anxiety, pain. Tics happens daily and throughout the entire day, she doesn't notice it as much as children, family and people in social interactions, but this sometimes causes anxiety. She has tried CBD gummies and this helped tics a little bit. She has been having issues her GI which has been diagnosed at  [facility] and treated with probiotics and OTC supplements. She has felt fatigue and decreased energy and missed work due to Gi symptoms. Tics are worse due to GI issues and anxiety from health issues. Not interested in taking medications for tics.     She continues to have anxiety and reports that she may be depressed. She feels pulled in several different directions and indicates that she doesn't feel that she has time for herself. She has not scheduled her appointment with psychiatry. She is opposed to taking meds for depression. She is no longer going to family therapy with her kids.     Review of Systems:  Other than that mentioned above, the remainder of 12 systems reviewed were negative.    PMH: unchanged  PSH: unchanged  FH: unchanged  SH: unchanged    Medications:  Current Outpatient Medications   Medication Sig Dispense Refill     escitalopram (LEXAPRO) 20 MG tablet Take 1 tablet (20 mg) by mouth daily 30 tablet 3     LORazepam (ATIVAN) 0.5 MG tablet Take 1 tablet (0.5 mg) by mouth 2 times daily as needed for anxiety 30 tablet 0           Allergies   Allergen Reactions     Remeron [Mirtazapine]      Excess sedation even with 7.5mg dose          Physical Exam:  The patient's  weight is 64.9 kg (143 lb). Her blood pressure is 120/68 and her pulse is 65. Her respiration is 16 and oxygen saturation is 99%.    Physical Exam:  GENERAL: alert, active, attentive, appropriately groomed   HEENT: normocephalic, eyes open with no discharge, nares patent, oropharynx clear-no lesions  CHEST: non labored breathing  EXTREMITIES: no edema/cyanosis in BUE  PSYCH: mood anxious    Neurologic Exam:  MENTAL STATUS: Alert and oriented to person, place, time, and situation. Follows commands. Recent and remote memory intact. Attention span and concentration intact. Fund of knowledge intact to current events.  SPEECH: Fluent, intact comprehension and articulation  CN: visual fields intact, EOMIB, facial movement symmetric, hearing  grossly intact to conversation  MOTOR: Moves all extremities equally against gravity without difficulty   Involuntary movements:   Spontaneous movement: jerking and myoclonus of the shoulders, proximal BUE's, head (twisting, flexion, tilting), and blinking that was random and unpredictable although persistent throughout the encounter.   COORDINATION: no dysmetria with FTN  GAIT: able to rise unassisted from a seated position, normal arm swing and normal stride length, no en bloc turns, no ataxia      Impression:  Kamilah Dee is a 37 year old female with anxiety and a motor tic disorder. The patient's main concern today is her GI issues and how this is affecting/worsening her tics.    1.  Chronic motor tic disorder: She has battled this since 14 years old. She got benefit from the Ingrezza 40 mg a day, which was tried about 2-3 years ago (~2018).  She took this under the direction of Dr. Latoya Juarez. She then went through a period of severe stress and her tics got worse.  She did not know if it was the stress or the medicine making her worse so she stopped the Ingrezza.  She has remained off this med since.  She was recently referred to treatment of chronic motor tic disorder with habit reversal therapy and comprehensive behavioral intervention and Tourette's (CBIT) but was unable to be seen due to insurance issues, clinic availability issues or clinic restrictions on age (many do not see patients older than 25).   2.  History of depression and anxiety and recently referred back to psychiatry    Plan:   - Referral to Dr. Lawrence, PhD, or Dr. Deepa Gonzales, PhD, for treatment of chronic motor tic disorder with habit reversal therapy and comprehensive behavioral intervention in Tourette's (CBIT)  - If they are not accepting patients, will refer to [facility] [place] to Dr. Soo Duque, PhD or [facility] for CBIT  - Recommend referral to   [facility], Psychiatry, for management of anxiety and depression but she would like to hold off for now as she is not interested in taking medications.  - Not interested in a therapist at this time due to time conflicts   - Not interested in medical therapy for now and would like to try CBIT first  - Encouraged starting mindfulness exercises such as yoga and meditation  - Consider meditating 10 minutes twice a day  - Insighttimer is an guy that helps you manage meditation on your phone  - If she were to become interested in starting medication treatment for her tics, would consider reinstituting the Ingrezza or considering topiramate.  I would be reluctant to use neuroleptics in a young woman due to risk of developing tardive dyskinesia.  - Read more about Tourettes at [email or web address].     Patient to return in 12 months, for in-person visit, 30 minutes.     Medina Ackerman DO, MA   of Neurology   [facility]    45 minutes spent on date of encounter doing chart reviews and exam and documentation and further activities as noted above.

## 2022-05-12 NOTE — PATIENT INSTRUCTIONS
Plan:   - Referral to Dr. Lawrence, PhD, for treatment of chronic motor tic disorder with habit reversal therapy and comprehensive behavioral intervention in Tourette's (CBIT)  - Recommend referral to Dr. De Leon, Psychiatry, for management of anxiety and depression but would like to hold off for now as she is not interested in taking medications.  - Not interested in a therapist at this time due to time conflicts   - Not interested in medical therapy for now and would like to try CBIT first  - Encouraged starting mindfulness exercises such as yoga  - Consider meditating 10 minutes twice a day  - [employer] is an guy that helps you manage meditation on your phone  - Read more about Tourettes at [email or web address].     Patient to return in 12 months, for in-person visit, 30 minutes.

## 2022-05-12 NOTE — PROGRESS NOTES
Department of Neurology  Movement Disorders Division     Patient: Kamilah Dee   MRN: [id number]   : 1985   Date of Visit: May 12, 2022    History of Present Illness  Ms. Dee is a 37 year old R handed female that presents to Neurology Movement clinic for follow up regarding  management of motor tic disorder.  Patient was last seen on 2021 and wanted to try habit reversal therapy and comprehensive behavioral intervention Tourette's (CBIT) with Dr. Lawrence, PhD, for her motor tics at that time but was unable due to limitations from insurance and availability with the various facilities (15-20 attempts), or unable to cater to adults in otherwise adolescent settings.  She was not interested in medical therapy for tics during that appointment.  She was also referred to psychiatry for management of anxiety but was unable to establish with a general psychiatrist.    History obtained from patient.   Patient reports tics bother her but has learned to live with them since she was 14 years old. There has not been much change to the character of her tics. Continue to have jerking of shoulders and proximal BUEs,  head twisting, and blinking/twitching of eyes. Denies grunting, clearing throat, or vocal components, and these have never occurred his her history. Tics are exacerbated by stress, anxiety, pain. Tics happens daily and throughout the entire day, she doesn't notice it as much as children, family and people in social interactions, but this sometimes causes anxiety. She has tried CBD gummies and this helped tics a little bit. She has been having issues her GI which has been diagnosed at [facility] and treated with probiotics and OTC supplements. She has felt fatigue and decreased energy and missed work due to Gi symptoms. Tics are worse due to GI issues and anxiety from health issues. Not interested in taking medications for tics.     She continues to have anxiety and reports that she may be depressed.  She feels pulled in several different directions and indicates that she doesn't feel that she has time for herself. She has not scheduled her appointment with psychiatry. She is opposed to taking meds for depression. She is no longer going to family therapy with her kids.     Review of Systems:  Other than that mentioned above, the remainder of 12 systems reviewed were negative.    PMH: unchanged  PSH: unchanged  FH: unchanged  SH: unchanged    Medications:  Current Outpatient Medications   Medication Sig Dispense Refill     escitalopram (LEXAPRO) 20 MG tablet Take 1 tablet (20 mg) by mouth daily 30 tablet 3     LORazepam (ATIVAN) 0.5 MG tablet Take 1 tablet (0.5 mg) by mouth 2 times daily as needed for anxiety 30 tablet 0           Allergies   Allergen Reactions     Remeron [Mirtazapine]      Excess sedation even with 7.5mg dose          Physical Exam:  The patient's  weight is 64.9 kg (143 lb). Her blood pressure is 120/68 and her pulse is 65. Her respiration is 16 and oxygen saturation is 99%.    Physical Exam:  GENERAL: alert, active, attentive, appropriately groomed   HEENT: normocephalic, eyes open with no discharge, nares patent, oropharynx clear-no lesions  CHEST: non labored breathing  EXTREMITIES: no edema/cyanosis in BUE  PSYCH: mood anxious    Neurologic Exam:  MENTAL STATUS: Alert and oriented to person, place, time, and situation. Follows commands. Recent and remote memory intact. Attention span and concentration intact. Fund of knowledge intact to current events.  SPEECH: Fluent, intact comprehension and articulation  CN: visual fields intact, EOMIB, facial movement symmetric, hearing grossly intact to conversation  MOTOR: Moves all extremities equally against gravity without difficulty   Involuntary movements:   Spontaneous movement: jerking and myoclonus of the shoulders, proximal BUE's, head (twisting, flexion, tilting), and blinking that was random and unpredictable although persistent throughout  the encounter.   COORDINATION: no dysmetria with FTN  GAIT: able to rise unassisted from a seated position, normal arm swing and normal stride length, no en bloc turns, no ataxia      Impression:  Kamilah Dee is a 37 year old female with anxiety and a motor tic disorder. The patient's main concern today is her GI issues and how this is affecting/worsening her tics.    1.  Chronic motor tic disorder: She has battled this since 14 years old. She got benefit from the Ingrezza 40 mg a day, which was tried about 2-3 years ago (~2018).  She took this under the direction of Dr. Latoya Juarez. She then went through a period of severe stress and her tics got worse.  She did not know if it was the stress or the medicine making her worse so she stopped the Ingrezza.  She has remained off this med since.  She was recently referred to treatment of chronic motor tic disorder with habit reversal therapy and comprehensive behavioral intervention and Tourette's (CBIT) but was unable to be seen due to insurance issues, clinic availability issues or clinic restrictions on age (many do not see patients older than 25).   2.  History of depression and anxiety and recently referred back to psychiatry    Plan:   - Referral to Dr. Lawrence, PhD, or Dr. Deepa Gonzales, PhD, for treatment of chronic motor tic disorder with habit reversal therapy and comprehensive behavioral intervention in Tourette's (CBIT)  - If they are not accepting patients, will refer to [facility] to Dr. Soo Duque, PhD or [facility] (Licensed Professional Clinical Counselor) for CBIT  - Recommend referral to Dr. De Leon, Psychiatry, for management of anxiety and depression but she would like to hold off for now as she is not interested in taking medications.  - Not interested in a therapist at this time due to time conflicts   - Not interested in medical therapy for now and would like to try CBIT first  - Encouraged starting  mindfulness exercises such as yoga and meditation  - Consider meditating 10 minutes twice a day  - Insight[employer] is an guy that helps you manage meditation on your phone  - If she were to become interested in starting medication treatment for her tics, would consider reinstituting the Ingrezza or considering topiramate.  I would be reluctant to use neuroleptics in a young woman due to risk of developing tardive dyskinesia.  - Read more about Tourettes at [email or web address].     Patient to return in 12 months, for in-person visit, 30 minutes.     Medina Ackerman DO MA   of Neurology   [facility]    45 minutes spent on date of encounter doing chart reviews and exam and documentation and further activities as noted above.

## 2022-06-05 PROBLEM — R53.83 OTHER FATIGUE: Status: ACTIVE | Noted: 2022-06-05

## 2022-06-05 PROBLEM — K63.8219 SMALL INTESTINAL BACTERIAL OVERGROWTH (SIBO): Status: ACTIVE | Noted: 2022-06-05

## 2022-06-05 PROBLEM — G44.209 TENSION HEADACHE: Status: ACTIVE | Noted: 2022-06-05

## 2022-06-05 PROBLEM — R53.83 OTHER FATIGUE: Status: RESOLVED | Noted: 2022-06-05 | Resolved: 2022-06-05

## 2022-07-12 ENCOUNTER — TELEPHONE (OUTPATIENT)
Dept: INTERNAL MEDICINE | Facility: CLINIC | Age: 37
End: 2022-07-12

## 2022-07-13 NOTE — TELEPHONE ENCOUNTER
Reason for call:  Same Day Appointment   Requested Provider: any provider at     PCP: Opal    Reason for visit: knee pain    Duration of symptoms: going on for awhile, getting much worse last couple of days     Have you been treated for this in the past? NA    Additional comments: Patient experiencing worsening knee pain, attempting to schedule appointment in clinic to get issue evaluated with no openings until August. Please call to advise.      Phone number to reach patient:  Cell number on file:    Telephone Information:   Mobile [phone]       Best Time:  any    Can we leave a detailed message on this number?  YES    Isha Beckford CMA/ Central Scheduling

## 2022-07-13 NOTE — TELEPHONE ENCOUNTER
If no appointments available and the pain is acutely worse patient would have option of being seen in urgent care.

## 2022-07-20 NOTE — PROGRESS NOTES
SUBJECTIVE:   Kamilah Dee is a 37 year old who presents to the clinic for discussion of birth control methods.   She has used the following methods in the past: Patch and Depo  Today she is interested in discussing birth control options  Histories reviewed and updated  Past Medical History:   Diagnosis Date     Anemia     with pregnancy     Anxiety 2014     Depo-Provera contraceptive status      Depression      History of varicella-documented immunity 2011     Migraine 8/3/2012     Other, mixed, or unspecified nondependent drug abuse, episodic      Tic age 14     Past Surgical History:   Procedure Laterality Date     ABDOMINOPLASTY  2021     HC REMOVAL OF TONSILS,11+ Y/O  1996    Tonsils 11+y.o.     Social History     Socioeconomic History     Marital status:      Spouse name: Miky     Number of children: 1     Years of education: Not on file     Highest education level: Not on file   Occupational History     Occupation: Customer [employer]     Employer: [employer]   Tobacco Use     Smoking status: Former Smoker     Quit date: 2010     Years since quittin.1     Smokeless tobacco: Never Used   Substance and Sexual Activity     Alcohol use: Yes     Alcohol/week: 1.0 standard drink     Types: 1 Standard drinks or equivalent per week     Comment: occasionally 1-2 etoh a month/     Drug use: No     Comment: marijuana hasn't used in 3 years, prior use of ampht, LSD, crack & cocaine. Sober since age 18      Sexual activity: Yes     Partners: Male   Other Topics Concern      Service Not Asked     Blood Transfusions Not Asked     Caffeine Concern Not Asked     Occupational Exposure Not Asked     Hobby Hazards Not Asked     Sleep Concern Not Asked     Stress Concern Not Asked     Weight Concern Not Asked     Special Diet Not Asked     Back Care Not Asked     Exercise Not Asked     Bike Helmet Not Asked     Seat Belt Not Asked     Self-Exams Not Asked     Parent/sibling w/ CABG, MI  "or angioplasty before 65F 55M? Not Asked   Social History Narrative    Living arrangements - the patient lives with her , Miky and son     Social Determinants of Health     Financial Resource Strain: Not on file   Food Insecurity: Not on file   Transportation Needs: Not on file   Physical Activity: Not on file   Stress: Not on file   Social Connections: Not on file   Intimate Partner Violence: Not on file   Housing Stability: Not on file     Family History   Problem Relation Age of Onset     Alcohol/Drug Mother         b:1964 addicted to crack     Family History Negative Father         b: 1962     Family History Negative Sister         b:1994     Cervical Cancer Sister 35        10/2015     Breast Cancer Sister         no information     Family History Negative Sister         b:1981     Family History Negative Brother         b:1989     No Known Problems Maternal Grandmother      No Known Problems Maternal Grandfather      No Known Problems Paternal Grandmother      No Known Problems Other      Menstrual History: Reports normal, regular period roughly every 30 days.  Length of menses: 6 days  Menstrual description: normal    Possible aura with migraines    Denies the following contraindications to estrogen/progesterone combined contraception:  Smoking over age 35  Liver disease  Personal history of blood clot or stroke   History of heart disease  History of breast cancer  Undiagnosed vaginal bleeding  Hypertension  Pregnancy    Denies the following contraindications to the IUD:  Distortion of the uterine cavity  Luan's disease/copper allergy  Active pelvic infection  Unexplained uterine bleeding  Known or suspected pregnancy  Breast cancer or liver disease    Health maintenance updated:  no    ROS:   12 point review of systems negative other than symptoms noted below or in the HPI.    EXAM:  /68   Pulse 68   Ht 1.676 m (5' 6\")   Wt 61.2 kg (135 lb)   LMP 07/03/2022   BMI 21.79 kg/m    Body " mass index is 21.79 kg/m .    ASSESSMENT/PLAN:    ICD-10-CM    1. General counseling and advice on contraceptive management  Z30.09    2. Pre-procedure lab exam  Z01.812 HCG Qual, Urine ([id number])     There are no contraindications to the use of Mirena IUD  Return for Mirena IUD placement in 1.5 weeks when on period    COUNSELING:  Reviewed risks and benefits of contraceptive use  Discussed proper use of chosen method  Take 800mg of Ibuprofen prior to placement of IUD    [facility]    I was present with the MITCHELL student who participated in the documentation of the services provided. I have verified the history and personally performed the physical exam and medical decision making, as documented by the student and edited by me.     Raquel Fregoso, MARYCRUZ Student  Elda RICO, KEYONNA, MARYCRUZ-BC  [phone]      07/22/22

## 2022-07-22 ENCOUNTER — OFFICE VISIT (OUTPATIENT)
Dept: MIDWIFE SERVICES | Facility: CLINIC | Age: 37
End: 2022-07-22
Payer: COMMERCIAL

## 2022-07-22 VITALS
HEART RATE: 68 BPM | BODY MASS INDEX: 21.69 KG/M2 | WEIGHT: 135 LBS | SYSTOLIC BLOOD PRESSURE: 110 MMHG | DIASTOLIC BLOOD PRESSURE: 68 MMHG | HEIGHT: 66 IN

## 2022-07-22 DIAGNOSIS — Z01.812 PRE-PROCEDURE LAB EXAM: ICD-10-CM

## 2022-07-22 DIAGNOSIS — Z30.09 GENERAL COUNSELING AND ADVICE ON CONTRACEPTIVE MANAGEMENT: Primary | ICD-10-CM

## 2022-07-22 PROBLEM — Z30.2 STERILIZATION: Status: RESOLVED | Noted: 2020-06-18 | Resolved: 2022-07-22

## 2022-07-22 LAB — HCG UR QL: NEGATIVE

## 2022-07-22 PROCEDURE — 99212 OFFICE O/P EST SF 10 MIN: CPT | Performed by: NURSE PRACTITIONER

## 2022-07-22 PROCEDURE — 81025 URINE PREGNANCY TEST: CPT | Performed by: NURSE PRACTITIONER

## 2022-07-22 ASSESSMENT — ANXIETY QUESTIONNAIRES
GAD7 TOTAL SCORE: 7
6. BECOMING EASILY ANNOYED OR IRRITABLE: SEVERAL DAYS
3. WORRYING TOO MUCH ABOUT DIFFERENT THINGS: SEVERAL DAYS
1. FEELING NERVOUS, ANXIOUS, OR ON EDGE: SEVERAL DAYS
7. FEELING AFRAID AS IF SOMETHING AWFUL MIGHT HAPPEN: SEVERAL DAYS
IF YOU CHECKED OFF ANY PROBLEMS ON THIS QUESTIONNAIRE, HOW DIFFICULT HAVE THESE PROBLEMS MADE IT FOR YOU TO DO YOUR WORK, TAKE CARE OF THINGS AT HOME, OR GET ALONG WITH OTHER PEOPLE: NOT DIFFICULT AT ALL
2. NOT BEING ABLE TO STOP OR CONTROL WORRYING: SEVERAL DAYS
5. BEING SO RESTLESS THAT IT IS HARD TO SIT STILL: SEVERAL DAYS
GAD7 TOTAL SCORE: 7

## 2022-07-22 ASSESSMENT — PATIENT HEALTH QUESTIONNAIRE - PHQ9
5. POOR APPETITE OR OVEREATING: SEVERAL DAYS
SUM OF ALL RESPONSES TO PHQ QUESTIONS 1-9: 7

## 2022-07-26 DIAGNOSIS — Z01.812 PRE-PROCEDURE LAB EXAM: Primary | ICD-10-CM

## 2022-08-02 ENCOUNTER — LAB (OUTPATIENT)
Dept: LAB | Facility: CLINIC | Age: 37
End: 2022-08-02
Payer: COMMERCIAL

## 2022-08-02 ENCOUNTER — OFFICE VISIT (OUTPATIENT)
Dept: MIDWIFE SERVICES | Facility: CLINIC | Age: 37
End: 2022-08-02

## 2022-08-02 VITALS — BODY MASS INDEX: 22.18 KG/M2 | SYSTOLIC BLOOD PRESSURE: 104 MMHG | DIASTOLIC BLOOD PRESSURE: 60 MMHG | WEIGHT: 137.4 LBS

## 2022-08-02 DIAGNOSIS — Z01.812 PRE-PROCEDURE LAB EXAM: ICD-10-CM

## 2022-08-02 DIAGNOSIS — Z11.3 ROUTINE SCREENING FOR STI (SEXUALLY TRANSMITTED INFECTION): ICD-10-CM

## 2022-08-02 DIAGNOSIS — Z30.430 ENCOUNTER FOR INSERTION OF INTRAUTERINE CONTRACEPTIVE DEVICE: Primary | ICD-10-CM

## 2022-08-02 LAB — HCG UR QL: NEGATIVE

## 2022-08-02 PROCEDURE — 87591 N.GONORRHOEAE DNA AMP PROB: CPT | Performed by: NURSE PRACTITIONER

## 2022-08-02 PROCEDURE — 81025 URINE PREGNANCY TEST: CPT

## 2022-08-02 PROCEDURE — 58300 INSERT INTRAUTERINE DEVICE: CPT | Performed by: NURSE PRACTITIONER

## 2022-08-02 PROCEDURE — 87491 CHLMYD TRACH DNA AMP PROBE: CPT | Performed by: NURSE PRACTITIONER

## 2022-08-02 NOTE — PATIENT INSTRUCTIONS
Your Intrauterine Device (IUD)     What to watch for right after IUD placement:    Some women may experience uterine cramps, bleeding, and/or dizziness during and right after IUD placement.     To help minimize the cramps, you may take ibuprofen 600 mg with food. These symptoms should improve over the next 24 hours.  Mild cramping may be present for a few days after IUD placement. You may continue taking ibuprofen as directed if needed.    You may experience spotting or bleeding for the first few weeks to months after IUD placement.      Other side effects can include:  Anemia, hemorrhage, partial or complete expulsion of device, uterine or cervical perforation, embedding of IUD in the uterine wall, increased risk of pelvic inflammatory disease.  Women who become pregnant with an IUD in place are at higher risk of an ectopic pregnancy.  There is also a higher risk of miscarriage when pregnancy occurs with an IUD in place.    Use condoms or abstain from sex for 7 days after the insertion of your Mirena or Kyleena or Nory  IUD.  This is not necessary if you had a ParaGard IUD placed.    If you experience fever, chills, abdominal pain, worsening pelvic pain, dizziness, unusually heavy vaginal bleeding, suspected expulsion of device or foul smelling vaginal discharge please call the clinic for evaluation.    Please schedule an appointment at the clinic for a string check 4-6 weeks after IUD placement    Your periods may change (Nory/Kyleena/Mirena):    For the first 3 to 6 months, your monthly period may become irregular. You may also have frequent spotting or light bleeding. A few women have heavy bleeding during this time. After your body adjusts, the number of bleeding days is likely to decrease (but may remain irregular), and you may even find that your periods stop altogether for as long as Nory/Mirena is in place.  Your periods will return rapidly once the IUD is removed.      ParaGard IUD users:    ParaGard  IUD users may experience heavier than normal cycles while their IUD is in place, this is considered normal   Back-up contraception is not needed      Checking for your strings:    We encourage everyone with an IUD in place to check for their strings monthly    You may check your own IUD strings by inserting a finger into the vagina and feeling the strings as they exit the cervix.  The strings will initially feel firm, like fishing line, but will soften over a few weeks.  After the strings have softened, you or your partner should not be able to feel the strings during intercourse.     If the string length greatly changes or if you cannot feel your strings at all please make an appointment to see you midwife and use a backup method of contraception like a condom.    If you can feel something hard/plastic like the IUD may not be in the correct place. You should then see your healthcare provider to have the position confirmed with ultrasound.       Remember:    IUD's do not protect against HIV or STIs.  IUD's do not prevent the formation of ovarian cysts.  IUD's do not typically reduce acne or cause weight gain or mood changes.    For more information:  [email or web address]-us.com/    The Mirena IUD is effective for 7 years.  Your IUD can easily be removed by a healthcare professional at any time.    Do not try to remove the IUD yourself.      If you have questions or concerns please call:    [facility]   [phone]

## 2022-08-02 NOTE — PROGRESS NOTES
IUD Insertion:  CONSULT:    Is a pregnancy test required: Yes.  Was it positive or negative?  Negative  Was a consent obtained?  Yes    Subjective: Kamilah Garcia is a 37 year old  presents for IUD and desires Mirena type IUD.    Patient has been given the opportunity to ask questions about all forms of birth control, including all options appropriate for Kamilah Garcia. Discussed that no method of birth control, except abstinence is 100% effective against pregnancy or sexually transmitted infection.     Kamilah Garcia understands she may have the IUD removed at any time. IUD should be removed by a health care provider.    The entire insertion procedure was reviewed with the patient, including care after placement.    Patient's last menstrual period was 2022.  No allergy to betadine or shellfish. Patient desires STD screening  HCG Qual Urine   Date Value Ref Range Status   2020 Negative NEG^Negative Final     Comment:     This test is for screening purposes.  Results should be interpreted along with   the clinical picture.  Confirmation testing is available if warranted by   ordering ZND590, HCG Quantitative Pregnancy.       hCG Urine Qualitative   Date Value Ref Range Status   2022 Negative Negative Final     Comment:     This test is for screening purposes.  Results should be interpreted along with the clinical picture.  Confirmation testing is available if warranted by ordering CCP729, HCG Quantitative Pregnancy.         /60   Wt 62.3 kg (137 lb 6.4 oz)   LMP 2022   BMI 22.18 kg/m      Pelvic Exam:   EG/BUS: normal genital architecture without lesions, erythema or abnormal secretions.   Vagina: moist, pink, rugae with physiologic discharge and secretions  Cervix: multiparous no lesions and pink, moist, closed, without lesion or CMT  Uterus: anteverted position, mobile, no pain  Adnexa: within normal limits and no masses, nodularity, tenderness    PROCEDURE NOTE: --  IUD Insertion    Reason for Insertion: contraception    Premedicated with ibuprofen.  Under sterile technique, cervix was visualized with speculum and prepped with Betadine solution swab x 3. Tenaculum was placed for stability. The uterus was gently straightened and sounded to 8.0 cm. IUD prepared for placement, and IUD inserted according to 's instructions without difficulty or significant resitance, and deployed at the fundus. The strings were visualized and trimmed to 3.0 cm from the external os. Tenaculum was removed and hemostasis noted. Speculum removed.  Patient tolerated procedure well.    Lot # [id number]  Exp: 10/2024    EBL: minimal    Complications: none    ASSESSMENT:     ICD-10-CM    1. Encounter for insertion of intrauterine contraceptive device  Z30.430 levonorgestrel (MIRENA) 20 MCG/DAY IUD     levonorgestrel (MIRENA) 20 MCG/DAY IUD 20 mcg     INSERTION INTRAUTERINE DEVICE        PLAN:    Given 's handouts, including when to have IUD removed, list of danger s/sx, side effects and follow up recommended. Encouraged condom use for prevention of STD. Back up contraception advised for 7 days if progestin method. Advised to call for any fever, for prolonged or severe pain or bleeding, abnormal vaginal discharge, or unable to palpate strings. She was advised to use pain medications (ibuprofen) as needed for mild to moderate pain. Advised to follow-up in clinic in 8-12 weeks for IUD string check if unable to find strings or as directed by provider.     TRISHA Tucker CNM

## 2022-08-03 LAB
C TRACH DNA SPEC QL NAA+PROBE: NEGATIVE
N GONORRHOEA DNA SPEC QL NAA+PROBE: NEGATIVE

## 2022-08-15 ENCOUNTER — HOSPITAL ENCOUNTER (EMERGENCY)
Facility: CLINIC | Age: 37
End: 2022-08-15
Payer: COMMERCIAL

## 2022-08-15 ENCOUNTER — OFFICE VISIT (OUTPATIENT)
Dept: URGENT CARE | Facility: URGENT CARE | Age: 37
End: 2022-08-15
Payer: COMMERCIAL

## 2022-08-15 ENCOUNTER — ANCILLARY PROCEDURE (OUTPATIENT)
Dept: GENERAL RADIOLOGY | Facility: CLINIC | Age: 37
End: 2022-08-15
Attending: PHYSICIAN ASSISTANT
Payer: COMMERCIAL

## 2022-08-15 VITALS
TEMPERATURE: 98.8 F | RESPIRATION RATE: 16 BRPM | OXYGEN SATURATION: 100 % | HEART RATE: 74 BPM | SYSTOLIC BLOOD PRESSURE: 111 MMHG | DIASTOLIC BLOOD PRESSURE: 63 MMHG

## 2022-08-15 DIAGNOSIS — M54.2 CERVICALGIA: ICD-10-CM

## 2022-08-15 DIAGNOSIS — G44.209 TENSION HEADACHE: Primary | ICD-10-CM

## 2022-08-15 PROCEDURE — 99214 OFFICE O/P EST MOD 30 MIN: CPT | Performed by: PHYSICIAN ASSISTANT

## 2022-08-15 PROCEDURE — 72040 X-RAY EXAM NECK SPINE 2-3 VW: CPT | Mod: TC | Performed by: RADIOLOGY

## 2022-08-15 RX ORDER — METHOCARBAMOL 750 MG/1
750 TABLET, FILM COATED ORAL 3 TIMES DAILY
Qty: 30 TABLET | Refills: 0 | Status: SHIPPED | OUTPATIENT
Start: 2022-08-15 | End: 2022-09-07

## 2022-08-15 RX ORDER — NAPROXEN 500 MG/1
500 TABLET ORAL 2 TIMES DAILY WITH MEALS
Qty: 30 TABLET | Refills: 3 | Status: SHIPPED | OUTPATIENT
Start: 2022-08-15 | End: 2022-09-07

## 2022-08-17 ENCOUNTER — TELEPHONE (OUTPATIENT)
Dept: OBGYN | Facility: CLINIC | Age: 37
End: 2022-08-17

## 2022-08-17 NOTE — TELEPHONE ENCOUNTER
Recommend trying Ibuprofen or Tylenol and heat packs if has not tried these comfort measures yet. If she is having significant pain that is unrelieved with OTC medication, heat, rest then I recommend ED for evaluation. Agree can also schedule US and OV visit to confirm IUD placement if she is having mild pain but significant pain with IUD needs urgent evaluation.     TRISHA Flynn, CNM

## 2022-08-17 NOTE — TELEPHONE ENCOUNTER
LMB    Mayela Tate RN on 8/17/2022 at 1:54 PM    [facility] well  Abbie Byrnes RN on 8/18/2022 at 4:36 PM
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
## 2022-09-07 ENCOUNTER — OFFICE VISIT (OUTPATIENT)
Dept: OBGYN | Facility: CLINIC | Age: 37
End: 2022-09-07
Payer: COMMERCIAL

## 2022-09-07 VITALS — BODY MASS INDEX: 21.79 KG/M2 | WEIGHT: 135 LBS | SYSTOLIC BLOOD PRESSURE: 122 MMHG | DIASTOLIC BLOOD PRESSURE: 70 MMHG

## 2022-09-07 DIAGNOSIS — R10.2 PELVIC PAIN IN FEMALE: ICD-10-CM

## 2022-09-07 DIAGNOSIS — N89.8 VAGINAL DISCHARGE: ICD-10-CM

## 2022-09-07 DIAGNOSIS — Z11.8 SCREENING FOR CHLAMYDIAL DISEASE: ICD-10-CM

## 2022-09-07 DIAGNOSIS — Z11.3 SCREEN FOR STD (SEXUALLY TRANSMITTED DISEASE): Primary | ICD-10-CM

## 2022-09-07 LAB
CLUE CELLS: ABNORMAL
NUGENT SCORE: 8
WHITE BLOOD CELLS: ABNORMAL

## 2022-09-07 PROCEDURE — 87491 CHLMYD TRACH DNA AMP PROBE: CPT | Performed by: NURSE PRACTITIONER

## 2022-09-07 PROCEDURE — 87102 FUNGUS ISOLATION CULTURE: CPT | Performed by: NURSE PRACTITIONER

## 2022-09-07 PROCEDURE — 99213 OFFICE O/P EST LOW 20 MIN: CPT | Performed by: NURSE PRACTITIONER

## 2022-09-07 PROCEDURE — 87205 SMEAR GRAM STAIN: CPT | Performed by: NURSE PRACTITIONER

## 2022-09-07 PROCEDURE — 87591 N.GONORRHOEAE DNA AMP PROB: CPT | Performed by: NURSE PRACTITIONER

## 2022-09-07 NOTE — PROGRESS NOTES
SUBJECTIVE:                                                   Kamilah Garcia is a 37 year old female who presents to clinic today for the following health issue(s):  Patient presents with:  IUD: IUD check, having some pain and cramping since insertion. Tried giving it tome to see if it would get better but seems to have gotten worse. +painful intercourse       HPI:Patient had Mirena IUD inserted on 22.  She states she has had pain and cramping since that time, that is just getting worse.  She bled for the first week, than stopped and had a cycle on .  She states intercourse is painful, which she has never experienced before.  Some mild vaginal discharge, no odor or itching.  She also has a new condition called SIBO, it involves the small intestine and can have a bacteria that can cause inflammation anywhere in the body.  She was dx by [facility], but having problems trying to find someone to manage all her symptoms with it.  She states it is a new thing and people don't know what to do for it.  They are expensive medication you can try but may not work.  She is very frustrated.  Read on internet and concerned has PID now.        Patient's last menstrual period was 2022 (approximate)..     Patient is sexually active, .  Using IUD for contraception.    reports that she quit smoking about 12 years ago. She has never used smokeless tobacco.    STD testing offered?  Accepted    Health maintenance updated:  yes    Today's PHQ-2 Score:   PHQ-2 (  [employer]) 2022   Q1: Little interest or pleasure in doing things 1   Q2: Feeling down, depressed or hopeless 1   PHQ-2 Score 2   PHQ-2 Total Score (12-17 Years)- Positive if 3 or more points; Administer PHQ-A if positive -   Q1: Little interest or pleasure in doing things -   Q2: Feeling down, depressed or hopeless -   PHQ-2 Score -       Problem list and histories reviewed & adjusted, as indicated.  Additional history: as documented.    Patient  Active Problem List   Diagnosis     Tic disorder     Health Care Home     Migraine     HAMIDA (generalized anxiety disorder)     Atypical glandular cells on cervical Pap smear     Major depressive disorder, single episode, mild (H)     Family history of breast cancer     Small intestinal bacterial overgrowth (SIBO)     Tension headache     IUD in place     Past Surgical History:   Procedure Laterality Date     ABDOMINOPLASTY  2021     HC REMOVAL OF TONSILS,11+ Y/O  1996    Tonsils 11+y.o.      Social History     Tobacco Use     Smoking status: Former Smoker     Quit date: 2010     Years since quittin.2     Smokeless tobacco: Never Used   Substance Use Topics     Alcohol use: Yes     Alcohol/week: 1.0 standard drink     Types: 1 Standard drinks or equivalent per week     Comment: occasionally 1-2 etoh a month/      Problem (# of Occurrences) Relation (Name,Age of Onset)    Alcohol/Drug (1) Mother: b:1964 addicted to crack    Breast Cancer (1) Sister: no information    Cervical Cancer (1) Sister (35): 10/2015    Family History Negative (4) Father: b: , Sister: b:, Sister: b:, Brother: b:    No Known Problems (4) Maternal Grandmother, Maternal Grandfather, Paternal Grandmother, Other            Current Outpatient Medications   Medication Sig     levonorgestrel (MIRENA) 20 MCG/DAY IUD 1 each (20 mcg) by Intrauterine route once     No current facility-administered medications for this visit.     Allergies   Allergen Reactions     Remeron [Mirtazapine]      Excess sedation even with 7.5mg dose       ROS:  12 point review of systems negative other than symptoms noted below or in the HPI.  Genitourinary: Cramps, Painful Winger, Pelvic Pain and Spotting  No urinary frequency or dysuria, bladder or kidney problems, pelvic pain, dysparunia      OBJECTIVE:     /70   Wt 61.2 kg (135 lb)   LMP 2022 (Approximate)   BMI 21.79 kg/m    Body mass index is 21.79  kg/m .    Exam:  Constitutional:  Appearance: Well nourished, well developed alert, in no acute distress  Neurologic:  Mental Status:  Oriented X3.  Normal strength and tone, sensory exam grossly normal, mentation intact and speech normal.    Psychiatric:  Mentation appears normal and affect normal/bright.  Pelvic Exam:  External Genitalia:     Normal appearance for age, no discharge present, no tenderness present, no inflammatory lesions present, color normal  Vagina:    Normal vaginal vault without central or paravaginal defects, small amount cream discharge present, no inflammatory lesions present, no masses present  Vaginal culture collected and sent.  Chlamydia/GC done.    Bladder:     Nontender to palpation  Urethra:   Urethral Body:  Urethra palpation normal, urethra structural support normal   Urethral Meatus:  No erythema or lesions present  Cervix:     Appearance healthy, no lesions present, nontender to palpation, no bleeding present, string present  Uterus:     tender to palpation, no masses present, position anteflexed, mobility: normal  Adnexa:      adnexal tenderness present, no adnexal masses present  Perineum:     Perineum within normal limits, no evidence of trauma, no rashes or skin lesions present  Anus:     Anus within normal limits, no hemorrhoids present  Inguinal Lymph Nodes:     No lymphadenopathy present  Pubic Hair:     Normal pubic hair distribution for age  Genitalia and Groin:     No rashes present, no lesions present, no areas of discoloration, no masses present       In-Clinic Test Results:  No results found for this or any previous visit (from the past 24 hour(s)).    ASSESSMENT/PLAN:                                                        ICD-10-CM    1. Screen for STD (sexually transmitted disease)  Z11.3 NEISSERIA GONORRHOEA PCR   2. Screening for chlamydial disease  Z11.8 CHLAMYDIA TRACHOMATIS PCR   3. Pelvic pain in female  R10.2 US Transvaginal Pelvic Non-OB   4. Vaginal  discharge  N89.8 Bacterial Vaginosis Smear     Fungal or Yeast Culture Routine         Discussed patient to return for pelvic US for position of IUD.  Will notify patient with lab results when available.  If all normal, we discussed possibly trying Doxycline for a week, also removing IUD.  Patient going to think about it and wait for results.    TRISHA Zhang CNP  [facility] [place]

## 2022-09-08 ENCOUNTER — ANCILLARY PROCEDURE (OUTPATIENT)
Dept: ULTRASOUND IMAGING | Facility: CLINIC | Age: 37
End: 2022-09-08
Payer: COMMERCIAL

## 2022-09-08 DIAGNOSIS — R10.2 PELVIC PAIN IN FEMALE: ICD-10-CM

## 2022-09-08 LAB
C TRACH DNA SPEC QL NAA+PROBE: NEGATIVE
N GONORRHOEA DNA SPEC QL NAA+PROBE: NEGATIVE

## 2022-09-08 PROCEDURE — 76830 TRANSVAGINAL US NON-OB: CPT | Performed by: OBSTETRICS & GYNECOLOGY

## 2022-09-09 DIAGNOSIS — B96.89 BACTERIAL VAGINOSIS: Primary | ICD-10-CM

## 2022-09-09 DIAGNOSIS — N76.0 BACTERIAL VAGINOSIS: Primary | ICD-10-CM

## 2022-09-09 RX ORDER — METRONIDAZOLE 7.5 MG/G
1 GEL VAGINAL AT BEDTIME
Qty: 70 G | Refills: 0 | Status: SHIPPED | OUTPATIENT
Start: 2022-09-09 | End: 2022-09-14

## 2022-09-11 LAB — BACTERIA SPEC CULT: NO GROWTH

## 2022-09-12 NOTE — RESULT ENCOUNTER NOTE
Kamilah      Your ultrasound results show a left ovarian cyst, that could be the cause of your pelvic pain.  We need to repeat the ultrasound in 8 weeks to laurie ovarian cyst.  Please let me know if your pelvic pain has gotten worse or not improving.   If further questions please give me a call.    Ruba Rasheed RNC

## 2022-09-14 ENCOUNTER — OFFICE VISIT (OUTPATIENT)
Dept: FAMILY MEDICINE | Facility: CLINIC | Age: 37
End: 2022-09-14
Payer: COMMERCIAL

## 2022-09-14 ENCOUNTER — TELEPHONE (OUTPATIENT)
Dept: OBGYN | Facility: CLINIC | Age: 37
End: 2022-09-14

## 2022-09-14 ENCOUNTER — DOCUMENTATION ONLY (OUTPATIENT)
Dept: LAB | Facility: CLINIC | Age: 37
End: 2022-09-14

## 2022-09-14 ENCOUNTER — TELEPHONE (OUTPATIENT)
Dept: FAMILY MEDICINE | Facility: CLINIC | Age: 37
End: 2022-09-14

## 2022-09-14 VITALS
RESPIRATION RATE: 16 BRPM | WEIGHT: 135 LBS | DIASTOLIC BLOOD PRESSURE: 68 MMHG | HEART RATE: 72 BPM | BODY MASS INDEX: 21.79 KG/M2 | OXYGEN SATURATION: 100 % | SYSTOLIC BLOOD PRESSURE: 112 MMHG | TEMPERATURE: 97.4 F

## 2022-09-14 DIAGNOSIS — F41.9 ANXIETY: ICD-10-CM

## 2022-09-14 DIAGNOSIS — R94.31 NONSPECIFIC ST-T WAVE ELECTROCARDIOGRAPHIC CHANGES: ICD-10-CM

## 2022-09-14 DIAGNOSIS — R07.9 CHEST PAIN, UNSPECIFIED TYPE: Primary | ICD-10-CM

## 2022-09-14 DIAGNOSIS — R52 GENERALIZED BODY ACHES: ICD-10-CM

## 2022-09-14 DIAGNOSIS — R06.02 SOB (SHORTNESS OF BREATH): ICD-10-CM

## 2022-09-14 DIAGNOSIS — M94.0 COSTOCHONDRITIS: ICD-10-CM

## 2022-09-14 DIAGNOSIS — Z63.4 BEREAVEMENT: ICD-10-CM

## 2022-09-14 DIAGNOSIS — K21.9 GASTROESOPHAGEAL REFLUX DISEASE, UNSPECIFIED WHETHER ESOPHAGITIS PRESENT: ICD-10-CM

## 2022-09-14 DIAGNOSIS — K44.9 HIATAL HERNIA: ICD-10-CM

## 2022-09-14 LAB
D DIMER PPP FEU-MCNC: 0.31 UG/ML FEU (ref 0–0.5)
HBA1C MFR BLD: 5.2 % (ref 0–5.6)

## 2022-09-14 PROCEDURE — 99215 OFFICE O/P EST HI 40 MIN: CPT | Performed by: INTERNAL MEDICINE

## 2022-09-14 PROCEDURE — 36415 COLL VENOUS BLD VENIPUNCTURE: CPT | Performed by: INTERNAL MEDICINE

## 2022-09-14 PROCEDURE — 83036 HEMOGLOBIN GLYCOSYLATED A1C: CPT | Performed by: INTERNAL MEDICINE

## 2022-09-14 PROCEDURE — 85379 FIBRIN DEGRADATION QUANT: CPT | Performed by: INTERNAL MEDICINE

## 2022-09-14 PROCEDURE — 93000 ELECTROCARDIOGRAM COMPLETE: CPT | Performed by: INTERNAL MEDICINE

## 2022-09-14 PROCEDURE — 96127 BRIEF EMOTIONAL/BEHAV ASSMT: CPT | Performed by: INTERNAL MEDICINE

## 2022-09-14 RX ORDER — SERTRALINE HYDROCHLORIDE 25 MG/1
25 TABLET, FILM COATED ORAL DAILY
Qty: 30 TABLET | Refills: 1 | Status: SHIPPED | OUTPATIENT
Start: 2022-09-14 | End: 2022-12-02

## 2022-09-14 RX ORDER — IBUPROFEN 800 MG/1
800 TABLET, FILM COATED ORAL EVERY 8 HOURS PRN
Qty: 30 TABLET | Refills: 0 | Status: SHIPPED | OUTPATIENT
Start: 2022-09-14 | End: 2022-12-02

## 2022-09-14 SDOH — SOCIAL STABILITY - SOCIAL INSECURITY: DISSAPEARANCE AND DEATH OF FAMILY MEMBER: Z63.4

## 2022-09-14 ASSESSMENT — PATIENT HEALTH QUESTIONNAIRE - PHQ9
SUM OF ALL RESPONSES TO PHQ QUESTIONS 1-9: 12
10. IF YOU CHECKED OFF ANY PROBLEMS, HOW DIFFICULT HAVE THESE PROBLEMS MADE IT FOR YOU TO DO YOUR WORK, TAKE CARE OF THINGS AT HOME, OR GET ALONG WITH OTHER PEOPLE: VERY DIFFICULT
SUM OF ALL RESPONSES TO PHQ QUESTIONS 1-9: 12

## 2022-09-14 ASSESSMENT — PAIN SCALES - GENERAL: PAINLEVEL: SEVERE PAIN (6)

## 2022-09-14 NOTE — PROGRESS NOTES
PT ONLY WANTS TO DO TWO OF THE SIX TESTS ORDERED TODAY.  D-DIMER & A1C    PT WILL MAKE A LAB ONLY TO DO THE REMAINING FOUR TESTS

## 2022-09-14 NOTE — PATIENT INSTRUCTIONS
As discussed, your symptoms appear as Costochondritis. But given your concerns of possible heart involvement given your recent partner heart attack , will check EKG to exclude it.     Given your Abdominal discomfort will check H.pylori and treat if positive, started on PPI given your hiatal hernia as seen on EGD last year.     Started on Amtianxiety for improvement.     ===================

## 2022-09-14 NOTE — PROGRESS NOTES
Assessment and Plan  1. Chest pain, unspecified type  2. SOB (shortness of breath)  Ongoing problem, Uncontrolled. Started around 4 weeks back when she was at rest and ranges 6/10 and constant all the time , denies radiating to the arm and neck though sometimes.    UPDATE - EKG does show non specific ST-T changes in anterior leads but no acute concerns at this time. Will consider Echocardiogram and update you on labs ordered.    - D dimer, quantitative; Future  - EKG 12-lead complete w/read - Clinics  - Troponin I; Future  - Echocardiogram Complete; Future    3. Costochondritis  New problem, as per clinical diagnosis of severe tenderness on palpation of the costochondral junctio on the Rt side of the chest wall. Will give emperic NSAIDS with all precautions explained.   - ibuprofen (ADVIL/MOTRIN) 800 MG tablet; Take 1 tablet (800 mg) by mouth every 8 hours as needed for moderate pain or inflammatory pain  Dispense: 30 tablet; Refill: 0    4. Generalized body aches  Ongoing symptoms of bodyaches, CMP, CBC normal in last checks in 5/2022. Can check iron, B12 , TSH and will not repeat any other labs done within last one year.   - Hemoglobin A1c; Future  - Vitamin B12; Future  - Iron and iron binding capacity; Future    5. Gastroesophageal reflux disease, unspecified whether esophagitis present  6. Hiatal hernia  Pt does follow [facility] and last EKG does show Hiatal hernia. With current Epigastric abdominal discomfort on palpation will start on emperic PPI . H.Pylori was negative on the recent check.   - omeprazole (PRILOSEC) 20 MG DR capsule; Take 1 capsule (20 mg) by mouth daily  Dispense: 30 capsule; Refill: 1    7. Anxiety  8. Bereavement  New problem, given her recent expiry of partner with heart attack with she being only parent for her kids is very anxious on the chest pain symptoms she is having. Will start on antianxiety medication for improvement. Offered Psychotherapy counseling which she is not opting at this  time.   - TSH with free T4 reflex; Future  - sertraline (ZOLOFT) 25 MG tablet; Take 1 tablet (25 mg) by mouth daily  Dispense: 30 tablet; Refill: 1      Over 40 minutes spent on reviewing patient chart,  face to face encounter, greater than 50% time spent with plan/cordination of care and documentation as above in my A/P.            Patient Instructions   As discussed, your symptoms appear as Costochondritis. But given your concerns of possible heart involvement given your recent partner heart attack , will check EKG to exclude it.     Given your Abdominal discomfort will check H.pylori and treat if positive, started on PPI given your hiatal hernia as seen on EGD last year.     Started on Amtianxiety for improvement.     ===================            Return in about 4 weeks (around 10/12/2022), or if symptoms worsen or fail to improve, for If symptoms persist, Follow up of last visit.    Rashida Barrera MD  [facility] ALEJANDRINA Triana is a 37 year old, presenting for the following health issues:  Chest Pain      Chest Pain     History of Present Illness       Reason for visit:  Chest pain  Symptom onset:  1-2 weeks ago  Symptoms include:  Chest pain  Symptom intensity:  Moderate  Symptom progression:  Staying the same  Had these symptoms before:  Yes  Has tried/received treatment for these symptoms:  No    She eats 0-1 servings of fruits and vegetables daily.She consumes 1 sweetened beverage(s) daily.She exercises with enough effort to increase her heart rate 20 to 29 minutes per day.  She exercises with enough effort to increase her heart rate 3 or less days per week.   She is taking medications regularly.    Today's PHQ-9         PHQ-9 Total Score: 12    PHQ-9 Q9 Thoughts of better off dead/self-harm past 2 weeks :   Not at all    How difficult have these problems made it for you to do your work, take care of things at home, or get along with other people: Very difficult     She  does have previous OV with multiple non specific symptoms which were attributed to lora anxiety . Last labs in 2022 normal.        Allergies   Allergen Reactions     Remeron [Mirtazapine]      Excess sedation even with 7.5mg dose        Past Medical History:   Diagnosis Date     Anemia     with pregnancy     Anxiety 2014     Depression      History of varicella-documented immunity 2011     IUD in place 2022    MIRENA Placed 2022 Lot # [id number] Exp: 10/2024      Migraine 2012     Other, mixed, or unspecified nondependent drug abuse, episodic      Tic age 14       Past Surgical History:   Procedure Laterality Date     ABDOMINOPLASTY  2021     HC REMOVAL OF TONSILS,12+ Y/O  1996    Tonsils 11+y.o.       Family History   Problem Relation Age of Onset     Alcohol/Drug Mother         b: addicted to crack     Family History Negative Father         b:      Family History Negative Sister         b:     Cervical Cancer Sister 35        10/2015     Breast Cancer Sister         no information     Family History Negative Sister         b:     Family History Negative Brother         b:     No Known Problems Maternal Grandmother      No Known Problems Maternal Grandfather      No Known Problems Paternal Grandmother      No Known Problems Other        Social History     Tobacco Use     Smoking status: Former Smoker     Quit date: 2010     Years since quittin.3     Smokeless tobacco: Never Used   Substance Use Topics     Alcohol use: Yes     Alcohol/week: 1.0 standard drink     Types: 1 Standard drinks or equivalent per week     Comment: occasionally 1-2 etoh a month/        Current Outpatient Medications   Medication     ibuprofen (ADVIL/MOTRIN) 800 MG tablet     levonorgestrel (MIRENA) 20 MCG/DAY IUD     metroNIDAZOLE (METROGEL) 0.75 % vaginal gel     omeprazole (PRILOSEC) 20 MG DR capsule     sertraline (ZOLOFT) 25 MG tablet     No current facility-administered  medications for this visit.        Review of Systems   Cardiovascular: Positive for chest pain.      Constitutional, HEENT, cardiovascular, pulmonary, GI, , musculoskeletal, neuro, skin, endocrine and psych systems are negative, except as otherwise noted.      Objective    /68   Pulse 72   Temp 97.4  F (36.3  C) (Tympanic)   Resp 16   Wt 61.2 kg (135 lb)   LMP 08/24/2022 (Approximate)   SpO2 100%   BMI 21.79 kg/m    Body mass index is 21.79 kg/m .  Physical Exam   GENERAL: healthy, alert and no distress  NECK: no adenopathy, no asymmetry, masses, or scars and thyroid normal to palpation  RESP: lungs clear to auscultation - no rales, rhonchi or wheezes  CV: regular rate and rhythm, normal S1 S2, no S3 or S4, no murmur, click or rub, no peripheral edema and peripheral pulses strong  POSITIVE for reproducible chest pain on palpation   ABDOMEN: soft, nontender, no hepatosplenomegaly, no masses and bowel sounds normal  MS: no gross musculoskeletal defects noted, no edema

## 2022-09-14 NOTE — TELEPHONE ENCOUNTER
----- Message from Rashida Barrera MD sent at 9/14/2022 12:36 PM CDT -----  Your EKG is showing non specific waves which I would be preferring to do Echocardiogram to make sure your heart Ultrasound is remaining normal.     Please let me know if you have any questions.   Rashida Barrera MD on 9/14/2022 at 12:36 PM

## 2022-09-14 NOTE — TELEPHONE ENCOUNTER
Pt calling asking about US results and recommendations.  Wanted to know if an ovaria cyst is cancerous - reassured pt this is not indicating cancer at this time. Continue normal activities and recheck in 8 wks will reassess if gone, same or increased in size. If has pain that does not resolve, be rechecked earlier.    Pt also asking about BV - reassured not sexually transmitted but an overgrowth of her own bacteria. Complete tx and if resolve of sx, no concern but if return of sx, be rechecked.    Pt verbalized understanding, in agreement with plan, and voiced no further questions.  Mayela Tate RN on 9/14/2022 at 9:07 AM

## 2022-09-14 NOTE — TELEPHONE ENCOUNTER
Patient Contact     S/w pt and relayed message from Dr. Barrera, see below. She stated understanding and had no questions at this time. Cardiology number was sent in a [employer] message per her preference to assist with scheduling.     Asia GRANDE RN  [facility]

## 2022-09-15 ENCOUNTER — LAB (OUTPATIENT)
Dept: LAB | Facility: CLINIC | Age: 37
End: 2022-09-15
Payer: COMMERCIAL

## 2022-09-15 DIAGNOSIS — Z63.4 BEREAVEMENT: ICD-10-CM

## 2022-09-15 DIAGNOSIS — R52 GENERALIZED BODY ACHES: ICD-10-CM

## 2022-09-15 DIAGNOSIS — F41.9 ANXIETY: ICD-10-CM

## 2022-09-15 DIAGNOSIS — R07.9 CHEST PAIN, UNSPECIFIED TYPE: ICD-10-CM

## 2022-09-15 LAB
IRON SATN MFR SERPL: 60 % (ref 15–46)
IRON SERPL-MCNC: 189 UG/DL (ref 35–180)
TIBC SERPL-MCNC: 314 UG/DL (ref 240–430)
TROPONIN I SERPL HS-MCNC: 5 NG/L
TSH SERPL DL<=0.005 MIU/L-ACNC: 1.07 MU/L (ref 0.4–4)
VIT B12 SERPL-MCNC: 1218 PG/ML (ref 232–1245)

## 2022-09-15 PROCEDURE — 84443 ASSAY THYROID STIM HORMONE: CPT

## 2022-09-15 PROCEDURE — 84484 ASSAY OF TROPONIN QUANT: CPT

## 2022-09-15 PROCEDURE — 83550 IRON BINDING TEST: CPT

## 2022-09-15 PROCEDURE — 82607 VITAMIN B-12: CPT

## 2022-09-15 PROCEDURE — 36415 COLL VENOUS BLD VENIPUNCTURE: CPT

## 2022-09-15 SDOH — SOCIAL STABILITY - SOCIAL INSECURITY: DISSAPEARANCE AND DEATH OF FAMILY MEMBER: Z63.4

## 2022-09-16 ENCOUNTER — TELEPHONE (OUTPATIENT)
Dept: FAMILY MEDICINE | Facility: CLINIC | Age: 37
End: 2022-09-16

## 2022-09-16 ENCOUNTER — HOSPITAL ENCOUNTER (OUTPATIENT)
Dept: CARDIOLOGY | Facility: CLINIC | Age: 37
Discharge: HOME | End: 2022-09-16
Attending: INTERNAL MEDICINE | Admitting: INTERNAL MEDICINE
Payer: COMMERCIAL

## 2022-09-16 ENCOUNTER — TELEPHONE (OUTPATIENT)
Dept: OBGYN | Facility: CLINIC | Age: 37
End: 2022-09-16

## 2022-09-16 DIAGNOSIS — R94.31 NONSPECIFIC ST-T WAVE ELECTROCARDIOGRAPHIC CHANGES: ICD-10-CM

## 2022-09-16 DIAGNOSIS — R07.9 CHEST PAIN, UNSPECIFIED TYPE: ICD-10-CM

## 2022-09-16 LAB — LVEF ECHO: NORMAL

## 2022-09-16 PROCEDURE — 93306 TTE W/DOPPLER COMPLETE: CPT

## 2022-09-16 PROCEDURE — 93306 TTE W/DOPPLER COMPLETE: CPT | Mod: 26 | Performed by: INTERNAL MEDICINE

## 2022-09-16 NOTE — TELEPHONE ENCOUNTER
OV 9/7/22:  Discussed patient to return for pelvic US for position of IUD.  Will notify patient with lab results when available.  If all normal, we discussed possibly trying Doxycline for a week, also removing IUD.  Patient going to think about it and wait for results.     Bacterial Vaginosis Stain  Order: [id number] - Part of Panel Order [id number]   Status: Final result     Visible to patient: Yes (seen)     Dx: Vaginal discharge    Specimen Information: Vagina; Swab         1 Result Note     1 Patient Communication     Component Ref Range & Units 9 d ago     Andre score 1 , 2 , 3 , 0  8  Abnormal     Comment: Morphotypes consistent with bacterial vaginosis          9/7/22 Treated with metrogel for 5 days.    Patient calling in today.notes reddish tissue like clumps . No mucusy discharge. More tissue like in appearance. No bright red bleeding.no increase in pain. Denies fever. Denies odors. Denies itching or burning.    Routing to Mayela RICO CNP to review and advise.    Jacquelyn Ward RN

## 2022-09-16 NOTE — TELEPHONE ENCOUNTER
----- Message from Rashida Barrera MD sent at 9/16/2022 12:37 AM CDT -----  Your Iron levels are borderline high for unspecified reasons. Please hold off on any iron supplements if you are taking any .     All your other labs normal, you may see some highlighted which do not have Clinical significance.  Rashida Barrera MD on 9/16/2022

## 2022-09-16 NOTE — TELEPHONE ENCOUNTER
Reason for Call:  Other call back    Detailed comments: Patient got results from recently ordered tests and is concerned, would like to speak to the provider regarding this as soon as possible does have virtual visit scheduled wenesday the 21st    Phone Number Patient can be reached at: Home number on file [phone] (home)    Best Time: Anytime     Can we leave a detailed message on this number? YES    Call taken on 9/16/2022 at 10:30 AM by Jacquelyn Jon

## 2022-09-16 NOTE — TELEPHONE ENCOUNTER
Pt reviewed results and message from provider via [facility].     Written by Rashida Barrera MD on 9/16/2022 12:37 AM CDT View Full Comments  Seen by patient Kamilah Garcia on 9/16/2022  8:50 AM    Asia GRANDE RN  [facility]

## 2022-09-21 ENCOUNTER — VIRTUAL VISIT (OUTPATIENT)
Dept: FAMILY MEDICINE | Facility: CLINIC | Age: 37
End: 2022-09-21
Payer: COMMERCIAL

## 2022-09-21 ENCOUNTER — OFFICE VISIT (OUTPATIENT)
Dept: OBGYN | Facility: CLINIC | Age: 37
End: 2022-09-21

## 2022-09-21 VITALS
HEIGHT: 66 IN | WEIGHT: 135.8 LBS | SYSTOLIC BLOOD PRESSURE: 102 MMHG | BODY MASS INDEX: 21.83 KG/M2 | DIASTOLIC BLOOD PRESSURE: 64 MMHG

## 2022-09-21 DIAGNOSIS — K63.8219 SMALL INTESTINAL BACTERIAL OVERGROWTH (SIBO): ICD-10-CM

## 2022-09-21 DIAGNOSIS — B96.89 BACTERIAL VAGINOSIS: ICD-10-CM

## 2022-09-21 DIAGNOSIS — Z30.430 ENCOUNTER FOR INSERTION OF INTRAUTERINE CONTRACEPTIVE DEVICE: Primary | ICD-10-CM

## 2022-09-21 DIAGNOSIS — F41.1 GAD (GENERALIZED ANXIETY DISORDER): ICD-10-CM

## 2022-09-21 DIAGNOSIS — R35.0 URINARY FREQUENCY: ICD-10-CM

## 2022-09-21 DIAGNOSIS — R94.31 NONSPECIFIC ST-T WAVE ELECTROCARDIOGRAPHIC CHANGES: ICD-10-CM

## 2022-09-21 DIAGNOSIS — I07.1 TRACE TRICUSPID VALVE REGURGITATION: ICD-10-CM

## 2022-09-21 DIAGNOSIS — R53.83 OTHER FATIGUE: ICD-10-CM

## 2022-09-21 DIAGNOSIS — B37.31 YEAST VAGINITIS: ICD-10-CM

## 2022-09-21 DIAGNOSIS — I35.1 TRACE AORTIC VALVE REGURGITATION: Primary | ICD-10-CM

## 2022-09-21 DIAGNOSIS — Z13.220 ENCOUNTER FOR SCREENING FOR LIPID DISORDER: ICD-10-CM

## 2022-09-21 DIAGNOSIS — N92.6 IRREGULAR MENSES: ICD-10-CM

## 2022-09-21 DIAGNOSIS — N76.0 BACTERIAL VAGINOSIS: ICD-10-CM

## 2022-09-21 LAB
ALBUMIN UR-MCNC: NEGATIVE MG/DL
APPEARANCE UR: CLEAR
BILIRUB UR QL STRIP: NEGATIVE
COLOR UR AUTO: YELLOW
GLUCOSE UR STRIP-MCNC: NEGATIVE MG/DL
HCG UR QL: NEGATIVE
HGB UR QL STRIP: ABNORMAL
KETONES UR STRIP-MCNC: NEGATIVE MG/DL
LEUKOCYTE ESTERASE UR QL STRIP: NEGATIVE
NITRATE UR QL: NEGATIVE
NUGENT SCORE: 6
PH UR STRIP: 5.5 [PH] (ref 5–7)
SP GR UR STRIP: 1.01 (ref 1–1.03)
UROBILINOGEN UR STRIP-ACNC: 0.2 E.U./DL
WHITE BLOOD CELLS: ABNORMAL

## 2022-09-21 PROCEDURE — 87102 FUNGUS ISOLATION CULTURE: CPT | Performed by: NURSE PRACTITIONER

## 2022-09-21 PROCEDURE — 87106 FUNGI IDENTIFICATION YEAST: CPT | Performed by: NURSE PRACTITIONER

## 2022-09-21 PROCEDURE — 99215 OFFICE O/P EST HI 40 MIN: CPT | Mod: GT | Performed by: INTERNAL MEDICINE

## 2022-09-21 PROCEDURE — 99213 OFFICE O/P EST LOW 20 MIN: CPT | Performed by: NURSE PRACTITIONER

## 2022-09-21 PROCEDURE — 81025 URINE PREGNANCY TEST: CPT | Performed by: NURSE PRACTITIONER

## 2022-09-21 PROCEDURE — 81003 URINALYSIS AUTO W/O SCOPE: CPT | Performed by: NURSE PRACTITIONER

## 2022-09-21 PROCEDURE — 87205 SMEAR GRAM STAIN: CPT | Performed by: NURSE PRACTITIONER

## 2022-09-21 NOTE — PROGRESS NOTES
Kamilah is a 37 year old who is being evaluated via a billable telephone visit.      What phone number would you like to be contacted at? [phone]  How would you like to obtain your AVS? Matheust    Assessment and Plan  1. Trace aortic valve regurgitation  2. Trace tricuspid valve regurgitation  3. Nonspecific ST-T wave electrocardiographic changes  Recent atypical chest pain with Costochondritis as diagnosis , though patient states that she still continues to have the chest pains , which we emphasized to take her Ibuprofen as prescribed as she was not.   - Discussed on the Echocardiogram findings as mentioned above the new findings and whole part of appointment tried to reassure that this is not a major concerns as she was very anxious . Please see AVS below for details on the explanation.     4. Encounter for screening for lipid disorder  - Lipid panel reflex to direct LDL Fasting; Future    5. Other fatigue  Patient has ongoing symptoms after recent family stress and requesting for rechecking all the labs though they were done in 5/2022 reason we didn't do them in your recent OV. Please make lab only appointment in fasting as we have added Lipid panel.   - Comprehensive metabolic panel (BMP + Alb, Alk Phos, ALT, AST, Total. Bili, TP); Future  - CBC with platelets; Future    6. Small intestinal bacterial overgrowth (SIBO)  Pt has seen [facility] which I reviewed the notes in 2/2022 with EGD done at that time. She was supposed to be taking Xifaxan and Neomycin as per their recommendations but couldn't use it due to insurance issues. Please see AVS below for details of the plan    7. HAMIDA (generalized anxiety disorder)  Ongoing problem, Uncontrolled. Pt was started on Zoloft in her last OV with me which she is Non compliant.       Over 40 minutes spent on reviewing patient chart,  Office encounter, greater than 50% time spent with plan/cordination of care and documentation as above in my A/P.            Patient Instructions  "    As discussed , your Echocardiogram report showing trace valvular leaks which is sometimes seen in people and remain that way without any complications , in healthy people too. You may watch for symptoms of edema in the legs , difficulty laying down flat with SOB which if you develop any we will recheck your echo to make sure no worsening of this valvular leaks. Otherwise no follow up needed.     Please follow up with GI as discussed on your SIBO and follow the diet below for improvement from primary care standpoint     I went ahead and placed your fasting labs as requested.     =============    Characteristics and sources of common FODMAPs - AVOID these.     Word that corresponds to letter in acronym Compounds in this category Foods that contain these compounds   F Fermentable   O Oligosaccharides Fructans, galacto-oligosaccharides Wheat, barley, rye, onion, august, white part of spring onion, garlic, shallots, artichokes, beetroot, fennel, peas, chicory, pistachio, cashews, legumes, lentils, and chickpeas   D Disaccharides Lactose Milk, custard, ice cream, and yogurt   M Monosaccharides \"Free fructose\" (fructose in excess of glucose) Apples, pears, mangoes, cherries, watermelon, asparagus, sugar snap peas, honey, high-fructose corn syrup   A And   P Polyols Sorbitol, mannitol, maltitol, and xylitol Apples, pears, apricots, cherries, nectarines, peaches, plums, watermelon, mushrooms, cauliflower, artificially sweetened chewing gum and confectionery   FODMAPs: fermentable oligosaccharides, disaccharides, monosaccharides, and polyols.  Adapted by permission from [employer]: American Journal of Gastroenterology. Jama SJ, Mackenzie MC, Vinnie OK. Short-chain carbohydrates and functional gastrointestinal disorders. Am J Gastroenterol 2013; 108:707. Copyright   2013. www.nature.com/ajg.  Graphic 78709 Version 2.0     Return in about 3 months (around 12/21/2022), or if symptoms worsen or fail to improve, " for Preventative Visit.    Rashida Barrera MD  [facility] ALEJANDRINA Triana is a 37 year old, presenting for the following health issues:  Results      HPI     Concern - Discuss ECHO results and lab results     Last seen pt on 22 for multiple concerns including chest pain and evaluated.        Allergies   Allergen Reactions     Remeron [Mirtazapine]      Excess sedation even with 7.5mg dose        Past Medical History:   Diagnosis Date     Anemia     with pregnancy     Anxiety 2014     Depression      History of varicella-documented immunity 2011     IUD in place 2022    MIRENA Placed 2022 Lot # [id number] Exp: 10/2024      Migraine 2012     Other, mixed, or unspecified nondependent drug abuse, episodic      Tic age 14       Past Surgical History:   Procedure Laterality Date     ABDOMINOPLASTY  2021     HC REMOVAL OF TONSILS,12+ Y/O  1996    Tonsils 11+y.o.       Family History   Problem Relation Age of Onset     Alcohol/Drug Mother         b: addicted to crack     Family History Negative Father         b:      Family History Negative Sister         b:     Cervical Cancer Sister 35        10/2015     Breast Cancer Sister         no information     Family History Negative Sister         b:     Family History Negative Brother         b:     No Known Problems Maternal Grandmother      No Known Problems Maternal Grandfather      No Known Problems Paternal Grandmother      No Known Problems Other        Social History     Tobacco Use     Smoking status: Former Smoker     Quit date: 2010     Years since quittin.3     Smokeless tobacco: Never Used   Substance Use Topics     Alcohol use: Yes     Alcohol/week: 1.0 standard drink     Types: 1 Standard drinks or equivalent per week     Comment: occasionally 1-2 etoh a month/        Current Outpatient Medications   Medication     ibuprofen (ADVIL/MOTRIN) 800 MG tablet      levonorgestrel (MIRENA) 20 MCG/DAY IUD     omeprazole (PRILOSEC) 20 MG DR capsule     sertraline (ZOLOFT) 25 MG tablet     No current facility-administered medications for this visit.        Review of Systems   Constitutional, HEENT, cardiovascular, pulmonary, GI, , musculoskeletal, neuro, skin, endocrine and psych systems are negative, except as otherwise noted.      Objective           Vitals:  No vitals were obtained today due to virtual visit.    Physical Exam   healthy, alert and no distress  PSYCH: Alert and oriented times 3; coherent speech, normal   rate and volume, able to articulate logical thoughts, able   to abstract reason, no tangential thoughts, no hallucinations   or delusions  Her affect is normal  RESP: No cough, no audible wheezing, able to talk in full sentences  Remainder of exam unable to be completed due to telephone visits      Phone call duration: 21 minutes

## 2022-09-21 NOTE — PROGRESS NOTES
"Kamilah is a 37 year old who is being evaluated via a billable video visit.      How would you like to obtain your AVS? MyChart  If the video visit is dropped, the invitation should be resent by: Text to cell phone: [phone]  Will anyone else be joining your video visit? No  {If patient encounters technical issues they should call [phone] :443623}        {PROVIDER CHARTING PREFERENCE:570332}    Subjective   Kamilah is a 37 year old{ACCOMPANIED BY STATEMENT (Optional):690168}, presenting for the following health issues:  No chief complaint on file.      HPI     {SUPERLIST (Optional):735705}  {additonal problems for provider to add (Optional):117679}    Review of Systems   {ROS COMP (Optional):255812}      Objective           Vitals:  No vitals were obtained today due to virtual visit.    Physical Exam   {video visit exam brief selected:441544::\"GENERAL: Healthy, alert and no distress\",\"EYES: Eyes grossly normal to inspection.  No discharge or erythema, or obvious scleral/conjunctival abnormalities.\",\"RESP: No audible wheeze, cough, or visible cyanosis.  No visible retractions or increased work of breathing.  \",\"SKIN: Visible skin clear. No significant rash, abnormal pigmentation or lesions.\",\"NEURO: Cranial nerves grossly intact.  Mentation and speech appropriate for age.\",\"PSYCH: Mentation appears normal, affect normal/bright, judgement and insight intact, normal speech and appearance well-groomed.\"}    {Diagnostic Test Results (Optional):052105}    {AMBULATORY ATTESTATION (Optional):136585}        Video-Visit Details    Video Start Time: {video visit start/end time for provider to select:322679}    Type of service:  Video Visit    Video End Time:{video visit start/end time for provider to select:635153}    Originating Location (pt. Location): {video visit patient location:109375::\"Home\"}    Distant Location (provider location):  [facility]     Platform used for Video Visit: {Virtual " "Visit Platforms:819883::\"Se\"}    "

## 2022-09-21 NOTE — PATIENT INSTRUCTIONS
"As discussed , your Echocardiogram report showing trace valvular leaks which is sometimes seen in people and remain that way without any complications , in healthy people too. You may watch for symptoms of edema in the legs , difficulty laying down flat with SOB which if you develop any we will recheck your echo to make sure no worsening of this valvular leaks. Otherwise no follow up needed.     Please follow up with GI as discussed on your SIBO and follow the diet below for improvement from primary care standpoint     I went ahead and placed your fasting labs as requested.     =============    Characteristics and sources of common FODMAPs - AVOID these.     Word that corresponds to letter in acronym Compounds in this category Foods that contain these compounds   F Fermentable   O Oligosaccharides Fructans, galacto-oligosaccharides Wheat, barley, rye, onion, august, white part of spring onion, garlic, shallots, artichokes, beetroot, fennel, peas, chicory, pistachio, cashews, legumes, lentils, and chickpeas   D Disaccharides Lactose Milk, custard, ice cream, and yogurt   M Monosaccharides \"Free fructose\" (fructose in excess of glucose) Apples, pears, mangoes, cherries, watermelon, asparagus, sugar snap peas, honey, high-fructose corn syrup   A And   P Polyols Sorbitol, mannitol, maltitol, and xylitol Apples, pears, apricots, cherries, nectarines, peaches, plums, watermelon, mushrooms, cauliflower, artificially sweetened chewing gum and confectionery   FODMAPs: fermentable oligosaccharides, disaccharides, monosaccharides, and polyols.  Adapted by permission from [employer]: American Journal of Gastroenterology. Jama SJ, Mackenzie MC, Vinnie [place]. Short-chain carbohydrates and functional gastrointestinal disorders. Am J Gastroenterol 2013; 108:707. Copyright   2013. www.nature.com/ajg.  Graphic 52543 Version 2.0   "

## 2022-09-22 RX ORDER — CLINDAMYCIN PHOSPHATE 20 MG/G
1 CREAM VAGINAL AT BEDTIME
Qty: 40 G | Refills: 0 | Status: SHIPPED | OUTPATIENT
Start: 2022-09-22 | End: 2022-09-29

## 2022-09-23 RX ORDER — FLUCONAZOLE 150 MG/1
150 TABLET ORAL
Qty: 2 TABLET | Refills: 0 | Status: SHIPPED | OUTPATIENT
Start: 2022-09-23 | End: 2022-12-02

## 2022-09-24 LAB — BACTERIA SPEC CULT: ABNORMAL

## 2022-09-29 ENCOUNTER — OFFICE VISIT (OUTPATIENT)
Dept: OBGYN | Facility: CLINIC | Age: 37
End: 2022-09-29
Payer: COMMERCIAL

## 2022-09-29 VITALS
HEIGHT: 66 IN | WEIGHT: 136.4 LBS | SYSTOLIC BLOOD PRESSURE: 102 MMHG | DIASTOLIC BLOOD PRESSURE: 70 MMHG | BODY MASS INDEX: 21.92 KG/M2

## 2022-09-29 DIAGNOSIS — Z80.3 FAMILY HISTORY OF BREAST CANCER: ICD-10-CM

## 2022-09-29 DIAGNOSIS — Z97.5 IUD (INTRAUTERINE DEVICE) IN PLACE: ICD-10-CM

## 2022-09-29 DIAGNOSIS — Z30.09 STERILIZATION CONSULT: Primary | ICD-10-CM

## 2022-09-29 DIAGNOSIS — N83.202 LEFT OVARIAN CYST: ICD-10-CM

## 2022-09-29 PROCEDURE — 99213 OFFICE O/P EST LOW 20 MIN: CPT | Performed by: OBSTETRICS & GYNECOLOGY

## 2022-09-29 NOTE — PROGRESS NOTES
SUBJECTIVE:                                                   Kamilah Garcia is a 37 year old female who presents to clinic today for the following health issue(s):  Patient presents with:  Consult: Tubal ligament        HPI:  Still on tx for BV now, has one more night.     Will get cramps/painful feelings with sex in last couple months. Seems better now. Is not having sex either.   The discomfort started after the mirena IUD was placed 22.    Found on ultrasound  to have IUD in right location, 2.7cm complex left ov cyst.     Has hx of chronic inflammation in body at baseline.     Cannot afford to take off work to recover from tubal ligation. This is the biggest consideration for getting the surgery or not.   Works on her feet all day, works at [employer], will need to push/pull/lift.     Has had abdominoplasty. Regrets it. Thinks she also has bowel adhesions. Has a lot of abd distension and GI issues with food.     Sister has had cancers. She is not sure which ones. Notes cervical cancer, but also had mastectomy b/l and ?hysterectomy. Does talk to her sister so can find out. Is not sure if she had genetic testing or not.     Patient's last menstrual period was 2022 (lmp unknown)..     Patient is sexually active, .  Using IUD for contraception.    reports that she quit smoking about 12 years ago. She has never used smokeless tobacco.    STD testing offered?  Declined    Health maintenance updated:  yes    Today's PHQ-2 Score:   PHQ-2 (  [employer]) 2022   Q1: Little interest or pleasure in doing things 1   Q2: Feeling down, depressed or hopeless 1   PHQ-2 Score 2   PHQ-2 Total Score (12-17 Years)- Positive if 3 or more points; Administer PHQ-A if positive -   Q1: Little interest or pleasure in doing things -   Q2: Feeling down, depressed or hopeless -   PHQ-2 Score -     Today's PHQ-9 Score:   PHQ-9 SCORE 2022   PHQ-9 Total Score -   PHQ-9 Total Score MyChart 12 (Moderate depression)    PHQ-9 Total Score 12     Today's HAMIDA-7 Score:   HAMIDA-7 SCORE 2022   Total Score -   Total Score 7       Problem list and histories reviewed & adjusted, as indicated.  Additional history: as documented.    Patient Active Problem List   Diagnosis     Tic disorder     Health Care Home     Migraine     HAMIDA (generalized anxiety disorder)     Atypical glandular cells on cervical Pap smear     Major depressive disorder, single episode, mild (H)     Family history of breast cancer     Small intestinal bacterial overgrowth (SIBO)     Tension headache     IUD in place     Past Surgical History:   Procedure Laterality Date     ABDOMINOPLASTY  2021     HC REMOVAL OF TONSILS,12+ Y/O  1996    Tonsils 11+y.o.      Social History     Tobacco Use     Smoking status: Former Smoker     Quit date: 2010     Years since quittin.3     Smokeless tobacco: Never Used   Substance Use Topics     Alcohol use: Yes     Alcohol/week: 1.0 standard drink     Types: 1 Standard drinks or equivalent per week     Comment: occasionally 1-2 etoh a month/      Problem (# of Occurrences) Relation (Name,Age of Onset)    Alcohol/Drug (1) Mother: b:1964 addicted to crack    Breast Cancer (1) Sister (35): no information. hysterectomy/mastectomy    Cervical Cancer (1) Sister (35): 10/2015    Family History Negative (4) Father: b: , Sister: b:, Sister: b:, Brother: b:    No Known Problems (4) Maternal Grandmother, Maternal Grandfather, Paternal Grandmother, Other            Current Outpatient Medications   Medication Sig     fluconazole (DIFLUCAN) 150 MG tablet Take 1 tablet (150 mg) by mouth every 3 days     ibuprofen (ADVIL/MOTRIN) 800 MG tablet Take 1 tablet (800 mg) by mouth every 8 hours as needed for moderate pain or inflammatory pain     levonorgestrel (MIRENA) 20 MCG/DAY IUD 1 each (20 mcg) by Intrauterine route once     omeprazole (PRILOSEC) 20 MG DR capsule Take 1 capsule (20 mg) by mouth daily (Patient not  "taking: No sig reported)     sertraline (ZOLOFT) 25 MG tablet Take 1 tablet (25 mg) by mouth daily (Patient not taking: No sig reported)     No current facility-administered medications for this visit.     Allergies   Allergen Reactions     Remeron [Mirtazapine]      Excess sedation even with 7.5mg dose       ROS:  12 point review of systems negative other than symptoms noted below or in the HPI.  No urinary frequency or dysuria, bladder or kidney problems      OBJECTIVE:     /70   Ht 1.676 m (5' 6\")   Wt 61.9 kg (136 lb 6.4 oz)   LMP 08/24/2022 (LMP Unknown)   Breastfeeding No   BMI 22.02 kg/m    Body mass index is 22.02 kg/m .    Exam:  Constitutional:  Appearance: Well nourished, well developed alert, in no acute distress  Neurologic:  Mental Status:  Oriented X3.  Normal strength and tone, sensory exam grossly normal, mentation intact and speech normal.    Psychiatric:  Mentation appears normal and affect normal/bright.         ASSESSMENT/PLAN:                                                        ICD-10-CM    1. Sterilization consult  Z30.09    2. Left ovarian cyst  N83.202 US Transvaginal Pelvic Non-OB   3. Family history of breast cancer  Z80.3 Cancer Risk Mgmt/Cancer Genetic Counseling Referral   4. IUD (intrauterine device) in place  Z97.5 US Transvaginal Pelvic Non-OB         -Currently with IUD in place since beginning of Aug. Suspect is going through adjustment period with bleeding and vaginal infections. Reviewed bleeding expectations in the first 6-8wks. Has noted improvement at this point.   Sterilization with L/S reviewed, procedure risks, recovery. Discussed vasectomy as well.   In light of her hx of abd surgeries, elevating her surgical risk, would 1) use the IUD if this will work for her/be tolerable, 2) encourage her BF to get a vasectomy as it would be significantly less risky than another abd surgery for patient, as well as easier recovery. Kamilah is quite limited in amount of time she " could talke off and has a physical job.   -LOV cyst, new on ultrasound. SUspect was exacerbated/prolonged by IUD. Likely to resolve on own, does not appear to be symptomatic. Was rec'd she have f/u ultrasound at 8wk. Can schedule this with IUD check.   -Fhx cancer. Suspect patient's sister had a genetic mutation based on the additional hysterectomy patient had following br ca and mastectomy. However, patient is not certain of details. Rec she discuss with her sister and undergo genetic counseling. Discussed this could affect her own screening plans    Alejandrina Bridges Masters, DO  [facility] [place]

## 2022-09-30 ENCOUNTER — VIRTUAL VISIT (OUTPATIENT)
Dept: INTERNAL MEDICINE | Facility: CLINIC | Age: 37
End: 2022-09-30
Payer: COMMERCIAL

## 2022-09-30 DIAGNOSIS — R68.89 MULTIPLE COMPLAINTS: Primary | ICD-10-CM

## 2022-09-30 DIAGNOSIS — J02.9 SORE THROAT: ICD-10-CM

## 2022-09-30 PROCEDURE — 99213 OFFICE O/P EST LOW 20 MIN: CPT | Mod: GT | Performed by: INTERNAL MEDICINE

## 2022-09-30 NOTE — PROGRESS NOTES
"Kamilah is a 37 year old who is being evaluated via a billable video visit.      How would you like to obtain your AVS? MyChart  If the video visit is dropped, the invitation should be resent by: Send to e-mail at: [email or web address]  Will anyone else be joining your video visit? No          Assessment & Plan     Multiple complaints  Sore throat  -not consistent with strep or really any acute illness  -she seems anxious  -rec'd f/u in-person with her PCP if needed                   No follow-ups on file.    Artem Cardoza MD  [facility]    Subjective   Kamilah is a 37 year old, presenting for the following health issues:  No chief complaint on file.      HPI     Concern - sore throat, SOB,   Onset: yesterday, but also a few weeks  Description: couldn't catch breath, feel lumps on neck, felt feverish, Did not test for COVID \"I'm not interested in testing for that\"  Intensity: moderate  Progression of Symptoms:  worsening  Accompanying Signs & Symptoms: as above  Therapies tried and outcome: None        Review of Systems         Objective       Vitals:  No vitals were obtained today due to virtual visit.    Physical Exam   GENERAL: alert and no distress  EYES: Eyes grossly normal to inspection  HENT: normal cephalic/atraumatic  RESP: No audible wheeze, cough, or visible cyanosis.  No visible retractions or increased work of breathing.    SKIN: Visible skin clear. No significant rash, abnormal pigmentation or lesions.  PSYCH:anxious                Video-Visit Details    Video Start Time: 150    Type of service:  Video Visit    Video End Time:2:08 PM    Originating Location (pt. Location): Home    Distant Location (provider location):  [facility]     Platform used for Video Visit: [employer]    "

## 2022-12-02 ENCOUNTER — OFFICE VISIT (OUTPATIENT)
Dept: INTERNAL MEDICINE | Facility: CLINIC | Age: 37
End: 2022-12-02
Payer: COMMERCIAL

## 2022-12-02 VITALS
SYSTOLIC BLOOD PRESSURE: 106 MMHG | OXYGEN SATURATION: 99 % | HEART RATE: 86 BPM | WEIGHT: 137.8 LBS | DIASTOLIC BLOOD PRESSURE: 64 MMHG | TEMPERATURE: 98.3 F | BODY MASS INDEX: 22.24 KG/M2

## 2022-12-02 DIAGNOSIS — B35.1 ONYCHOMYCOSIS: Primary | ICD-10-CM

## 2022-12-02 LAB
ALBUMIN SERPL BCG-MCNC: 4.6 G/DL (ref 3.5–5.2)
ALP SERPL-CCNC: 31 U/L (ref 35–104)
ALT SERPL W P-5'-P-CCNC: 13 U/L (ref 10–35)
AST SERPL W P-5'-P-CCNC: 20 U/L (ref 10–35)
BILIRUB DIRECT SERPL-MCNC: <0.2 MG/DL (ref 0–0.3)
BILIRUB SERPL-MCNC: 0.8 MG/DL
PROT SERPL-MCNC: 7 G/DL (ref 6.4–8.3)

## 2022-12-02 PROCEDURE — 36415 COLL VENOUS BLD VENIPUNCTURE: CPT | Performed by: PHYSICIAN ASSISTANT

## 2022-12-02 PROCEDURE — 80076 HEPATIC FUNCTION PANEL: CPT | Performed by: PHYSICIAN ASSISTANT

## 2022-12-02 PROCEDURE — 99213 OFFICE O/P EST LOW 20 MIN: CPT | Performed by: PHYSICIAN ASSISTANT

## 2022-12-02 RX ORDER — TERBINAFINE HYDROCHLORIDE 250 MG/1
250 TABLET ORAL DAILY
Qty: 90 TABLET | Refills: 0 | Status: SHIPPED | OUTPATIENT
Start: 2022-12-02 | End: 2023-03-02

## 2022-12-02 ASSESSMENT — PATIENT HEALTH QUESTIONNAIRE - PHQ9
SUM OF ALL RESPONSES TO PHQ QUESTIONS 1-9: 12
SUM OF ALL RESPONSES TO PHQ QUESTIONS 1-9: 12
10. IF YOU CHECKED OFF ANY PROBLEMS, HOW DIFFICULT HAVE THESE PROBLEMS MADE IT FOR YOU TO DO YOUR WORK, TAKE CARE OF THINGS AT HOME, OR GET ALONG WITH OTHER PEOPLE: SOMEWHAT DIFFICULT

## 2022-12-02 NOTE — PROGRESS NOTES
Assessment & Plan     Onychomycosis    - Hepatic panel (Albumin, ALT, AST, Bili, Alk Phos, TP); Future  - terbinafine (LAMISIL) 250 MG tablet; Take 1 tablet (250 mg) by mouth daily for 90 days  - Hepatic panel (Albumin, ALT, AST, Bili, Alk Phos, TP); Future             Reviewed treatment with oral medication   Discussed LFT prior to treatment if normal then ok to start Lamisil  Then repeat LFT in one month     Return in about 4 weeks (around 12/30/2022) for Lab Work.    Lisseth Johnson PA-C  [facility] [place] Triana is a 37 year old, presenting for the following health issues:  Nail Problem (R big toe)      History of Present Illness       Reason for visit:  Toenail infected?  Symptom onset:  3-4 weeks ago  Symptoms include:  Discolored toe nail  Symptom intensity:  Mild  Symptom progression:  Worsening  Had these symptoms before:  No    She eats 0-1 servings of fruits and vegetables daily.She consumes 2 sweetened beverage(s) daily.She exercises with enough effort to increase her heart rate 10 to 19 minutes per day.  She exercises with enough effort to increase her heart rate 3 or less days per week.   She is taking medications regularly.    Today's PHQ-9         PHQ-9 Total Score: 12    PHQ-9 Q9 Thoughts of better off dead/self-harm past 2 weeks :   Not at all    How difficult have these problems made it for you to do your work, take care of things at home, or get along with other people: Somewhat difficult             Review of Systems   Constitutional, HEENT, cardiovascular, pulmonary, gi and gu systems are negative, except as otherwise noted.      Objective    /64   Pulse 86   Temp 98.3  F (36.8  C) (Tympanic)   Wt 62.5 kg (137 lb 12.8 oz)   SpO2 99%   BMI 22.24 kg/m    Body mass index is 22.24 kg/m .  Physical Exam   GENERAL: healthy, alert and no distress  MS: no gross musculoskeletal defects noted, no edema  SKIN: right great toenail with  discoloration and lifting from the nail bed  Thickened and dystrophic

## 2022-12-05 ENCOUNTER — TELEPHONE (OUTPATIENT)
Dept: INTERNAL MEDICINE | Facility: CLINIC | Age: 37
End: 2022-12-05

## 2022-12-05 NOTE — TELEPHONE ENCOUNTER
PRIOR AUTHORIZATION DENIED    Medication: terbinafine (LAMISIL) 250 MG tablet - EPA DENIED     Denial Date: 12/5/2022    Denial Rational:   Dosing is higher than plan allows - INS needs explanation why higher strength/dose is required      Appeal Information:

## 2022-12-06 ENCOUNTER — ANCILLARY PROCEDURE (OUTPATIENT)
Dept: ULTRASOUND IMAGING | Facility: CLINIC | Age: 37
End: 2022-12-06
Payer: COMMERCIAL

## 2022-12-06 DIAGNOSIS — N83.202 LEFT OVARIAN CYST: ICD-10-CM

## 2022-12-06 DIAGNOSIS — Z97.5 IUD (INTRAUTERINE DEVICE) IN PLACE: ICD-10-CM

## 2022-12-06 PROCEDURE — 76830 TRANSVAGINAL US NON-OB: CPT | Performed by: OBSTETRICS & GYNECOLOGY

## 2022-12-13 ENCOUNTER — VIRTUAL VISIT (OUTPATIENT)
Dept: OBGYN | Facility: CLINIC | Age: 37
End: 2022-12-13
Payer: COMMERCIAL

## 2022-12-13 DIAGNOSIS — Z97.5 IUD (INTRAUTERINE DEVICE) IN PLACE: Primary | ICD-10-CM

## 2022-12-13 PROCEDURE — 99207 PR NO CHARGE LOS: CPT | Performed by: NURSE PRACTITIONER

## 2022-12-13 NOTE — PROGRESS NOTES
Kamilah Garcia is a 37 year old female who is being evaluated via a billable telephone visit.      What phone number would you like to be contacted at? [phone]  How would you like to obtain your AVS? MyChart      Originating Location (pt. Location): Home      Distant Location (provider location):  On-site      SUBJECTIVE:                                                   Kamilah Garcia is a 37 year old female who presents for virtual visit today for the following health issue(s):  Patient presents with:  Follow Up: US        HPI:  Telephone visit with the patient regarding pelvic pain and follow-up of ovarian cyst that were found on ultrasound in September.  She had a Mirena IUD placed in August.  Her cycles are slightly irregular but she is in the infancy of her IUD lifespan.    No LMP recorded. (Menstrual status: IUD)..     Patient is sexually active, .  Using IUD for contraception.    reports that she quit smoking about 12 years ago. She has never used smokeless tobacco.      Health maintenance updated:  no    Today's PHQ-2 Score:   PHQ-2 (  [employer]) 2022   Q1: Little interest or pleasure in doing things 1   Q2: Feeling down, depressed or hopeless 1   PHQ-2 Score 2   PHQ-2 Total Score (12-17 Years)- Positive if 3 or more points; Administer PHQ-A if positive -   Q1: Little interest or pleasure in doing things -   Q2: Feeling down, depressed or hopeless -   PHQ-2 Score -     Today's PHQ-9 Score:   PHQ-9 SCORE 2022   PHQ-9 Total Score -   PHQ-9 Total Score [facility] 12 (Moderate depression)   PHQ-9 Total Score 12     Today's HAMIDA-7 Score:   HAMIDA-7 SCORE 2022   Total Score -   Total Score 7       Problem list and histories reviewed & adjusted, as indicated.  Additional history: as documented.    Patient Active Problem List   Diagnosis     Tic disorder     Health Care Home     Migraine     HAMIDA (generalized anxiety disorder)     Atypical glandular cells on cervical Pap smear     Major depressive  disorder, single episode, mild (H)     Family history of breast cancer     Small intestinal bacterial overgrowth (SIBO)     Tension headache     IUD in place     Past Surgical History:   Procedure Laterality Date     ABDOMINOPLASTY  2021     HC REMOVAL OF TONSILS,12+ Y/O  1996    Tonsils 11+y.o.      Social History     Tobacco Use     Smoking status: Former     Types: Cigarettes     Quit date: 2010     Years since quittin.5     Smokeless tobacco: Never   Substance Use Topics     Alcohol use: Yes     Alcohol/week: 1.0 standard drink     Types: 1 Standard drinks or equivalent per week     Comment: occasionally 1-2 etoh a month/      Problem (# of Occurrences) Relation (Name,Age of Onset)    Alcohol/Drug (1) Mother: b:1964 addicted to crack    Family History Negative (4) Father: b: , Sister: b:, Sister: b:, Brother: b:    Breast Cancer (1) Sister (35): no information. hysterectomy/mastectomy    Cervical Cancer (1) Sister (35): 10/2015    No Known Problems (4) Maternal Grandmother, Maternal Grandfather, Paternal Grandmother, Other            Current Outpatient Medications   Medication Sig     levonorgestrel (MIRENA) 20 MCG/DAY IUD 1 each (20 mcg) by Intrauterine route once     terbinafine (LAMISIL) 250 MG tablet Take 1 tablet (250 mg) by mouth daily for 90 days     No current facility-administered medications for this visit.     Allergies   Allergen Reactions     Remeron [Mirtazapine]      Excess sedation even with 7.5mg dose         OBJECTIVE:     No vitals were obtained today due to virtual visit.    Physical Exam  GENERAL: Healthy, alert and no distress  EYES: Eyes grossly normal to inspection.  No discharge or erythema, or obvious scleral/conjunctival abnormalities.  RESP: No audible wheeze, cough, or visible cyanosis.  No visible retractions or increased work of breathing.    SKIN: Visible skin clear. No significant rash, abnormal pigmentation or lesions.  NEURO: Cranial nerves  grossly intact.  Mentation and speech appropriate for age.  PSYCH: Mentation appears normal, affect normal/bright, judgement and insight intact, normal speech and appearance well-groomed.          ASSESSMENT/PLAN:                                                      Phone call duration: 10 minutes      ICD-10-CM    1. IUD (intrauterine device) in place  Z97.5           There are no Patient Instructions on file for this visit.    Ultrasound from last week shows a normal uterus with a thin EMS.  She has a normal-appearing right ovary.  Her left ovary does have 2 small simple cysts the largest being 2.2 cm.  This is improved from a left complex ovarian cyst noted in September which apparently has resolved.  We discussed ongoing management of her pelvic pain with IUD in place.  We encouraged her to give it a couple more months.  We did offer for her to utilize either a low-dose oral contraceptive tablet or to go back to her contraceptive patch along with her IUD to prevent further cyst formation.  The patient declined this at this time.  She was reassured that she can have the IUD removed at any time.    TRISHA Tucker CNP  [facility] [place]

## 2023-01-04 ENCOUNTER — OFFICE VISIT (OUTPATIENT)
Dept: FAMILY MEDICINE | Facility: CLINIC | Age: 38
End: 2023-01-04
Payer: COMMERCIAL

## 2023-01-04 VITALS
SYSTOLIC BLOOD PRESSURE: 124 MMHG | BODY MASS INDEX: 22.43 KG/M2 | DIASTOLIC BLOOD PRESSURE: 74 MMHG | RESPIRATION RATE: 18 BRPM | OXYGEN SATURATION: 98 % | TEMPERATURE: 98 F | WEIGHT: 139.6 LBS | HEART RATE: 80 BPM | HEIGHT: 66 IN

## 2023-01-04 DIAGNOSIS — M25.562 ACUTE PAIN OF BOTH KNEES: ICD-10-CM

## 2023-01-04 DIAGNOSIS — M25.561 ACUTE PAIN OF BOTH KNEES: ICD-10-CM

## 2023-01-04 DIAGNOSIS — R59.1 LA (LYMPHADENOPATHY): Primary | ICD-10-CM

## 2023-01-04 LAB
BASOPHILS # BLD AUTO: 0 10E3/UL (ref 0–0.2)
BASOPHILS NFR BLD AUTO: 0 %
EOSINOPHIL # BLD AUTO: 0.2 10E3/UL (ref 0–0.7)
EOSINOPHIL NFR BLD AUTO: 4 %
ERYTHROCYTE [DISTWIDTH] IN BLOOD BY AUTOMATED COUNT: 12.4 % (ref 10–15)
HCT VFR BLD AUTO: 37.2 % (ref 35–47)
HGB BLD-MCNC: 12.3 G/DL (ref 11.7–15.7)
LYMPHOCYTES # BLD AUTO: 2.1 10E3/UL (ref 0.8–5.3)
LYMPHOCYTES NFR BLD AUTO: 35 %
MCH RBC QN AUTO: 29.6 PG (ref 26.5–33)
MCHC RBC AUTO-ENTMCNC: 33.1 G/DL (ref 31.5–36.5)
MCV RBC AUTO: 89 FL (ref 78–100)
MONOCYTES # BLD AUTO: 0.5 10E3/UL (ref 0–1.3)
MONOCYTES NFR BLD AUTO: 9 %
NEUTROPHILS # BLD AUTO: 3.1 10E3/UL (ref 1.6–8.3)
NEUTROPHILS NFR BLD AUTO: 52 %
PLATELET # BLD AUTO: 240 10E3/UL (ref 150–450)
RBC # BLD AUTO: 4.16 10E6/UL (ref 3.8–5.2)
WBC # BLD AUTO: 5.9 10E3/UL (ref 4–11)

## 2023-01-04 PROCEDURE — 36415 COLL VENOUS BLD VENIPUNCTURE: CPT | Performed by: PHYSICIAN ASSISTANT

## 2023-01-04 PROCEDURE — 85025 COMPLETE CBC W/AUTO DIFF WBC: CPT | Performed by: PHYSICIAN ASSISTANT

## 2023-01-04 PROCEDURE — 99214 OFFICE O/P EST MOD 30 MIN: CPT | Performed by: PHYSICIAN ASSISTANT

## 2023-01-04 PROCEDURE — 80076 HEPATIC FUNCTION PANEL: CPT | Performed by: PHYSICIAN ASSISTANT

## 2023-01-04 ASSESSMENT — PATIENT HEALTH QUESTIONNAIRE - PHQ9
SUM OF ALL RESPONSES TO PHQ QUESTIONS 1-9: 7
10. IF YOU CHECKED OFF ANY PROBLEMS, HOW DIFFICULT HAVE THESE PROBLEMS MADE IT FOR YOU TO DO YOUR WORK, TAKE CARE OF THINGS AT HOME, OR GET ALONG WITH OTHER PEOPLE: VERY DIFFICULT
SUM OF ALL RESPONSES TO PHQ QUESTIONS 1-9: 7

## 2023-01-04 ASSESSMENT — ANXIETY QUESTIONNAIRES
8. IF YOU CHECKED OFF ANY PROBLEMS, HOW DIFFICULT HAVE THESE MADE IT FOR YOU TO DO YOUR WORK, TAKE CARE OF THINGS AT HOME, OR GET ALONG WITH OTHER PEOPLE?: VERY DIFFICULT
7. FEELING AFRAID AS IF SOMETHING AWFUL MIGHT HAPPEN: MORE THAN HALF THE DAYS
2. NOT BEING ABLE TO STOP OR CONTROL WORRYING: SEVERAL DAYS
GAD7 TOTAL SCORE: 8
GAD7 TOTAL SCORE: 8
7. FEELING AFRAID AS IF SOMETHING AWFUL MIGHT HAPPEN: MORE THAN HALF THE DAYS
IF YOU CHECKED OFF ANY PROBLEMS ON THIS QUESTIONNAIRE, HOW DIFFICULT HAVE THESE PROBLEMS MADE IT FOR YOU TO DO YOUR WORK, TAKE CARE OF THINGS AT HOME, OR GET ALONG WITH OTHER PEOPLE: VERY DIFFICULT
4. TROUBLE RELAXING: MORE THAN HALF THE DAYS
1. FEELING NERVOUS, ANXIOUS, OR ON EDGE: SEVERAL DAYS
3. WORRYING TOO MUCH ABOUT DIFFERENT THINGS: SEVERAL DAYS
5. BEING SO RESTLESS THAT IT IS HARD TO SIT STILL: NOT AT ALL
6. BECOMING EASILY ANNOYED OR IRRITABLE: SEVERAL DAYS
GAD7 TOTAL SCORE: 8

## 2023-01-04 NOTE — PROGRESS NOTES
Assessment & Plan     LA (lymphadenopathy)    Check CBC and US to see if enlarged or concerning.    - CBC with platelets and differential; Future  - US Head Neck Soft Tissue; Future  - CBC with platelets and differential  - Hepatic function panel; Future  - Hepatic function panel      Acute pain of both knees    Likely from kneeling regularly at work. Discussed conservative cares. Offered x-ray and PT orders. Can call if she changes her mind.                 No follow-ups on file.    FOUZIA Betts [facility] MARCO Triana is a 37 year old, presenting for the following health issues:    Musculoskeletal Problem    History of Present Illness       Reason for visit:  Neck and knee concerns  Symptom onset:  More than a month  Symptoms include:  Lumps and sore neck and throat  Symptom intensity:  Moderate  Symptom progression:  Worsening  Had these symptoms before:  No    She eats 0-1 servings of fruits and vegetables daily.She consumes 1 sweetened beverage(s) daily.She exercises with enough effort to increase her heart rate 10 to 19 minutes per day.  She exercises with enough effort to increase her heart rate 3 or less days per week.   She is taking medications regularly.    Today's PHQ-9         PHQ-9 Total Score: 7    PHQ-9 Q9 Thoughts of better off dead/self-harm past 2 weeks :   Not at all    How difficult have these problems made it for you to do your work, take care of things at home, or get along with other people: Very difficult  Today's HAMIDA-7 Score: 8     Concern - Lumps all over neck.   Onset: noticed a couple months ago so probably at least 3 months  Description: Lumps around neck and under chin that feel like little balls are are tender to touch. No illness related to this. Slight sore throat and pain in her neck with swallowing.  Intensity: moderate  Progression of Symptoms:  worsening  Accompanying Signs & Symptoms: no fevers, chills. Has had a tenson headache the  "last few months.  Was Seen virtually for knees a couple months ago and still having the pain unknown if related to the lumps in her neck.    Is supposed to have a liver test done for a medication today.    Both knees feel pain when laying down- feels like they also lock up. Sharp and dull pain. Sometimes pain with walking. No giving way.       Review of Systems   Constitutional, HEENT, cardiovascular, pulmonary, gi and gu systems are negative, except as otherwise noted.        Objective    /74 (BP Location: Right arm, Patient Position: Sitting, Cuff Size: Adult Regular)   Pulse 80   Temp 98  F (36.7  C) (Oral)   Resp 18   Ht 1.676 m (5' 6\")   Wt 63.3 kg (139 lb 9.6 oz)   SpO2 98%   BMI 22.53 kg/m    Body mass index is 22.53 kg/m .       Physical Exam   GENERAL: healthy, alert and no distress  EYES: Eyes grossly normal to inspection, PERRL and conjunctivae and sclerae normal  HENT: ear canals and TM's normal, nose and mouth without ulcers or lesions  MS: no gross musculoskeletal defects noted, no edema  SKIN: no suspicious lesions or rashes  PSYCH: mentation appears normal, affect normal/bright  LYMPH: anterior cervical: palpable lymph nodes- unclear if they are enlarged                    "

## 2023-01-05 LAB
ALBUMIN SERPL BCG-MCNC: 4.4 G/DL (ref 3.5–5.2)
ALP SERPL-CCNC: 29 U/L (ref 35–104)
ALT SERPL W P-5'-P-CCNC: 12 U/L (ref 10–35)
AST SERPL W P-5'-P-CCNC: 20 U/L (ref 10–35)
BILIRUB DIRECT SERPL-MCNC: <0.2 MG/DL (ref 0–0.3)
BILIRUB SERPL-MCNC: 0.3 MG/DL
PROT SERPL-MCNC: 6.7 G/DL (ref 6.4–8.3)

## 2023-01-06 ENCOUNTER — HOSPITAL ENCOUNTER (OUTPATIENT)
Dept: ULTRASOUND IMAGING | Facility: CLINIC | Age: 38
Discharge: HOME | End: 2023-01-06
Attending: PHYSICIAN ASSISTANT | Admitting: PHYSICIAN ASSISTANT
Payer: COMMERCIAL

## 2023-01-06 ENCOUNTER — MYC MEDICAL ADVICE (OUTPATIENT)
Dept: FAMILY MEDICINE | Facility: CLINIC | Age: 38
End: 2023-01-06

## 2023-01-06 DIAGNOSIS — R59.1 LA (LYMPHADENOPATHY): ICD-10-CM

## 2023-01-06 PROCEDURE — 76536 US EXAM OF HEAD AND NECK: CPT

## 2023-01-06 NOTE — PROGRESS NOTES
Eliezer Cruz PA-C, please see [email or web address]. message. Pt saw Eliezer Cruz PA-C on 1/4/23 for lymphadenopathy (see relevant visit notes below)    LA (lymphadenopathy)     Check CBC and US to see if enlarged or concerning.          CBC and US ordered and both came back normal     Pt wonders on next steps.    Routing to last provider seen for this to review and advise.  Jacquelyn Yoon RN    
[de-identified] : Patient is here today for bilateral knees. Patient has been referred by Dr. Vasquez to discuss bilateral TKAs. \par ***Pt had CSI 2 days ago and believes she is having an allergic reaction to it due to her face swelling and turning red. \par Patient c/o Right greater than Left knee pain.  Pain is diffuse.  She is planning cruise soon.  She is ineterested in having TKA in the fall.   Patient has noted a gait disturbance to the RLE over the past week. She has hx of HNP Low back.

## 2023-01-18 ENCOUNTER — PATIENT OUTREACH (OUTPATIENT)
Dept: OBGYN | Facility: CLINIC | Age: 38
End: 2023-01-18
Payer: COMMERCIAL

## 2023-01-23 ENCOUNTER — E-VISIT (OUTPATIENT)
Dept: URGENT CARE | Facility: CLINIC | Age: 38
End: 2023-01-23
Payer: COMMERCIAL

## 2023-01-23 ENCOUNTER — VIRTUAL VISIT (OUTPATIENT)
Dept: FAMILY MEDICINE | Facility: CLINIC | Age: 38
End: 2023-01-23
Payer: COMMERCIAL

## 2023-01-23 ENCOUNTER — LAB (OUTPATIENT)
Dept: LAB | Facility: CLINIC | Age: 38
End: 2023-01-23
Attending: FAMILY MEDICINE
Payer: COMMERCIAL

## 2023-01-23 DIAGNOSIS — R68.83 CHILLS: ICD-10-CM

## 2023-01-23 DIAGNOSIS — J02.9 SORE THROAT: Primary | ICD-10-CM

## 2023-01-23 DIAGNOSIS — R05.1 ACUTE COUGH: ICD-10-CM

## 2023-01-23 DIAGNOSIS — R11.0 NAUSEA: ICD-10-CM

## 2023-01-23 DIAGNOSIS — J02.9 SORE THROAT: ICD-10-CM

## 2023-01-23 LAB
DEPRECATED S PYO AG THROAT QL EIA: NEGATIVE
GROUP A STREP BY PCR: NOT DETECTED

## 2023-01-23 PROCEDURE — 99213 OFFICE O/P EST LOW 20 MIN: CPT | Mod: 95 | Performed by: FAMILY MEDICINE

## 2023-01-23 PROCEDURE — 99207 PR NON-BILLABLE SERV PER CHARTING: CPT | Performed by: NURSE PRACTITIONER

## 2023-01-23 PROCEDURE — 87651 STREP A DNA AMP PROBE: CPT

## 2023-01-23 NOTE — PROGRESS NOTES
Kamilah is a 37 year old who is being evaluated via a billable video visit.      How would you like to obtain your AVS? MyChart  If the video visit is dropped, the invitation should be resent by: Text to cell phone: [phone]  Will anyone else be joining your video visit? No          Assessment & Plan     Sore throat  Labs pending  In the meantime Tylenol and ibuprofen as needed  Note done for work  - Streptococcus A Rapid Screen w/Reflex to PCR - Clinic Collect; Future  - Influenza A & B Antigen - Clinic Collect; Future  - Symptomatic COVID-19 Virus (Coronavirus) by PCR Nose; Future    Chills  See above  - Streptococcus A Rapid Screen w/Reflex to PCR - Clinic Collect; Future  - Influenza A & B Antigen - Clinic Collect; Future  - Symptomatic COVID-19 Virus (Coronavirus) by PCR Nose; Future  Sent message on patient portal with rapid strep results which were negative.  Recommended patient complete the influenza and COVID testing.  Recommended she stay home until she is feeling better.    Nausea  See above  - Streptococcus A Rapid Screen w/Reflex to PCR - Clinic Collect; Future  - Influenza A & B Antigen - Clinic Collect; Future  - Symptomatic COVID-19 Virus (Coronavirus) by PCR Nose; Future    Acute cough  See above  - Influenza A & B Antigen - Clinic Collect; Future  - Symptomatic COVID-19 Virus (Coronavirus) by PCR Nose; Future                 Return in about 1 week (around 1/30/2023), or if symptoms worsen or fail to improve.    Faviola Monreal MD  [facility]   Kamilah is a 37 year old accompanied by her self, presenting for the following health issues:  Pharyngitis (X2 days )      HPI   Patient a 37-year-old female on video visit today for complaints of sore throat for 2 days.  Is worse when she swallows or coughs.  She has a chronic headache which has not changed.  Positive nausea but no vomiting.  She feels warm and has had chills but she has not documented a fever.  She  denies myalgias.  She had nasal congestion the first day but that has gotten better.  She does have a little bit of a cough.  She has tried lemon water at home for the sore throat  She is at work today and doing this visit over her lunch break  Preferred pharmacy is [facility]      Review of Systems   Negative except as listed in HPI      Objective           Vitals:  No vitals were obtained today due to virtual visit.    Physical Exam   GENERAL: Healthy, alert and no distress  EYES: Eyes grossly normal to inspection.  No discharge or erythema, or obvious scleral/conjunctival abnormalities.  RESP: No audible wheeze, cough, or visible cyanosis.  No visible retractions or increased work of breathing.    SKIN: Visible skin clear. No significant rash, abnormal pigmentation or lesions.  NEURO: Cranial nerves grossly intact.  Mentation and speech appropriate for age.  PSYCH: Mentation appears normal, affect normal/bright, judgement and insight intact, normal speech and appearance well-groomed.  Rapid strep test is negative              Video-Visit Details    Type of service:  Video Visit     Originating Location (pt. Location): Other Parking lot at work, [employer]    Distant Location (provider location):  On-site  Platform used for Video Visit: Brianna

## 2023-01-23 NOTE — PATIENT INSTRUCTIONS
Dear Kamilah Garcia,    We are sorry you are not feeling well. Based on the responses you provided, it is recommended that you be seen in-person in urgent care so we can better evaluate your symptoms. Please click here to find the nearest urgent care location to you.   You will not be charged for this Visit. Thank you for trusting us with your care.    TRISHA Patel CNP

## 2023-01-23 NOTE — LETTER
January 23, 2023      Kamilah Garcia  [address] [address]        To Whom It May Concern:    Kamilah Garcia  was seen on 01/23/23.  Please excuse her from work until feeling better due to illness.        Sincerely,        Faviola Monreal MD

## 2023-01-23 NOTE — PATIENT INSTRUCTIONS
Acetaminophen (Tylenol) 500mg tablets.  You may take 2 tabs every 4-6 hours as needed  Ibuprofen (Advil, Motrin) 200mg tablets.  You may take 2 tabs every 6-8 hours as needed with food.  Drink plenty of fluids  Rest

## 2023-01-26 ENCOUNTER — OFFICE VISIT (OUTPATIENT)
Dept: OBGYN | Facility: CLINIC | Age: 38
End: 2023-01-26
Payer: COMMERCIAL

## 2023-01-26 VITALS
WEIGHT: 139.4 LBS | HEIGHT: 67 IN | BODY MASS INDEX: 21.88 KG/M2 | DIASTOLIC BLOOD PRESSURE: 78 MMHG | SYSTOLIC BLOOD PRESSURE: 114 MMHG

## 2023-01-26 DIAGNOSIS — Z12.4 SCREENING FOR CERVICAL CANCER: ICD-10-CM

## 2023-01-26 DIAGNOSIS — N89.8 VAGINAL DISCHARGE: ICD-10-CM

## 2023-01-26 DIAGNOSIS — Z01.419 ENCOUNTER FOR GYNECOLOGICAL EXAMINATION WITHOUT ABNORMAL FINDING: Primary | ICD-10-CM

## 2023-01-26 DIAGNOSIS — R30.0 DYSURIA: Primary | ICD-10-CM

## 2023-01-26 DIAGNOSIS — R82.90 BAD ODOR OF URINE: ICD-10-CM

## 2023-01-26 DIAGNOSIS — R30.0 DYSURIA: ICD-10-CM

## 2023-01-26 LAB
ALBUMIN UR-MCNC: NEGATIVE MG/DL
APPEARANCE UR: CLEAR
BACTERIA #/AREA URNS HPF: ABNORMAL /HPF
BACTERIAL VAGINOSIS SMEAR: ABNORMAL
BILIRUB UR QL STRIP: NEGATIVE
CLUE CELLS: ABNORMAL
COLOR UR AUTO: YELLOW
GLUCOSE UR STRIP-MCNC: NEGATIVE MG/DL
HGB UR QL STRIP: ABNORMAL
KETONES UR STRIP-MCNC: NEGATIVE MG/DL
LEUKOCYTE ESTERASE UR QL STRIP: ABNORMAL
NITRATE UR QL: NEGATIVE
NUGENT SCORE: 0
PH UR STRIP: 6 [PH] (ref 5–7)
RBC #/AREA URNS AUTO: ABNORMAL /HPF
SP GR UR STRIP: 1.01 (ref 1–1.03)
SQUAMOUS #/AREA URNS AUTO: ABNORMAL /LPF
TRICHOMONAS, WET PREP: ABNORMAL
UROBILINOGEN UR STRIP-ACNC: 0.2 E.U./DL
WBC #/AREA URNS AUTO: ABNORMAL /HPF
WBC'S/HIGH POWER FIELD, WET PREP: ABNORMAL
WHITE BLOOD CELLS: NORMAL
YEAST, WET PREP: ABNORMAL

## 2023-01-26 PROCEDURE — 87086 URINE CULTURE/COLONY COUNT: CPT | Performed by: NURSE PRACTITIONER

## 2023-01-26 PROCEDURE — 87210 SMEAR WET MOUNT SALINE/INK: CPT | Performed by: NURSE PRACTITIONER

## 2023-01-26 PROCEDURE — 87205 SMEAR GRAM STAIN: CPT | Performed by: NURSE PRACTITIONER

## 2023-01-26 PROCEDURE — 87106 FUNGI IDENTIFICATION YEAST: CPT | Performed by: NURSE PRACTITIONER

## 2023-01-26 PROCEDURE — 87624 HPV HI-RISK TYP POOLED RSLT: CPT | Performed by: NURSE PRACTITIONER

## 2023-01-26 PROCEDURE — G0145 SCR C/V CYTO,THINLAYER,RESCR: HCPCS | Performed by: NURSE PRACTITIONER

## 2023-01-26 PROCEDURE — 87102 FUNGUS ISOLATION CULTURE: CPT | Performed by: NURSE PRACTITIONER

## 2023-01-26 PROCEDURE — 99214 OFFICE O/P EST MOD 30 MIN: CPT | Mod: 25 | Performed by: NURSE PRACTITIONER

## 2023-01-26 PROCEDURE — 99395 PREV VISIT EST AGE 18-39: CPT | Performed by: NURSE PRACTITIONER

## 2023-01-26 PROCEDURE — 81001 URINALYSIS AUTO W/SCOPE: CPT | Performed by: NURSE PRACTITIONER

## 2023-01-26 RX ORDER — NITROFURANTOIN 25; 75 MG/1; MG/1
100 CAPSULE ORAL 2 TIMES DAILY
Qty: 14 CAPSULE | Refills: 0 | Status: SHIPPED | OUTPATIENT
Start: 2023-01-26 | End: 2023-03-09

## 2023-01-26 ASSESSMENT — ANXIETY QUESTIONNAIRES
IF YOU CHECKED OFF ANY PROBLEMS ON THIS QUESTIONNAIRE, HOW DIFFICULT HAVE THESE PROBLEMS MADE IT FOR YOU TO DO YOUR WORK, TAKE CARE OF THINGS AT HOME, OR GET ALONG WITH OTHER PEOPLE: SOMEWHAT DIFFICULT
5. BEING SO RESTLESS THAT IT IS HARD TO SIT STILL: SEVERAL DAYS
2. NOT BEING ABLE TO STOP OR CONTROL WORRYING: MORE THAN HALF THE DAYS
3. WORRYING TOO MUCH ABOUT DIFFERENT THINGS: MORE THAN HALF THE DAYS
7. FEELING AFRAID AS IF SOMETHING AWFUL MIGHT HAPPEN: MORE THAN HALF THE DAYS
GAD7 TOTAL SCORE: 11
1. FEELING NERVOUS, ANXIOUS, OR ON EDGE: MORE THAN HALF THE DAYS
GAD7 TOTAL SCORE: 11
6. BECOMING EASILY ANNOYED OR IRRITABLE: SEVERAL DAYS

## 2023-01-26 ASSESSMENT — PATIENT HEALTH QUESTIONNAIRE - PHQ9: 5. POOR APPETITE OR OVEREATING: SEVERAL DAYS
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
## 2023-01-26 NOTE — RESULT ENCOUNTER NOTE
Kamilah      Your urinalysis results are positive for a bladder infection.  I am sending a prescription to your pharmacy for this. Please let me know if symptoms do not improve.  If further questions please give me a call.    Ruba Rasheed RNC

## 2023-01-26 NOTE — NURSING NOTE
Depression Response    Patient completed the PHQ-9 assessment for depression and scored >9? Yes  Question 9 on the PHQ-9 was positive for suicidality? No  Does patient have current mental health provider? unkown    Is this a virtual visit? No    I personally notified the following: visit provider

## 2023-01-27 ENCOUNTER — TELEPHONE (OUTPATIENT)
Dept: OBGYN | Facility: CLINIC | Age: 38
End: 2023-01-27
Payer: COMMERCIAL

## 2023-01-27 DIAGNOSIS — B37.31 YEAST VAGINITIS: Primary | ICD-10-CM

## 2023-01-27 RX ORDER — FLUCONAZOLE 150 MG/1
150 TABLET ORAL ONCE
Qty: 1 TABLET | Refills: 0 | Status: SHIPPED | OUTPATIENT
Start: 2023-01-27 | End: 2023-01-27

## 2023-01-27 NOTE — TELEPHONE ENCOUNTER
Routing to oncall provider to review since Mayela RICO CNP and Ruba RICO CNP are out of clinic.     Fungal or Yeast Culture Routine      Culture 2+ Yeast Abnormal              Jacquelyn Ward RN

## 2023-01-27 NOTE — TELEPHONE ENCOUNTER
Reason for call:  Results   Name of test or procedure: Had swab to check for BV/Yeast infection  Date of test or procedure: 1/26/23  Location of test or procedure: [facility] with Ruba Rasheed    Additional comments: Patient was sent RX for a UTI found, but received a mychart result showing possible yeast infection. Patient doesn't want to go the weekend without an RX if needed as she knows Ruba isn't working today and wants to be proactive to start treatment if needed.    Result Below:  Bacterial Vaginosis Smear   Abnormal   2+ Yeast              Resulting Agency: [facility]           Specimen Collected: 01/26/23 10:12 AM               Phone number to reach patient:  Cell number on file:    Telephone Information:   Mobile [phone]       Best Time:  any    Can we leave a detailed message on this number?  YES

## 2023-01-28 LAB — BACTERIA UR CULT: ABNORMAL

## 2023-01-29 LAB — BACTERIA SPEC CULT: ABNORMAL

## 2023-01-30 ENCOUNTER — TELEPHONE (OUTPATIENT)
Dept: OBGYN | Facility: CLINIC | Age: 38
End: 2023-01-30
Payer: COMMERCIAL

## 2023-01-30 DIAGNOSIS — B37.9 YEAST INFECTION: Primary | ICD-10-CM

## 2023-01-30 LAB
BKR LAB AP GYN ADEQUACY: NORMAL
BKR LAB AP GYN INTERPRETATION: NORMAL
BKR LAB AP HPV REFLEX: NORMAL
BKR LAB AP PREVIOUS ABNL DX: NORMAL
BKR LAB AP PREVIOUS ABNORMAL: NORMAL
PATH REPORT.COMMENTS IMP SPEC: NORMAL
PATH REPORT.COMMENTS IMP SPEC: NORMAL
PATH REPORT.RELEVANT HX SPEC: NORMAL

## 2023-01-30 RX ORDER — FLUCONAZOLE 150 MG/1
150 TABLET ORAL
Qty: 2 TABLET | Refills: 0 | Status: SHIPPED | OUTPATIENT
Start: 2023-01-30 | End: 2023-03-09

## 2023-01-30 NOTE — RESULT ENCOUNTER NOTE
Kamilah      Your  yeast vaginal culture results are positive for yeast  .  I will send a prescription to the pharmacy for diflucan. If further questions  or symptoms persist please give me a call.    Ruba Rasheed RNC

## 2023-01-30 NOTE — TELEPHONE ENCOUNTER
Reason for call:  Med questions, was in 1/26 for a UTI and yeast infection. Was given 1 diflucan and the UTI abx. Got a notification today that two more diflucan had been sent, unsure why.     Phone number to reach patient:  [phone]      Best Time:  anytime    Can we leave a detailed message on this number?  Yes      Travel screening: N/A

## 2023-02-06 ENCOUNTER — PATIENT OUTREACH (OUTPATIENT)
Dept: OBGYN | Facility: CLINIC | Age: 38
End: 2023-02-06
Payer: COMMERCIAL

## 2023-02-27 ENCOUNTER — MYC MEDICAL ADVICE (OUTPATIENT)
Dept: OBGYN | Facility: CLINIC | Age: 38
End: 2023-02-27

## 2023-02-28 ENCOUNTER — OFFICE VISIT (OUTPATIENT)
Dept: OBGYN | Facility: CLINIC | Age: 38
End: 2023-02-28
Payer: COMMERCIAL

## 2023-02-28 VITALS
BODY MASS INDEX: 21.35 KG/M2 | WEIGHT: 136 LBS | SYSTOLIC BLOOD PRESSURE: 122 MMHG | HEIGHT: 67 IN | DIASTOLIC BLOOD PRESSURE: 70 MMHG

## 2023-02-28 DIAGNOSIS — B96.89 BACTERIAL VAGINOSIS: ICD-10-CM

## 2023-02-28 DIAGNOSIS — R82.90 ABNORMAL URINE ODOR: ICD-10-CM

## 2023-02-28 DIAGNOSIS — Z30.431 IUD CHECK UP: ICD-10-CM

## 2023-02-28 DIAGNOSIS — N76.0 BACTERIAL VAGINOSIS: ICD-10-CM

## 2023-02-28 DIAGNOSIS — N63.0 MULTIPLE BENIGN LUMPS OF BREAST: ICD-10-CM

## 2023-02-28 DIAGNOSIS — N89.8 VAGINAL DISCHARGE: Primary | ICD-10-CM

## 2023-02-28 LAB
ALBUMIN UR-MCNC: NEGATIVE MG/DL
APPEARANCE UR: CLEAR
BACTERIA #/AREA URNS HPF: ABNORMAL /HPF
BILIRUB UR QL STRIP: NEGATIVE
CLUE CELLS: ABNORMAL
COLOR UR AUTO: YELLOW
GLUCOSE UR STRIP-MCNC: NEGATIVE MG/DL
HGB UR QL STRIP: ABNORMAL
KETONES UR STRIP-MCNC: NEGATIVE MG/DL
LEUKOCYTE ESTERASE UR QL STRIP: NEGATIVE
NITRATE UR QL: NEGATIVE
NUGENT SCORE: 8
PH UR STRIP: 6 [PH] (ref 5–7)
RBC #/AREA URNS AUTO: ABNORMAL /HPF
SP GR UR STRIP: 1.02 (ref 1–1.03)
SQUAMOUS #/AREA URNS AUTO: ABNORMAL /LPF
UROBILINOGEN UR STRIP-ACNC: 0.2 E.U./DL
WBC #/AREA URNS AUTO: ABNORMAL /HPF
WHITE BLOOD CELLS: ABNORMAL

## 2023-02-28 PROCEDURE — 81001 URINALYSIS AUTO W/SCOPE: CPT | Performed by: OBSTETRICS & GYNECOLOGY

## 2023-02-28 PROCEDURE — 87205 SMEAR GRAM STAIN: CPT | Performed by: OBSTETRICS & GYNECOLOGY

## 2023-02-28 PROCEDURE — 99214 OFFICE O/P EST MOD 30 MIN: CPT | Performed by: OBSTETRICS & GYNECOLOGY

## 2023-02-28 NOTE — PROGRESS NOTES
SUBJECTIVE:                                                   Kamilah Garcia is a 37 year old female who presents to clinic today for the following health issue(s):  Patient presents with:  IUD: Would like IUD removed  Vaginal Problem: C/o vaginal discharge        HPI:  Maybe having some discharge.   Some cramps.  Urine right now with some odor.  Thinks she wants to take the IUD out  Does not want kids. Not interested in tubal ligation at this time. Doesn't want another procedure now. Just broke up with her fiance. Is not in a relationship.    Also notes some breast tenderness, not sure at what intervals. Some cysts in breasts too, feels lumpy/bumpy. Has had them about a year. Had mammogram 3/22 birads 1, noted dense tissue. Last nl birads 1 was . Had ultrasound  which was negative.         No LMP recorded. (Menstrual status: IUD)..         Problem list and histories reviewed & adjusted, as indicated.  Additional history: as documented.    Patient Active Problem List   Diagnosis     Tic disorder     Health Care Home     Migraine     HAMIDA (generalized anxiety disorder)     Atypical glandular cells on cervical Pap smear     Major depressive disorder, single episode, mild (H)     Family history of breast cancer     Small intestinal bacterial overgrowth (SIBO)     Tension headache     IUD in place     Past Surgical History:   Procedure Laterality Date     ABDOMINOPLASTY  2021     HC REMOVAL OF TONSILS,12+ Y/O  1996    Tonsils 11+y.o.      Social History     Tobacco Use     Smoking status: Former     Types: Cigarettes     Quit date: 2010     Years since quittin.7     Smokeless tobacco: Never   Substance Use Topics     Alcohol use: Yes     Alcohol/week: 1.0 standard drink     Types: 1 Standard drinks or equivalent per week     Comment: occasionally 1-2 etoh a month/      Problem (# of Occurrences) Relation (Name,Age of Onset)    Alcohol/Drug (1) Mother: b: addicted to crack     "Family History Negative (4) Father: b: 1962, Sister: b:1994, Sister: b:1981, Brother: b:1989    Breast Cancer (1) Sister (35): no information. hysterectomy/mastectomy    Cervical Cancer (1) Sister (35): 10/2015    No Known Problems (4) Maternal Grandmother, Maternal Grandfather, Paternal Grandmother, Other            Current Outpatient Medications   Medication Sig     levonorgestrel (MIRENA) 20 MCG/DAY IUD 1 each (20 mcg) by Intrauterine route once     terbinafine (LAMISIL) 250 MG tablet Take 1 tablet (250 mg) by mouth daily for 90 days     fluconazole (DIFLUCAN) 150 MG tablet Take 1 tablet (150 mg) by mouth every 3 days (Patient not taking: Reported on 2/28/2023)     nitroFURantoin macrocrystal-monohydrate (MACROBID) 100 MG capsule Take 1 capsule (100 mg) by mouth 2 times daily (Patient not taking: Reported on 2/28/2023)     No current facility-administered medications for this visit.     Allergies   Allergen Reactions     Remeron [Mirtazapine]      Excess sedation even with 7.5mg dose         OBJECTIVE:     /70   Ht 1.702 m (5' 7\")   Wt 61.7 kg (136 lb)   BMI 21.30 kg/m    Body mass index is 21.3 kg/m .    Exam:  Constitutional:  Appearance: Well nourished, well developed alert, in no acute distress  Breasts:  Inspection of Breasts:  Symmetric bilaterally.  No puckering.  No skin changes.  Palpation of Breasts and Axillae:  Axillary Lymph Nodes:  No lymphadenopathy present  Right: CYSTS 12, 8 2cm from areola.  Left: CYST 5, 4cm from areola   Lymphatic: Lymph Nodes:  No other lymphadenopathy present  Neurologic:  Mental Status:  Oriented X3.  Normal strength and tone, sensory exam grossly normal, mentation intact and speech normal.    Psychiatric:  Mentation appears normal and affect normal/bright.  Pelvic Exam:  External Genitalia:     Normal appearance for age, no discharge present, no tenderness present, no inflammatory lesions present, color normal  Vagina:    Normal vaginal vault without central or " paravaginal defects, no discharge present, no inflammatory lesions present, no masses present  Bladder:     Nontender to palpation  Urethra:   Urethral Body:  Urethra palpation normal, urethra structural support normal   Urethral Meatus:  No erythema or lesions present  Cervix:     Appearance healthy, no lesions present, nontender to palpation, no bleeding present, STRING PRESENT BUT SHORT, APPROX 5MM FROM OS  Perineum:     Perineum within normal limits, no evidence of trauma, no rashes or skin lesions present  Anus:     Anus within normal limits, no hemorrhoids present  Inguinal Lymph Nodes:     No lymphadenopathy present  Pubic Hair:     Normal pubic hair distribution for age  Genitalia and Groin:     No rashes present, no lesions present, no areas of discoloration, no masses present       In-Clinic Test Results:  Results for orders placed or performed in visit on 02/28/23 (from the past 24 hour(s))   UA Macro with Reflex to Micro and Culture - lab collect    Specimen: Urine, Midstream   Result Value Ref Range    Color Urine Yellow Colorless, Straw, Light Yellow, Yellow    Appearance Urine Clear Clear    Glucose Urine Negative Negative mg/dL    Bilirubin Urine Negative Negative    Ketones Urine Negative Negative mg/dL    Specific Gravity Urine 1.025 1.003 - 1.035    Blood Urine Moderate (A) Negative    pH Urine 6.0 5.0 - 7.0    Protein Albumin Urine Negative Negative mg/dL    Urobilinogen Urine 0.2 0.2, 1.0 E.U./dL    Nitrite Urine Negative Negative    Leukocyte Esterase Urine Negative Negative   Urine Microscopic   Result Value Ref Range    Bacteria Urine Few (A) None Seen /HPF    RBC Urine 2-5 (A) 0-2 /HPF /HPF    WBC Urine 0-5 0-5 /HPF /HPF    Squamous Epithelials Urine Few (A) None Seen /LPF    Narrative    Urine Culture not indicated   Bacterial Vaginosis Smear    Specimen: Vagina; Swab    Narrative    The following orders were created for panel order Bacterial Vaginosis Smear.  Procedure                                Abnormality         Status                     ---------                               -----------         ------                     Bacterial Vaginosis Stain[[id number]]                        In process                   Please view results for these tests on the individual orders.       ASSESSMENT/PLAN:                                                        ICD-10-CM    1. Vaginal discharge  N89.8 Bacterial Vaginosis Smear      2. Abnormal urine odor  R82.90 UA Macro with Reflex to Micro and Culture - lab collect     UA Macro with Reflex to Micro and Culture - lab collect     Urine Microscopic      3. Multiple benign lumps of breast  N63.0 MA Diagnostic Digital Left     MA Diagnostic Digital Right      4. IUD check up  Z30.431             -Chronic B/l breast cysts, low suspicion for malignancy. Will get diagnostic imaging. Reassurance provided.  Discussed breast tenderness which may be cyclic, also that tenderness may be function of the progestin IUD as well. Rec a symptom diary to try to identify pattern.   -IUD in place. Reiterated potential sx and SE of mirena IUD. Discussed IUD vs other contraceptives vs sterilization. She has decided to keep IUD for now. Does not want the surgical risks/recovery now and didn't do well on other contraceptives. Does not want kids.   -Vaginal discharge. Again discussed physiologic symptoms.   Will check vaginal infections.   Hx of 2 yeast infections, treated with diflucan. Discussed if another yeast infection, would rec terazol tx instead.   Reviewed negative UA today. Reviewed her urine last time did not in fact have infection, was yeast swept from vagina that grew in urine.     (44 minutes was spent on the date of the encounter doing chart review, review and interpretation of pertinent test results, history and/or exam, documentation, patient counseling.)      Alejandrina Bridges Masters, DO  [facility] [place]

## 2023-03-01 ENCOUNTER — TELEPHONE (OUTPATIENT)
Dept: OBGYN | Facility: CLINIC | Age: 38
End: 2023-03-01

## 2023-03-01 RX ORDER — METRONIDAZOLE 7.5 MG/G
1 GEL VAGINAL DAILY
Qty: 25 G | Refills: 0 | Status: SHIPPED | OUTPATIENT
Start: 2023-03-01 | End: 2023-03-06

## 2023-03-01 NOTE — TELEPHONE ENCOUNTER
Andre score 1 , 2 , 3 , 0  8 Abnormal   0 CM   6 Abnormal  CM  8 Abnormal  CM     Comment: Morphotypes consistent with bacterial vaginosis    White Blood Cells  3+ Predominately PMNs  2+ Predominately PMNs   1+  No WBCs    Comment: Presence of purulence suggests the possible presence of another infection and/or inflammatory condition. Correlate with clinical picture. Testing for N.gonorrhoeae, C.trachomatis and/or T.vaginalis may be indicated.    Clue Cells (none) 2+ Abnormal           Pt wondering if she can start treatment for this today and if she can have the 3-day tx vs 5-day?  Routing pt mychart message to provider to advise.  Eva Pressley RN on 3/1/2023 at 10:43 AM

## 2023-03-01 NOTE — TELEPHONE ENCOUNTER
2/28/23 OV w swabs w DR Grandas    See results - pt asking if tx will be sent?    Mayela Tate, RN on 3/1/2023 at 10:00 AM

## 2023-03-09 ENCOUNTER — HOSPITAL ENCOUNTER (OUTPATIENT)
Dept: MAMMOGRAPHY | Facility: CLINIC | Age: 38
Discharge: HOME | End: 2023-03-09
Attending: OBSTETRICS & GYNECOLOGY
Payer: COMMERCIAL

## 2023-03-09 ENCOUNTER — OFFICE VISIT (OUTPATIENT)
Dept: MIDWIFE SERVICES | Facility: CLINIC | Age: 38
End: 2023-03-09
Payer: COMMERCIAL

## 2023-03-09 VITALS — DIASTOLIC BLOOD PRESSURE: 68 MMHG | SYSTOLIC BLOOD PRESSURE: 116 MMHG | WEIGHT: 137 LBS | BODY MASS INDEX: 21.46 KG/M2

## 2023-03-09 DIAGNOSIS — N63.0 MULTIPLE BENIGN LUMPS OF BREAST: ICD-10-CM

## 2023-03-09 DIAGNOSIS — Z30.432 ENCOUNTER FOR REMOVAL OF INTRAUTERINE CONTRACEPTIVE DEVICE: Primary | ICD-10-CM

## 2023-03-09 PROBLEM — Z30.430 ENCOUNTER FOR INSERTION OF INTRAUTERINE CONTRACEPTIVE DEVICE: Status: RESOLVED | Noted: 2022-08-02 | Resolved: 2023-03-09

## 2023-03-09 PROCEDURE — 76642 ULTRASOUND BREAST LIMITED: CPT | Mod: 50

## 2023-03-09 PROCEDURE — 58301 REMOVE INTRAUTERINE DEVICE: CPT | Performed by: ADVANCED PRACTICE MIDWIFE

## 2023-03-09 PROCEDURE — 77062 BREAST TOMOSYNTHESIS BI: CPT

## 2023-03-09 NOTE — PROGRESS NOTES
IUD Removal:  SUBJECTIVE:    Is a pregnancy test required: No.  Was a consent obtained?  Yes    Kamilah Garcia is a 37 year old female,, No LMP recorded. (Menstrual status: IUD). who presents today for IUD removal. Her current IUD was placed 2022. She has had problems including feels like she has been getting increased vaginal infections, UTIs, BV and ovarian cysts with the IUD. She requests removal of the IUD because of problems stated above    Today's PHQ-2 Score:   PHQ-2 (  [employer]) 3/9/2023   Q1: Little interest or pleasure in doing things 0   Q2: Feeling down, depressed or hopeless 0   PHQ-2 Score 0   PHQ-2 Total Score (12-17 Years)- Positive if 3 or more points; Administer PHQ-A if positive -   Q1: Little interest or pleasure in doing things -   Q2: Feeling down, depressed or hopeless -   PHQ-2 Score -       PROCEDURE:    A speculum exam was performed and the cervix was visualized. The IUD string was visualized. Using ring forceps, the string was grasped and the IUD removed intact.    POST PROCEDURE:    The patient tolerated the procedure well. Patient was discharged in stable condition.    Call if bleeding, pain or fever occur. and Birth control counseling given. Pt is uninterested in starting a new form of contraception as she is not currently sexually active. Advised to schedule appt in the future if she changes her mind to discuss contraceptive options    TRISHA Flynn, KEYONNA

## 2023-03-31 ENCOUNTER — ANCILLARY PROCEDURE (OUTPATIENT)
Dept: GENERAL RADIOLOGY | Facility: CLINIC | Age: 38
End: 2023-03-31
Attending: PHYSICIAN ASSISTANT
Payer: COMMERCIAL

## 2023-03-31 ENCOUNTER — OFFICE VISIT (OUTPATIENT)
Dept: INTERNAL MEDICINE | Facility: CLINIC | Age: 38
End: 2023-03-31
Payer: COMMERCIAL

## 2023-03-31 VITALS
HEIGHT: 67 IN | HEART RATE: 85 BPM | WEIGHT: 139.1 LBS | DIASTOLIC BLOOD PRESSURE: 68 MMHG | OXYGEN SATURATION: 98 % | SYSTOLIC BLOOD PRESSURE: 112 MMHG | RESPIRATION RATE: 16 BRPM | BODY MASS INDEX: 21.83 KG/M2

## 2023-03-31 DIAGNOSIS — M25.561 RIGHT KNEE PAIN, UNSPECIFIED CHRONICITY: ICD-10-CM

## 2023-03-31 DIAGNOSIS — M25.562 LEFT KNEE PAIN, UNSPECIFIED CHRONICITY: ICD-10-CM

## 2023-03-31 DIAGNOSIS — J39.2 THROAT IRRITATION: Primary | ICD-10-CM

## 2023-03-31 DIAGNOSIS — R07.0 THROAT PAIN: ICD-10-CM

## 2023-03-31 PROCEDURE — 73560 X-RAY EXAM OF KNEE 1 OR 2: CPT | Mod: TC | Performed by: RADIOLOGY

## 2023-03-31 PROCEDURE — 99214 OFFICE O/P EST MOD 30 MIN: CPT | Performed by: PHYSICIAN ASSISTANT

## 2023-03-31 ASSESSMENT — PATIENT HEALTH QUESTIONNAIRE - PHQ9
10. IF YOU CHECKED OFF ANY PROBLEMS, HOW DIFFICULT HAVE THESE PROBLEMS MADE IT FOR YOU TO DO YOUR WORK, TAKE CARE OF THINGS AT HOME, OR GET ALONG WITH OTHER PEOPLE: SOMEWHAT DIFFICULT
SUM OF ALL RESPONSES TO PHQ QUESTIONS 1-9: 8
SUM OF ALL RESPONSES TO PHQ QUESTIONS 1-9: 8

## 2023-03-31 NOTE — PROGRESS NOTES
Assessment & Plan     Throat irritation    - Adult ENT  Referral; Future    Throat pain    - Adult ENT  Referral; Future    Left knee pain, unspecified chronicity    - XR Knee Bilateral 1/2 Views; Future  - Physical Therapy Referral; Future    Right knee pain, unspecified chronicity    - XR Knee Bilateral 1/2 Views; Future  - Physical Therapy Referral; Future      I spent a total of 30 minutes on the day of the visit.   Time spent by me doing chart review, history and exam, documentation and further activities per the note       Reviewed workup that has been done reassurance given , referral to ENT as she is having some irritation and soreness in the throat with swallowing too  Knee xray today and then PT - Patellar femoral syndrome likely     FOUZIA Johnson [facility] BASSEM Triana is a 37 year old, presenting for the following health issues:  Knee Pain and Throat Problem  No flowsheet data found.  History of Present Illness       Reason for visit:  Follow up on past visit  Symptoms include:  Knee pain in both knees been ongoing but is getting worst, Throat  hurting inside the throat   Symptom intensity:  Moderate  Symptom progression:  Worsening    She eats 0-1 servings of fruits and vegetables daily.She consumes 2 sweetened beverage(s) daily.She exercises with enough effort to increase her heart rate 10 to 19 minutes per day.  She exercises with enough effort to increase her heart rate 3 or less days per week.   She is taking medications regularly.    Today's PHQ-9         PHQ-9 Total Score: 8    PHQ-9 Q9 Thoughts of better off dead/self-harm past 2 weeks :   Not at all    How difficult have these problems made it for you to do your work, take care of things at home, or get along with other people: Somewhat difficult       Concern about sore throat and irritation. Feels worse with time.  Had US- of the neck normal and labs all normal  States  "irritation with swallowing at times.   Worried about lumps in neck.    Secondly concern about knees   Musculoskeletal problem/pain      Duration: 4-5 months     Description  Location: bilateral knees     Intensity:  mild, moderate    Accompanying signs and symptoms: denies any redness or swelling. No locking or catching of the knees    Feels popping and crackling and some pain around the knee caps         History  Previous similar problem: YES  Previous evaluation:  Declined     Precipitating or alleviating factors:  Trauma or overuse: YES- kneeing for work   Aggravating factors include: climbing stairs    Therapies tried and outcome: nothing               Review of Systems         Objective    /68   Pulse 85   Resp 16   Ht 1.702 m (5' 7\")   Wt 63.1 kg (139 lb 1.6 oz)   LMP 03/23/2023 (Approximate)   SpO2 98%   BMI 21.79 kg/m    Body mass index is 21.79 kg/m .  Physical Exam   GENERAL: healthy, alert and no distress  HENT: normal cephalic/atraumatic, oropharynx clear and oral mucous membranes moist  NECK: mass small ? Cyst under the chin, feels within the dermis  and thyroid normal to palpation  RESP: lungs clear to auscultation - no rales, rhonchi or wheezes  CV: regular rate and rhythm, normal S1 S2, no S3 or S4,    MS: mild tenderness along the medial joint line bilaterally   Crepitus with flexion extension of the knee, with the patella   No pain   No instability on exam   SKIN: no suspicious lesions or rashes                    "

## 2023-04-11 NOTE — PROGRESS NOTES
SUBJECTIVE:                                                   Kamilah Garcia is a 38 year old female who presents to clinic today for the following health issue(s):  Patient presents with:  Urinary Problem: C/o urinary frequency and odor. F/u to frequent vaginal infections.    HPI:  Patient is here for infection check.      Symptoms duration: Has been having struggles with infections since about 8 months ago when had IUD placed, which was removed 3/9/23   Prior evaluation: Yes. Has had multiple appointments  Prior Treatment: Yes. Details: Has been treated for both BV and yeast in the past. Has been treated with clindamycin and metronidazole gel. Has had diflucan as well.    She reports the following symptoms:  Feels like she needs to constantly urinate  Increased discharge: some minimal increase in discharge  Doesn't really feel like symptoms have ever completely gone away  Odor: Yes. Details: feels like urine has a strong odor and appears cloudy  Itching: No  Bleeding: nothing abnormal  Dyspareunia: not currently active  Lesions: No  Dysuria: some occasional cramping. Not sure if this is bladder or her intestinal issues.      Desiring STD testing: had GC/Chlamydia in 2022.       Patient's last menstrual period was 2023 (approximate).    Patient is not sexually active, .  Using none for contraception.    reports that she quit smoking about 12 years ago. Her smoking use included cigarettes. She has never used smokeless tobacco.    STD testing offered?  Declined    Health maintenance updated:  yes    Today's PHQ-2 Score:       3/9/2023     9:26 AM   PHQ-2 (  [employer])   Q1: Little interest or pleasure in doing things 0   Q2: Feeling down, depressed or hopeless 0   PHQ-2 Score 0     Today's PHQ-9 Score:       3/31/2023     7:00 AM   PHQ-9 SCORE   PHQ-9 Total Score MyChart 8 (Mild depression)   PHQ-9 Total Score 8     Today's HAMIDA-7 Score:       2023     8:47 AM   HAMIDA-7 SCORE   Total Score 11  "      Problem list and histories reviewed & adjusted, as indicated.  Additional history: as documented.    Patient Active Problem List   Diagnosis     Tic disorder     Health Care Home     Migraine     HAMIDA (generalized anxiety disorder)     Atypical glandular cells on cervical Pap smear     Major depressive disorder, single episode, mild (H)     Family history of breast cancer     Small intestinal bacterial overgrowth (SIBO)     Tension headache     Past Surgical History:   Procedure Laterality Date     ABDOMINOPLASTY  2021     HC REMOVAL OF TONSILS,12+ Y/O  1996    Tonsils 11+y.o.      Social History     Tobacco Use     Smoking status: Former     Types: Cigarettes     Quit date: 2010     Years since quittin.8     Smokeless tobacco: Never   Vaping Use     Vaping status: Never Used   Substance Use Topics     Alcohol use: Yes     Alcohol/week: 1.0 standard drink of alcohol     Types: 1 Standard drinks or equivalent per week     Comment: occasionally 1-2 etoh a month/      Problem (# of Occurrences) Relation (Name,Age of Onset)    Alcohol/Drug (1) Mother: b:1964 addicted to crack    Family History Negative (4) Father: b: , Sister: b:, Sister: b:, Brother: b:    Breast Cancer (1) Sister (35): no information. hysterectomy/mastectomy    Cervical Cancer (1) Sister (35): 10/2015    No Known Problems (4) Maternal Grandmother, Maternal Grandfather, Paternal Grandmother, Other            Current Outpatient Medications   Medication Sig     metroNIDAZOLE (FLAGYL) 500 MG tablet Take 1 tablet (500 mg) by mouth 2 times daily for 7 days     No current facility-administered medications for this visit.     Allergies   Allergen Reactions     Remeron [Mirtazapine]      Excess sedation even with 7.5mg dose       ROS:  12 point review of systems negative other than symptoms noted below or in the HPI.  POSITIVE for: urinary frequency      OBJECTIVE:     /68   Ht 1.702 m (5' 7\")   Wt 64 kg (141 lb) "   LMP 03/23/2023 (Approximate)   BMI 22.08 kg/m    Body mass index is 22.08 kg/m .    Exam:  Constitutional:  Appearance: Well nourished, well developed alert, in no acute distress  Chest:  Respiratory Effort:  Breathing unlabored.   Cardiovascular: Warm and well perfused  Neurologic:  Mental Status:  Oriented X3. Facial tics present. mentation intact and speech normal.    Pelvic Exam:  External Genitalia:     Normal appearance for age, no discharge present, no tenderness present, no inflammatory lesions present, color normal  Vagina:     Normal vaginal vault without central or paravaginal defects, no discharge present, no inflammatory lesions present, no masses present  Bladder:     Nontender to palpation  Urethra:   Urethral Body:  Urethra palpation normal, urethra structural support normal   Urethral Meatus:  No erythema or lesions present  Cervix:     Appearance healthy, no lesions present, nontender to palpation, no bleeding present  Uterus:     Uterus: firm, normal sized and nontender, anteverted in position.   Adnexa:     No adnexal tenderness present, no adnexal masses present  Perineum:     Perineum within normal limits, no evidence of trauma, no rashes or skin lesions present  Pubic Hair:     Normal pubic hair distribution for age  Genitalia and Groin:     No rashes present, no lesions present, no areas of discoloration, no masses present       In-Clinic Test Results:  Results for orders placed or performed in visit on 04/12/23 (from the past 24 hour(s))   UA without Microscopic - lab collect   Result Value Ref Range    Color Urine Yellow Colorless, Straw, Light Yellow, Yellow    Appearance Urine Clear Clear    Glucose Urine Negative Negative mg/dL    Bilirubin Urine Negative Negative    Ketones Urine Negative Negative mg/dL    Specific Gravity Urine 1.015 1.003 - 1.035    Blood Urine Negative Negative    pH Urine 7.5 (H) 5.0 - 7.0    Protein Albumin Urine Negative Negative mg/dL    Urobilinogen Urine  0.2 0.2, 1.0 E.U./dL    Nitrite Urine Negative Negative    Leukocyte Esterase Urine Negative Negative   Wet prep - Clinic Collect    Specimen: Vagina; Swab   Result Value Ref Range    Trichomonas Absent Absent    Yeast Absent Absent    Clue Cells Present (A) Absent    WBCs/high power field 1+ (A) None   Bacterial Vaginosis Smear    Specimen: Vagina; Swab    Narrative    The following orders were created for panel order Bacterial Vaginosis Smear.  Procedure                               Abnormality         Status                     ---------                               -----------         ------                     Bacterial Vaginosis Stain[[id number]]                        In process                   Please view results for these tests on the individual orders.       ASSESSMENT/PLAN:                                                        ICD-10-CM    1. Urinary frequency  R35.0 UA without Microscopic - lab collect     UA without Microscopic - lab collect     Wet prep - Clinic Collect     Yeast culture     Bacterial Vaginosis Smear     Urogenital Ureaplasma and Mycoplasma Species by PCR      2. Bacterial vaginosis  N76.0 metroNIDAZOLE (FLAGYL) 500 MG tablet    B96.89           Kamilah is a 38 y.o. female here for concerns of urinary frequency. She is well appearing, no signs of toxicity. UA here was negative for UTI. Speculum exam is non-revealing. Given her recent history of several  infections, including UTI, BV, and vaginal candidiasis, did consider alternative etiologies. With her primary concern being longstanding urinary frequency, considered a bladder pathology such as interstitial cystitis. She follows with [facility] for small intestinal bacterial overgrowth (SIBO), symptoms could be related to her existing condition. Less likely cervicitis given normal exam today and negative GC/chlamydia last year. Obtained swabs for wet prep, BV, and yeast along with ureaplasma and mycoplasma as these have not been tested  previously.    - BV, yeast, ureaplasma and mycoplasma pending  - Wet prep with clue cells: Will prescribe oral flagyl since patient has not done this treatment yet.  - Recommended trying OTC AZO  - If testing is unrevealing, may consider evaluation with Urology      Og Macdonald, MS3  [employer]    Physician Attestation   I, Carmenza Nuñez MD, was present with the medical/REBEKA student who participated in the service and in the documentation of the note.  I have verified the history and personally performed the physical exam and medical decision making.  I agree with the assessment and plan of care as documented in the note.        Carmenza Nuñez MD  Date of Service (when I saw the patient): 04/12/23    [facility] [place]

## 2023-04-12 ENCOUNTER — OFFICE VISIT (OUTPATIENT)
Dept: OBGYN | Facility: CLINIC | Age: 38
End: 2023-04-12
Payer: COMMERCIAL

## 2023-04-12 VITALS
SYSTOLIC BLOOD PRESSURE: 114 MMHG | BODY MASS INDEX: 22.13 KG/M2 | DIASTOLIC BLOOD PRESSURE: 68 MMHG | HEIGHT: 67 IN | WEIGHT: 141 LBS

## 2023-04-12 DIAGNOSIS — N76.0 BACTERIAL VAGINOSIS: ICD-10-CM

## 2023-04-12 DIAGNOSIS — R35.0 URINARY FREQUENCY: Primary | ICD-10-CM

## 2023-04-12 DIAGNOSIS — B96.89 BACTERIAL VAGINOSIS: ICD-10-CM

## 2023-04-12 LAB
ALBUMIN UR-MCNC: NEGATIVE MG/DL
APPEARANCE UR: CLEAR
BILIRUB UR QL STRIP: NEGATIVE
CLUE CELLS: PRESENT
COLOR UR AUTO: YELLOW
GLUCOSE UR STRIP-MCNC: NEGATIVE MG/DL
HGB UR QL STRIP: NEGATIVE
KETONES UR STRIP-MCNC: NEGATIVE MG/DL
LEUKOCYTE ESTERASE UR QL STRIP: NEGATIVE
NITRATE UR QL: NEGATIVE
NUGENT SCORE: 0
PH UR STRIP: 7.5 [PH] (ref 5–7)
SP GR UR STRIP: 1.01 (ref 1–1.03)
TRICHOMONAS, WET PREP: ABNORMAL
UROBILINOGEN UR STRIP-ACNC: 0.2 E.U./DL
WBC'S/HIGH POWER FIELD, WET PREP: ABNORMAL
WHITE BLOOD CELLS: NORMAL
YEAST, WET PREP: ABNORMAL

## 2023-04-12 PROCEDURE — 87798 DETECT AGENT NOS DNA AMP: CPT | Mod: 90 | Performed by: OBSTETRICS & GYNECOLOGY

## 2023-04-12 PROCEDURE — 87210 SMEAR WET MOUNT SALINE/INK: CPT | Performed by: OBSTETRICS & GYNECOLOGY

## 2023-04-12 PROCEDURE — 81003 URINALYSIS AUTO W/O SCOPE: CPT | Performed by: OBSTETRICS & GYNECOLOGY

## 2023-04-12 PROCEDURE — 99214 OFFICE O/P EST MOD 30 MIN: CPT | Performed by: OBSTETRICS & GYNECOLOGY

## 2023-04-12 PROCEDURE — 87205 SMEAR GRAM STAIN: CPT | Performed by: OBSTETRICS & GYNECOLOGY

## 2023-04-12 PROCEDURE — 99000 SPECIMEN HANDLING OFFICE-LAB: CPT | Performed by: OBSTETRICS & GYNECOLOGY

## 2023-04-12 PROCEDURE — 87563 M. GENITALIUM AMP PROBE: CPT | Mod: 90 | Performed by: OBSTETRICS & GYNECOLOGY

## 2023-04-12 PROCEDURE — 87102 FUNGUS ISOLATION CULTURE: CPT | Performed by: OBSTETRICS & GYNECOLOGY

## 2023-04-12 RX ORDER — METRONIDAZOLE 500 MG/1
500 TABLET ORAL 2 TIMES DAILY
Qty: 14 TABLET | Refills: 0 | Status: SHIPPED | OUTPATIENT
Start: 2023-04-12 | End: 2023-04-19

## 2023-04-14 LAB
M GENITALIUM DNA SPEC QL NAA+PROBE: NOT DETECTED
M HOMINIS DNA SPEC QL NAA+PROBE: NOT DETECTED
U PARVUM DNA SPEC QL NAA+PROBE: DETECTED
U UREALYTICUM DNA SPEC QL NAA+PROBE: NOT DETECTED

## 2023-04-16 LAB — BACTERIA SPEC CULT: NO GROWTH

## 2023-04-17 ENCOUNTER — MYC MEDICAL ADVICE (OUTPATIENT)
Dept: OBGYN | Facility: CLINIC | Age: 38
End: 2023-04-17
Payer: COMMERCIAL

## 2023-04-17 DIAGNOSIS — R35.0 URINARY FREQUENCY: Primary | ICD-10-CM

## 2023-04-17 NOTE — TELEPHONE ENCOUNTER
As this is a non-emergent question I will forward it to Dr. Nuñez although I suspect the answer is yes to a Urology referral

## 2023-04-17 NOTE — TELEPHONE ENCOUNTER
4/12/23: Joaquin Triana is a 38 y.o. female here for concerns of urinary frequency. She is well appearing, no signs of toxicity. UA here was negative for UTI. Speculum exam is non-revealing. Given her recent history of several  infections, including UTI, BV, and vaginal candidiasis, did consider alternative etiologies. With her primary concern being longstanding urinary frequency, considered a bladder pathology such as interstitial cystitis. She follows with [place] [facility] for small intestinal bacterial overgrowth (SIBO), symptoms could be related to her existing condition. Less likely cervicitis given normal exam today and negative GC/chlamydia last year. Obtained swabs for wet prep, BV, and yeast along with ureaplasma and mycoplasma as these have not been tested previously.     - BV, yeast, ureaplasma and mycoplasma pending  - Wet prep with clue cells: Will prescribe oral flagyl since patient has not done this treatment yet.  - Recommended trying OTC AZO  - If testing is unrevealing, may consider evaluation with Urology    Ureaplasma parvum by PCR Detected Abnormal       Component Ref Range & Units 5 d ago 1 mo ago 2 mo ago 7 mo ago     Trichomonas Absent Absent   Absent      Yeast Absent Absent   Absent      Clue Cells Absent Present Abnormal   2+ Abnormal  R  Absent  2+ R     WBCs/high power field None 1+ Abnormal    1+ Abnormal            Component Ref Range & Units 5 d ago 1 mo ago 2 mo ago 6 mo ago 7 mo ago     Andre score 1 , 2 , 3 , 0  0  8 Abnormal  CM  0 CM  6 Abnormal  CM  8 Abnormal  CM    Comment: Normal vaginal yash    White Blood Cells  No WBCs  3+ Predominately PMNs CM  2+ Predominately PMNs          Routing pt mychart message to provider to advise.  Ureaplasma +, pt inquiring about urology, still noticing symptoms with tx of Flagyl  Eva Pressley RN on 4/17/2023 at 8:18 AM

## 2023-04-18 RX ORDER — DOXYCYCLINE 100 MG/1
100 CAPSULE ORAL 2 TIMES DAILY
Qty: 14 CAPSULE | Refills: 0 | Status: SHIPPED | OUTPATIENT
Start: 2023-04-18 | End: 2023-05-01

## 2023-04-18 NOTE — TELEPHONE ENCOUNTER
One of her cultures came back positive for ureaplasma.  Sometimes this can lead to symptoms that she describes.  I would like to treat her with 7 day course of doxycycline 100mg BID.    If symptoms persistent after that, then referral to urology is reasonable.

## 2023-04-24 NOTE — TELEPHONE ENCOUNTER
I am sorry to here she is still having troubles.   Of the 2 medications I would prefer she finish the doxycyline over the metronidazole.

## 2023-04-24 NOTE — TELEPHONE ENCOUNTER
Routing pt Gochikur[employer]art message to provider to advise.    Review urology referral pended    Mayela Tate RN on 4/24/2023 at 9:51 AM

## 2023-04-27 NOTE — TELEPHONE ENCOUNTER
Routing pt [employer] message to provider to advise.  Eva Pressley RN on 4/27/2023 at 8:58 AM

## 2023-04-27 NOTE — TELEPHONE ENCOUNTER
No this really is the best choice  Again I would only take the doxycyline and make sure to take with food.

## 2023-05-01 RX ORDER — DOXYCYCLINE 100 MG/1
100 CAPSULE ORAL 2 TIMES DAILY
Qty: 14 CAPSULE | Refills: 0 | Status: SHIPPED | OUTPATIENT
Start: 2023-05-01 | End: 2023-05-23

## 2023-05-01 NOTE — TELEPHONE ENCOUNTER
Pt requesting refill of doxycycline  Hx: PID and urinary symptoms  Urology referral already placed.    Recommend appt here or at urology?  Routing pt mychart message to provider to advise.  Eva Pressley RN on 5/1/2023 at 3:34 PM

## 2023-05-01 NOTE — TELEPHONE ENCOUNTER
Pt requesting Doxy--see previous note  Appropriate to send or need appt?  Routing pt mychart message to provider to advise.  Eva Pressley RN on 5/1/2023 at 3:53 PM

## 2023-05-10 ENCOUNTER — TELEPHONE (OUTPATIENT)
Dept: NEUROLOGY | Facility: CLINIC | Age: 38
End: 2023-05-10
Payer: COMMERCIAL

## 2023-05-10 ENCOUNTER — TELEPHONE (OUTPATIENT)
Dept: NEUROLOGY | Facility: CLINIC | Age: 38
End: 2023-05-10

## 2023-05-10 NOTE — TELEPHONE ENCOUNTER
Pt called wanting a virtual appointment-  Dr. Ackerman would like to see pt in person we are not able to change this appointment. If patient can not make it in-person then patient can reschedule by calling [phone].     Yady Sevilla on 5/10/2023 at 3:02 PM

## 2023-05-10 NOTE — TELEPHONE ENCOUNTER
[facility] Call Center    Phone Message    May a detailed message be left on voicemail: yes     Reason for Call: Patient called back if she can switch to video visit, will still be in [place] for appt. Please call back ASAP    Action Taken: Message routed to:  [facility] priya neurology    Travel Screening: Not Applicable

## 2023-05-10 NOTE — TELEPHONE ENCOUNTER
[facility] Call [facility]    Phone Message    May a detailed message be left on voicemail: yes     Reason for Call: Other: Patient is calling to see if appt for 5/11/2023 can be changed to a video visit. Writer tried to reschedule as video and not able to. Please contact patient back at [phone]     Action Taken: Message routed to:  [facility] Neurology    Travel Screening: Not Applicable

## 2023-05-11 ENCOUNTER — OFFICE VISIT (OUTPATIENT)
Dept: NEUROLOGY | Facility: CLINIC | Age: 38
End: 2023-05-11
Payer: COMMERCIAL

## 2023-05-11 VITALS
HEART RATE: 76 BPM | DIASTOLIC BLOOD PRESSURE: 80 MMHG | SYSTOLIC BLOOD PRESSURE: 133 MMHG | RESPIRATION RATE: 16 BRPM | OXYGEN SATURATION: 98 %

## 2023-05-11 DIAGNOSIS — F95.9 TIC DISORDER: ICD-10-CM

## 2023-05-11 DIAGNOSIS — F41.9 ANXIETY: ICD-10-CM

## 2023-05-11 DIAGNOSIS — F95.1 CHRONIC MOTOR TIC DISORDER: Primary | ICD-10-CM

## 2023-05-11 PROCEDURE — 99214 OFFICE O/P EST MOD 30 MIN: CPT | Performed by: PSYCHIATRY & NEUROLOGY

## 2023-05-11 ASSESSMENT — PAIN SCALES - GENERAL: PAINLEVEL: NO PAIN (0)

## 2023-05-11 NOTE — PATIENT INSTRUCTIONS
Plan:   - Rreferral to [facility] to Dr. Soo Duque, PhD or Peggy Rhodes [facility] (Licensed Professional Clinical Counselor) for CBIT  - Recommend referral to Dr. De Leon, Psychiatry, for management of anxiety and depression but she would like to hold off for now as she is not interested in taking medications.  - Encouraged to continue to find a therapist through work   - Not interested in medical therapy for now and would like to try CBIT first  - Encouraged starting mindfulness exercises such as yoga and meditation  - Consider meditating 10 minutes twice a day  - Consider trying guided meditation. Here are some apps I recommend:.     - [employer] guy (www.headspace.com): 14 day free trial and then there is a monthly or annual rate  - Calm guy (www.calm.com): 7 day free trial and then there is an annual rate  - InsightTimer guy (www.insighttimer.com): FREE   - If she were to become interested in starting medication treatment for her tics, would consider reinstituting the Ingrezza or considering topiramate or guanfacine.  I would be reluctant to use neuroleptics in a young woman due to risk of developing tardive dyskinesia.  - Read more about Tourettes at TAA.org.     Patient to return in 12 months, for in-person visit, 30 minutes.

## 2023-05-11 NOTE — LETTER
2023       RE: Kamilah Garcia  [address]  [place] [facility] [address]       Dear Colleague,    Thank you for referring your patient, Kamilah Garcia, to the [facility] [place] [facility]. Please see a copy of my visit note below.    Department of Neurology  Movement Disorders Division     Patient: Kamilah Garcia   MRN: [id number]   : 1985   Date of Visit: May 11, 2023    History of Present Illness  Ms. Garcia is a 38 year old female that presents to Neurology Movement clinic for follow up regarding motor tic disorder management.   Patient was last seen on 2022 where she reported no change in the character of her tics. She did express feelings of anxiety and depression. Referrals were placed to Psychology for CBIT/HRT, and psychiatry. We discussed meditation practices.    History obtained from patient.   Motor Tics: Worse due health stressors, kid's dad , being a single mother  Patient reports tics bother her but has learned to live with them since she was 14 years old. There has not been much change to the character of her tics. Continue to have jerking of shoulders and proximal BUEs,  head twisting, and blinking/twitching of eyes. Denies grunting, clearing throat, or vocal components, and these have never occurred his her history. Tics are exacerbated by stress, anxiety, pain. Tics happens daily and throughout the entire day, she doesn't notice it as much as children, family and people in social interactions, but this sometimes causes anxiety.   habit reversal therapy and comprehensive behavioral intervention Tourette's (CBIT): Uncertain she can afford CBIT and is concerned for this which is why she has not been able to make an appointment.   Mood/therapist/Psychiatry: Was starting to see a school therapist for kids and individually but she can't afford it right now but if her insurance covers she is very interested in going.  "Same for psychiatry.   Stressors: She reports \"insomnia and breathing at night\" and has a hard time catching her breath. Breathing issues started when tics starts. Tics interfere with sleep at times. Works helps her with sleep.  She is not interested in medical therapy for tic disorder or mood. She continues to have medial issues regarding her GI/. Family comes to help with kids some times. She is missing work due to being sick and the tics.   Children's dad  last year so she is raising her kids on her own.   She is concerned for financial strain.      Review of Systems:  Other than that mentioned above, the remainder of 12 systems reviewed were negative.    PMH: unchanged  PSH: unchanged  FH: unchanged  SH: unchanged    Medications:  Current Outpatient Medications   Medication Sig Dispense Refill    doxycycline hyclate (VIBRAMYCIN) 100 MG capsule Take 1 capsule (100 mg) by mouth 2 times daily 14 capsule 0           Allergies   Allergen Reactions    Remeron [Mirtazapine]      Excess sedation even with 7.5mg dose          Physical Exam:  The patient's  vitals were not taken for this visit.    Physical Exam:  GENERAL: alert, active, attentive, appropriately groomed   HEENT: normocephalic, eyes open with no discharge, nares patent, oropharynx clear-no lesions  CHEST: non labored breathing  EXTREMITIES: no edema/cyanosis in BUE  PSYCH: mood anxious     Neurologic Exam:  MENTAL STATUS: Alert and oriented to person, place, time, and situation. Follows commands. Recent and remote memory intact. Attention span and concentration intact. Fund of knowledge intact to current events.  SPEECH: Fluent, intact comprehension and articulation  CN: visual fields intact, EOMIB, facial movement symmetric, hearing grossly intact to conversation  MOTOR: Moves all extremities equally against gravity without difficulty   Involuntary movements:   Spontaneous movement: jerking and myoclonus of the shoulders, proximal BUE's, head " (twisting, flexion, tilting), and blinking that was random and unpredictable although persistent throughout the encounter.   COORDINATION: no dysmetria with FTN  GAIT: able to rise unassisted from a seated position, normal arm swing and normal stride length, no en bloc turns, no ataxia    Impression:  Kamliah Garcia is a 38 year old female with anxiety and a motor tic disorder. The patient's main concern today is worsening tics. We discussed     1.  Chronic motor tic disorder: She has battled this since 14 years old. She got benefit from the Ingrezza 40 mg a day, which was tried about 2-3 years ago (~2018).  She took this under the direction of Dr. Latoya Juarez. She then went through a period of severe stress and her tics got worse.  She did not know if it was the stress or the medicine making her worse so she stopped the Ingrezza.  She has remained off this med since.  She was recently referred to treatment of chronic motor tic disorder with habit reversal therapy and comprehensive behavioral intervention and Tourette's (CBIT) but was unable to be seen due to insurance issues, clinic availability issues or clinic restrictions on age (many do not see patients older than 25).   2.  History of depression and anxiety and recently referred back to psychiatry    Plan:   - Rreferral to [facility] to Dr. Soo Duque, PhD or Peggy Rhodes [facility] (Licensed Professional Clinical Counselor) for CBIT  - Recommend referral to Dr. De Leon, Psychiatry, for management of anxiety and depression but she would like to hold off for now as she is not interested in taking medications.  - Encouraged to continue to find a therapist through work   - Not interested in medical therapy for now and would like to try CBIT first  - Encouraged starting mindfulness exercises such as yoga and meditation  - Consider meditating 10 minutes twice a day  - Consider trying guided meditation. Here are some apps I recommend:.     -  [employer] guy (www.headspace.com): 14 day free trial and then there is a monthly or annual rate  - Calm guy (www.calm.com): 7 day free trial and then there is an annual rate  - InsightTimer guy (www.insighttimer.com): FREE   - If she were to become interested in starting medication treatment for her tics, would consider reinstituting the Ingrezza or considering topiramate or guanfacine.  I would be reluctant to use neuroleptics in a young woman due to risk of developing tardive dyskinesia.  - Read more about Tourettes at TAA.org.     Patient to return in 12 months, for in-person visit, 30 minutes.     31 minutes spent on date of encounter doing chart reviews and exam and documentation and further activities as noted above.         Again, thank you for allowing me to participate in the care of your patient.      Sincerely,    Medina Ackerman, DO

## 2023-05-11 NOTE — Clinical Note
Hello team, do we know of any pschologists taking adult patients for tics? Here's who I have:   [facility] to Dr. Soo Duque, PhD or [facility] (Licensed Professional Clinical Counselor) for CBIT  Thank you,   Medina

## 2023-05-11 NOTE — PROGRESS NOTES
"Department of Neurology  Movement Disorders Division     Patient: Kamilah Garcia   MRN: [id number]   : 1985   Date of Visit: May 11, 2023    History of Present Illness  Ms. Garcia is a 38 year old female that presents to Neurology Movement clinic for follow up regarding motor tic disorder management.   Patient was last seen on 2022 where she reported no change in the character of her tics. She did express feelings of anxiety and depression. Referrals were placed to Psychology for CBIT/HRT, and psychiatry. We discussed meditation practices.    History obtained from patient.   Motor Tics: Worse due health stressors, kid's dad , being a single mother  Patient reports tics bother her but has learned to live with them since she was 14 years old. There has not been much change to the character of her tics. Continue to have jerking of shoulders and proximal BUEs,  head twisting, and blinking/twitching of eyes. Denies grunting, clearing throat, or vocal components, and these have never occurred his her history. Tics are exacerbated by stress, anxiety, pain. Tics happens daily and throughout the entire day, she doesn't notice it as much as children, family and people in social interactions, but this sometimes causes anxiety.   habit reversal therapy and comprehensive behavioral intervention Tourette's (CBIT): Uncertain she can afford CBIT and is concerned for this which is why she has not been able to make an appointment.   Mood/therapist/Psychiatry: Was starting to see a school therapist for kids and individually but she can't afford it right now but if her insurance covers she is very interested in going. Same for psychiatry.   Stressors: She reports \"insomnia and breathing at night\" and has a hard time catching her breath. Breathing issues started when tics starts. Tics interfere with sleep at times. Works helps her with sleep.  She is not interested in medical therapy for tic disorder or mood. She continues " to have medial issues regarding her GI/. Family comes to help with kids some times. She is missing work due to being sick and the tics.   Children's dad  last year so she is raising her kids on her own.   She is concerned for financial strain.      Review of Systems:  Other than that mentioned above, the remainder of 12 systems reviewed were negative.    PMH: unchanged  PSH: unchanged  FH: unchanged  SH: unchanged    Medications:  Current Outpatient Medications   Medication Sig Dispense Refill     doxycycline hyclate (VIBRAMYCIN) 100 MG capsule Take 1 capsule (100 mg) by mouth 2 times daily 14 capsule 0           Allergies   Allergen Reactions     Remeron [Mirtazapine]      Excess sedation even with 7.5mg dose          Physical Exam:  The patient's  vitals were not taken for this visit.    Physical Exam:  GENERAL: alert, active, attentive, appropriately groomed   HEENT: normocephalic, eyes open with no discharge, nares patent, oropharynx clear-no lesions  CHEST: non labored breathing  EXTREMITIES: no edema/cyanosis in BUE  PSYCH: mood anxious     Neurologic Exam:  MENTAL STATUS: Alert and oriented to person, place, time, and situation. Follows commands. Recent and remote memory intact. Attention span and concentration intact. Fund of knowledge intact to current events.  SPEECH: Fluent, intact comprehension and articulation  CN: visual fields intact, EOMIB, facial movement symmetric, hearing grossly intact to conversation  MOTOR: Moves all extremities equally against gravity without difficulty   Involuntary movements:   Spontaneous movement: jerking and myoclonus of the shoulders, proximal BUE's, head (twisting, flexion, tilting), and blinking that was random and unpredictable although persistent throughout the encounter.   COORDINATION: no dysmetria with FTN  GAIT: able to rise unassisted from a seated position, normal arm swing and normal stride length, no en bloc turns, no ataxia    Impression:  Kamilah NICHOLS  Jose is a 38 year old female with anxiety and a motor tic disorder. The patient's main concern today is worsening tics. We discussed     1.  Chronic motor tic disorder: She has battled this since 14 years old. She got benefit from the Ingrezza 40 mg a day, which was tried about 2-3 years ago (~2018).  She took this under the direction of Dr. Latoya Juarez. She then went through a period of severe stress and her tics got worse.  She did not know if it was the stress or the medicine making her worse so she stopped the Ingrezza.  She has remained off this med since.  She was recently referred to treatment of chronic motor tic disorder with habit reversal therapy and comprehensive behavioral intervention and Tourette's (CBIT) but was unable to be seen due to insurance issues, clinic availability issues or clinic restrictions on age (many do not see patients older than 25).   2.  History of depression and anxiety and recently referred back to psychiatry    Plan:   - Rreferral to [facility] [place] to Dr. Soo Duque, PhD or [facility] Rhodes [facility] (Licensed Professional Clinical Counselor) for CBIT  - Recommend referral to Dr. De Leon, Psychiatry, for management of anxiety and depression but she would like to hold off for now as she is not interested in taking medications.  - Encouraged to continue to find a therapist through work   - Not interested in medical therapy for now and would like to try CBIT first  - Encouraged starting mindfulness exercises such as yoga and meditation  - Consider meditating 10 minutes twice a day  - Consider trying guided meditation. Here are some apps I recommend:.     - [employer] guy (www.headspace.com): 14 day free trial and then there is a monthly or annual rate  - Calm guy (www.calm.com): 7 day free trial and then there is an annual rate  - InsightTimer guy (www.buySAFEtimer.com): FREE   - If she were to become interested in starting medication treatment for her tics, would  consider reinstituting the Ingrezza or considering topiramate or guanfacine.  I would be reluctant to use neuroleptics in a young woman due to risk of developing tardive dyskinesia.  - Read more about Tourettes at TAA.org.     Patient to return in 12 months, for in-person visit, 30 minutes.     Medina Ackerman DO, MA   of Neurology   [facility]    31 minutes spent on date of encounter doing chart reviews and exam and documentation and further activities as noted above.

## 2023-05-15 ENCOUNTER — TELEPHONE (OUTPATIENT)
Dept: NEUROLOGY | Facility: CLINIC | Age: 38
End: 2023-05-15
Payer: COMMERCIAL

## 2023-05-15 ENCOUNTER — PATIENT OUTREACH (OUTPATIENT)
Dept: CARE COORDINATION | Facility: CLINIC | Age: 38
End: 2023-05-15
Payer: COMMERCIAL

## 2023-05-15 NOTE — TELEPHONE ENCOUNTER
Left Voicemail (1st Attempt) and Sent Mychart (1st Attempt) for the patient to call back and schedule the following:    Appointment type: follow up  Provider: Dr. Ackerman  Return date: 1 year (May 2024)  Specialty phone number: [phone]  Additional appointment(s) needed: n/a  Additonal Notes: LVM, sent MyC 5/15 NP

## 2023-05-15 NOTE — PROGRESS NOTES
"Social Work Intervention  [facility]    Data/Intervention:    Patient Name:  Kamilah Garcia  /Age:  1985 (38 year old)    Visit Type: telephone  Referral Source: Neurology  Reason for Referral:  \"Kamilah is unable to afford psychiatry, psychology sessions to improve/treat motor tics. Please provide assistanct for fincances, caregiver burnout and community recourses to aid with her children\"    Collaborated With:    -Kamilah- [phone]    Psychosocial Information/Concerns:  Referral received with above concerns/needs.     Intervention/Education/Resources Provided:  I contacted Kamilah and introduced myself and the reason for my call and asked for a return call.     Assessment/Plan:  Will await Kamilah's return call and provide assistance at that time.      update: After no call from Kamilah I sent a My Chart message today offering assistance and offered for her to either call me or reply to the My Chart message. I will await a call or My Chart message from Kamilah and provide assistance at that time.     Provided patient/family with contact information and availability.    FRED Joe, [facility]    [facility]  Ph: [phone], Pgr: [phone]  5/15/2023        "

## 2023-05-18 ENCOUNTER — MYC MEDICAL ADVICE (OUTPATIENT)
Dept: MIDWIFE SERVICES | Facility: CLINIC | Age: 38
End: 2023-05-18
Payer: COMMERCIAL

## 2023-05-19 ENCOUNTER — TELEPHONE (OUTPATIENT)
Dept: NEUROLOGY | Facility: CLINIC | Age: 38
End: 2023-05-19
Payer: COMMERCIAL

## 2023-05-19 NOTE — TELEPHONE ENCOUNTER
OK to keep appt.  Will plan to do testing to make sure infections are resolved.  Urology typically does not screen for those infections.     Thanks!  Elda RICO CNM, [facility]  [phone]

## 2023-05-19 NOTE — TELEPHONE ENCOUNTER
Spoke to Pt, would like to be reminded later to schedule follow up with Dr. Ackerman. Appointment to be schedule for next year May 2024.     Yady Sevilla on 5/19/2023 at 11:22 AM

## 2023-05-20 ENCOUNTER — OFFICE VISIT (OUTPATIENT)
Dept: URGENT CARE | Facility: URGENT CARE | Age: 38
End: 2023-05-20
Payer: COMMERCIAL

## 2023-05-20 ENCOUNTER — ANCILLARY PROCEDURE (OUTPATIENT)
Dept: GENERAL RADIOLOGY | Facility: CLINIC | Age: 38
End: 2023-05-20
Attending: STUDENT IN AN ORGANIZED HEALTH CARE EDUCATION/TRAINING PROGRAM
Payer: COMMERCIAL

## 2023-05-20 VITALS
OXYGEN SATURATION: 100 % | TEMPERATURE: 98.3 F | WEIGHT: 138 LBS | HEART RATE: 80 BPM | DIASTOLIC BLOOD PRESSURE: 74 MMHG | SYSTOLIC BLOOD PRESSURE: 118 MMHG | BODY MASS INDEX: 21.61 KG/M2

## 2023-05-20 DIAGNOSIS — S92.505A CLOSED NONDISPLACED FRACTURE OF PHALANX OF LESSER TOE OF LEFT FOOT, UNSPECIFIED PHALANX, INITIAL ENCOUNTER: Primary | ICD-10-CM

## 2023-05-20 DIAGNOSIS — M79.675 PAIN OF TOE OF LEFT FOOT: ICD-10-CM

## 2023-05-20 PROCEDURE — 99213 OFFICE O/P EST LOW 20 MIN: CPT | Performed by: STUDENT IN AN ORGANIZED HEALTH CARE EDUCATION/TRAINING PROGRAM

## 2023-05-20 PROCEDURE — 73630 X-RAY EXAM OF FOOT: CPT | Mod: TC | Performed by: RADIOLOGY

## 2023-05-20 NOTE — PATIENT INSTRUCTIONS
Good seeing you in [facility]. Unfortunately you have fracture of your left fifth toe. We recommend pranav taping to the adjacent toe (separate the toes with cotton or felt) for 4-6 weeks. We will provide a post op boot to help with the pain as well. Return to clinic if pain is not improving after 2 weeks, or suddenly gets worse.

## 2023-05-20 NOTE — PROGRESS NOTES
Assessment & Plan     Closed nondisplaced fracture of phalanx of lesser toe of left foot, unspecified phalanx, initial encounter  Pain of toe of left foot  Patient was found to have a left fifth toe nondisplaced fracture. Neurovascularly intact on exam. We'll use conservative management with pranav taping as well as a postop shoe for comfort.   - Return precautions given to patient as per patient instructions  - XR Foot Left G/E 3 Views  - Recommended pranav taping for 4 to 6 weeks  - Ankle/Foot Bracing Supplies DME Post-op Shoe; Left             Return for Routine preventive.    Taryn Carbajal MD  [facility] BASSEM Triana is a 38 year old, presenting for the following health issues:  Musculoskeletal Problem and Toe Injury (Left pink toe injury, swollen and painful to walk )        3/31/2023     7:06 AM   Additional Questions   Roomed by Cassandra Monreal     HPI     Patient reports that she injured her left 5th toe approximately 2 hours ago. Stubbed her toe on a shopping cart. Has had significant pain since then, continues to swell.     Denies any numbness to the toe. Denies any other injury.     Review of Systems   Constitutional, HEENT, cardiovascular, pulmonary, gi and gu systems are negative, except as otherwise noted.      Objective    /74   Pulse 80   Temp 98.3  F (36.8  C) (Tympanic)   Wt 62.6 kg (138 lb)   LMP 03/23/2023 (Approximate)   SpO2 100%   BMI 21.61 kg/m    Body mass index is 21.61 kg/m .  Physical Exam   GENERAL: healthy, alert and no distress  NECK: no adenopathy, no asymmetry, masses, or scars and thyroid normal to palpation  RESP: lungs clear to auscultation - no rales, rhonchi or wheezes  CV: regular rate and rhythm, normal S1 S2, no S3 or S4, no murmur, click or rub, no peripheral edema and peripheral pulses strong  ABDOMEN: soft, nontender, no hepatosplenomegaly, no masses and bowel sounds normal  MS: Patient does have significant swelling and  some mild erythema of her left fifth toe. Appears to be neurovascularly intact with intact flexion extension, excellent cap refill. No other gross musculoskeletal defects noted, no edema.    Xray -  IMPRESSION: Oblique fracture of the proximal phalanx of the left small toe. The left foot is otherwise negative.

## 2023-05-23 ENCOUNTER — OFFICE VISIT (OUTPATIENT)
Dept: MIDWIFE SERVICES | Facility: CLINIC | Age: 38
End: 2023-05-23
Payer: COMMERCIAL

## 2023-05-23 VITALS
SYSTOLIC BLOOD PRESSURE: 118 MMHG | HEIGHT: 67 IN | WEIGHT: 140 LBS | HEART RATE: 68 BPM | DIASTOLIC BLOOD PRESSURE: 62 MMHG | BODY MASS INDEX: 21.97 KG/M2

## 2023-05-23 DIAGNOSIS — R30.0 DYSURIA: Primary | ICD-10-CM

## 2023-05-23 DIAGNOSIS — N89.8 VAGINAL ODOR: ICD-10-CM

## 2023-05-23 DIAGNOSIS — R35.0 FREQUENCY OF URINATION: ICD-10-CM

## 2023-05-23 DIAGNOSIS — R39.15 URINARY URGENCY: ICD-10-CM

## 2023-05-23 LAB
CLUE CELLS: ABNORMAL
HCG UR QL: NEGATIVE
NUGENT SCORE: 0
TRICHOMONAS, WET PREP: ABNORMAL
WBC'S/HIGH POWER FIELD, WET PREP: ABNORMAL
WHITE BLOOD CELLS: NORMAL
YEAST, WET PREP: ABNORMAL

## 2023-05-23 PROCEDURE — 87102 FUNGUS ISOLATION CULTURE: CPT | Performed by: ADVANCED PRACTICE MIDWIFE

## 2023-05-23 PROCEDURE — 87798 DETECT AGENT NOS DNA AMP: CPT | Mod: 90 | Performed by: ADVANCED PRACTICE MIDWIFE

## 2023-05-23 PROCEDURE — 87210 SMEAR WET MOUNT SALINE/INK: CPT | Performed by: ADVANCED PRACTICE MIDWIFE

## 2023-05-23 PROCEDURE — 87205 SMEAR GRAM STAIN: CPT | Performed by: ADVANCED PRACTICE MIDWIFE

## 2023-05-23 PROCEDURE — 87563 M. GENITALIUM AMP PROBE: CPT | Mod: 90 | Performed by: ADVANCED PRACTICE MIDWIFE

## 2023-05-23 PROCEDURE — 99214 OFFICE O/P EST MOD 30 MIN: CPT | Performed by: ADVANCED PRACTICE MIDWIFE

## 2023-05-23 PROCEDURE — 81025 URINE PREGNANCY TEST: CPT | Performed by: PHYSICIAN ASSISTANT

## 2023-05-23 PROCEDURE — 99000 SPECIMEN HANDLING OFFICE-LAB: CPT | Performed by: ADVANCED PRACTICE MIDWIFE

## 2023-05-23 NOTE — PROGRESS NOTES
"SUBJECTIVE:   Kamilah is a 38 year old here for vaginal symptoms:               Vaginal Symptoms     Onset: IUD has been out for 11 months. Has noticed increase in vaginal infections since it was removed. Did not have these issues before having her IUD.    Concerned she may have PID    Description:  Vaginal Discharge: \"Not really much\"  Itching (Pruritis): No  Burning sensation:  No  Odor:  Yes: \"Doesn't smell as fresh.\"  Irritation:  No    Accompanying Signs & Symptoms:  Pain with Urination: No  Frequency: Yes and constantly feels need to void.  Has an appt with GI coming up  Abdominal Pain:  Yes: \"cramps\"  Fever: No   History:   Sexually active:  Yes \"Luz hurts to have sex sometimes, just not very comfortable.\"   New Partner:  No  Possibility of Pregnancy:  No  Contraceptive type: Vasectomy    Precipitating factors:   Recent Antibiotic Use: Yes:     Alleviating factors:     Therapies Tried and outcome:   Previous Episodes of Vaginitis:  Yes:     Other associated symptoms: None.  History of STI's:  No  STI Testing offered: Yes, declined    LMP: Patient's last menstrual period was 05/13/2023 (approximate).      Patient Active Problem List   Diagnosis     Tic disorder     Health Care Home     Migraine     HAMIDA (generalized anxiety disorder)     Atypical glandular cells on cervical Pap smear     Major depressive disorder, single episode, mild (H)     Family history of breast cancer     Small intestinal bacterial overgrowth (SIBO)     Tension headache     Past Medical History:   Diagnosis Date     Anemia     with pregnancy     Anxiety 01/21/2014     Depression      Encounter for IUD removal 03/09/2023    Inserted 8/2/22, MIRENA Lot # [id number] Exp: 10/2024     History of varicella-documented immunity 07/21/2011     Migraine 08/03/2012     Other, mixed, or unspecified nondependent drug abuse, episodic      Tic age 14     Past Surgical History:   Procedure Laterality Date     ABDOMINOPLASTY  08/2021     HC REMOVAL OF " "TONSILS,12+ Y/O  01/01/1996    Tonsils 12+y.o.     No current outpatient medications on file.     Allergies   Allergen Reactions     Remeron [Mirtazapine]      Excess sedation even with 7.5mg dose       Health maintenance updated:  Yes    ROS:   12 point review of systems negative other than symptoms noted below or in the HPI.    PHYSICAL EXAM:    /62   Pulse 68   Ht 1.702 m (5' 7\")   Wt 63.5 kg (140 lb)   LMP 05/13/2023 (Approximate)   BMI 21.93 kg/m  , Body mass index is 21.93 kg/m .  General appearance:  Healthy, alert and no distress  Pelvic Exam:  Vulva: No lesions, no adenopathy  Vagina: Moist, pink, discharge normal well rugated, no lesions  Cervix:Smooth, pink, no visible lesions.  Rectal exam: Deferred    ASSESSMENT/PLAN:     ICD-10-CM    1. Dysuria  R30.0 UA with Microscopic     Urine Culture - Aerobic Bacterial     Wet  Prep     Bacterial Vaginosis Smear     Yeast culture     Urine Culture - Aerobic Bacterial     UA with Microscopic     Urogenital Ureaplasma and Mycoplasma Species by PCR      2. Urinary urgency  R39.15       3. Frequency of urination  R35.0       4. Vaginal odor  N89.8           Results for orders placed or performed in visit on 05/23/23   Wet  Prep     Status: Abnormal    Specimen: Vagina; Swab   Result Value Ref Range    Trichomonas Absent Absent    Yeast Absent Absent    Clue Cells Absent Absent    WBCs/high power field 1+ (A) None   Bacterial Vaginosis Smear     Status: None (In process)    Specimen: Vagina; Swab    Narrative    The following orders were created for panel order Bacterial Vaginosis Smear.  Procedure                               Abnormality         Status                     ---------                               -----------         ------                     Bacterial Vaginosis Stain[[id number]]                        In process                   Please view results for these tests on the individual orders.         COUNSELING:  Will call with results, and " treat if needed  Handout provided about vaginitis and how to prevent future infections.  Return to clinic if symptoms persist or worsen  Keep Urology appt scheduled for 5/31/23    30 minutes spent by me on the date of the encounter doing chart review, patient visit and documentation     Maricel RICO CNM

## 2023-05-23 NOTE — PROGRESS NOTES
"SUBJECTIVE:   Kamilah is a 38 year old here for vaginal symptoms:  ***                  Vaginal Symptoms     Onset: ***    Description:  Vaginal Discharge: {Color:033958}  Itching (Pruritis): {YES NO:843929::\"Yes\"}  Burning sensation:  {YES NO:147666::\"Yes\"}  Odor:  {YES (EXPLAIN)/NO:590838}  Irritation:  {YES NO:210489::\"Yes\"}    Accompanying Signs & Symptoms:  Pain with Urination: {YES (EXPLAIN)/NO:254479}  Abdominal Pain:  {YES (EXPLAIN)/NO:287671}  Fever: {YES (EXPLAIN)/NO:425472}   History:   Sexually active:  {YES NO:819268::\"Yes\"}  New Partner:  {YES (EXPLAIN)/NO:653858}  Possibility of Pregnancy:  { :185203::\"No\"}  Contraceptive type: {CONTRACEPTIVE METHOD:009992}    Precipitating factors:   Recent Antibiotic Use: {YES (EXPLAIN)/NO:917026}    Alleviating factors:  ***   Therapies Tried and outcome: ***  Previous Episodes of Vaginitis:  {YES (EXPLAIN)/NO:380772}      Other associated symptoms: {ASS'TD SX:739}.  History of STI's:  {YES (EXPLAIN)/NO:130640}  STI Testing offered: {YES/ NO (recomended):887091}    LMP: Patient's last menstrual period was 03/23/2023 (approximate).      Patient Active Problem List   Diagnosis     Tic disorder     Health Care Home     Migraine     HAMIDA (generalized anxiety disorder)     Atypical glandular cells on cervical Pap smear     Major depressive disorder, single episode, mild (H)     Family history of breast cancer     Small intestinal bacterial overgrowth (SIBO)     Tension headache     Past Medical History:   Diagnosis Date     Anemia     with pregnancy     Anxiety 01/21/2014     Depression      Encounter for IUD removal 03/09/2023    Inserted 8/2/22, MIRENA Lot # [id number] Exp: 10/2024     History of varicella-documented immunity 07/21/2011     Migraine 08/03/2012     Other, mixed, or unspecified nondependent drug abuse, episodic      Tic age 14     Past Surgical History:   Procedure Laterality Date     ABDOMINOPLASTY  08/2021     HC REMOVAL OF TONSILS,12+ Y/O  01/01/1996    " "Tonsils 12+y.o.     Current Outpatient Medications   Medication Sig Dispense Refill     doxycycline hyclate (VIBRAMYCIN) 100 MG capsule Take 1 capsule (100 mg) by mouth 2 times daily 14 capsule 0     Allergies   Allergen Reactions     Remeron [Mirtazapine]      Excess sedation even with 7.5mg dose       Health maintenance updated:  {yes no:745464}    ROS:   {PLC ROSGYN:008612::\"12 point review of systems negative other than symptoms noted below or in the HPI.\"}    PHYSICAL EXAM:    LMP 03/23/2023 (Approximate) , There is no height or weight on file to calculate BMI.  General appearance:  healthy, alert and no distress  Pelvic Exam:  Vulva: No lesions, no adenopathy, BUS WNL  Vagina: Moist, pink, discharge {mic9:502155}  well rugated, no lesions  Cervix:smooth, pink, no visible lesions  Rectal exam: deferred    ASSESSMENT/PLAN:   No diagnosis found.    No results found for any visits on 05/23/23.      COUNSELING:  Advised no alcohol during treat with flagyl  Abstain from sexual intercourse while being treated for vaginal infection  Handout provided about vaginitis and how to prevent future infections.  Return to clinic if symptoms persist or worsen    ***                   "

## 2023-05-27 DIAGNOSIS — Z22.39 CARRIER OF UREAPLASMA UREALYTICUM: Primary | ICD-10-CM

## 2023-05-27 LAB
BACTERIA SPEC CULT: NO GROWTH
M GENITALIUM DNA SPEC QL NAA+PROBE: NOT DETECTED
M HOMINIS DNA SPEC QL NAA+PROBE: NOT DETECTED
U PARVUM DNA SPEC QL NAA+PROBE: DETECTED
U UREALYTICUM DNA SPEC QL NAA+PROBE: NOT DETECTED

## 2023-05-27 RX ORDER — DOXYCYCLINE HYCLATE 100 MG
100 TABLET ORAL 2 TIMES DAILY
Qty: 4 TABLET | Refills: 0 | Status: SHIPPED | OUTPATIENT
Start: 2023-05-27 | End: 2023-05-29

## 2023-05-31 ENCOUNTER — OFFICE VISIT (OUTPATIENT)
Dept: UROLOGY | Facility: CLINIC | Age: 38
End: 2023-05-31
Attending: OBSTETRICS & GYNECOLOGY
Payer: COMMERCIAL

## 2023-05-31 VITALS
DIASTOLIC BLOOD PRESSURE: 70 MMHG | WEIGHT: 140 LBS | BODY MASS INDEX: 21.97 KG/M2 | SYSTOLIC BLOOD PRESSURE: 128 MMHG | HEIGHT: 67 IN

## 2023-05-31 DIAGNOSIS — N34.1 NONGONOCOCCAL URETHRITIS DUE TO UREAPLASMA UREALYTICUM: Primary | ICD-10-CM

## 2023-05-31 DIAGNOSIS — R35.0 URINARY FREQUENCY: ICD-10-CM

## 2023-05-31 DIAGNOSIS — A49.3 NONGONOCOCCAL URETHRITIS DUE TO UREAPLASMA UREALYTICUM: Primary | ICD-10-CM

## 2023-05-31 DIAGNOSIS — R10.2 PELVIC PAIN IN FEMALE: ICD-10-CM

## 2023-05-31 PROCEDURE — 99204 OFFICE O/P NEW MOD 45 MIN: CPT | Performed by: OBSTETRICS & GYNECOLOGY

## 2023-05-31 NOTE — PROGRESS NOTES
May 31, 2023    Referring Provider: Carmenza Nuñez MD  [id number] [address]    Primary Care Provider: Mario Joseph    CC: Urinary frequency and dyspareunia    HPI:  Kamilah Garcia is a 38 year old  female with who presents for evaluation of dyspareunia and urinary frequency. She reports dyspareunia began after her Mirena IUD was placed 2022. She had her IUD taken out 3/2023, but reports continued pain during intercourse that worsened in intensity this past month. She also presents with urinary frequency that began 2 months ago. Lab workup 2023 was positive for ureaplasma, and patient was prescribed doxycycline. However, patient reported no improvement in her sx after treatment.  Repeat testing 2023 came back positive for ureaplasma, patient was prescribed a 2nd course of doxycycline, but has not taken it yet.     Prolapse:  Do you feel a vaginal bulge?no                                   Pressure? no   Do you have to place your fingers in the vagina or in the rectum to have a bowel movement? no  Impact to quality of life? minimal    Stress Incontinence:  Do you leak urine with cough, sneeze, exercise? no  How often do you leak with cough, sneeze, exercise? n/a  How much do you usually leak? n/a  Do you wear a pad? no   Impact to quality of life? n/a    Urge Incontinence:  Do you often get sudden urges to urinate? yes  How often do have urges? daily  If so, do you leak with these urges? no  How much do you usually leak? none  Impact to quality of life? minimal    Voids/day:6-10  Nocturia: 1-2  Fluid intake: 3-6 8 oz drinks  Caffeine: 1-2 8 oz drinks    Urinating:  Difficulty starting urination or strain to void? no  Weak or intermittent stream? yes  Incomplete emptying or dribbling? yes  Pain or burning with urination? no  Any blood in your urine? no    GI:  Constipation? yes  Frequency stools daily    Straining for stools: no  Stool consistency: normal, loose, hard   Ever leak  stool (Accidental Bowel Leakage)? no      If so, how often?               n/a      If so, do you leak?                   n/a      Soiling without sensation? no  History of irritable bowel or Crohn's? no    Sexual/Pain:  Are you currently having sex?. yes  Pain with sex?   Occurs most times, moderate pain  Sexual Partner: male  Do any of these symptoms interfere with sex? yes  Impact to quality of life? moderate    Prior therapy:  Ever done pelvic floor physical therapy? no  Trial of medication? no  Have you ever tried a pessary? no    Medical History:  Do you have?   High Cholesterol? no  Diabetes? no  High Blood pressure? no     Recurrent UTIs? yes  Sleep Apnea? no  Other medical problems: small intestinal bacterial overgrowth    Surgical History:    Hysterectomy? no   Bladder Surgery? no   Other? Abdominoplasty    OB/Gyn History:  Pregnancies? 2  Deliveries? 2  Vaginal: 2  Section: 0  Current birth control? None. Partner got vasectomy.  Periods? regular  When was the first day of your last period? 2023  Last Pap smear? 23 Any abnormal? Yes, atypical glandular cells from 2018  Last mammogram? n/a  Last colonoscopy? N/a    Medications/Vitamins/Supplements: Doxycycline    Drug Allergies: none  Latex Allergy: none  Iodine Allergy none    Family History: (list relationship and age at diagnosis)  Breast cancer? sister  Ovarian cancer? none  Colon cancer? none  Other? none    Social History:  Marital status: domestic partner  Do you/ have you ever smoke(d)  cigarettes? no  Drink more than 1 alcoholic beverage a day?  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 no                  Unusual headaches? no  Leg pain w/ walking/exercise? no                       Easy bruising? no  Difficulty breathing w/ walking/exercise? yes  Problems with vision? no  Dizziness, falls, or fainting? yes  Excessive bleeding from cuts, gums, surgery? no  Other:  n/a    Past Medical History:   Diagnosis Date     Anemia     with pregnancy     Anxiety 2014     Depression      Encounter for IUD removal 2023    Inserted 22, MIRENA Lot # [id number] Exp: 10/2024     History of varicella-documented immunity 2011     Migraine 2012     Other, mixed, or unspecified nondependent drug abuse, episodic      Tic age 14       Past Surgical History:   Procedure Laterality Date     ABDOMINOPLASTY  2021     HC REMOVAL OF TONSILS,12+ Y/O  1996    Tonsils 12+y.o.       Social History     Socioeconomic History     Marital status:      Spouse name: Miky     Number of children: 1     Years of education: Not on file     Highest education level: Not on file   Occupational History     Occupation: Customer [employer]     Employer: [employer]   Tobacco Use     Smoking status: Former     Types: Cigarettes     Quit date: 2010     Years since quittin.0     Smokeless tobacco: Never   Vaping Use     Vaping status: Never Used   Substance and Sexual Activity     Alcohol use: Yes     Alcohol/week: 1.0 standard drink of alcohol     Types: 1 Standard drinks or equivalent per week     Comment: occasionally 1-2 etoh a month/     Drug use: No     Comment: marijuana hasn't used in 3 years, prior use of ampht, LSD, crack & cocaine. Sober since age 18      Sexual activity: Yes     Partners: Male   Other Topics Concern      Service Not Asked     Blood Transfusions Not Asked     Caffeine Concern Not Asked     Occupational Exposure Not Asked     Hobby Hazards Not Asked     Sleep Concern Not Asked     Stress Concern Not Asked     Weight Concern Not Asked     Special Diet Not Asked     Back Care Not Asked     Exercise Not Asked     Bike Helmet Not Asked     Seat Belt Not Asked     Self-Exams Not Asked     Parent/sibling w/ CABG, MI or angioplasty before 65F 55M? Not Asked   Social History Narrative    Living arrangements - the patient lives with her , Miky  "and son     Social Determinants of Health     Financial Resource Strain: Not on file   Food Insecurity: Not on file   Transportation Needs: Not on file   Physical Activity: Not on file   Stress: Not on file   Social Connections: Not on file   Intimate Partner Violence: Not on file   Housing Stability: Not on file       Family History   Problem Relation Age of Onset     Alcohol/Drug Mother         b:1964 addicted to crack     Family History Negative Father         b: 1962     Family History Negative Sister         b:1994     Cervical Cancer Sister 35        10/2015     Breast Cancer Sister 35        no information. hysterectomy/mastectomy     Family History Negative Sister         b:1981     Family History Negative Brother         b:1989     No Known Problems Maternal Grandmother      No Known Problems Maternal Grandfather      No Known Problems Paternal Grandmother      No Known Problems Other        ROS    Allergies   Allergen Reactions     Remeron [Mirtazapine]      Excess sedation even with 7.5mg dose       No current outpatient medications on file.     No current facility-administered medications for this visit.       /70   Ht 1.702 m (5' 7\")   Wt 63.5 kg (140 lb)   LMP 05/13/2023 (Approximate)   BMI 21.93 kg/m   Patient's last menstrual period was 05/13/2023 (approximate). Body mass index is 21.93 kg/m .  Patient is alert, comfortable in no acute distress, non-labored breathing.   Abdomen is soft, non-tender, non-distended, no CVAT.    Normal external female genitalia. The urethra was normal-appearing.    She has normal support on supine strain.  Speculum and bimanual exam remarkable for tenderness during palpation of the anterior vaginal wall and bladder. Negative for cervical motion tenderness     Urine dip: ND    A/P: Kamilah Garcia is a 38 year old F with recent hx of Mirena IUD removal and ureaplasma infection who presents with urinary frequency and dyspareunia.  Considering that the patient is " still testing positive for ureaplasma despite finishing a course of doxycycline, suspect this infection is underlying her current symptoms. Also possible dyspareunia is due to residual inflammation following IUD removal, anticipate this will improve with time.   -complete 2nd course of doxycycline as recommended by primary ObGyn provider  -ibuprofen for pain relief  -cytoscopy to rule out other etiology, including stones or malignancy    I spent a total of 45 minutes with  Ms. Garcia  on the date of the encounter in chart review, face to face patient visit, review of tests, documentation and/or discussion with other providers about the issues documented above.    Na Monk, MS3    I attest that I was present for the history and personally performed the physical exam and medical decision making and that I agree with the findings and treatment plan outlined above.     Barrett Herrera MD  Professor, OB/GYN  Urogynecologist      CC  Patient Care Team:  Mario Joseph MD as PCP - General (Internal Medicine)  Deepa Guy PA-C as Physician Assistant (Dermatology)  Medina Ackerman DO as Assigned Neuroscience Provider  sAlejandrina DO as Assigned OBGYN Provider  Rashida Barrera MD as Assigned PCP  Gabrielle Ernandez MD as MD (Otolaryngology)  Barrett Herrera MD as MD (OB/Gyn)  NATALIIA TANG

## 2023-06-03 ENCOUNTER — TELEPHONE (OUTPATIENT)
Dept: OTOLARYNGOLOGY | Facility: CLINIC | Age: 38
End: 2023-06-03
Payer: COMMERCIAL

## 2023-06-07 ENCOUNTER — DOCUMENTATION ONLY (OUTPATIENT)
Dept: HOME HEALTH SERVICES | Facility: CLINIC | Age: 38
End: 2023-06-07

## 2023-06-07 DIAGNOSIS — S92.505A: Primary | ICD-10-CM

## 2023-06-08 ENCOUNTER — TELEPHONE (OUTPATIENT)
Dept: UROLOGY | Facility: CLINIC | Age: 38
End: 2023-06-08
Payer: COMMERCIAL

## 2023-06-08 NOTE — TELEPHONE ENCOUNTER
Call center called with pt on the line waiting to get a call back to schedule surgery. Writer does not see surgery orders in place. A cysto is being requested, these cn be done in clinic. [facility] has no clue whats going on, call center called them. Writer sending message to [facility] instead.

## 2023-07-05 NOTE — TELEPHONE ENCOUNTER
FUTURE VISIT INFORMATION:      FUTURE VISIT INFORMATION:    Date: 9/22/23    Time: 8 AM    Location: [facility]  REFERRAL INFORMATION:    Referring provider:     Referring providers clinic:     Reason for visit/diagnosis:  per patient throat pain, confirmed [facility]    RECORDS REQUESTED FROM:       Clinic name Comments Records Status Imaging Status   [facility] 3/31/23 OV with Lisseth Johnson PA-C [facility] US head neck 1/6/23  XR cervical 8/15/22  XR chest 12/19/21 Epic Pacs   Procedure 1/3/22 Upper GI scanned in [facility]

## 2023-07-26 ENCOUNTER — OFFICE VISIT (OUTPATIENT)
Dept: UROLOGY | Facility: CLINIC | Age: 38
End: 2023-07-26
Payer: COMMERCIAL

## 2023-07-26 DIAGNOSIS — R39.89 BLADDER PAIN: Primary | ICD-10-CM

## 2023-07-26 PROCEDURE — 52000 CYSTOURETHROSCOPY: CPT | Performed by: OBSTETRICS & GYNECOLOGY

## 2023-07-26 NOTE — PROGRESS NOTES
Reason for Visit:  Cystoscopy    Clinical Data: Ms. Kamilah Garcia is a 38 year old female with a hx of bladder pain    Cystoscopy procedure:  Pt. Was consented and placed in the lithotomy position.  She was cleaned and preparred in the usual fashion.  Lidocain gel was inserted into the urethra and given time to take effect.  A 16 fr flexible cystoscope was then inserted through the urethra and into the bladder.  The urethra was wnl.  The bladder was without trabeculation.  No tumors, diverticulae, or stones.  Bilateral u/o's were effluxing clear urine.  The cystoscope was then withdrawn.  The pt. Tolerated the procedure well.    A/P:  38 year old female with normal cystoscopy with bladder pain  -refer to PFPT    Thank you for allowing me to participate in the care of  Ms. Kamilah Garcia and I will keep you updated on her progress.    Barrett Herrera MD

## 2023-07-26 NOTE — PATIENT INSTRUCTIONS
"AFTER YOUR CYSTOSCOPY        You have just completed a cystoscopy, or \"cysto\", which allowed your physician to learn more about your bladder (or to remove a stent placed after surgery). We suggest that you continue to avoid caffeine, fruit juice, and alcohol for the next 24 hours, however, you are encouraged to return to your normal activities.         A few things that are considered normal after your cystoscopy:     * Small amount of bleeding (or spotting) that clears within the next 24 hours     * Slight burning sensation with urination     * Sensation to of needing to avoid more frequently     * The feeling of \"air\" in your urine     * Mild discomfort that is relieved with Tylenol        Please contact our office promptly if you:     * Develop a fever above 101 degrees     * Are unable to urinate     * Develop bright red blood that does not stop     * Severe pain or swelling         Please contact our office with any concerns or questions @[email or web address].  "

## 2023-08-14 NOTE — PROGRESS NOTES
"  SUBJECTIVE:                                                   Kamilah Garcia is a 37 year old female who presents to clinic today for the following health issue(s):  Patient presents with:  Vaginal Problem: Bleeding (3 wk), tissue discharge(red, brown, gray), frequent urine, used vaginal meds about a week, cramps and pain(ibuprofen)    HPI:  Patient here today with concerns of irregular menstrual bleeding for a couple of weeks.  She has a Mirena IUD in place for a couple of months.  She states that she has \"chunks of tissue\" that she is discharging.  She also notes a urinary frequency.  She was recently diagnosed and treated for bacterial vaginosis and use a vaginal gel.    Patient's last menstrual period was 2022 (lmp unknown)..     Patient is sexually active, .  Using IUD for contraception.    reports that she quit smoking about 12 years ago. She has never used smokeless tobacco.    STD testing offered?  Declined    Health maintenance updated:  yes    Today's PHQ-2 Score:   PHQ-2 (  [employer]) 2022   Q1: Little interest or pleasure in doing things 1   Q2: Feeling down, depressed or hopeless 1   PHQ-2 Score 2   PHQ-2 Total Score (12-17 Years)- Positive if 3 or more points; Administer PHQ-A if positive -   Q1: Little interest or pleasure in doing things -   Q2: Feeling down, depressed or hopeless -   PHQ-2 Score -     Today's PHQ-9 Score:   PHQ-9 SCORE 2022   PHQ-9 Total Score -   PHQ-9 Total Score MyChart 12 (Moderate depression)   PHQ-9 Total Score 12     Today's HAMIDA-7 Score:   HAMIDA-7 SCORE 2022   Total Score -   Total Score 7       Problem list and histories reviewed & adjusted, as indicated.  Additional history: as documented.    Patient Active Problem List   Diagnosis     Tic disorder     Health Care Home     Migraine     HAMIDA (generalized anxiety disorder)     Atypical glandular cells on cervical Pap smear     Major depressive disorder, single episode, mild (H)     Family history of " "breast cancer     Small intestinal bacterial overgrowth (SIBO)     Tension headache     IUD in place     Past Surgical History:   Procedure Laterality Date     ABDOMINOPLASTY  2021     HC REMOVAL OF TONSILS,12+ Y/O  1996    Tonsils 11+y.o.      Social History     Tobacco Use     Smoking status: Former Smoker     Quit date: 2010     Years since quittin.3     Smokeless tobacco: Never Used   Substance Use Topics     Alcohol use: Yes     Alcohol/week: 1.0 standard drink     Types: 1 Standard drinks or equivalent per week     Comment: occasionally 1-2 etoh a month/      Problem (# of Occurrences) Relation (Name,Age of Onset)    Alcohol/Drug (1) Mother: b: addicted to crack    Breast Cancer (1) Sister: no information    Cervical Cancer (1) Sister (35): 10/2015    Family History Negative (4) Father: b: , Sister: b:, Sister: b:, Brother: b:    No Known Problems (4) Maternal Grandmother, Maternal Grandfather, Paternal Grandmother, Other            Current Outpatient Medications   Medication Sig     ibuprofen (ADVIL/MOTRIN) 800 MG tablet Take 1 tablet (800 mg) by mouth every 8 hours as needed for moderate pain or inflammatory pain     levonorgestrel (MIRENA) 20 MCG/DAY IUD 1 each (20 mcg) by Intrauterine route once     omeprazole (PRILOSEC) 20 MG DR capsule Take 1 capsule (20 mg) by mouth daily (Patient not taking: Reported on 2022)     sertraline (ZOLOFT) 25 MG tablet Take 1 tablet (25 mg) by mouth daily (Patient not taking: Reported on 2022)     No current facility-administered medications for this visit.     Allergies   Allergen Reactions     Remeron [Mirtazapine]      Excess sedation even with 7.5mg dose       ROS:  Genitourinary: Cramps, Heavy Bleeding with Period and Pelvic Pain  tissue discharge(red, brown, gray) , urinary frequency      OBJECTIVE:     /64   Ht 1.676 m (5' 6\")   Wt 61.6 kg (135 lb 12.8 oz)   LMP 2022 (LMP Unknown)   Breastfeeding No   " BMI 21.92 kg/m    Body mass index is 21.92 kg/m .    Exam:  Constitutional:  Appearance: Well nourished, well developed alert, in no acute distress  Psychiatric:  Mentation appears normal and affect normal/bright.  Pelvic Exam:  External Genitalia:     Normal appearance for age, no discharge present, no tenderness present, no inflammatory lesions present, color normal  Vagina:    Normal vaginal vault without central or paravaginal defects, no discharge present, no inflammatory lesions present, no masses present.  Minimal amount of granular brown discharge in the back the vaginal vault  Bladder:     Nontender to palpation  Urethra:   Urethral Body:  Urethra palpation normal, urethra structural support normal   Urethral Meatus:  No erythema or lesions present  Cervix:     Appearance healthy, no lesions present, nontender to palpation, minimal bleeding present, string present  Uterus:     Tender to palpation, no masses present, position anteflexed, mobility: normal  Adnexa:     No adnexal tenderness present, no adnexal masses present  Perineum:     Perineum within normal limits, no evidence of trauma, no rashes or skin lesions present  Anus:     Anus within normal limits, no hemorrhoids present  Inguinal Lymph Nodes:     No lymphadenopathy present  Pubic Hair:     Normal pubic hair distribution for age  Genitalia and Groin:     No rashes present, no lesions present, no areas of discoloration, no masses present       In-Clinic Test Results:  Results for orders placed or performed in visit on 09/21/22 (from the past 24 hour(s))   Bacterial Vaginosis Smear    Specimen: Vagina; Swab    Narrative    The following orders were created for panel order Bacterial Vaginosis Smear.  Procedure                               Abnormality         Status                     ---------                               -----------         ------                     Bacterial Vaginosis Stain[[id number]]                                                      Please view results for these tests on the individual orders.   HCG qualitative urine   Result Value Ref Range    hCG Urine Qualitative Negative Negative   UA without Microscopic - lab collect   Result Value Ref Range    Color Urine Yellow Colorless, Straw, Light Yellow, Yellow    Appearance Urine Clear Clear    Glucose Urine Negative Negative mg/dL    Bilirubin Urine Negative Negative    Ketones Urine Negative Negative mg/dL    Specific Gravity Urine 1.010 1.003 - 1.035    Blood Urine Large (A) Negative    pH Urine 5.5 5.0 - 7.0    Protein Albumin Urine Negative Negative mg/dL    Urobilinogen Urine 0.2 0.2, 1.0 E.U./dL    Nitrite Urine Negative Negative    Leukocyte Esterase Urine Negative Negative       ASSESSMENT/PLAN:                                                        ICD-10-CM    1. IUD in place  Z30.430    2. Urinary frequency  R35.0 UA without Microscopic - lab collect     UA without Microscopic - lab collect   3. Irregular menses  N92.6 HCG qualitative urine     Bacterial Vaginosis Smear     Yeast culture     HCG qualitative urine       There are no Patient Instructions on file for this visit.    37-year-old female with breakthrough bleeding on the Mirena IUD.  She does have some bleeding on exam today as well as the granular brown discharge that she has noted.  Urinalysis:+ blood but pt is bleedign today.  Urine pregnancy test: negative.   We will send for vaginal culture and follow-up as appropriate.    TRISHA Tucker CNP  [facility]   [FreeTextEntry8] : pt denied thoughts of suicide [FreeTextEntry7] : no evidence of current violent or homicidal ideation

## 2023-09-22 ENCOUNTER — PRE VISIT (OUTPATIENT)
Dept: OTOLARYNGOLOGY | Facility: CLINIC | Age: 38
End: 2023-09-22

## 2023-10-02 ENCOUNTER — TELEPHONE (OUTPATIENT)
Dept: OTOLARYNGOLOGY | Facility: CLINIC | Age: 38
End: 2023-10-02
Payer: COMMERCIAL

## 2023-10-16 NOTE — MR AVS SNAPSHOT
MRN:[id number]                      After Visit Summary   3/6/2018    Kamilah Dee    MRN: [id number]           Visit Information        Provider Department      3/6/2018 1:30 PM [facility]      Your next 10 appointments already scheduled     Mar 20, 2018 11:00 AM CDT   Return Visit with Shazia Norman   [facility] ([facility])    [address]  [place] [id number] [phone]            Apr 03, 2018 11:00 AM CDT   Return Visit with Shazia [place]   [facility] ([facility])    [address]  [place] [id number] [phone]              MyChart Information     [employer] gives you secure access to your electronic health record. If you see a primary care provider, you can also send messages to your care team and make appointments. If you have questions, please call your primary care clinic.  If you do not have a primary care provider, please call [phone] and they will assist you.        Care EveryWhere ID     This is your Care EveryWhere ID. This could be used by other organizations to access your [facility] medical records  [id number]        Equal Access to Services     SAV CALVILLO AH: Kenya Perez, waivetteda radha, qaybta kaalmada paco, wellington chan. So [facility] [phone].    ATENCIÓN: Si habla español, tiene a farfan disposición servicios gratuitos de asistencia lingüística. Llame al [phone].    We comply with applicable federal civil rights laws and [place] laws. We do not discriminate on the basis of race, color, national origin, age, disability, sex, sexual orientation, or gender identity.            
Mental Health

## 2023-12-01 ENCOUNTER — TELEPHONE (OUTPATIENT)
Dept: NEUROLOGY | Facility: CLINIC | Age: 38
End: 2023-12-01
Payer: COMMERCIAL

## 2023-12-01 NOTE — TELEPHONE ENCOUNTER
Left Voicemail (2nd Attempt) and Sent Mychart (2nd Attempt) for the patient to call back and schedule the following:    Appointment type: RETURN MOVEMENT DISORDER  Provider: Dr. Ackerman  Return date: May 2024  Specialty phone number: [phone]  Additional appointment(s) needed: N/A  Additonal Notes: Annual follow-up    Monica Elias on 12/1/2023 at 10:10 AM

## 2023-12-12 NOTE — PROGRESS NOTES
"[facility]  OB/GYN Clinic Note    SUBJECTIVE:                                                   Kamilah Garcia is a 38 year old  female who presents to clinic today for the following health issue(s):  Patient presents with:  Breast Problem: Painful, feels like there cysts and wondering what she can do, had a mammogram about a year ago, left side is more tender    Additional information: breast exam    HPI:  Patient presents to review results of  a direct-to-consumer breast screening US which shows several breast cysts and also to discuss persistent breast pain for two years.   Mammogram and US was also done last year showing benign breast cysts throughout.   States breast pain is bilateral, but more so on the left.  Has tried NSAIDs, warm pack, infrared therapy without relief. She is worried about breast cancer and is requesting biopsy or FNA for answers.   Seeing someone to \"normalize\" hormones. Taking iodine. Denies use of hormones.     Patient's last menstrual period was 2023 (approximate)..   Patient is sexually active, .  Using vasectomy for contraception.    reports that she quit smoking about 13 years ago. Her smoking use included cigarettes. She has never used smokeless tobacco.  STD testing offered?  Declined  Health maintenance updated:  yes    Today's PHQ-2 Score:       1/10/2024     3:36 PM   PHQ-2 (  [employer])   Q1: Little interest or pleasure in doing things 1   Q2: Feeling down, depressed or hopeless 1   PHQ-2 Score 2     Today's PHQ-9 Score:       1/10/2024     3:36 PM   PHQ-9 SCORE   PHQ-9 Total Score 7       Problem list and histories reviewed & adjusted, as indicated.  Additional history: as documented.    Patient Active Problem List   Diagnosis    Tic disorder    Health Care Home    Migraine    HAMIDA (generalized anxiety disorder)    Atypical glandular cells on cervical Pap smear    Major depressive disorder, single episode, mild (H24)    Family history " "of breast cancer    Small intestinal bacterial overgrowth (SIBO)    Tension headache     Past Surgical History:   Procedure Laterality Date    ABDOMINOPLASTY  2021    HC REMOVAL OF TONSILS,12+ Y/O  1996    Tonsils 11+y.o.      Social History     Tobacco Use    Smoking status: Former     Types: Cigarettes     Quit date: 2010     Years since quittin.6    Smokeless tobacco: Never   Substance Use Topics    Alcohol use: Yes     Alcohol/week: 1.0 standard drink of alcohol     Types: 1 Standard drinks or equivalent per week     Comment: occasionally 1-2 etoh a month/      Problem (# of Occurrences) Relation (Name,Age of Onset)    Alcohol/Drug (1) Mother: b: addicted to crack    Family History Negative (4) Father: b: , Sister: b:, Sister: b:, Brother: b:    Breast Cancer (1) Sister (35): no information. hysterectomy/mastectomy    Cervical Cancer (1) Sister (35): 10/2015    No Known Problems (4) Maternal Grandmother, Maternal Grandfather, Paternal Grandmother, Other              No current outpatient medications on file.     No current facility-administered medications for this visit.     Allergies   Allergen Reactions    Remeron [Mirtazapine]      Excess sedation even with 7.5mg dose             OBJECTIVE:     /68   Ht 1.702 m (5' 7\")   Wt 68.9 kg (151 lb 12.8 oz)   LMP 2023 (Approximate)   BMI 23.78 kg/m    Body mass index is 23.78 kg/m .    Exam:  Constitutional:  Appearance: Well nourished, well developed alert, in no acute distress  Breasts:  Inspection of Breasts:  Symmetric bilaterally.  No puckering.  No skin changes.  Palpation of Breasts and Axillae:  No masses present on palpation, no breast tenderness Axillary Lymph Nodes:  No lymphadenopathy present         ASSESSMENT/PLAN:                                                        ICD-10-CM    1. Breast pain  N64.4 Breast Provider  Referral      2. Bilateral breast cysts  N60.01     N60.02         Kamilah " SIMONE Garcia is a 38 year old  with above.     Breast pain:   Discussed formal bra fitting, scheduled NSAIDs, avoiding triggers.   Patient is concerned about breast cancer. Reviewed that breast pain is actually an uncommon presenting sign of breast cancer.   Referral to breast center provided for further guidance in management of symptoms. Patient is wondering about biopsy for further evaluation. Reviewed risks of unnecessary procedures may include worsening pain and lack of resolution of pain if biopsy is performed.     Breast cysts  Extensive reassurance provided. Exam is reassuring. Screening mammogram to begin soon at age 39. Direct to consumer US report reviewed showing breast cysts, consistent with evaluation last year. Referral to breast center provided for further guidance in evaluation.     Pap due soon, declines completing today.     Nadya Mcintosh MD, MHS  [facility]  01/10/24

## 2024-01-09 ENCOUNTER — PATIENT OUTREACH (OUTPATIENT)
Dept: OBGYN | Facility: CLINIC | Age: 39
End: 2024-01-09
Payer: COMMERCIAL

## 2024-01-09 DIAGNOSIS — R87.619 ATYPICAL GLANDULAR CELLS ON CERVICAL PAP SMEAR: Primary | ICD-10-CM

## 2024-01-09 NOTE — TELEPHONE ENCOUNTER
Patient due for Pap and HPV.    Reminder done today via [facility].    1/10/24 visit scheduled for  breast exam - notes added pt soon due

## 2024-01-10 ENCOUNTER — OFFICE VISIT (OUTPATIENT)
Dept: OBGYN | Facility: CLINIC | Age: 39
End: 2024-01-10
Payer: COMMERCIAL

## 2024-01-10 VITALS
WEIGHT: 151.8 LBS | HEIGHT: 67 IN | DIASTOLIC BLOOD PRESSURE: 68 MMHG | BODY MASS INDEX: 23.83 KG/M2 | SYSTOLIC BLOOD PRESSURE: 118 MMHG

## 2024-01-10 DIAGNOSIS — N64.4 BREAST PAIN: Primary | ICD-10-CM

## 2024-01-10 DIAGNOSIS — N60.02 BILATERAL BREAST CYSTS: ICD-10-CM

## 2024-01-10 DIAGNOSIS — N60.01 BILATERAL BREAST CYSTS: ICD-10-CM

## 2024-01-10 PROCEDURE — 99213 OFFICE O/P EST LOW 20 MIN: CPT | Performed by: STUDENT IN AN ORGANIZED HEALTH CARE EDUCATION/TRAINING PROGRAM

## 2024-01-10 ASSESSMENT — PATIENT HEALTH QUESTIONNAIRE - PHQ9: SUM OF ALL RESPONSES TO PHQ QUESTIONS 1-9: 7

## 2024-01-12 ENCOUNTER — PATIENT OUTREACH (OUTPATIENT)
Dept: ONCOLOGY | Facility: CLINIC | Age: 39
End: 2024-01-12
Payer: COMMERCIAL

## 2024-01-12 NOTE — PROGRESS NOTES
"New Patient Oncology Nurse Navigator Note     Referring provider: Nadya Mcintosh MD      Referring Clinic/Organization: [facility]     Referred to (specialty:) Breast Provider Referral      Date Referral Received: January 10, 2024     Evaluation for:  N64.4 (ICD-10-CM) - Breast pain     Clinical History (per Nurse review of records provided):      Patient presented to ordering provider visit with painful breasts. The left side is more tender. Patient feels like there cysts and wondering what she can do. Has tried NSAIDs, warm pack, infrared therapy without relief. She is worried about breast cancer and is requesting biopsy or FNA for answers.     Kamilah has ultrasound with [facility] a direct-to-consumer imaging company approximately two months ago.   She has some documents report they sent her and email with images and CD of images as well. She states their recommendation was for her to meet with provider.     2005  Patient had baseline breast imaging in 2005 with left breast ultrasound to evaluate a feeling of thickening at 1 o'clock position of left breast and tenderness medially. Ultrasound medially in area of pain, with unremarkable tissue. Management of the patient's pain symptomatology would be on a clinical basis.     2019  Kamilah had a pre-op evaluation on 4/30/2019 with Lisseth Johnson PA-C. Provider references \"Dr. George\" and \"fat graphing (transfer). This is assumed by writer to be in reference to fat grafting planned with Dr. Ariella Rodriguez however patient cannot recall plastic surgeon's name.     2020  Bilateral screening mammograms were performed on 2/20/2020. Birads category 1 - negative. Annual mammography recommended. (This was at age 34 and no current breast complaint at time of imaging.)    2022  On 3/31/22 Kamilah had bilateral screening mammograms. BI-RADS Category 1: Negative and recommendation of annual routine screening mammogram.    2023  3/9/23 - Bilateral diagnostic " mammograms and ultrasounds were performed after provider palpated lumps in both breasts. Diffuse pain/tenderness in both breasts. Patient has a history of fat transfer in 2021.    Findings: Bilateral mammography with digital breast tomosynthesis performed with triangle shaped markers in both breasts at the palpable areas of concern. Multiple benign oil cysts are seen in both breasts. No concerning mammographic/tomographic findings on either side.  Focussed ultrasound of the upper outer and lower outer quadrants of the RIGHT breast as well as the lower outer quadrant of the LEFT breast was performed. Multiple benign oil cysts and simple cysts are  seen. No concerning findings identified.    1/12 13:26 - Telephoned and spoke with Kamilah to review her breast health history and discuss breast provider referral. Explained my role and purpose for the call. We will ask medical records to contact her regarding fall of 2023 report and imaging from [employer] direct to consumer breast screening ultrasound.   We discussed Yamila Johnson PA-C and the model of incorporating consult with her and breast imaging into the same appointment. She agreed to this plan. Writer received referral, reviewed for appropriate plan, and call warm transferred to New Patient Scheduling for completion.    Records Location: Care Everywhere, Media, and See Bookmarked material     Records Needed:   2023 - [facility] images and reports/correspondence with patient (in patient's possession)  2019 - Consult, operative, and progress notes from fat grafting/fat transfer. Patient did not recall name of provider but may be with Dr. Ariella Rodriguez, plastic surgeon.

## 2024-01-15 NOTE — TELEPHONE ENCOUNTER
RECORDS STATUS - BREAST    RECORDS REQUESTED FROM: Epic   DATE REQUESTED:    NOTES DETAILS STATUS   OFFICE NOTE from referring provider Epic 01/10/24: Dr. Nadya Mcintosh   MEDICATION LIST Epic    IMAGING (NEED IMAGES & REPORT)     MAMMO PACS 03/09/23-03/31/22   ULTRASOUND PACS 03/09/23: US Breast

## 2024-02-12 NOTE — PROGRESS NOTES
"NEW CONSULTATION   2024     Kamilah Garcia is a 38 year old woman who presents with breast pain and family history of breast cancer. She was referred by Dr. Mcintosh.    HPI:    She reports several years of bilateral non-focal breast pain. The pain is worse in her left breast compared to the right breast. She denies any breast mass, skin change, nipple inversion or nipple discharge. She had normal b/l mammogram and right breast ultrasound in . She had a recent screening ultrasound at [facility] that demonstrated hypoechoic areas in the left breast at 3:00, 600, and 9:00 and in the right breast at 12:00, 3:00 and 9:00 BI-RADS 4.     BREAST-SPECIFIC HISTORY:    Previous breast imaging: Yes  - 10/28/05 left breast ultrasound for 1:00 thickening BI-RADS 1  - 2/ Smammo BI-RADS 1  - 3/31/22 Smammo BI-RADS 1  - 3/9/23 b/l Dmammo  and right breast ultrasound for lump: benign oil cyst and simple cysts BI-RADS 2   - [facility] 3 months ago ultrasound, BI-RADS 4     Prior breast biopsies/surgeries: yes  -  breast fat transfer    Prior history of breast cancer or DCIS: No  Prior radiation history: No   Self breast exams: Yes  Breast density: heterogeneously dense    GYN HISTORY:  . Age at 1st pregnancy: 22. Breastfeeding history: No.   Age at menarche: 12  Menopausal: premenopausal.   Menopausal hormone replacement therapy: No     RISK ASSESSMENT:     FAMILY HISTORY:  Her sister had unknown what sound like Gyn malignancy. Patient reports all her sister \"parts\" were removed including her breasts.   Breast ca: No  Ovarian ca: No  Pancreatic ca: No  Prostate: No  Gastric ca: No  Melanoma: No  Colon ca: No  Other cancer: Yes  - sister with Gyn cancer  Other genetic, testing, syndromes, or clotting disorders: no     PAST MEDICAL HISTORY  Past Medical History:   Diagnosis Date    Anemia     with pregnancy    Anxiety 2014    Depression     Encounter for IUD removal 2023    Inserted 22, ALBERTO " "Lot # [id number] Exp: 10/2024    History of varicella-documented immunity 07/21/2011    Migraine 08/03/2012    Other, mixed, or unspecified nondependent drug abuse, episodic     Tic age 14     PAST SURGICAL HISTORY   Past Surgical History:   Procedure Laterality Date    ABDOMINOPLASTY  08/2021    HC REMOVAL OF TONSILS,12+ Y/O  01/01/1996    Tonsils 12+y.o.     MEDICATIONS  No current outpatient medications on file.     ALLERGIES  Allergies   Allergen Reactions    Remeron [Mirtazapine]      Excess sedation even with 7.5mg dose      SOCIAL HISTORY:  Smokes: No, former  EtOH: No  Exercise:    Works at [employer]    ROS:   Change in vision No  Headaches: no  Respiratory: No shortness of breath, dyspnea on exertion, cough, or hemoptysis   Cardiovascular: negative   Gastrointestinal: negative Abdominal pain: no  Breast: negative  Musculoskeletal: negative Joint pain No Back pain: no  Psychiatric: negative  Hematologic/Lymphatic/Immunologic: negative  Endocrine: negative    EXAM  /76   Pulse 83   Temp 97.7  F (36.5  C)   Resp 18   Ht 1.702 m (5' 7\")   Wt 68.9 kg (152 lb)   LMP 12/20/2023 (Approximate)   SpO2 100%   BMI 23.81 kg/m     PHYSICAL EXAM  Respiratory: breathing non labored.   Breasts: Examination was done in both the upright and supine positions.  Breasts are symmetrical. No skin or nipple changes. No nipple discharge.   Right breast 12:00 at areolar boarder nodular fibroglandular density. Left breast 2:30 5 cm from the nipple 1.5 cm firm irregular mass.   No clavicular, cervical, or axillary lymphadenopathy.     INVESTIGATIONS:    2/14/24 bilateral diagnostic mammogram and bilateral breast ultrasound: per Radiology     ASSESSMENT/PLAN:    Kamilah Garcia is a 38 year old woman with bilateral breast pain and recent screening breast ultrasound with abnormal findings. Exam today demonstrated a left breast mass at 3:00. Mammogram and ultrasound was without concerning findings.     1) Breast pain and left " breast mass Reviewed images today with the Radiologist and results were discussed with the patient. No concerning findings.   - Follow up in 3 months for breast exam.     Breast pain is common. Up to 70% of women will experience breast pain in their lifetime.   Breast pain is most often related to normal physiologic change (hormonal fluctuations) and is a rare symptom of breast cancer.   Management of breast pain  Evaluation with imaging is important to determine if the pain is due to normal physiologic changes related to hormonal fluctuations or to a pathologic process.   Physical support  Wear a supportive Bra that is professionally fit and sized. Be sure your bra is not overly compressive or restrictive.   Wear a sports bra when exercising.   Wear comfortable breast support while sleeping.   Improving omega 3 fatty acid intake may improve breast pain. Eat 1-2 tablespoons of flaxseed daily.   Plant-based diets may help with breast pain.  Breast massage. (see handout)   Warm compresses or ice packs can be used  Acupuncture may help with breast pain.   Consider eliminating or decrease caffeine (chocolate, tea, coffee, soda) for a two week period of time to see if pain improves/resolves  Evening Primrose Oil starting with 500 mg/day. May increase to 1000 mg 3 times daily for 6 months. Can be tried.   Chasteberry 400-500 mg/day can be tried.   If you are on hormone based medications adjusting or decreasing these medications may help.   If needed over the counter analgesics such as acetaminophen, ibuprofen, or topical diclofenac cream can intermittently be used.     2) Screening recommendations  Clinic encounter every 1-3 years. Be familiar with your breast and how they normally feel and appear. Promptly report any changes to your healthcare provider. Beginning screening mammograms at age 40.   - screening mammogram due: 2/15/25    3) Lifestyle Modifications were provided.   - Maintain your best healthy weight. Higher  body fat and adult weight gain is associated with increased risk for breast cancer. This increase in risk has been attributed to increase in circulating endogenous estrogen levels from fat tissue.   - Any alcohol intake increases the risk for breast cancer. If you choose to drink alcohol limit alcohol consumption to less than 1 drink (1 ounce of liquor, 6 ounces of wine, or 8 ounces of beer) per day or less than 3 drinks per week.  - Be active daily and void being sedentary.   - Vitamin D may decrease the risk of developing breast cancer.     Yamila Johnson PA-C    30 minutes spent on the date of the encounter doing chart review, review of test results, interpretation of tests, patient visit and documentation.

## 2024-02-13 NOTE — TELEPHONE ENCOUNTER
Patient due for Pap and HPV.    Reminder done today via telephone call - mailbox full, will try again later.    [Of note: pt was offered pap at 1/10/24 visit but declined]

## 2024-02-14 ENCOUNTER — ANCILLARY PROCEDURE (OUTPATIENT)
Dept: MAMMOGRAPHY | Facility: CLINIC | Age: 39
End: 2024-02-14
Attending: PHYSICIAN ASSISTANT
Payer: COMMERCIAL

## 2024-02-14 ENCOUNTER — ONCOLOGY VISIT (OUTPATIENT)
Dept: SURGERY | Facility: CLINIC | Age: 39
End: 2024-02-14
Attending: STUDENT IN AN ORGANIZED HEALTH CARE EDUCATION/TRAINING PROGRAM
Payer: COMMERCIAL

## 2024-02-14 ENCOUNTER — PRE VISIT (OUTPATIENT)
Dept: SURGERY | Facility: CLINIC | Age: 39
End: 2024-02-14

## 2024-02-14 VITALS
HEART RATE: 83 BPM | BODY MASS INDEX: 23.86 KG/M2 | TEMPERATURE: 97.7 F | HEIGHT: 67 IN | SYSTOLIC BLOOD PRESSURE: 121 MMHG | RESPIRATION RATE: 18 BRPM | WEIGHT: 152 LBS | DIASTOLIC BLOOD PRESSURE: 76 MMHG | OXYGEN SATURATION: 100 %

## 2024-02-14 DIAGNOSIS — N64.4 BREAST PAIN: ICD-10-CM

## 2024-02-14 PROCEDURE — 77066 DX MAMMO INCL CAD BI: CPT | Performed by: RADIOLOGY

## 2024-02-14 PROCEDURE — 99214 OFFICE O/P EST MOD 30 MIN: CPT | Performed by: PHYSICIAN ASSISTANT

## 2024-02-14 PROCEDURE — 99213 OFFICE O/P EST LOW 20 MIN: CPT | Performed by: PHYSICIAN ASSISTANT

## 2024-02-14 PROCEDURE — 76642 ULTRASOUND BREAST LIMITED: CPT | Mod: LT | Performed by: RADIOLOGY

## 2024-02-14 PROCEDURE — G0279 TOMOSYNTHESIS, MAMMO: HCPCS | Performed by: RADIOLOGY

## 2024-02-14 ASSESSMENT — PAIN SCALES - GENERAL: PAINLEVEL: MILD PAIN (2)

## 2024-02-14 NOTE — PATIENT INSTRUCTIONS
Kamilah Garcia is a 38 year old woman with bilateral breast pain and recent screening breast ultrasound with abnormal findings. Exam today demonstrated a left breast mass at 3:00. Mammogram and ultrasound was without concerning findings.     1) Breast pain and left breast mass Reviewed images today with the Radiologist and results were discussed with the patient. No concerning findings.   - Follow up in 3 months for breast exam.     Breast pain is common. Up to 70% of women will experience breast pain in their lifetime.   Breast pain is most often related to normal physiologic change (hormonal fluctuations) and is a rare symptom of breast cancer.   Management of breast pain  Evaluation with imaging is important to determine if the pain is due to normal physiologic changes related to hormonal fluctuations or to a pathologic process.   Physical support  Wear a supportive Bra that is professionally fit and sized. Be sure your bra is not overly compressive or restrictive.   Wear a sports bra when exercising.   Wear comfortable breast support while sleeping.   Improving omega 3 fatty acid intake may improve breast pain. Eat 1-2 tablespoons of flaxseed daily.   Plant-based diets may help with breast pain.  Breast massage. (see handout)   Warm compresses or ice packs can be used  Acupuncture may help with breast pain.   Consider eliminating or decrease caffeine (chocolate, tea, coffee, soda) for a two week period of time to see if pain improves/resolves  Evening Primrose Oil starting with 500 mg/day. May increase to 1000 mg 3 times daily for 6 months. Can be tried.   Chasteberry 400-500 mg/day can be tried.   If you are on hormone based medications adjusting or decreasing these medications may help.   If needed over the counter analgesics such as acetaminophen, ibuprofen, or topical diclofenac cream can intermittently be used.     2) Screening recommendations  Clinic encounter every 1-3 years. Be familiar with your breast  and how they normally feel and appear. Promptly report any changes to your healthcare provider. Beginning screening mammograms at age 40.   - screening mammogram due: 2/15/25    3) Lifestyle Modifications were provided.   - Maintain your best healthy weight. Higher body fat and adult weight gain is associated with increased risk for breast cancer. This increase in risk has been attributed to increase in circulating endogenous estrogen levels from fat tissue.   - Any alcohol intake increases the risk for breast cancer. If you choose to drink alcohol limit alcohol consumption to less than 1 drink (1 ounce of liquor, 6 ounces of wine, or 8 ounces of beer) per day or less than 3 drinks per week.  - Be active daily and void being sedentary.   - Vitamin D may decrease the risk of developing breast cancer.

## 2024-02-14 NOTE — LETTER
"    2024         RE: Kamilah Garcia  [address]  [place] [address]        Dear Colleague,    Thank you for referring your patient, Kamilah Garcia, to the [facility]. Please see a copy of my visit note below.    NEW CONSULTATION   2024     Kamilah Garcia is a 38 year old woman who presents with breast pain and family history of breast cancer. She was referred by Dr. Mcintosh.    HPI:    She reports several years of bilateral non-focal breast pain. The pain is worse in her left breast compared to the right breast. She denies any breast mass, skin change, nipple inversion or nipple discharge. She had normal b/l mammogram and right breast ultrasound in . She had a recent screening ultrasound at [facility] that demonstrated hypoechoic areas in the left breast at 3:00, 600, and 9:00 and in the right breast at 12:00, 3:00 and 9:00 BI-RADS 4.     BREAST-SPECIFIC HISTORY:    Previous breast imaging: Yes  - 10/28/05 left breast ultrasound for 1:00 thickening BI-RADS 1  - 2/ Smammo BI-RADS 1  - 3/ Smammo BI-RADS 1  - 3/ b/l Dmammo  and right breast ultrasound for lump: benign oil cyst and simple cysts BI-RADS 2   - [facility] 3 months ago ultrasound, BI-RADS 4     Prior breast biopsies/surgeries: yes  -  breast fat transfer    Prior history of breast cancer or DCIS: No  Prior radiation history: No   Self breast exams: Yes  Breast density: heterogeneously dense    GYN HISTORY:  . Age at 1st pregnancy: 22. Breastfeeding history: No.   Age at menarche: 12  Menopausal: premenopausal.   Menopausal hormone replacement therapy: No     RISK ASSESSMENT:     FAMILY HISTORY:  Her sister had unknown what sound like Gyn malignancy. Patient reports all her sister \"parts\" were removed including her breasts.   Breast ca: No  Ovarian ca: No  Pancreatic ca: No  Prostate: No  Gastric ca: No  Melanoma: No  Colon ca: No  Other cancer: Yes  - sister with Gyn cancer  Other " "genetic, testing, syndromes, or clotting disorders: no     PAST MEDICAL HISTORY  Past Medical History:   Diagnosis Date    Anemia     with pregnancy    Anxiety 01/21/2014    Depression     Encounter for IUD removal 03/09/2023    Inserted 8/2/22, MIRENA Lot # [id number] Exp: 10/2024    History of varicella-documented immunity 07/21/2011    Migraine 08/03/2012    Other, mixed, or unspecified nondependent drug abuse, episodic     Tic age 14     PAST SURGICAL HISTORY   Past Surgical History:   Procedure Laterality Date    ABDOMINOPLASTY  08/2021    HC REMOVAL OF TONSILS,12+ Y/O  01/01/1996    Tonsils 12+y.o.     MEDICATIONS  No current outpatient medications on file.     ALLERGIES  Allergies   Allergen Reactions    Remeron [Mirtazapine]      Excess sedation even with 7.5mg dose      SOCIAL HISTORY:  Smokes: No, former  EtOH: No  Exercise:    Works at [employer]    ROS:   Change in vision No  Headaches: no  Respiratory: No shortness of breath, dyspnea on exertion, cough, or hemoptysis   Cardiovascular: negative   Gastrointestinal: negative Abdominal pain: no  Breast: negative  Musculoskeletal: negative Joint pain No Back pain: no  Psychiatric: negative  Hematologic/Lymphatic/Immunologic: negative  Endocrine: negative    EXAM  /76   Pulse 83   Temp 97.7  F (36.5  C)   Resp 18   Ht 1.702 m (5' 7\")   Wt 68.9 kg (152 lb)   LMP 12/20/2023 (Approximate)   SpO2 100%   BMI 23.81 kg/m     PHYSICAL EXAM  Respiratory: breathing non labored.   Breasts: Examination was done in both the upright and supine positions.  Breasts are symmetrical. No skin or nipple changes. No nipple discharge.   Right breast 12:00 at areolar boarder nodular fibroglandular density. Left breast 2:30 5 cm from the nipple 1.5 cm firm irregular mass.   No clavicular, cervical, or axillary lymphadenopathy.     INVESTIGATIONS:    2/14/24 bilateral diagnostic mammogram and bilateral breast ultrasound: per Radiology     ASSESSMENT/PLAN:    Kamilah Garcia is " a 38 year old woman with bilateral breast pain and recent screening breast ultrasound with abnormal findings. Exam today demonstrated a left breast mass at 3:00. Mammogram and ultrasound was without concerning findings.     1) Breast pain and left breast mass Reviewed images today with the Radiologist and results were discussed with the patient. No concerning findings.   - Follow up in 3 months for breast exam.     Breast pain is common. Up to 70% of women will experience breast pain in their lifetime.   Breast pain is most often related to normal physiologic change (hormonal fluctuations) and is a rare symptom of breast cancer.   Management of breast pain  Evaluation with imaging is important to determine if the pain is due to normal physiologic changes related to hormonal fluctuations or to a pathologic process.   Physical support  Wear a supportive Bra that is professionally fit and sized. Be sure your bra is not overly compressive or restrictive.   Wear a sports bra when exercising.   Wear comfortable breast support while sleeping.   Improving omega 3 fatty acid intake may improve breast pain. Eat 1-2 tablespoons of flaxseed daily.   Plant-based diets may help with breast pain.  Breast massage. (see handout)   Warm compresses or ice packs can be used  Acupuncture may help with breast pain.   Consider eliminating or decrease caffeine (chocolate, tea, coffee, soda) for a two week period of time to see if pain improves/resolves  Evening Primrose Oil starting with 500 mg/day. May increase to 1000 mg 3 times daily for 6 months. Can be tried.   Chasteberry 400-500 mg/day can be tried.   If you are on hormone based medications adjusting or decreasing these medications may help.   If needed over the counter analgesics such as acetaminophen, ibuprofen, or topical diclofenac cream can intermittently be used.     2) Screening recommendations  Clinic encounter every 1-3 years. Be familiar with your breast and how they  normally feel and appear. Promptly report any changes to your healthcare provider. Beginning screening mammograms at age 40.   - screening mammogram due: 2/15/25    3) Lifestyle Modifications were provided.   - Maintain your best healthy weight. Higher body fat and adult weight gain is associated with increased risk for breast cancer. This increase in risk has been attributed to increase in circulating endogenous estrogen levels from fat tissue.   - Any alcohol intake increases the risk for breast cancer. If you choose to drink alcohol limit alcohol consumption to less than 1 drink (1 ounce of liquor, 6 ounces of wine, or 8 ounces of beer) per day or less than 3 drinks per week.  - Be active daily and void being sedentary.   - Vitamin D may decrease the risk of developing breast cancer.     Yamila Johnson PA-C    30 minutes spent on the date of the encounter doing chart review, review of test results, interpretation of tests, patient visit and documentation.

## 2024-02-14 NOTE — NURSING NOTE
"Oncology Rooming Note    February 14, 2024 12:59 PM   Kamilah Garcia is a 38 year old female who presents for:    Chief Complaint   Patient presents with    Oncology Clinic Visit     Breast pain     Initial Vitals: /76   Pulse 83   Temp 97.7  F (36.5  C)   Resp 18   Ht 1.702 m (5' 7\")   Wt 68.9 kg (152 lb)   LMP 12/20/2023 (Approximate)   SpO2 100%   BMI 23.81 kg/m   Estimated body mass index is 23.81 kg/m  as calculated from the following:    Height as of this encounter: 1.702 m (5' 7\").    Weight as of this encounter: 68.9 kg (152 lb). Body surface area is 1.8 meters squared.  Mild Pain (2) Comment: Data Unavailable   Patient's last menstrual period was 12/20/2023 (approximate).  Allergies reviewed: Yes  Medications reviewed: Yes    Medications: Medication refills not needed today.  Pharmacy name entered into [employer]:    [id number] - [place], [place] - [address] [facility] - [place], [place] - [address]   [facility] - [place], [place] - [address] [facility] [address] [facility] - [place], [place] - [address] [facility]    Frailty Screening:   Is the patient here for a new oncology consult visit in cancer care? 1. Yes. Over the past month, have you experienced difficulty or required a caregiver to assist with:   1. Balance, walking or general mobility (including any falls)? YES  2. Completion of self-care tasks such as bathing, dressing, toileting, grooming/hygiene?  NO  3. Concentration or memory that affects your daily life?  YES       Clinical concerns: no other complaints      John Orr"

## 2024-02-26 ENCOUNTER — OFFICE VISIT (OUTPATIENT)
Dept: DERMATOLOGY | Facility: CLINIC | Age: 39
End: 2024-02-26
Payer: COMMERCIAL

## 2024-02-26 DIAGNOSIS — D23.9 DILATED PORE OF WINER: Primary | ICD-10-CM

## 2024-02-26 DIAGNOSIS — D22.9 MULTIPLE BENIGN NEVI: ICD-10-CM

## 2024-02-26 PROCEDURE — 99213 OFFICE O/P EST LOW 20 MIN: CPT | Performed by: STUDENT IN AN ORGANIZED HEALTH CARE EDUCATION/TRAINING PROGRAM

## 2024-02-26 NOTE — PROGRESS NOTES
[facility] Dermatology Note    Encounter Date: Feb 26, 2024    Dermatology Problem List:    ______________________________________    Impression/Plan:  Kamilah was seen today for derm problem.    Diagnoses and all orders for this visit:    Dilated pore of Boni  - belly button  - benign    Multiple benign nevi  - Reviewed the compounding benefits of incremental changes to sun protective clothing behaviors including increased frequency of sunscreen and sun protective clothing like broad brimmed hats and longsleeved UPF containing clothing          Follow-up in .       Staff Involved:  Staff Only    Jose Martin Benito MD   of Dermatology  Department of Dermatology  [facility]      CC:   Chief Complaint   Patient presents with    Derm Problem     Would like something removed from the belly button per pt        History of Present Illness:  Ms. Kamilah Garcia is a 38 year old female who presents as a new patient.    Spot within belly button noticed several months ago, hasn't gone away     Labs:      Physical exam:  Vitals: LMP 12/20/2023 (Approximate)   GEN: well developed, well-nourished, in no acute distress, in a pleasant mood.     SKIN: [place] phototype 1  - Focused examination of the abdomen and face was performed.  - dilated pore  - brown macules on abdomen   - No other lesions of concern on areas examined.     Past Medical History:   Past Medical History:   Diagnosis Date    Anemia     with pregnancy    Anxiety 01/21/2014    Depression     Encounter for IUD removal 03/09/2023    Inserted 8/2/22, MIRENA Lot # [id number] Exp: 10/2024    History of varicella-documented immunity 07/21/2011    Migraine 08/03/2012    Other, mixed, or unspecified nondependent drug abuse, episodic     Tic age 14     Past Surgical History:   Procedure Laterality Date    ABDOMINOPLASTY  08/2021    HC REMOVAL OF TONSILS,12+ Y/O  01/01/1996    Tonsils 12+y.o.       Social  History:   reports that she quit smoking about 13 years ago. Her smoking use included cigarettes. She has never used smokeless tobacco. She reports current alcohol use of about 1.0 standard drink of alcohol per week. She reports that she does not use drugs.    Family History:  Family History   Problem Relation Age of Onset    Alcohol/Drug Mother         b:1964 addicted to crack    Family History Negative Father         b: 1962    Family History Negative Sister         b:1994    Cervical Cancer Sister 35        10/2015    Breast Cancer Sister 35        no information. hysterectomy/mastectomy    Family History Negative Sister         b:1981    Family History Negative Brother         b:1989    No Known Problems Maternal Grandmother     No Known Problems Maternal Grandfather     No Known Problems Paternal Grandmother     No Known Problems Other        Medications:  No current outpatient medications on file.     Allergies   Allergen Reactions    Remeron [Mirtazapine]      Excess sedation even with 7.5mg dose

## 2024-02-26 NOTE — LETTER
2/26/2024         RE: Kamilah Garcia  [address]  [facility] [address]        Dear Colleague,    Thank you for referring your patient, Kamilah Garcia, to the [facility]. Please see a copy of my visit note below.    [facility] Dermatology Note    Encounter Date: Feb 26, 2024    Dermatology Problem List:    ______________________________________    Impression/Plan:  Kamilah was seen today for derm problem.    Diagnoses and all orders for this visit:    Dilated pore of Boni  - belly button  - benign    Multiple benign nevi  - Reviewed the compounding benefits of incremental changes to sun protective clothing behaviors including increased frequency of sunscreen and sun protective clothing like broad brimmed hats and longsleeved UPF containing clothing          Follow-up in .       Staff Involved:  Staff Only    Jose Martin Benito MD   of Dermatology  Department of Dermatology  [facility]      CC:   Chief Complaint   Patient presents with     Derm Problem     Would like something removed from the belly button per pt        History of Present Illness:  Ms. Kamilah Garcia is a 38 year old female who presents as a new patient.    Spot within belly button noticed several months ago, hasn't gone away     Labs:      Physical exam:  Vitals: LMP 12/20/2023 (Approximate)   GEN: well developed, well-nourished, in no acute distress, in a pleasant mood.     SKIN: [place] phototype 1  - Focused examination of the abdomen and face was performed.  - dilated pore  - brown macules on abdomen   - No other lesions of concern on areas examined.     Past Medical History:   Past Medical History:   Diagnosis Date     Anemia     with pregnancy     Anxiety 01/21/2014     Depression      Encounter for IUD removal 03/09/2023    Inserted 8/2/22, MIRENA Lot # [id number] Exp: 10/2024     History of varicella-documented immunity 07/21/2011      Migraine 08/03/2012     Other, mixed, or unspecified nondependent drug abuse, episodic      Tic age 14     Past Surgical History:   Procedure Laterality Date     ABDOMINOPLASTY  08/2021     HC REMOVAL OF TONSILS,11+ Y/O  01/01/1996    Tonsils 11+y.o.       Social History:   reports that she quit smoking about 13 years ago. Her smoking use included cigarettes. She has never used smokeless tobacco. She reports current alcohol use of about 1.0 standard drink of alcohol per week. She reports that she does not use drugs.    Family History:  Family History   Problem Relation Age of Onset     Alcohol/Drug Mother         b:1964 addicted to crack     Family History Negative Father         b: 1962     Family History Negative Sister         b:1994     Cervical Cancer Sister 35        10/2015     Breast Cancer Sister 35        no information. hysterectomy/mastectomy     Family History Negative Sister         b:1981     Family History Negative Brother         b:1989     No Known Problems Maternal Grandmother      No Known Problems Maternal Grandfather      No Known Problems Paternal Grandmother      No Known Problems Other        Medications:  No current outpatient medications on file.     Allergies   Allergen Reactions     Remeron [Mirtazapine]      Excess sedation even with 7.5mg dose               Again, thank you for allowing me to participate in the care of your patient.        Sincerely,        Jose Martin Benito MD

## 2024-03-15 NOTE — TELEPHONE ENCOUNTER
Forwarding to covering provider, Mayela Arambula, in Ruba Rasheed's absence.     FYI to provider - Patient is lost to pap tracking follow-up. Attempts to contact pt have been made per reminder process and there has been no reply and/or no appt scheduled. Contact hx listed below.     '03, '05, '11 NIL pap  4/15/14 NIL pap, Neg HPV  12/11/15 NIL pap, + HR HPV (not 16 or 18)    1/11/16 Colpo: ECC-neg  12/12/16 NIL pap, Neg HPV  9/6/18 ASCUS pap, Neg HPV.  Plan: cotest in 3 years per guidelines, but pt may request annual pap  12/13/21 AGC pap, neg HPV. Plan: colposcopy and endometrial biopsy. Due by 3/13/22.   2/3/22 Colpo bx MAHNAZ I, ECC neg, EMB neg. Plan: cotest in 1 year  1/26/23 NIL pap, neg HR HPV. Plan 1 year cotest  01/09/24 Reminder MyChart   1/10/24 visit scheduled for  breast exam - notes added pt soon due  02/13/24 Reminder call - mailbox full, will try again later  02/15/24 Reminder call - pt notified  03/15/24 Lost to follow-up for pap tracking     PEPE Guillermo, RN

## 2024-03-17 ENCOUNTER — HEALTH MAINTENANCE LETTER (OUTPATIENT)
Age: 39
End: 2024-03-17

## 2024-04-22 ENCOUNTER — OFFICE VISIT (OUTPATIENT)
Dept: PEDIATRICS | Facility: CLINIC | Age: 39
End: 2024-04-22
Payer: COMMERCIAL

## 2024-04-22 VITALS
HEART RATE: 86 BPM | DIASTOLIC BLOOD PRESSURE: 70 MMHG | BODY MASS INDEX: 25.69 KG/M2 | HEIGHT: 65 IN | WEIGHT: 154.2 LBS | OXYGEN SATURATION: 100 % | SYSTOLIC BLOOD PRESSURE: 110 MMHG | TEMPERATURE: 98.6 F

## 2024-04-22 DIAGNOSIS — Z00.00 ROUTINE GENERAL MEDICAL EXAMINATION AT A HEALTH CARE FACILITY: Primary | ICD-10-CM

## 2024-04-22 DIAGNOSIS — K63.8219 SMALL INTESTINAL BACTERIAL OVERGROWTH (SIBO): ICD-10-CM

## 2024-04-22 DIAGNOSIS — Z12.4 CERVICAL CANCER SCREENING: ICD-10-CM

## 2024-04-22 DIAGNOSIS — E55.9 VITAMIN D DEFICIENCY: ICD-10-CM

## 2024-04-22 DIAGNOSIS — Z13.29 SCREENING FOR THYROID DISORDER: ICD-10-CM

## 2024-04-22 DIAGNOSIS — F32.0 MAJOR DEPRESSIVE DISORDER, SINGLE EPISODE, MILD (H): ICD-10-CM

## 2024-04-22 DIAGNOSIS — R53.83 OTHER FATIGUE: ICD-10-CM

## 2024-04-22 DIAGNOSIS — Z80.3 FAMILY HISTORY OF BREAST CANCER: ICD-10-CM

## 2024-04-22 DIAGNOSIS — Z13.220 LIPID SCREENING: ICD-10-CM

## 2024-04-22 LAB
BASOPHILS # BLD AUTO: 0.1 10E3/UL (ref 0–0.2)
BASOPHILS NFR BLD AUTO: 1 %
EOSINOPHIL # BLD AUTO: 0.2 10E3/UL (ref 0–0.7)
EOSINOPHIL NFR BLD AUTO: 4 %
ERYTHROCYTE [DISTWIDTH] IN BLOOD BY AUTOMATED COUNT: 12.8 % (ref 10–15)
HCT VFR BLD AUTO: 38.6 % (ref 35–47)
HGB BLD-MCNC: 12.8 G/DL (ref 11.7–15.7)
IMM GRANULOCYTES # BLD: 0 10E3/UL
IMM GRANULOCYTES NFR BLD: 0 %
LYMPHOCYTES # BLD AUTO: 1.5 10E3/UL (ref 0.8–5.3)
LYMPHOCYTES NFR BLD AUTO: 28 %
MCH RBC QN AUTO: 28.7 PG (ref 26.5–33)
MCHC RBC AUTO-ENTMCNC: 33.2 G/DL (ref 31.5–36.5)
MCV RBC AUTO: 87 FL (ref 78–100)
MONOCYTES # BLD AUTO: 0.5 10E3/UL (ref 0–1.3)
MONOCYTES NFR BLD AUTO: 9 %
NEUTROPHILS # BLD AUTO: 3 10E3/UL (ref 1.6–8.3)
NEUTROPHILS NFR BLD AUTO: 57 %
PLATELET # BLD AUTO: 277 10E3/UL (ref 150–450)
RBC # BLD AUTO: 4.46 10E6/UL (ref 3.8–5.2)
WBC # BLD AUTO: 5.3 10E3/UL (ref 4–11)

## 2024-04-22 PROCEDURE — 99213 OFFICE O/P EST LOW 20 MIN: CPT | Mod: 25 | Performed by: INTERNAL MEDICINE

## 2024-04-22 PROCEDURE — 84443 ASSAY THYROID STIM HORMONE: CPT | Performed by: INTERNAL MEDICINE

## 2024-04-22 PROCEDURE — 99396 PREV VISIT EST AGE 40-64: CPT | Performed by: INTERNAL MEDICINE

## 2024-04-22 PROCEDURE — 82306 VITAMIN D 25 HYDROXY: CPT | Performed by: INTERNAL MEDICINE

## 2024-04-22 PROCEDURE — 80053 COMPREHEN METABOLIC PANEL: CPT | Performed by: INTERNAL MEDICINE

## 2024-04-22 PROCEDURE — 36415 COLL VENOUS BLD VENIPUNCTURE: CPT | Performed by: INTERNAL MEDICINE

## 2024-04-22 PROCEDURE — 80061 LIPID PANEL: CPT | Performed by: INTERNAL MEDICINE

## 2024-04-22 PROCEDURE — 85025 COMPLETE CBC W/AUTO DIFF WBC: CPT | Performed by: INTERNAL MEDICINE

## 2024-04-22 SDOH — HEALTH STABILITY: PHYSICAL HEALTH: ON AVERAGE, HOW MANY MINUTES DO YOU ENGAGE IN EXERCISE AT THIS LEVEL?: 30 MIN

## 2024-04-22 SDOH — HEALTH STABILITY: PHYSICAL HEALTH: ON AVERAGE, HOW MANY DAYS PER WEEK DO YOU ENGAGE IN MODERATE TO STRENUOUS EXERCISE (LIKE A BRISK WALK)?: 2 DAYS

## 2024-04-22 ASSESSMENT — PATIENT HEALTH QUESTIONNAIRE - PHQ9
SUM OF ALL RESPONSES TO PHQ QUESTIONS 1-9: 6
10. IF YOU CHECKED OFF ANY PROBLEMS, HOW DIFFICULT HAVE THESE PROBLEMS MADE IT FOR YOU TO DO YOUR WORK, TAKE CARE OF THINGS AT HOME, OR GET ALONG WITH OTHER PEOPLE: SOMEWHAT DIFFICULT
SUM OF ALL RESPONSES TO PHQ QUESTIONS 1-9: 6

## 2024-04-22 ASSESSMENT — SOCIAL DETERMINANTS OF HEALTH (SDOH): HOW OFTEN DO YOU GET TOGETHER WITH FRIENDS OR RELATIVES?: ONCE A WEEK

## 2024-04-22 NOTE — PROGRESS NOTES
"Preventive Care Visit  [facility] DECLAN Biswas MD, Internal Medicine - Pediatrics  Apr 22, 2024      Assessment & Plan     (Z00.00) Routine general medical examination at a health care facility  (primary encounter diagnosis)    (F32.0) Major depressive disorder, single episode, mild (H24)  Comment:   Plan: History of major depression.   States she has been treated with various antidepressants in the past but at this point is off all medications.  Patient has been seeing an alternative medicine provider regarding concerns about fatigue and gastrointestinal symptoms as well as depression (no records available).  States that the provider started her on \"thyroid medication\"/but cannot give further details-requesting thyroid lab work today.    (K63.6741) Small intestinal bacterial overgrowth (SIBO)  Comment:   Plan: History of SIBO, prior workup through gastroenterology.  Course of antibiotics was recommended which patient declined.  States that overall, symptoms of abdominal cramping and bloating have improved significantly    (Z13.220) Lipid screening  Comment:   Plan: Lipid panel reflex to direct LDL Non-fasting,         Comprehensive metabolic panel (BMP + Alb, Alk         Phos, ALT, AST, Total. Bili, TP)          (Z13.29) Screening for thyroid disorder  Comment:   Plan: TSH with free T4 reflex          (E55.9) Vitamin D deficiency  Comment: hx of vitamin D def  Plan: Vitamin D Deficiency          (R53.83) Other fatigue  Comment:   Plan: CBC with platelets and differential          (Z80.3) Family history of breast cancer  Comment:   Plan: recent mammogram reviewed    (Z12.4) Cervical cancer screening  Comment:   Plan: due for pap smear, she plans to schedule with her gynecologist              BMI  Estimated body mass index is 25.32 kg/m  as calculated from the following:    Height as of this encounter: 1.662 m (5' 5.43\").    Weight as of this encounter: 69.9 kg (154 lb 3.2 oz). "       Counseling  Appropriate preventive services were discussed with this patient, including applicable screening as appropriate for fall prevention, nutrition, physical activity, Tobacco-use cessation, weight loss and cognition.  Checklist reviewing preventive services available has been given to the patient.  Reviewed patient's diet, addressing concerns and/or questions.   She is at risk for lack of exercise and has been provided with information to increase physical activity for the benefit of her well-being.   The patient's PHQ-9 score is consistent with mild depression. She was provided with information regarding depression.       Chinmay Triana is a 39 year old, presenting for the following:  Physical        4/22/2024     2:22 PM   Additional Questions   Roomed by miss   Accompanied by self         4/22/2024     2:22 PM   Patient Reported Additional Medications   Patient reports taking the following new medications thyroid medication        HPI              4/22/2024   General Health   How would you rate your overall physical health? (!) FAIR   Feel stress (tense, anxious, or unable to sleep) To some extent   (!) STRESS CONCERN      4/22/2024   Nutrition   Three or more servings of calcium each day? (!) I DON'T KNOW   Diet: Regular (no restrictions)   How many servings of fruit and vegetables per day? (!) I DON'T KNOW   How many sweetened beverages each day? (!) I DON'T KNOW         4/22/2024   Exercise   Days per week of moderate/strenous exercise 2 days   Average minutes spent exercising at this level 30 min   (!) EXERCISE CONCERN      4/22/2024   Social Factors   Frequency of gathering with friends or relatives Once a week   Worry food won't last until get money to buy more No   Food not last or not have enough money for food? No   Do you have housing?  Yes   Are you worried about losing your housing? No   Lack of transportation? No   Unable to get utilities (heat,electricity)? No         4/22/2024    Dental   Dentist two times every year? Yes         2024   TB Screening   Were you born outside of the [place]? No       Today's PHQ-9 Score:       2024     2:26 PM   PHQ-9 SCORE   PHQ-9 Total Score MyChart 6 (Mild depression)   PHQ-9 Total Score 6         2024   Substance Use   Alcohol more than 3/day or more than 7/wk No   Do you use any other substances recreationally? No     Social History     Tobacco Use    Smoking status: Former     Current packs/day: 0.00     Types: Cigarettes     Quit date: 2010     Years since quittin.9    Smokeless tobacco: Never   Vaping Use    Vaping status: Never Used   Substance Use Topics    Alcohol use: Yes     Alcohol/week: 1.0 standard drink of alcohol     Types: 1 Standard drinks or equivalent per week     Comment: occasionally 1-2 etoh a month/    Drug use: No     Comment: marijuana hasn't used in 3 years, prior use of ampht, LSD, crack & cocaine. Sober since age 18            2024   LAST FHS-7 RESULTS   1st degree relative breast or ovarian cancer Unknown   Any relative bilateral breast cancer No   Any male have breast cancer No   Any ONE woman have BOTH breast AND ovarian cancer No   Any woman with breast cancer before 50yrs Unknown   2 or more relatives with breast AND/OR ovarian cancer No   2 or more relatives with breast AND/OR bowel cancer No        Mammogram Screening - Mammogram every 1-2 years updated in Health Maintenance based on mutual decision making        2024   STI Screening   New sexual partner(s) since last STI/HIV test? No         Latest Ref Rng & Units 2023     8:59 AM 2021     3:38 PM 2018    12:26 PM   PAP / HPV   PAP  Negative for Intraepithelial Lesion or Malignancy (NILM)  Atypical glandular cells, not otherwise specified     PAP (Historical)    ASC-US    HPV 16 DNA Negative Negative  Negative     HPV 18 DNA Negative Negative  Negative     Other HR HPV Negative Negative  Negative       ASCVD Risk    The ASCVD Risk score (Sunday WALLS, et al., 2019) failed to calculate for the following reasons:    Cannot find a previous HDL lab    Cannot find a previous total cholesterol lab        2024   Contraception/Family Planning   Questions about contraception or family planning No        Reviewed and updated as needed this visit by Provider                    Patient Active Problem List   Diagnosis    Tic disorder    Health Care Home    Migraine    HAMIDA (generalized anxiety disorder)    Atypical glandular cells on cervical Pap smear    Major depressive disorder, single episode, mild (H24)    Family history of breast cancer    Small intestinal bacterial overgrowth (SIBO)    Tension headache     Past Surgical History:   Procedure Laterality Date    ABDOMINOPLASTY  2021    HC REMOVAL OF TONSILS,12+ Y/O  1996    Tonsils 11+y.o.       Social History     Tobacco Use    Smoking status: Former     Current packs/day: 0.00     Types: Cigarettes     Quit date: 2010     Years since quittin.9    Smokeless tobacco: Never   Substance Use Topics    Alcohol use: Yes     Alcohol/week: 1.0 standard drink of alcohol     Types: 1 Standard drinks or equivalent per week     Comment: occasionally 1-2 etoh a month/     Family History   Problem Relation Age of Onset    Alcohol/Drug Mother         b: addicted to crack    Family History Negative Father         b:     Family History Negative Sister         b:    Cervical Cancer Sister 35        10/2015    Breast Cancer Sister 35        no information. hysterectomy/mastectomy    Family History Negative Sister         b:    Family History Negative Brother         b:    No Known Problems Maternal Grandmother     No Known Problems Maternal Grandfather     No Known Problems Paternal Grandmother     No Known Problems Other          No current outpatient medications on file.         Review of Systems  Constitutional, neuro, ENT, endocrine, pulmonary,  "cardiac, gastrointestinal, genitourinary, musculoskeletal, integument and psychiatric systems are negative, except as otherwise noted.     Objective    Exam  /70 (BP Location: Right arm, Patient Position: Sitting, Cuff Size: Adult Regular)   Pulse 86   Temp 98.6  F (37  C) (Tympanic)   Ht 1.662 m (5' 5.43\")   Wt 69.9 kg (154 lb 3.2 oz)   LMP 04/15/2024   SpO2 100%   BMI 25.32 kg/m     Estimated body mass index is 25.32 kg/m  as calculated from the following:    Height as of this encounter: 1.662 m (5' 5.43\").    Weight as of this encounter: 69.9 kg (154 lb 3.2 oz).    Physical Exam  GENERAL: alert and no distress  EYES: Eyes grossly normal to inspection, PERRL and conjunctivae and sclerae normal  HENT: ear canals and TM's normal, nose and mouth without ulcers or lesions  NECK: no adenopathy, no asymmetry, masses, or scars  RESP: lungs clear to auscultation - no rales, rhonchi or wheezes  CV: regular rate and rhythm, normal S1 S2, no S3 or S4, no murmur, click or rub, no peripheral edema  ABDOMEN: soft, nontender, no hepatosplenomegaly, no masses and bowel sounds normal  MS: no gross musculoskeletal defects noted, no edema  SKIN: no suspicious lesions or rashes  NEURO: Normal strength and tone, mentation intact and speech normal.  Motor tic, facial.  PSYCH: mentation appears normal, affect normal        Signed Electronically by: Ronaldo Biswas MD    "

## 2024-04-22 NOTE — PATIENT INSTRUCTIONS
Preventive Care Advice   This is general advice given by our system to help you stay healthy. However, your care team may have specific advice just for you. Please talk to your care team about your preventive care needs.  Nutrition  Eat 5 or more servings of fruits and vegetables each day.  Try wheat bread, brown rice and whole grain pasta (instead of white bread, rice, and pasta).  Get enough calcium and vitamin D. Check the label on foods and aim for 100% of the RDA (recommended daily allowance).  Lifestyle  Exercise at least 150 minutes each week   (30 minutes a day, 5 days a week).  Do muscle strengthening activities 2 days a week. These help control your weight and prevent disease.  No smoking.  Wear sunscreen to prevent skin cancer.  Have a dental exam and cleaning every 6 months.  Yearly exams  See your health care team every year to talk about:  Any changes in your health.  Any medicines your care team has prescribed.  Preventive care, family planning, and ways to prevent chronic diseases.  Shots (vaccines)   HPV shots (up to age 26), if you've never had them before.  Hepatitis B shots (up to age 59), if you've never had them before.  COVID-19 shot: Get this shot when it's due.  Flu shot: Get a flu shot every year.  Tetanus shot: Get a tetanus shot every 10 years.  Pneumococcal, hepatitis A, and RSV shots: Ask your care team if you need these based on your risk.  Shingles shot (for age 50 and up).  General health tests  Diabetes screening:  Starting at age 35, Get screened for diabetes at least every 3 years.  If you are younger than age 35, ask your care team if you should be screened for diabetes.  Cholesterol test: At age 39, start having a cholesterol test every 5 years, or more often if advised.  Bone density scan (DEXA): At age 50, ask your care team if you should have this scan for osteoporosis (brittle bones).  Hepatitis C: Get tested at least once in your life.  STIs (sexually transmitted  infections)  Before age 24: Ask your care team if you should be screened for STIs.  After age 24: Get screened for STIs if you're at risk. You are at risk for STIs (including HIV) if:  You are sexually active with more than one person.  You don't use condoms every time.  You or a partner was diagnosed with a sexually transmitted infection.  If you are at risk for HIV, ask about PrEP medicine to prevent HIV.  Get tested for HIV at least once in your life, whether you are at risk for HIV or not.  Cancer screening tests  Cervical cancer screening: If you have a cervix, begin getting regular cervical cancer screening tests at age 21. Most people who have regular screenings with normal results can stop after age 65. Talk about this with your provider.  Breast cancer scan (mammogram): If you've ever had breasts, begin having regular mammograms starting at age 40. This is a scan to check for breast cancer.  Colon cancer screening: It is important to start screening for colon cancer at age 45.  Have a colonoscopy test every 10 years (or more often if you're at risk) Or, ask your provider about stool tests like a FIT test every year or Cologuard test every 3 years.  To learn more about your testing options, visit: https://www.[employer]146233.pdf.  For help making a decision, visit: https://bit.ly/tv81976.  Prostate cancer screening test: If you have a prostate and are age 55 to 69, ask your provider if you would benefit from a yearly prostate cancer screening test.  Lung cancer screening: If you are a current or former smoker age 50 to 80, ask your care team if ongoing lung cancer screenings are right for you.  For informational purposes only. Not to replace the advice of your health care provider. Copyright   2023 [employer]. All rights reserved. Clinically reviewed by the [facility] Transitions Program. [employer] 671601 - REV 01/24.

## 2024-04-23 LAB
ALBUMIN SERPL BCG-MCNC: 4.5 G/DL (ref 3.5–5.2)
ALP SERPL-CCNC: 39 U/L (ref 40–150)
ALT SERPL W P-5'-P-CCNC: 14 U/L (ref 0–50)
ANION GAP SERPL CALCULATED.3IONS-SCNC: 12 MMOL/L (ref 7–15)
AST SERPL W P-5'-P-CCNC: 19 U/L (ref 0–45)
BILIRUB SERPL-MCNC: 0.4 MG/DL
BUN SERPL-MCNC: 12 MG/DL (ref 6–20)
CALCIUM SERPL-MCNC: 9.7 MG/DL (ref 8.6–10)
CHLORIDE SERPL-SCNC: 103 MMOL/L (ref 98–107)
CHOLEST SERPL-MCNC: 191 MG/DL
CREAT SERPL-MCNC: 0.73 MG/DL (ref 0.51–0.95)
DEPRECATED HCO3 PLAS-SCNC: 22 MMOL/L (ref 22–29)
EGFRCR SERPLBLD CKD-EPI 2021: >90 ML/MIN/1.73M2
FASTING STATUS PATIENT QL REPORTED: NO
GLUCOSE SERPL-MCNC: 78 MG/DL (ref 70–99)
HDLC SERPL-MCNC: 79 MG/DL
LDLC SERPL CALC-MCNC: 103 MG/DL
NONHDLC SERPL-MCNC: 112 MG/DL
POTASSIUM SERPL-SCNC: 4 MMOL/L (ref 3.4–5.3)
PROT SERPL-MCNC: 7.1 G/DL (ref 6.4–8.3)
SODIUM SERPL-SCNC: 137 MMOL/L (ref 135–145)
TRIGL SERPL-MCNC: 43 MG/DL
TSH SERPL DL<=0.005 MIU/L-ACNC: 1.69 UIU/ML (ref 0.3–4.2)
VIT D+METAB SERPL-MCNC: 51 NG/ML (ref 20–50)

## 2024-07-15 ENCOUNTER — TELEPHONE (OUTPATIENT)
Dept: OBGYN | Facility: CLINIC | Age: 39
End: 2024-07-15
Payer: COMMERCIAL

## 2024-07-15 NOTE — TELEPHONE ENCOUNTER
[facility] Call Center    Phone Message    May a detailed message be left on voicemail: yes     Reason for Call: Other: Pt calling to schedule annual and pap with Ralf. Pt came in on 7/10 for this appt however clinic had power outage and pt was unable to complete appt. Pt states Ralf let her know clinic will get her seen for an appt ASAP. First available isnt till Sept. Please advise      Action Taken: Message routed to:  Other: [facility]    Travel Screening: Not Applicable

## 2024-07-16 ENCOUNTER — OFFICE VISIT (OUTPATIENT)
Dept: OBGYN | Facility: CLINIC | Age: 39
End: 2024-07-16
Payer: COMMERCIAL

## 2024-07-16 VITALS
HEIGHT: 66 IN | DIASTOLIC BLOOD PRESSURE: 70 MMHG | WEIGHT: 148.6 LBS | SYSTOLIC BLOOD PRESSURE: 100 MMHG | BODY MASS INDEX: 23.88 KG/M2

## 2024-07-16 DIAGNOSIS — E03.9 ACQUIRED HYPOTHYROIDISM: ICD-10-CM

## 2024-07-16 DIAGNOSIS — Z13.0 SCREENING FOR DISORDER OF BLOOD AND BLOOD-FORMING ORGANS: ICD-10-CM

## 2024-07-16 DIAGNOSIS — R74.8 LOW SERUM ALKALINE PHOSPHATASE: ICD-10-CM

## 2024-07-16 DIAGNOSIS — Z01.419 ENCOUNTER FOR GYNECOLOGICAL EXAMINATION WITHOUT ABNORMAL FINDING: Primary | ICD-10-CM

## 2024-07-16 DIAGNOSIS — Z11.3 SCREEN FOR STD (SEXUALLY TRANSMITTED DISEASE): ICD-10-CM

## 2024-07-16 DIAGNOSIS — R87.619 ATYPICAL GLANDULAR CELLS ON CERVICAL PAP SMEAR: ICD-10-CM

## 2024-07-16 DIAGNOSIS — Z01.84 IMMUNITY STATUS TESTING: ICD-10-CM

## 2024-07-16 LAB
ALBUMIN SERPL BCG-MCNC: 4.6 G/DL (ref 3.5–5.2)
ALP SERPL-CCNC: 38 U/L (ref 40–150)
ALT SERPL W P-5'-P-CCNC: 10 U/L (ref 0–50)
ANION GAP SERPL CALCULATED.3IONS-SCNC: 9 MMOL/L (ref 7–15)
AST SERPL W P-5'-P-CCNC: 22 U/L (ref 0–45)
BASOPHILS # BLD AUTO: 0.1 10E3/UL (ref 0–0.2)
BASOPHILS NFR BLD AUTO: 1 %
BILIRUB SERPL-MCNC: 0.6 MG/DL
BUN SERPL-MCNC: 12.4 MG/DL (ref 6–20)
CALCIUM SERPL-MCNC: 9.5 MG/DL (ref 8.8–10.4)
CHLORIDE SERPL-SCNC: 102 MMOL/L (ref 98–107)
CREAT SERPL-MCNC: 0.76 MG/DL (ref 0.51–0.95)
EGFRCR SERPLBLD CKD-EPI 2021: >90 ML/MIN/1.73M2
EOSINOPHIL # BLD AUTO: 0.2 10E3/UL (ref 0–0.7)
EOSINOPHIL NFR BLD AUTO: 5 %
ERYTHROCYTE [DISTWIDTH] IN BLOOD BY AUTOMATED COUNT: 13 % (ref 10–15)
GLUCOSE SERPL-MCNC: 78 MG/DL (ref 70–99)
HBV SURFACE AB SERPL IA-ACNC: <3.5 M[IU]/ML
HBV SURFACE AB SERPL IA-ACNC: NONREACTIVE M[IU]/ML
HCO3 SERPL-SCNC: 27 MMOL/L (ref 22–29)
HCT VFR BLD AUTO: 38.5 % (ref 35–47)
HGB BLD-MCNC: 12.5 G/DL (ref 11.7–15.7)
IMM GRANULOCYTES # BLD: 0 10E3/UL
IMM GRANULOCYTES NFR BLD: 0 %
LYMPHOCYTES # BLD AUTO: 1.7 10E3/UL (ref 0.8–5.3)
LYMPHOCYTES NFR BLD AUTO: 34 %
MCH RBC QN AUTO: 28.3 PG (ref 26.5–33)
MCHC RBC AUTO-ENTMCNC: 32.5 G/DL (ref 31.5–36.5)
MCV RBC AUTO: 87 FL (ref 78–100)
MONOCYTES # BLD AUTO: 0.4 10E3/UL (ref 0–1.3)
MONOCYTES NFR BLD AUTO: 7 %
NEUTROPHILS # BLD AUTO: 2.6 10E3/UL (ref 1.6–8.3)
NEUTROPHILS NFR BLD AUTO: 53 %
PLATELET # BLD AUTO: 267 10E3/UL (ref 150–450)
POTASSIUM SERPL-SCNC: 4.1 MMOL/L (ref 3.4–5.3)
PROT SERPL-MCNC: 7.2 G/DL (ref 6.4–8.3)
RBC # BLD AUTO: 4.42 10E6/UL (ref 3.8–5.2)
SODIUM SERPL-SCNC: 138 MMOL/L (ref 135–145)
TSH SERPL DL<=0.005 MIU/L-ACNC: 1.06 UIU/ML (ref 0.3–4.2)
WBC # BLD AUTO: 4.9 10E3/UL (ref 4–11)

## 2024-07-16 PROCEDURE — 87624 HPV HI-RISK TYP POOLED RSLT: CPT | Performed by: NURSE PRACTITIONER

## 2024-07-16 PROCEDURE — 86706 HEP B SURFACE ANTIBODY: CPT | Performed by: NURSE PRACTITIONER

## 2024-07-16 PROCEDURE — 85025 COMPLETE CBC W/AUTO DIFF WBC: CPT | Performed by: NURSE PRACTITIONER

## 2024-07-16 PROCEDURE — 84443 ASSAY THYROID STIM HORMONE: CPT | Performed by: NURSE PRACTITIONER

## 2024-07-16 PROCEDURE — 87491 CHLMYD TRACH DNA AMP PROBE: CPT | Performed by: NURSE PRACTITIONER

## 2024-07-16 PROCEDURE — 87591 N.GONORRHOEAE DNA AMP PROB: CPT | Performed by: NURSE PRACTITIONER

## 2024-07-16 PROCEDURE — G0145 SCR C/V CYTO,THINLAYER,RESCR: HCPCS | Performed by: NURSE PRACTITIONER

## 2024-07-16 PROCEDURE — 83735 ASSAY OF MAGNESIUM: CPT | Performed by: NURSE PRACTITIONER

## 2024-07-16 PROCEDURE — 36415 COLL VENOUS BLD VENIPUNCTURE: CPT | Performed by: NURSE PRACTITIONER

## 2024-07-16 PROCEDURE — 80053 COMPREHEN METABOLIC PANEL: CPT | Performed by: NURSE PRACTITIONER

## 2024-07-16 PROCEDURE — 99459 PELVIC EXAMINATION: CPT | Performed by: NURSE PRACTITIONER

## 2024-07-16 PROCEDURE — 99396 PREV VISIT EST AGE 40-64: CPT | Performed by: NURSE PRACTITIONER

## 2024-07-16 NOTE — PROGRESS NOTES
Kamilah is a 39 year old  female who presents for annual exam.     Besides routine health maintenance,  she would like to discuss blood work CMP.    HPI:  The patient's PCP is Dr. Mario Joseph MD. patient here today for her annual GYN exam.  She had a mammogram with ultrasound in February.    She is starting to have regular cycles after restarting her levothyroxine.  She is requesting recheck of her blood work when she had an elevated alkaline phosphatase in April.  She also like to recheck her thyroid level since she restarted her medication 4 to 6 weeks ago.  She still struggling with hair loss.  She is stressed with work and raising 2 kids as her partner  suddenly a few years ago.    She is due for Pap smear today.  She accepts STD testing.      GYNECOLOGIC HISTORY:    Patient's last menstrual period was 2024 (approximate).    Regular menses? yes  Menses every 28 days.  Length of menses: 4 days    Her current contraception method is: vasectomy.  She  reports that she quit smoking about 14 years ago. Her smoking use included cigarettes. She has never used smokeless tobacco.    Patient is sexually active.  STD testing offered?  Declined  Last PHQ-9 score on record =       2024     2:26 PM   PHQ-9 SCORE   PHQ-9 Total Score MyChart 6 (Mild depression)   PHQ-9 Total Score 6     Last GAD7 score on record =       2023     8:47 AM   HAMIDA-7 SCORE   Total Score 11     Alcohol Score =     HEALTH MAINTENANCE:  Cholesterol:   Recent Labs   Lab Test 24  1511   CHOL 191   HDL 79   *   TRIG 43     Last Mammo:  24 Diagnostic Bi US , Result: Normal, Next Mammo:  2025    Pap:   Lab Results   Component Value Date    GYNINTERP  2023     Negative for Intraepithelial Lesion or Malignancy (NILM)    GYNINTERP Atypical glandular cells, not otherwise specified 2021    PAP ASC-US 2018    PAP NIL 2016    PAP NIL 2015   COLP 2/10/22    Colonoscopy:  NA, Result: Not  applicable, Next Colonoscopy: 45 years.  Dexa:  NA    Health maintenance updated: Yes    HISTORY:  OB History    Para Term  AB Living   2 2 2 0 0 2   SAB IAB Ectopic Multiple Live Births   0 0 0 0 2      # Outcome Date GA Lbr Eddie/2nd Weight Sex Type Anes PTL Lv   2 Term 10/29/14 39w6d 04:40 / 00:21 3.33 kg (7 lb 5.5 oz) F Vag-Spont EPI N LEAH      Name: Clary      Apgar1: 8  Apgar5: 9   1 Term 11 40w1d 07:34 / 01:28 3.56 kg (7 lb 13.6 oz) M Vag-Spont   LEAH      Name: Bandar      Apgar5: 9       Patient Active Problem List   Diagnosis    Tic disorder    Migraine    HAMIDA (generalized anxiety disorder)    Atypical glandular cells on cervical Pap smear    Major depressive disorder, single episode, mild (H24)    Family history of breast cancer    Small intestinal bacterial overgrowth (SIBO)    Tension headache     Past Surgical History:   Procedure Laterality Date    ABDOMINOPLASTY  2021    HC REMOVAL OF TONSILS,12+ Y/O  1996    Tonsils 11+y.o.      Social History     Tobacco Use    Smoking status: Former     Current packs/day: 0.00     Types: Cigarettes     Quit date: 2010     Years since quittin.1    Smokeless tobacco: Never   Substance Use Topics    Alcohol use: Yes     Alcohol/week: 1.0 standard drink of alcohol     Types: 1 Standard drinks or equivalent per week     Comment: occasionally 1-2 etoh a month/      Problem (# of Occurrences) Relation (Name,Age of Onset)    Alcohol/Drug (1) Mother: b:1964 addicted to crack    Family History Negative (4) Father: b: , Sister: b:, Sister: b:, Brother: b:    Breast Cancer (1) Sister (35): no information. hysterectomy/mastectomy    Cervical Cancer (1) Sister (35): 10/2015    No Known Problems (4) Maternal Grandmother, Maternal Grandfather, Paternal Grandmother, Other              Current Outpatient Medications   Medication Sig Dispense Refill    thyroid (ARMOUR) 60 MG tablet Take 60 mg by mouth daily       No current  "facility-administered medications for this visit.     Allergies   Allergen Reactions    Remeron [Mirtazapine]      Excess sedation even with 7.5mg dose       Past medical, surgical, social and family histories were reviewed and updated in EPIC.    EXAM:  /70   Ht 1.676 m (5' 6\")   Wt 67.4 kg (148 lb 9.6 oz)   LMP 07/06/2024 (Approximate)   Breastfeeding No   BMI 23.98 kg/m     BMI: Body mass index is 23.98 kg/m .    PHYSICAL EXAM:  Constitutional:   Appearance: Well nourished, well developed, alert, in no acute distress  Chest:  Respiratory Effort:  Breathing unlabored  Cardiovascular:    Heart: Auscultation:  Regular rate, normal rhythm, no murmurs present  Breasts: Inspection of Breasts:  No lymphadenopathy present., Palpation of Breasts and Axillae:  No masses present on palpation, no breast tenderness., Axillary Lymph Nodes:  No lymphadenopathy present., and No nodularity, asymmetry or nipple discharge bilaterally.    Lymphatic: Lymph Nodes:  No other lymphadenopathy present  Skin:  General Inspection:  No rashes present, no lesions present, no areas of  discoloration  Neurologic:    Mental Status:  Oriented X3.  Normal strength and tone, sensory exam                grossly normal, mentation intact and speech normal.    Psychiatric:   Mentation appears normal and affect normal/bright.         Pelvic Exam:  External Genitalia:     Normal appearance for age, no discharge present, no tenderness present, no inflammatory lesions present, color normal  Vagina:     Normal vaginal vault without central or paravaginal defects, no discharge present, no inflammatory lesions present, no masses present  Bladder:     Nontender to palpation  Urethra:   Urethral Body:  Urethra palpation normal, urethra structural support normal   Urethral Meatus:  No erythema or lesions present  Cervix:     Appearance healthy, no lesions present, nontender to palpation, no bleeding present  Uterus:     Uterus: firm, normal sized and " nontender, anteverted in position.   Adnexa:     No adnexal tenderness present, no adnexal masses present  Perineum:     Perineum within normal limits, no evidence of trauma, no rashes or skin lesions present  Anus:     Anus within normal limits, no hemorrhoids present  Inguinal Lymph Nodes:     No lymphadenopathy present  Pubic Hair:     Normal pubic hair distribution for age  Genitalia and Groin:     No rashes present, no lesions present, no areas of discoloration, no masses present    COUNSELING:   Special attention given to:        Regular exercise       Healthy diet/nutrition    BMI: Body mass index is 23.98 kg/m .      ASSESSMENT:  39 year old female with satisfactory annual exam.    ICD-10-CM    1. Encounter for gynecological examination without abnormal finding  Z01.419 Gynecologic Cytology (Pap) and HPV - Recommended Age 30-65 Years     [place] PELVIC EXAMINATION      2. Elevated serum alkaline phosphatase level  R74.8 Comprehensive metabolic panel     Comprehensive metabolic panel     CANCELED: Hepatic panel (Albumin, ALT, AST, Bili, Alk Phos, TP)      3. Screen for STD (sexually transmitted disease)  Z11.3 NEISSERIA GONORRHOEA PCR     CHLAMYDIA TRACHOMATIS PCR      4. Immunity status testing  Z01.84 Hepatitis B Surface Antibody     Hepatitis B Surface Antibody      5. Screening for disorder of blood and blood-forming organs  Z13.0 CBC with Platelets & Differential     CBC with Platelets & Differential      6. Atypical glandular cells on cervical Pap smear  R87.619       7. Acquired hypothyroidism  E03.9 TSH with free T4 reflex     TSH with free T4 reflex          PLAN:  39-year-old female with a normal GYN exam.  Pap smear was collected and if it is normal she can repeat in 1 year given her HPV status.  She is to continue with annual mammograms.  Blood work will be completed today.    TRISHA Tucker CNP

## 2024-07-17 LAB
C TRACH DNA SPEC QL NAA+PROBE: NEGATIVE
HPV HR 12 DNA CVX QL NAA+PROBE: NEGATIVE
HPV16 DNA CVX QL NAA+PROBE: NEGATIVE
HPV18 DNA CVX QL NAA+PROBE: NEGATIVE
HUMAN PAPILLOMA VIRUS FINAL DIAGNOSIS: NORMAL
MAGNESIUM SERPL-MCNC: 2 MG/DL (ref 1.7–2.3)
N GONORRHOEA DNA SPEC QL NAA+PROBE: NEGATIVE

## 2024-07-19 ENCOUNTER — TELEPHONE (OUTPATIENT)
Dept: OBGYN | Facility: CLINIC | Age: 39
End: 2024-07-19
Payer: COMMERCIAL

## 2024-07-19 DIAGNOSIS — Z13.6 ENCOUNTER FOR LIPID SCREENING FOR CARDIOVASCULAR DISEASE: Primary | ICD-10-CM

## 2024-07-19 DIAGNOSIS — Z13.220 ENCOUNTER FOR LIPID SCREENING FOR CARDIOVASCULAR DISEASE: Primary | ICD-10-CM

## 2024-07-19 NOTE — TELEPHONE ENCOUNTER
[facility] Call Center    Phone Message    May a detailed message be left on voicemail: yes     Reason for Call: Patient is wondering if she can get an order for cholesterol testing. Please let her know via [employer] when order is placed. Thank you    Action Taken: Message routed to:  Other: [facility]    Travel Screening: Not Applicable     Date of Service:

## 2024-07-19 NOTE — TELEPHONE ENCOUNTER
Pt had annual 7/16 with Mayela Arambula  Did have some blood work done at annual, but not cholesterol labs    Pt wondering if she can have order placed for cholesterol labs    Routing to provider to advise -   Ok with cholesterol testing or would you like pt to f/up with her PCP?     Looks like pt does have future ordered labs for iodine and zinc as well    Elisa Scott RN on 7/19/2024 at 11:00 AM

## 2024-07-22 ENCOUNTER — PATIENT OUTREACH (OUTPATIENT)
Dept: OBGYN | Facility: CLINIC | Age: 39
End: 2024-07-22
Payer: COMMERCIAL

## 2024-07-22 LAB
BKR LAB AP GYN ADEQUACY: NORMAL
BKR LAB AP GYN INTERPRETATION: NORMAL
BKR LAB AP PREVIOUS ABNL DX: NORMAL
BKR LAB AP PREVIOUS ABNORMAL: NORMAL
PATH REPORT.COMMENTS IMP SPEC: NORMAL
PATH REPORT.COMMENTS IMP SPEC: NORMAL
PATH REPORT.RELEVANT HX SPEC: NORMAL

## 2024-07-22 NOTE — TELEPHONE ENCOUNTER
RN Called pt and left detailed vm: Lipids done just 3 months ago and normal, reason why not ordered at last visit. If really wants them, they are ordered for fasting. Lab only visit   Mayela Tate RN on 7/22/2024 at 8:38 AM

## 2024-07-22 NOTE — TELEPHONE ENCOUNTER
She had lipids done 3 months ago and they were normal. But if she want's them again, they're ordered.     Be sure to come back fasting.

## 2024-11-04 ENCOUNTER — TELEPHONE (OUTPATIENT)
Dept: OBGYN | Facility: CLINIC | Age: 39
End: 2024-11-04
Payer: COMMERCIAL

## 2024-11-04 NOTE — TELEPHONE ENCOUNTER
"LMTCB  Abbie Byrnes RN on 11/4/2024 at 1:05 PM    Per pt has felt sick over the last couple of years and is worried she is not healthy enough to be pregnant at 39    Does have regular periods but cannot recall last LMP but has had one in the last 6 weeks-so if regular probably should have had one in October but she cannot remember date of last period    Wants to have a \"full panel\" of labs done to make sure she is healthy enough to continue the pregnancy. New ob appointments scheduled. Kamilah does state she wants to continue the pregnancy-advised we do have resources for TOP  Spoke with Mayela Arambula NP who saw her last as she is familiar with her health history.   Will plan on having Kamilah at this time follow up for non ob concerns with her PCP.   Will also see if we can get her an earlier appt as she does seem anxious about her pregnancy and has a history of depression.    Able to schedule an appt Tuesday for Kamilah to sit down with Dr. Elder to establish care and discuss a plan moving forward.   Abbie Byrnes RN on 11/4/2024 at 3:43 PM                            "

## 2024-11-05 ENCOUNTER — OFFICE VISIT (OUTPATIENT)
Dept: OBGYN | Facility: CLINIC | Age: 39
End: 2024-11-05
Payer: COMMERCIAL

## 2024-11-05 VITALS
DIASTOLIC BLOOD PRESSURE: 60 MMHG | SYSTOLIC BLOOD PRESSURE: 110 MMHG | HEIGHT: 67 IN | BODY MASS INDEX: 22.07 KG/M2 | WEIGHT: 140.6 LBS

## 2024-11-05 DIAGNOSIS — Z01.812 PRE-PROCEDURE LAB EXAM: ICD-10-CM

## 2024-11-05 DIAGNOSIS — Z32.01 PREGNANCY TEST PERFORMED, PREGNANCY CONFIRMED: Primary | ICD-10-CM

## 2024-11-05 DIAGNOSIS — O09.521 MULTIGRAVIDA OF ADVANCED MATERNAL AGE IN FIRST TRIMESTER: ICD-10-CM

## 2024-11-05 DIAGNOSIS — E03.9 HYPOTHYROIDISM, UNSPECIFIED TYPE: ICD-10-CM

## 2024-11-05 LAB — HCG UR QL: POSITIVE

## 2024-11-05 PROCEDURE — 81025 URINE PREGNANCY TEST: CPT | Performed by: OBSTETRICS & GYNECOLOGY

## 2024-11-05 PROCEDURE — 36415 COLL VENOUS BLD VENIPUNCTURE: CPT | Performed by: OBSTETRICS & GYNECOLOGY

## 2024-11-05 PROCEDURE — 99213 OFFICE O/P EST LOW 20 MIN: CPT | Performed by: OBSTETRICS & GYNECOLOGY

## 2024-11-05 PROCEDURE — 84443 ASSAY THYROID STIM HORMONE: CPT | Performed by: OBSTETRICS & GYNECOLOGY

## 2024-11-05 NOTE — PROGRESS NOTES
SUBJECTIVE:                                                   Kamilah Garcia is a 39 year old female who presents to clinic today for the following health issue(s):  Patient presents with:  Prenatal Care: Took two at home test  and were positive, would like to discuss risks because of her age.     HPI:  Here to discuss positive pregnancy test. States this is a desired pregnancy. She has no abdominal pain outside of her baseline symptoms since her abdominoplasty and has no vaginal bleeding.   Has had x2 positive HPT. Unsure of LMP but does have regular cycles (reported to RN LMP within the last 6 weeks). Is scheduled for NOB RN visit on  and follow up in person with US .  Is AMA and wondering about genetic testing during the pregnancy.   Wondering about risks associated with adhesions. Notes history of abdominoplasty and after developed SIBO and was told this may be due to adhesions. Was tested a few years ago but did not complete antibiotic therapy due to cost. Was taking garlic supplements for this. She saw a GI provider that also did a double scope that was negative.   Has history of hypothyroidism and was on thyroid medication for 1-2 months.   Had history of IUD but this was removed 1.5 years ago.    No LMP recorded (lmp unknown)..     Patient is sexually active, .  Using none for contraception.    reports that she quit smoking about 14 years ago. Her smoking use included cigarettes. She has never used smokeless tobacco.    STD testing offered?  Declined    Health maintenance updated:  yes    Today's PHQ-2 Score:       1/10/2024     3:36 PM   PHQ-2 (  [employer])   Q1: Little interest or pleasure in doing things 1   Q2: Feeling down, depressed or hopeless 1   PHQ-2 Score 2     Today's PHQ-9 Score:       2024     2:26 PM   PHQ-9 SCORE   PHQ-9 Total Score MyChart 6 (Mild depression)   PHQ-9 Total Score 6     Today's HAMIDA-7 Score:       2023     8:47 AM   HAMIDA-7 SCORE   Total Score 11  "      Problem list and histories reviewed & adjusted, as indicated.  Additional history: as documented.    Patient Active Problem List   Diagnosis    Tic disorder    Migraine    HAMIDA (generalized anxiety disorder)    Atypical glandular cells on cervical Pap smear    Major depressive disorder, single episode, mild (H)    Family history of breast cancer    Small intestinal bacterial overgrowth (SIBO)    Tension headache     Past Surgical History:   Procedure Laterality Date    ABDOMINOPLASTY  2021    HC REMOVAL OF TONSILS,12+ Y/O  1996    Tonsils 11+y.o.      Social History     Tobacco Use    Smoking status: Former     Current packs/day: 0.00     Types: Cigarettes     Quit date: 2010     Years since quittin.4    Smokeless tobacco: Never   Substance Use Topics    Alcohol use: Yes     Alcohol/week: 1.0 standard drink of alcohol     Types: 1 Standard drinks or equivalent per week     Comment: occasionally 1-2 etoh a month/      Problem (# of Occurrences) Relation (Name,Age of Onset)    Alcohol/Drug (1) Mother: b:1964 addicted to crack    Family History Negative (4) Father: b: , Sister: b:, Sister: b:, Brother: b:    Breast Cancer (1) Sister (35): no information. hysterectomy/mastectomy    Cervical Cancer (1) Sister (35): 10/2015    No Known Problems (4) Maternal Grandmother, Maternal Grandfather, Paternal Grandmother, Other              Current Outpatient Medications   Medication Sig Dispense Refill    thyroid (ARMOUR) 60 MG tablet Take 60 mg by mouth daily (Patient not taking: Reported on 2024)       No current facility-administered medications for this visit.     Allergies   Allergen Reactions    Remeron [Mirtazapine]      Excess sedation even with 7.5mg dose       ROS:  12 point review of systems negative other than symptoms noted below or in the HPI.  No urinary frequency or dysuria, bladder or kidney problems      OBJECTIVE:     /60   Ht 1.702 m (5' 7\")   Wt 63.8 kg " (140 lb 9.6 oz)   LMP  (LMP Unknown)   BMI 22.02 kg/m    Body mass index is 22.02 kg/m .    Exam:  Constitutional:  Appearance: Well nourished, well developed alert, anxious  Neurologic:  Mental Status:  Oriented X3.  Normal strength and tone, sensory exam grossly normal, mentation intact and speech normal.    Psychiatric:  Mentation appears normal and affect normal/bright.     In-Clinic Test Results:  No results found for this or any previous visit (from the past 24 hours).      ASSESSMENT/PLAN:                                                        ICD-10-CM    1. Pregnancy test performed, pregnancy confirmed  Z32.01       2. Pre-procedure lab exam  Z01.812 HCG qualitative urine     HCG qualitative urine      3. Hypothyroidism, unspecified type  E03.9 TSH with free T4 reflex     TSH with free T4 reflex      4. Multigravida of advanced maternal age in first trimester  O09.521         Here to discuss chronic health conditions in the setting of positive home pregnancy test.     Today reviewed risks associated with AMA including increased risk of miscarriage, genetic abnormalities, preeclampsia, gestational diabetes,  delivery. Discussed all available genetic screening options during pregnancy including consult with genetic counselors.   In regards to adhesive disease, discussed no contraindication to trying for vaginal delivery if present. If  indicated, may increase complexity of case.   For history of hypothyroidism, can obtain TSH now as if abnormal would start synthroid.    For history of SIBO, can plan to reconnect her to GI as she feels symptoms are ongoing.   As she has no bleeding or pain, will keep NOB and US scheduled as is and complete NOB labs at that time. Precautions reviewed of when to contact clinic or present for evaluation.     Yuli Elder MD  [facility] [place]

## 2024-11-06 LAB — TSH SERPL DL<=0.005 MIU/L-ACNC: 2.28 UIU/ML (ref 0.3–4.2)

## 2024-11-11 ENCOUNTER — TELEPHONE (OUTPATIENT)
Dept: NURSING | Facility: CLINIC | Age: 39
End: 2024-11-11

## 2024-11-11 DIAGNOSIS — O36.80X0 PREGNANCY WITH INCONCLUSIVE FETAL VIABILITY: Primary | ICD-10-CM

## 2024-11-11 NOTE — TELEPHONE ENCOUNTER
Pt calls to reschedule MTOP as she wants surgical TOP regardless of how far she is.    Advised we need to wait until after her U/s to try and find an appt as all providers go based off of GA.     Also, advised to call PP as we are booked out for TOP.    Pt was frustrated but I explained the best that I could how many moving parts there are in the process.        Mary CLAYTON RN BSN  [facility]

## 2024-11-11 NOTE — TELEPHONE ENCOUNTER
Kamilah Garcia  LMP unknown--maybe two months ago      When was your positive pregnancy test? Couple weeks ago       Was the test more than 52 days ago (before 9/20/24)? No    Were you using hormonal birth control, an IUD or emergency contraception when you got pregnant? No    Have you ever been diagnosed with an ectopic pregnancy? No    Have you ever had a tubal ligation or sterilization procedure? No    Have you ever been diagnosed with pelvic inflammatory disease? No    Are you having one sided pelvic pain? No, pt states she has been sick and having lots of aches and pains, no intense.    How long are your typical menstrual cycles /  How many days from the start of one period to the start of the next one?  Usually regular but does not ay attention     During the past 3 months, has the time between periods varied from your typical cycles by more than a few days? No, cycles have been regular.    When was the first day of your last period? No LMP recorded (lmp unknown). Patient's LMP is unknown.  ULTRASOUND NEEDED    Was LMP more than 10 weeks (70 days) ago (before 9/2/24)? YES - ULTRASOUND NEEDED    Did your last period start earlier or later than you would have expected? No    Was your last period shorter than usual? No    Was the amount of bleeding in your last period normal for you? Yes    Have you had any other recent bleeding that was not normal for you? No    Based on the responses given by the patient, an ultrasound is indicated.    US order placed.  Routing to provider as BELKIS Pressley RN

## 2024-11-12 ENCOUNTER — ANCILLARY PROCEDURE (OUTPATIENT)
Dept: ULTRASOUND IMAGING | Facility: CLINIC | Age: 39
End: 2024-11-12
Attending: STUDENT IN AN ORGANIZED HEALTH CARE EDUCATION/TRAINING PROGRAM
Payer: COMMERCIAL

## 2024-11-12 DIAGNOSIS — O36.80X0 PREGNANCY WITH INCONCLUSIVE FETAL VIABILITY: ICD-10-CM

## 2024-11-12 PROCEDURE — 76801 OB US < 14 WKS SINGLE FETUS: CPT

## 2024-11-14 ENCOUNTER — OFFICE VISIT (OUTPATIENT)
Dept: PEDIATRICS | Facility: CLINIC | Age: 39
End: 2024-11-14
Payer: COMMERCIAL

## 2024-11-14 VITALS
HEIGHT: 66 IN | HEART RATE: 91 BPM | SYSTOLIC BLOOD PRESSURE: 100 MMHG | WEIGHT: 140.7 LBS | BODY MASS INDEX: 22.61 KG/M2 | TEMPERATURE: 97.7 F | DIASTOLIC BLOOD PRESSURE: 56 MMHG | RESPIRATION RATE: 18 BRPM | OXYGEN SATURATION: 98 %

## 2024-11-14 DIAGNOSIS — Z33.2 TERMINATION OF PREGNANCY (FETUS): Primary | ICD-10-CM

## 2024-11-14 LAB — HCG INTACT+B SERPL-ACNC: ABNORMAL MIU/ML

## 2024-11-14 PROCEDURE — 84702 CHORIONIC GONADOTROPIN TEST: CPT | Performed by: STUDENT IN AN ORGANIZED HEALTH CARE EDUCATION/TRAINING PROGRAM

## 2024-11-14 PROCEDURE — 36415 COLL VENOUS BLD VENIPUNCTURE: CPT | Performed by: STUDENT IN AN ORGANIZED HEALTH CARE EDUCATION/TRAINING PROGRAM

## 2024-11-14 RX ORDER — MIFEPRISTONE 200 MG/1
200 TABLET ORAL ONCE
Status: COMPLETED | OUTPATIENT
Start: 2024-11-14 | End: 2024-11-14

## 2024-11-14 RX ORDER — MISOPROSTOL 200 UG/1
800 TABLET ORAL ONCE
Qty: 4 TABLET | Refills: 1 | Status: SHIPPED | OUTPATIENT
Start: 2024-11-14 | End: 2024-11-14

## 2024-11-14 RX ORDER — ONDANSETRON 4 MG/1
4 TABLET, FILM COATED ORAL EVERY 6 HOURS PRN
Qty: 10 TABLET | Refills: 0 | Status: SHIPPED | OUTPATIENT
Start: 2024-11-14

## 2024-11-14 RX ADMIN — MIFEPRISTONE 200 MG: 200 TABLET ORAL at 11:52

## 2024-11-14 ASSESSMENT — PAIN SCALES - GENERAL: PAINLEVEL_OUTOF10: EXTREME PAIN (8)

## 2024-11-14 NOTE — PROGRESS NOTES
2024           Subjective:  Kamilah Garcia is a 39 year old  at   7 weeks gestation here today for medication .  Patient has been counseled on pregnancy options and desires medical termination of pregnancy.       Objective:  Dating:  No LMP recorded (lmp unknown).      OB History    Para Term  AB Living   2 2 2 0 0 2   SAB IAB Ectopic Multiple Live Births   0 0 0 0 2      # Outcome Date GA Lbr Eddie/2nd Weight Sex Type Anes PTL Lv   2 Term 10/29/14 39w6d 04:40 / 00:21 3.33 kg (7 lb 5.5 oz) F Vag-Spont EPI N LEAH      Name: Clary      Apgar1: 8  Apgar5: 9   1 Term 11 40w1d 07:34 / 01:28 3.56 kg (7 lb 13.6 oz) M Vag-Spont   LEAH      Name: Bandar      Apgar5: 9        Past Medical History:   Diagnosis Date    Anemia     with pregnancy    Anxiety 2014    Depression     Encounter for IUD removal 2023    Inserted 22, MIRENA Lot # [id number] Exp: 10/2024    History of varicella-documented immunity 2011    Migraine 2012    Other, mixed, or unspecified nondependent drug abuse, episodic     Tic age 14       Past Surgical History:   Procedure Laterality Date    ABDOMINOPLASTY  2021    HC REMOVAL OF TONSILS,12+ Y/O  1996    Tonsils 11+y.o.       Current Outpatient Medications   Medication Sig Dispense Refill    misoprostol (CYTOTEC) 200 MCG tablet Place 4 tablets (800 mcg) inside cheek once for 1 dose. 4 tablet 1    ondansetron (ZOFRAN) 4 MG tablet Take 1 tablet (4 mg) by mouth every 6 hours as needed for nausea. 10 tablet 0    thyroid (ARMOUR) 60 MG tablet Take 60 mg by mouth daily (Patient not taking: Reported on 2024)       Current Facility-Administered Medications   Medication Dose Route Frequency Provider Last Rate Last Admin    miFEPRIStone (MIFEPREX) tablet 200 mg  200 mg Oral Once            Allergies   Allergen Reactions    Remeron [Mirtazapine]      Excess sedation even with 7.5mg dose       Social History     Tobacco Use    Smoking status:  "Former     Current packs/day: 0.00     Types: Cigarettes     Quit date: 2010     Years since quittin.4    Smokeless tobacco: Never   Vaping Use    Vaping status: Never Used   Substance Use Topics    Alcohol use: Yes     Alcohol/week: 1.0 standard drink of alcohol     Types: 1 Standard drinks or equivalent per week     Comment: occasionally 1-2 etoh a month/    Drug use: No     Comment: marijuana hasn't used in 3 years, prior use of ampht, LSD, crack & cocaine. Sober since age 18         Vitals:    24 1120   BP: 100/56   BP Location: Right arm   Patient Position: Sitting   Cuff Size: Adult Regular   Pulse: 91   Resp: 18   Temp: 97.7  F (36.5  C)   TempSrc: Temporal   SpO2: 98%   Weight: 63.8 kg (140 lb 11.2 oz)   Height: 1.676 m (5' 6\")       Estimated body mass index is 22.71 kg/m  as calculated from the following:    Height as of this encounter: 1.676 m (5' 6\").    Weight as of this encounter: 63.8 kg (140 lb 11.2 oz).     Gen: well-appearing, cooperative, well-groomed      Assessment and Plan:  Kamilah Garcia is a 39 year old  at 7 weeks gestation as dated by Previous US who desires termination of pregnancy with medication .     Based the pre-medication  ultrasound assessment, ultrasound prior to  IS indicated.     The patient is appropriate for medication  with:  mifepristone and misoprostol.     Prior to the administration/prescribing of mifepristone, the patient received the medication guide and signed the patient agreement.      Mifepristone administered in clinic. Patient plans to take misoprostol by buccal route.     .    Counseled patient on   - expected bleeding: Bleeding typically starts 2-4 hours after misoprostol and can be heavy for up to 2 hours. Once pregnancy tissue passes, bleeding should slow down to menstrual type flow but can last up to 2 weeks. Patient counseled to contact our clinic or present to Emergency Department in the case of heavy " bleeding (soaking more than two maxi pads per hour for 2 consecutive hours). Also counseled to contact clinic if no bleeding within 24 hours after taking misoprostol.  - pain: Counseled patient that the most severe pain occurs approximately 2-4 hours after misoprostol use and lasts 1-2 hours. Advised patient to take ibuprofen 800 mg with misoprostol and repeat as needed every 8 hours. Patient may also alternate with acetaminophen for added pain control (acetaminophen 650 mg every 6 hours).   - potential side effects of misoprostol: nausea, vomiting, diarrhea, headache, dizziness, and thermoregulatory effects such as fever, warmth, hot flushes, or chills  - follow up plan options:   Home Pregnancy Test - Take a urine pregnancy test four weeks after taking  medication(s).  Blood Test - Get a blood test on the day of your appointment or the day you take your first dose of medication to measure the amount of pregnancy hormone (HCG) in your blood.  Get another HCG blood test 1-2 weeks after taking  medication(s).   Ultrasound - Get a vaginal ultrasound 1-2 weeks after taking  medication(s).  Patient plans two blood tests for follow up.  Also offered to discuss any contraceptive needs/plans with the patient today.     Chart routed to RN team (TOP Triage [id number]) who will follow up with patient via telephone within 1 week after clinic visit to assess patient.              Deirdre E. Milligan, MD

## 2024-11-14 NOTE — PROGRESS NOTES
"  {PROVIDER CHARTING PREFERENCE:512809}    Subjective   Kamilah is a 39 year old, presenting for the following health issues:  termination of pregnancy      11/14/2024    11:08 AM   Additional Questions   Roomed by Arianna   Accompanied by none         11/14/2024    11:08 AM   Patient Reported Additional Medications   Patient reports taking the following new medications none     HPI       Patient here for TOP  {additonal problems for provider to add (Optional):051125}    {ROS Picklists (Optional):528507}      Objective    /56 (BP Location: Right arm, Patient Position: Sitting, Cuff Size: Adult Regular)   Pulse 91   Temp 97.7  F (36.5  C) (Temporal)   Resp 18   Ht 1.676 m (5' 6\")   Wt 63.8 kg (140 lb 11.2 oz)   LMP  (LMP Unknown)   SpO2 98%   BMI 22.71 kg/m    Body mass index is 22.71 kg/m .  Physical Exam   {Exam List (Optional):984867}    {Diagnostic Test Results (Optional):579963}        Signed Electronically by: Deirdre E. Milligan, MD  {Email feedback regarding this note to [email or web address]   :[id number]}  "

## 2024-11-14 NOTE — PATIENT INSTRUCTIONS
MEDICATION TERMINATION OF PREGNANCY USING MIFEPRISTONE AND MISOPROSTOL    HOW DOES IT WORK?    Mifepristone and misoprostol together are medications that can be taken to end your pregnancy.      HOW WELL DOES THE MEDICATION WORK?    Medication  is highly effective. Medication  is highly effective; it has a success rate of >95% using the standard protocol. Medication  is safe. Serious complications requiring hospitalization for infection treatment or transfusion occur in <0.4% of patients under the standard protocol?. Failed or incomplete medication  may require a suction procedure to complete.    WHAT DO I DO?    You took one tablet of 200 mg mifepristone today during your visit or will take it at home as directed by your provider.   After taking the mifepristone, use the misoprostol.  There are two ways to take misoprostol: vaginal or buccal (between cheek and gum in your mouth).   Vaginal Use of Misoprostol (may be inserted immediately or up to 48 hrs after mifepristone)   Wash your hands and lie down. Place the 4 misoprostol tablets one at a time up into the vagina as far as you can using your finger. It doesn t matter where in the vagina you put the pills. Each misoprostol pill contains 200 micrograms.    If your provider has recommended a second dose of misoprostol, repeat the process 4 hours later with an additional 4 tablets of misoprostol. If you have started to bleed, you can put the pills in your cheeks (between your cheek and your gums), instead of your vagina, for 30 minutes. See  Buccal Use of Misoprostol  below.    Buccal Use of Misoprostol (should be taken between 24-48 hrs after mifepristone)   Place 2 tablets of misoprostol in each cheek (between your cheek and your gums), four tablets total.    Leave them in your cheeks for 30 minutes to dissolve.    After 30 minutes swallow the pills with a large glass of water.   If your provider has recommended a second dose of  misoprostol, repeat steps i. through iii. above after 4 hours.    You may take ibuprofen (800 mg total) and/or acetaminophen (650mg) by mouth one hour before using the misoprostol. This may help with cramping. See further information on pain management below.       WHAT HAPPENS NEXT?   Vaginal bleeding with clots is an expected part of this process. The cramps and bleeding may be stronger than your normal period. The cramps and heavy bleeding usually start 2 to 4 hours after you use the misoprostol pills. This heavy bleeding means the pills are working. After the pregnancy tissue passes, the bleeding will slow down and get lighter and lighter. It is normal to pass small clots and sometimes the bleeding may seem to increase when you get up suddenly or go to the toilet. Bleeding may continue on and off for up to 4 weeks.     Lower abdominal cramping pain, especially in the middle of your lower abdomen can occur any time after you take the misoprostol.? Cramping may be similar or sometimes much greater than with a menstrual period and can last for a couple of days. If you continue to have pain and cramps after taking the ibuprofen (as directed above), then you can use additional pain medicine such as acetaminophen (Tylenol).?   Nausea, diarrhea: These symptoms should get better a few hours after using the pills and should not last longer than 24 hours.    Fever and chills: You may have fever and chills the day you take the misoprostol. It is NOT normal to have a fever after that. Call us right away if you do. It could be a sign that you are getting an infection.   You will receive a phone call from a clinic nurse within a week of your visit.    You can expect a period in 4 to 8 weeks after using mifepristone and misoprostol but this varies quite a bit depending upon a person s normal menstrual cycle.      WHAT CAN I TAKE FOR PAIN, NAUSEA, DIARRHEA?    Pain:    Take ibuprofen (Motrin or Advil) 800 mg every 8 hours as  needed for pain. Take this with food to avoid an upset stomach. You may also alternate this with acetaminophen (Tylenol) 650mg every 6 hours for added pain control.   Comfort: A hot pack may help with cramps. You can also drink some hot tea. Rest in a soothing place.    Nausea   Take ondansetron or promethazine as needed for nausea and vomiting as needed for nausea and vomiting if prescribed.   Diarrhea   Take Loperamide as needed for diarrhea. Take 2 capsules after the first loose bowel movement. Can take 1 capsule after each additional loose stool. Do not take more than 8 capsules in a day.     WHEN SHOULD I CALL MY HEALTHCARE PROVIDER?    Your bleeding soaks through more than 2 maxi pads per hour for 2 hours in a row   You do not bleed within 24 hours after taking the misoprostol   You continue to feel sick more than 24 hours after taking the misoprostol:   Fever on an oral thermometer of 100.4 degrees Fahrenheit, severe stomach area (abdominal) pain, weakness, nausea, vomiting, or diarrhea     The clinic phone is answered 24 hours per day. If it is after clinic hours, leave a message with the answering service and a health care provider will call you back. Please call if you have any questions, think something is going wrong, or think you have an emergency. If you do not receive a call back within an hour, go to the nearest emergency room.              FEELINGS AFTER ENDING PREGNANCY    People have many different feelings after using the medications to end a pregnancy. The most common emotion people report is relief, but people can also feel many other emotions. If you think your emotions are not what they should be, please talk to us.        References:   American College of Obstetricians and Gynecologists  Committee on Practice Bulletins--Gynecology, Society of Family Planning. Medication  Up to 70 Days of Gestation: ACOG Practice Bulletin, Number 225. Obstet Gynecol. 2020;136(4):e31-e47. doi:  10.1097/AOG.[id number].    Reproductive Health Access Project. Medication  Aftercare Instructions (vaginal miso). May 2022. Available at: https://www.reproductiveaccess.org/wp-content/uploads/4751-31-Vjtyqmeml_VhqstarJeuqNhl-final.pdf   Reproductive Health Access Project. Medication  Aftercare Instructions (buccal miso). May 2022. Available at: https://www.reproductiveaccess.org/wp-content/uploads/-english-buccal-med-ab-aftercare_final.pdf

## 2024-11-14 NOTE — LETTER
November 14, 2024      Kamilah Garcia  [address] [address]        To Whom It May Concern:    Kamilah Garcia was seen on 11/14/24.  Please excuse her from work on 11/12-11/13/24 due to illness.        Sincerely,        Deirdre E. Milligan, MD

## 2024-11-18 ENCOUNTER — TELEPHONE (OUTPATIENT)
Dept: PEDIATRICS | Facility: CLINIC | Age: 39
End: 2024-11-18
Payer: COMMERCIAL

## 2024-11-18 NOTE — TELEPHONE ENCOUNTER
Pt seen for MTOP on 11/14/2024- RN routing to [facility] for follow up call    Olya Maldonado RN on 11/18/2024 at 9:05 AM

## 2024-11-19 NOTE — TELEPHONE ENCOUNTER
Unable to reach patient via phone. RN left a message and instructed patient to call the clinic at [phone].    Olya Maldonado RN on 11/19/2024 at 8:31 AM

## 2024-11-20 NOTE — TELEPHONE ENCOUNTER
Unable to reach patient via phone. Left message to call back at [phone].     Haley DOWNING RN -  [facility] group

## 2024-11-21 NOTE — TELEPHONE ENCOUNTER
Medication Termination of Pregnancy Follow Up Assessment    Kamilah Garcia is 39 year old who had a medication . Patient took misoprostol on 11/15/2024      Bleeding: Patient reports MODERATE bleedin-2 pads per hour; small-medium sized clots (e.g. pea, grape, small coin).    Pt still having some cramping- also dealing with constipation. Changing a pad very infrequently over the past two days. RN advised OTC stool softener and ibuprofen for cramping- to reach out if cramping worsens or new symptoms arise (fever, increase in bleeding, abnormal discharge, etc.)    Infection: Patient denies signs/symptoms of infection, including fever.    Coping:  Patient reports coping OK . Denies any need for additional resources at this time.  Patient reports feeling supported at home.      Follow Up:   Patient is doing home pregnancy or follow up.  Results expected: in 3 weeks. Results will be sent to provider to finalize follow-up plan.  Patient to expect call back with results / plan.    Patient encouraged to call triage as needed.     Olya Maldonado RN

## 2024-11-21 NOTE — TELEPHONE ENCOUNTER
Unable to reach patient via phone. RN left a message and instructed patient to call the clinic at [phone].    RN also sent mychart.    Olya Maldonado RN on 11/21/2024 at 9:49 AM

## 2024-12-03 ENCOUNTER — TELEPHONE (OUTPATIENT)
Dept: PEDIATRICS | Facility: CLINIC | Age: 39
End: 2024-12-03
Payer: COMMERCIAL

## 2024-12-03 ENCOUNTER — TELEMEDICINE (OUTPATIENT)
Dept: OBGYN | Facility: CLINIC | Age: 39
End: 2024-12-03
Payer: COMMERCIAL

## 2024-12-03 DIAGNOSIS — Z33.2 TERMINATION OF PREGNANCY (FETUS): Primary | ICD-10-CM

## 2024-12-03 NOTE — TELEPHONE ENCOUNTER
Years recommend serum hcg (standing orders in place) and repeat ultrasound.    If retained products of conception and/or elevated hcg recommend OBGyn appointment.    Deirdre Milligan, MD  Internal Medicine & Pediatrics  [facility]  She/her

## 2024-12-03 NOTE — TELEPHONE ENCOUNTER
LMTCB, repeat HCG and US recommended at this time.  Eva Pressley RN on 12/3/2024 at 1:26 PM   [facility]

## 2024-12-03 NOTE — TELEPHONE ENCOUNTER
"Pt calling following TOP  States she is still bleeding, still passing a little bit of tissue.  UPT positive yesterday    Over the last week has been wearing a panty liner, more so light bleeding. Bleeding still dark red in color.  Still has occasional \"tissue\" clots, here and there, little bit  intermittently every day  Denies any abdominal pain or fevers.    Offered pt appt tomorrow 12/4 with Arnaldo, pt states she cannot make that, needs to work.  Would not be able to get to clinic until next week.  Encouraged pt that repeat HCG would most likely be recommended prior to next week.    ER precautions reviewed.  Pt verbalized understanding, in agreement with plan, and voiced no further questions.    Routing to provider to advise  HCG? US?  Eva Pressley RN on 12/3/2024 at 11:52 AM   [facility]  "

## 2024-12-04 NOTE — TELEPHONE ENCOUNTER
Call to patient to discuss next steps.     Patient very frustrated as she does not think she should have to pay for an ultrasound since she already paid for the service. RN explained it is a risk of the process that it may not be 100% successful and may need additional follow-up.     I did tell her that I would provide her with cost of care number and associated CPT codes to get an estimate.     Conversation abruptly ended as patient frustrated with the time she has taken out of her day to talk about this and still no resolution.     [facility] follow[facility]up to the patient.

## 2024-12-05 ENCOUNTER — LAB (OUTPATIENT)
Dept: LAB | Facility: CLINIC | Age: 39
End: 2024-12-05
Payer: COMMERCIAL

## 2024-12-05 DIAGNOSIS — Z33.2 TERMINATION OF PREGNANCY (FETUS): ICD-10-CM

## 2024-12-09 ENCOUNTER — ANCILLARY PROCEDURE (OUTPATIENT)
Dept: ULTRASOUND IMAGING | Facility: CLINIC | Age: 39
End: 2024-12-09
Attending: OBSTETRICS & GYNECOLOGY
Payer: COMMERCIAL

## 2024-12-09 ENCOUNTER — LAB (OUTPATIENT)
Dept: LAB | Facility: CLINIC | Age: 39
End: 2024-12-09
Payer: COMMERCIAL

## 2024-12-09 ENCOUNTER — TELEPHONE (OUTPATIENT)
Dept: OBGYN | Facility: CLINIC | Age: 39
End: 2024-12-09

## 2024-12-09 DIAGNOSIS — Z09 FOLLOW-UP EXAM: ICD-10-CM

## 2024-12-09 DIAGNOSIS — O04.80 COMPLICATION FOLLOWING MEDICAL ABORTION: ICD-10-CM

## 2024-12-09 DIAGNOSIS — O03.4 RETAINED PRODUCTS OF CONCEPTION FOLLOWING ABORTION: ICD-10-CM

## 2024-12-09 DIAGNOSIS — Z09 FOLLOW-UP EXAM: Primary | ICD-10-CM

## 2024-12-09 LAB
ERYTHROCYTE [DISTWIDTH] IN BLOOD BY AUTOMATED COUNT: 12.7 % (ref 10–15)
HCG INTACT+B SERPL-ACNC: 593 MIU/ML
HCT VFR BLD AUTO: 40.5 % (ref 35–47)
HGB BLD-MCNC: 12.8 G/DL (ref 11.7–15.7)
MCH RBC QN AUTO: 28.1 PG (ref 26.5–33)
MCHC RBC AUTO-ENTMCNC: 31.6 G/DL (ref 31.5–36.5)
MCV RBC AUTO: 89 FL (ref 78–100)
PLATELET # BLD AUTO: 273 10E3/UL (ref 150–450)
RBC # BLD AUTO: 4.55 10E6/UL (ref 3.8–5.2)
WBC # BLD AUTO: 5.3 10E3/UL (ref 4–11)

## 2024-12-09 PROCEDURE — 76830 TRANSVAGINAL US NON-OB: CPT | Performed by: STUDENT IN AN ORGANIZED HEALTH CARE EDUCATION/TRAINING PROGRAM

## 2024-12-09 PROCEDURE — 84702 CHORIONIC GONADOTROPIN TEST: CPT

## 2024-12-09 PROCEDURE — 36415 COLL VENOUS BLD VENIPUNCTURE: CPT

## 2024-12-09 PROCEDURE — 85027 COMPLETE CBC AUTOMATED: CPT

## 2024-12-09 NOTE — TELEPHONE ENCOUNTER
Pt did show up for the US at 1000 and having blood drawn for HCG and pt requested CBC  Order rec'd from Dr Elder to add the CBC as well.  Order placed.    RN went and spoke w pt and in lab and supported her and told her how glad nurse was she proceeded w US - understanding insurance and cost challenges are difficult.  Pt verbalized understanding, in agreement with plan, and voiced no further questions.    CBC added per Dr Elder's order    Mayela Tate RN on 12/9/2024 at 10:16 AM

## 2024-12-09 NOTE — TELEPHONE ENCOUNTER
"US at 1045 and f/up w Dr Elder tomorrow  12/5 Thursday     Bleeding and clots stopped a few days ago and wasn't much when passing  Lasted a little less than 3 weeks  Doesn't feel ill - denies fever, body aches and chills    Isn't really getting answers on cost of US and is frustrated about the cost of her TOP meds/visit.  Living paycheck to [employer]wer[employer]check. Cost of healthcare overall is overwhelming to her. She even said once \"Will just Google and go from there\"  NEEDS to cancel US today and asking for \"CBC and blood cx instead\"    RN walked through that she may have retained RPOC but not an infection at this time, a cx may not be advised w/o fever. Discussed fever/sepsis can come on very suddenly and then is an ER visit which will be much more expensive to her.    Pt reiterated need to cx today's US - on her way to work and already late. US cancelled and will discuss w Dr Elder alternative/reasonable options and \"message pt back via [employer]\" as she will be at work.  STILL has her apt tomorrow scheduled w Dr Elder.    RN returned to this encounter to update Dr Elder over the phone and noted that patient had RESCHEDULED her US for today at 1000. Updated Dr Elder on the last few weeks and why doing an US - Dr Elder would like that US still and a HCG quant - no CBC or blood cx as it would not be helpful w/o fever or sx of infection.    RN Sent pt a [employer]art to inquire on her thoughts - scheduled a lab visit - but just to hold a spot for now until know pt is willing.    Mayela Tate RN on 12/9/2024 at 9:33 AM      "

## 2024-12-10 ENCOUNTER — OFFICE VISIT (OUTPATIENT)
Dept: OBGYN | Facility: CLINIC | Age: 39
End: 2024-12-10
Attending: OBSTETRICS & GYNECOLOGY
Payer: COMMERCIAL

## 2024-12-10 ENCOUNTER — PREP FOR PROCEDURE (OUTPATIENT)
Dept: OBGYN | Facility: CLINIC | Age: 39
End: 2024-12-10

## 2024-12-10 ENCOUNTER — TELEPHONE (OUTPATIENT)
Dept: OBGYN | Facility: CLINIC | Age: 39
End: 2024-12-10

## 2024-12-10 VITALS — DIASTOLIC BLOOD PRESSURE: 60 MMHG | SYSTOLIC BLOOD PRESSURE: 108 MMHG | WEIGHT: 143.6 LBS | BODY MASS INDEX: 23.18 KG/M2

## 2024-12-10 DIAGNOSIS — O07.4 RETAINED PRODUCTS OF CONCEPTION AFTER INDUCED TERMINATION OF PREGNANCY: Primary | ICD-10-CM

## 2024-12-10 DIAGNOSIS — O07.4 RETAINED PRODUCTS OF CONCEPTION AFTER INDUCED TERMINATION OF PREGNANCY: ICD-10-CM

## 2024-12-10 DIAGNOSIS — O03.4 RETAINED PRODUCTS OF CONCEPTION FOLLOWING ABORTION: Primary | ICD-10-CM

## 2024-12-10 PROCEDURE — 99213 OFFICE O/P EST LOW 20 MIN: CPT | Performed by: OBSTETRICS & GYNECOLOGY

## 2024-12-10 RX ORDER — ONDANSETRON 4 MG/1
4 TABLET, ORALLY DISINTEGRATING ORAL ONCE
Status: CANCELLED | OUTPATIENT
Start: 2024-12-10 | End: 2024-12-10

## 2024-12-10 RX ORDER — DOXYCYCLINE HYCLATE 50 MG/1
200 CAPSULE ORAL ONCE
Status: CANCELLED | OUTPATIENT
Start: 2024-12-10 | End: 2024-12-10

## 2024-12-10 RX ORDER — ACETAMINOPHEN 325 MG/1
975 TABLET ORAL ONCE
Status: CANCELLED | OUTPATIENT
Start: 2024-12-10 | End: 2024-12-10

## 2024-12-10 RX ORDER — ONDANSETRON 2 MG/ML
4 INJECTION INTRAMUSCULAR; INTRAVENOUS ONCE
Status: CANCELLED | OUTPATIENT
Start: 2024-12-10

## 2024-12-10 NOTE — PROGRESS NOTES
"  SUBJECTIVE:                                                   Kamilah Garcia is a 39 year old female who presents to clinic today for the following health issue(s):  Patient presents with:  Follow Up: US follow up      HPI:  Here for concern of retained products of conception.  Patient underwent TOP on 2024 at 7 weeks gestation w/ mifepristone and misoprostol.   Patient reports very heavy bleeding for 8 hours and then bleeding continued to lighten up. Was passing clots and \"pieces of tissue\". Has had more spotting after and wearing a liner for the last 3 weeks. Has some intermittent stomach pain but not sure if related to her other GI issues.   hCG 5 days ago 956 and 593 yesterday. Had US yesterday showing RPOC.  No fevers or chills today. Felt maybe feverish yesterday but did not take her temp.   No abnormal vaginal discharge.   No nausea/vomiting and tolerating a general diet.      No LMP recorded (lmp unknown)..     Patient is sexually active, .  Using none for contraception.    reports that she quit smoking about 14 years ago. Her smoking use included cigarettes. She has never used smokeless tobacco.    STD testing offered?  Declined    Health maintenance updated:  yes    Today's PHQ-2 Score:       1/10/2024     3:36 PM   PHQ-2 (  [employer])   Q1: Little interest or pleasure in doing things 1   Q2: Feeling down, depressed or hopeless 1   PHQ-2 Score 2     Today's PHQ-9 Score:       2024     2:26 PM   PHQ-9 SCORE   PHQ-9 Total Score MyChart 6 (Mild depression)   PHQ-9 Total Score 6     Today's HAMIDA-7 Score:       2023     8:47 AM   HAMIDA-7 SCORE   Total Score 11       Problem list and histories reviewed & adjusted, as indicated.  Additional history: as documented.    Patient Active Problem List   Diagnosis    Tic disorder    Migraine    HAMIDA (generalized anxiety disorder)    Atypical glandular cells on cervical Pap smear    Major depressive disorder, single episode, mild (H)    Family history " of breast cancer    Small intestinal bacterial overgrowth (SIBO)    Tension headache     Past Surgical History:   Procedure Laterality Date    ABDOMINOPLASTY  2021    HC REMOVAL OF TONSILS,12+ Y/O  1996    Tonsils 11+y.o.      Social History     Tobacco Use    Smoking status: Former     Current packs/day: 0.00     Types: Cigarettes     Quit date: 2010     Years since quittin.5    Smokeless tobacco: Never   Substance Use Topics    Alcohol use: Yes     Alcohol/week: 1.0 standard drink of alcohol     Types: 1 Standard drinks or equivalent per week     Comment: occasionally 1-2 etoh a month/      Problem (# of Occurrences) Relation (Name,Age of Onset)    Alcohol/Drug (1) Mother: b: addicted to crack    Family History Negative (4) Father: b: , Sister: b:, Sister: b:, Brother: b:    Breast Cancer (1) Sister (35): no information. hysterectomy/mastectomy    Cervical Cancer (1) Sister (35): 10/2015    No Known Problems (4) Maternal Grandmother, Maternal Grandfather, Paternal Grandmother, Other              Current Outpatient Medications   Medication Sig Dispense Refill    ondansetron (ZOFRAN) 4 MG tablet Take 1 tablet (4 mg) by mouth every 6 hours as needed for nausea. (Patient not taking: Reported on 12/10/2024) 10 tablet 0    thyroid (ARMOUR) 60 MG tablet Take 60 mg by mouth daily (Patient not taking: Reported on 12/10/2024)       No current facility-administered medications for this visit.     Allergies   Allergen Reactions    Remeron [Mirtazapine]      Excess sedation even with 7.5mg dose       ROS:  12 point review of systems negative other than symptoms noted below or in the HPI.  No urinary frequency or dysuria, bladder or kidney problems      OBJECTIVE:     /60   Wt 65.1 kg (143 lb 9.6 oz)   LMP  (LMP Unknown)   BMI 23.18 kg/m    Body mass index is 23.18 kg/m .    Exam:  Constitutional:  Appearance: Well nourished, well developed alert, in no acute distress  Neck:   Lymph Nodes:  No lymphadenopathy present; Thyroid:  Gland size normal, nontender, no nodules or masses present on palpation  Chest:  Respiratory Effort:  Breathing unlabored. Clear to auscultation bilaterally.   Cardiovascular: Heart: Auscultation:  Regular rate, normal rhythm, no murmurs present  Gastrointestinal:  Abdominal Examination:  Abdomen nontender to palpation, tone normal without rigidity or guarding, no masses present, umbilicus without lesions  Neurologic:  Mental Status:  Oriented X3.  Normal strength and tone, sensory exam grossly normal, mentation intact and speech normal.    Psychiatric:  Mentation appears normal and affect normal/bright.     Obstetrical Ultrasound Report  OB U/S  < 14 Weeks -  Transvaginal  [facility]  Referring Provider: Yuli Elder MD   Sonographer: Gabrielle Frost RDMS  Indication:  Assess for RPOC s/p med TOP, Complication following medical       Dating (mm/dd/yyyy):   LMP: Unknown     Anatomy Scan:  Lopes gestation.  Biometry:  Gest Sac MSD    Not seen  CRL                       Not seen               Yolk Sac                              Not seen               Endo Stripe (If no IUP)    20.36mm                                               Cardiac activity:  N/A  Findings: Products of conception in uterine cavity.     Maternal Structures:  Cervix: The cervix appears long and closed.  Right Ovary: Wnl  Left Ovary: Wnl  Technique:Transvaginal Imaging performed     Impression:   No IUP is seen. Heterogeneous, thickened tissue present in endometrial cavity at the fundus, measuring 20.3mm, consistent with retained products of conception.      Nadya Mcintosh MD, S  24       In-Clinic Test Results:  Results for orders placed or performed in visit on 24 (from the past 24 hours)   HCG quantitative pregnancy   Result Value Ref Range    hCG Quantitative 593 (H) <5 mIU/mL   CBC with platelets   Result Value Ref Range    WBC Count 5.3  4.0 - 11.0 10e3/uL    RBC Count 4.55 3.80 - 5.20 10e6/uL    Hemoglobin 12.8 11.7 - 15.7 g/dL    Hematocrit 40.5 35.0 - 47.0 %    MCV 89 78 - 100 fL    MCH 28.1 26.5 - 33.0 pg    MCHC 31.6 31.5 - 36.5 g/dL    RDW 12.7 10.0 - 15.0 %    Platelet Count 273 150 - 450 10e3/uL       ASSESSMENT/PLAN:                                                        ICD-10-CM    1. Retained products of conception after induced termination of pregnancy  O07.4         Here for management of RPOC s/p medical TOP. US showed retained POC. Being approximately 1 month s/p medical management with large/heterogenous endometrial stripe, recommend suction D&C. Would not recommend cytotec as feel this would be less likely successfully pass all the tissue. Risks of infection, bleeding, and damage to nearby structures (including uterine perforation) all discussed today. Benefits and alternative discussed. Approve 3 days off of work (day of procedure and following 2 days) and will provide note day of. Patient declines Care coordination consult or additional resources to discuss emotional/financial support. Will schedule Suction D&C under US guidance.     Yuli Elder MD  [facility] [place]

## 2024-12-10 NOTE — TELEPHONE ENCOUNTER
Type of surgery: SUCTION D&C w/US  Location of surgery: [facility]  Date and time of surgery: 12/12/2024 7:30a  Surgeon: ADEEL  Pre-Op Appt Date: 12/10/2024  Post-Op Appt Date: NA   Packet sent out:  HANDED IN CLINIC 12/10/2024  Pre-cert/Authorization completed:   TBD  Date: 12/10/2024 Michelle with NELY and then via teams    Leilani Traore  Surgery Scheduler

## 2024-12-10 NOTE — TELEPHONE ENCOUNTER
ERAS BAG GIVEN No  ERAS INSTRUCTIONS EXPLAINED No    ASSIST: (MD, PA, CNM, NONE) NA    H&P TO BE COMPLETED BY:   Surgeon  FOR H&P TO BE DONE BY SURGEON   ALREADY DONE:  DATE  12/10/24   SAME DAY/OBSERVATION/INPATIENT: STRAIGHT SAME DAY  EQUIPMENT: US, suction D&C  VENDOR NEEDED AT CASE: NA  IF IUD WHAT BRAND NA  LABS/SPECIAL INSTRUCTIONS: NA  TIME OFF WORK: 3 days (day of procedure and 48 hours after)  DO YOU ESTIMATE EXTRA TIME NEEDED THAN YOUR AVERAGE ON THIS CASE? No  IF YES HOW MUCH EXTRA TIME IN MINUTES? No  POST OP TO BE SCHEDULED NA WEEKS AFTER SX. APPOINTMENT LENGTH IN MINUTES NA

## 2024-12-12 ENCOUNTER — HOSPITAL ENCOUNTER (OUTPATIENT)
Facility: CLINIC | Age: 39
Discharge: HOME | End: 2024-12-12
Attending: OBSTETRICS & GYNECOLOGY | Admitting: OBSTETRICS & GYNECOLOGY
Payer: COMMERCIAL

## 2024-12-12 ENCOUNTER — HOSPITAL ENCOUNTER (OUTPATIENT)
Dept: ULTRASOUND IMAGING | Facility: CLINIC | Age: 39
Discharge: HOME | End: 2024-12-12
Attending: OBSTETRICS & GYNECOLOGY | Admitting: OBSTETRICS & GYNECOLOGY
Payer: COMMERCIAL

## 2024-12-12 ENCOUNTER — ANESTHESIA EVENT (OUTPATIENT)
Dept: SURGERY | Facility: CLINIC | Age: 39
End: 2024-12-12
Payer: COMMERCIAL

## 2024-12-12 ENCOUNTER — ANESTHESIA (OUTPATIENT)
Dept: SURGERY | Facility: CLINIC | Age: 39
End: 2024-12-12
Payer: COMMERCIAL

## 2024-12-12 VITALS
HEART RATE: 66 BPM | SYSTOLIC BLOOD PRESSURE: 102 MMHG | DIASTOLIC BLOOD PRESSURE: 63 MMHG | WEIGHT: 143 LBS | TEMPERATURE: 97.8 F | BODY MASS INDEX: 22.98 KG/M2 | RESPIRATION RATE: 12 BRPM | HEIGHT: 66 IN | OXYGEN SATURATION: 100 %

## 2024-12-12 DIAGNOSIS — O07.4 RETAINED PRODUCTS OF CONCEPTION AFTER INDUCED TERMINATION OF PREGNANCY: ICD-10-CM

## 2024-12-12 LAB
ABO + RH BLD: NORMAL
BLD GP AB SCN SERPL QL: NEGATIVE
SPECIMEN EXP DATE BLD: NORMAL

## 2024-12-12 PROCEDURE — 88305 TISSUE EXAM BY PATHOLOGIST: CPT | Mod: TC | Performed by: OBSTETRICS & GYNECOLOGY

## 2024-12-12 PROCEDURE — 258N000003 HC RX IP 258 OP 636: Performed by: ANESTHESIOLOGY

## 2024-12-12 PROCEDURE — 999N000141 HC STATISTIC PRE-PROCEDURE NURSING ASSESSMENT: Performed by: OBSTETRICS & GYNECOLOGY

## 2024-12-12 PROCEDURE — 59812 TREATMENT OF MISCARRIAGE: CPT | Performed by: OBSTETRICS & GYNECOLOGY

## 2024-12-12 PROCEDURE — 710N000012 HC RECOVERY PHASE 2, PER MINUTE: Performed by: OBSTETRICS & GYNECOLOGY

## 2024-12-12 PROCEDURE — 360N000076 HC SURGERY LEVEL 3, PER MIN: Performed by: OBSTETRICS & GYNECOLOGY

## 2024-12-12 PROCEDURE — 710N000009 HC RECOVERY PHASE 1, LEVEL 1, PER MIN: Performed by: OBSTETRICS & GYNECOLOGY

## 2024-12-12 PROCEDURE — 86900 BLOOD TYPING SEROLOGIC ABO: CPT | Performed by: OBSTETRICS & GYNECOLOGY

## 2024-12-12 PROCEDURE — 36415 COLL VENOUS BLD VENIPUNCTURE: CPT | Performed by: OBSTETRICS & GYNECOLOGY

## 2024-12-12 PROCEDURE — 999N000063 US INTRAOPERATIVE: Mod: TC

## 2024-12-12 PROCEDURE — 250N000009 HC RX 250: Performed by: NURSE ANESTHETIST, CERTIFIED REGISTERED

## 2024-12-12 PROCEDURE — 272N000001 HC OR GENERAL SUPPLY STERILE: Performed by: OBSTETRICS & GYNECOLOGY

## 2024-12-12 PROCEDURE — 370N000017 HC ANESTHESIA TECHNICAL FEE, PER MIN: Performed by: OBSTETRICS & GYNECOLOGY

## 2024-12-12 PROCEDURE — 250N000013 HC RX MED GY IP 250 OP 250 PS 637: Performed by: OBSTETRICS & GYNECOLOGY

## 2024-12-12 PROCEDURE — 76998 US GUIDE INTRAOP: CPT | Mod: 26 | Performed by: OBSTETRICS & GYNECOLOGY

## 2024-12-12 PROCEDURE — 250N000011 HC RX IP 250 OP 636: Performed by: NURSE ANESTHETIST, CERTIFIED REGISTERED

## 2024-12-12 PROCEDURE — 250N000009 HC RX 250: Performed by: OBSTETRICS & GYNECOLOGY

## 2024-12-12 PROCEDURE — 250N000011 HC RX IP 250 OP 636: Performed by: OBSTETRICS & GYNECOLOGY

## 2024-12-12 RX ORDER — NALOXONE HYDROCHLORIDE 0.4 MG/ML
0.1 INJECTION, SOLUTION INTRAMUSCULAR; INTRAVENOUS; SUBCUTANEOUS
Status: DISCONTINUED | OUTPATIENT
Start: 2024-12-12 | End: 2024-12-12 | Stop reason: HOSPADM

## 2024-12-12 RX ORDER — HYDROMORPHONE HCL IN WATER/PF 6 MG/30 ML
0.4 PATIENT CONTROLLED ANALGESIA SYRINGE INTRAVENOUS EVERY 5 MIN PRN
Status: DISCONTINUED | OUTPATIENT
Start: 2024-12-12 | End: 2024-12-12 | Stop reason: HOSPADM

## 2024-12-12 RX ORDER — ACETAMINOPHEN 325 MG/1
975 TABLET ORAL ONCE
Status: DISCONTINUED | OUTPATIENT
Start: 2024-12-12 | End: 2024-12-12 | Stop reason: HOSPADM

## 2024-12-12 RX ORDER — HYDROMORPHONE HCL IN WATER/PF 6 MG/30 ML
0.2 PATIENT CONTROLLED ANALGESIA SYRINGE INTRAVENOUS EVERY 5 MIN PRN
Status: DISCONTINUED | OUTPATIENT
Start: 2024-12-12 | End: 2024-12-12 | Stop reason: HOSPADM

## 2024-12-12 RX ORDER — PROPOFOL 10 MG/ML
INJECTION, EMULSION INTRAVENOUS CONTINUOUS PRN
Status: DISCONTINUED | OUTPATIENT
Start: 2024-12-12 | End: 2024-12-12

## 2024-12-12 RX ORDER — FENTANYL CITRATE 50 UG/ML
25 INJECTION, SOLUTION INTRAMUSCULAR; INTRAVENOUS EVERY 5 MIN PRN
Status: DISCONTINUED | OUTPATIENT
Start: 2024-12-12 | End: 2024-12-12 | Stop reason: HOSPADM

## 2024-12-12 RX ORDER — ACETAMINOPHEN 325 MG/1
975 TABLET ORAL ONCE
Status: COMPLETED | OUTPATIENT
Start: 2024-12-12 | End: 2024-12-12

## 2024-12-12 RX ORDER — DOXYCYCLINE 100 MG/1
200 CAPSULE ORAL ONCE
Status: COMPLETED | OUTPATIENT
Start: 2024-12-12 | End: 2024-12-12

## 2024-12-12 RX ORDER — LIDOCAINE HYDROCHLORIDE 10 MG/ML
INJECTION, SOLUTION INFILTRATION; PERINEURAL
Status: DISCONTINUED
Start: 2024-12-12 | End: 2024-12-12 | Stop reason: HOSPADM

## 2024-12-12 RX ORDER — ONDANSETRON 2 MG/ML
INJECTION INTRAMUSCULAR; INTRAVENOUS PRN
Status: DISCONTINUED | OUTPATIENT
Start: 2024-12-12 | End: 2024-12-12

## 2024-12-12 RX ORDER — LIDOCAINE 40 MG/G
CREAM TOPICAL
Status: DISCONTINUED | OUTPATIENT
Start: 2024-12-12 | End: 2024-12-12 | Stop reason: HOSPADM

## 2024-12-12 RX ORDER — ONDANSETRON 2 MG/ML
4 INJECTION INTRAMUSCULAR; INTRAVENOUS EVERY 30 MIN PRN
Status: DISCONTINUED | OUTPATIENT
Start: 2024-12-12 | End: 2024-12-12 | Stop reason: HOSPADM

## 2024-12-12 RX ORDER — LIDOCAINE HYDROCHLORIDE 20 MG/ML
INJECTION, SOLUTION INFILTRATION; PERINEURAL PRN
Status: DISCONTINUED | OUTPATIENT
Start: 2024-12-12 | End: 2024-12-12

## 2024-12-12 RX ORDER — SODIUM CHLORIDE, SODIUM LACTATE, POTASSIUM CHLORIDE, CALCIUM CHLORIDE 600; 310; 30; 20 MG/100ML; MG/100ML; MG/100ML; MG/100ML
INJECTION, SOLUTION INTRAVENOUS CONTINUOUS
Status: DISCONTINUED | OUTPATIENT
Start: 2024-12-12 | End: 2024-12-12 | Stop reason: HOSPADM

## 2024-12-12 RX ORDER — FENTANYL CITRATE 50 UG/ML
50 INJECTION, SOLUTION INTRAMUSCULAR; INTRAVENOUS EVERY 5 MIN PRN
Status: DISCONTINUED | OUTPATIENT
Start: 2024-12-12 | End: 2024-12-12 | Stop reason: HOSPADM

## 2024-12-12 RX ORDER — DEXAMETHASONE SODIUM PHOSPHATE 4 MG/ML
4 INJECTION, SOLUTION INTRA-ARTICULAR; INTRALESIONAL; INTRAMUSCULAR; INTRAVENOUS; SOFT TISSUE
Status: DISCONTINUED | OUTPATIENT
Start: 2024-12-12 | End: 2024-12-12 | Stop reason: HOSPADM

## 2024-12-12 RX ORDER — ONDANSETRON 4 MG/1
4 TABLET, ORALLY DISINTEGRATING ORAL ONCE
Status: COMPLETED | OUTPATIENT
Start: 2024-12-12 | End: 2024-12-12

## 2024-12-12 RX ORDER — FENTANYL CITRATE 50 UG/ML
INJECTION, SOLUTION INTRAMUSCULAR; INTRAVENOUS PRN
Status: DISCONTINUED | OUTPATIENT
Start: 2024-12-12 | End: 2024-12-12

## 2024-12-12 RX ORDER — ONDANSETRON 2 MG/ML
4 INJECTION INTRAMUSCULAR; INTRAVENOUS ONCE
Status: COMPLETED | OUTPATIENT
Start: 2024-12-12 | End: 2024-12-12

## 2024-12-12 RX ORDER — ONDANSETRON 4 MG/1
4 TABLET, ORALLY DISINTEGRATING ORAL EVERY 30 MIN PRN
Status: DISCONTINUED | OUTPATIENT
Start: 2024-12-12 | End: 2024-12-12 | Stop reason: HOSPADM

## 2024-12-12 RX ORDER — KETOROLAC TROMETHAMINE 30 MG/ML
INJECTION, SOLUTION INTRAMUSCULAR; INTRAVENOUS PRN
Status: DISCONTINUED | OUTPATIENT
Start: 2024-12-12 | End: 2024-12-12

## 2024-12-12 RX ORDER — ACETAMINOPHEN 325 MG/1
975 TABLET ORAL EVERY 6 HOURS PRN
Qty: 50 TABLET | Refills: 0 | Status: SHIPPED | OUTPATIENT
Start: 2024-12-12

## 2024-12-12 RX ORDER — DEXAMETHASONE SODIUM PHOSPHATE 4 MG/ML
INJECTION, SOLUTION INTRA-ARTICULAR; INTRALESIONAL; INTRAMUSCULAR; INTRAVENOUS; SOFT TISSUE PRN
Status: DISCONTINUED | OUTPATIENT
Start: 2024-12-12 | End: 2024-12-12

## 2024-12-12 RX ORDER — IBUPROFEN 800 MG/1
800 TABLET, FILM COATED ORAL EVERY 6 HOURS PRN
Qty: 30 TABLET | Refills: 0 | Status: SHIPPED | OUTPATIENT
Start: 2024-12-12

## 2024-12-12 RX ORDER — OXYCODONE HYDROCHLORIDE 5 MG/1
5 TABLET ORAL
Status: DISCONTINUED | OUTPATIENT
Start: 2024-12-12 | End: 2024-12-12 | Stop reason: HOSPADM

## 2024-12-12 RX ORDER — IBUPROFEN 400 MG/1
800 TABLET, FILM COATED ORAL ONCE
Status: DISCONTINUED | OUTPATIENT
Start: 2024-12-12 | End: 2024-12-12 | Stop reason: HOSPADM

## 2024-12-12 RX ADMIN — KETOROLAC TROMETHAMINE 30 MG: 30 INJECTION, SOLUTION INTRAMUSCULAR at 07:41

## 2024-12-12 RX ADMIN — MIDAZOLAM 2 MG: 1 INJECTION INTRAMUSCULAR; INTRAVENOUS at 07:21

## 2024-12-12 RX ADMIN — FENTANYL CITRATE 50 MCG: 50 INJECTION INTRAMUSCULAR; INTRAVENOUS at 07:40

## 2024-12-12 RX ADMIN — SODIUM CHLORIDE, POTASSIUM CHLORIDE, SODIUM LACTATE AND CALCIUM CHLORIDE: 600; 310; 30; 20 INJECTION, SOLUTION INTRAVENOUS at 06:32

## 2024-12-12 RX ADMIN — DEXAMETHASONE SODIUM PHOSPHATE 8 MG: 4 INJECTION, SOLUTION INTRA-ARTICULAR; INTRALESIONAL; INTRAMUSCULAR; INTRAVENOUS; SOFT TISSUE at 07:27

## 2024-12-12 RX ADMIN — ONDANSETRON 4 MG: 2 INJECTION, SOLUTION INTRAMUSCULAR; INTRAVENOUS at 06:33

## 2024-12-12 RX ADMIN — FENTANYL CITRATE 25 MCG: 50 INJECTION INTRAMUSCULAR; INTRAVENOUS at 07:27

## 2024-12-12 RX ADMIN — PROPOFOL 175 MCG/KG/MIN: 10 INJECTION, EMULSION INTRAVENOUS at 07:21

## 2024-12-12 RX ADMIN — DOXYCYCLINE HYCLATE 200 MG: 100 CAPSULE ORAL at 06:33

## 2024-12-12 RX ADMIN — FENTANYL CITRATE 25 MCG: 50 INJECTION INTRAMUSCULAR; INTRAVENOUS at 07:24

## 2024-12-12 RX ADMIN — LIDOCAINE HYDROCHLORIDE 100 MG: 20 INJECTION, SOLUTION INFILTRATION; PERINEURAL at 07:21

## 2024-12-12 RX ADMIN — ACETAMINOPHEN 975 MG: 325 TABLET, FILM COATED ORAL at 06:34

## 2024-12-12 RX ADMIN — ONDANSETRON 4 MG: 2 INJECTION INTRAMUSCULAR; INTRAVENOUS at 07:27

## 2024-12-12 ASSESSMENT — ACTIVITIES OF DAILY LIVING (ADL)
ADLS_ACUITY_SCORE: 41

## 2024-12-12 ASSESSMENT — LIFESTYLE VARIABLES: TOBACCO_USE: 1

## 2024-12-12 NOTE — OP NOTE
Operative Note  Kamilah Garcia  [id number]  12/12/2024    Preoperative Diagnosis:  1. Retained products of conception    Postoperative Diagnosis: Same    Procedure: Procedure(s) (LRB):  DILATION AND CURETTAGE, UTERUS, USING SUCTION, WITH ULTRASOUND GUIDANCE (N/A)    Surgeon: Yuli Elder MD     Anesthesia: MAC with 1% Lidocaine Paracervical block    Complications: None    EBL: 10 cc  IVF: 700 cc  UO: 150 cc    Indications: RPOC. Risks and benefits of a suction D&C were discussed and the plan was agreed upon.    Findings: Initial TAUS showed thickened/heterogenous endometrium consistent with retained products of conception. TAUS at the conclusion of the procedure confirmed thin endometrial lining with no focal abnormalities.     Procedure: The patient was taken to the operating room and placed in dorsal lithotomy and positioned in a neurologically safe fashion. The patient was prepped and draped in a sterile fashion.  A sterile speculum was placed in the patient's vagina and the cervix was visualized to be closed. Intraoperative US was performed with the above noted findings. The anterior lip of the cervix was grasped with a single toothed tenaculum. The cervical-vaginal junction was injected with 1% Lidocaine at 4 o'clock and 8 o'clock to complete a paracervical block.    The cervix was dilated to 8 mm under US guidance. An 8 curved curettage was then introduced into the uterus and the uterus was gently suctioned with return of a moderate amount of tissue. An additional pass was done with minimal tissue return.  A sharp curettage was then performed confirming a gritty texture in all 4 quadrants. One additional pass was done with suction and the above TAUS findings were seen. Minimal bleeding was noted and the tenaculum removed with adequate hemostasis noted. The procedure was discontinued.    The patient tolerated the procedure well. Sponge, lap and needle counts were correct x2.  The patient was taken to the  recovery area in stable condition.     Pathology: Retained products of conception.    Yuli Elder MD  8:00 AM, December 12, 2024

## 2024-12-12 NOTE — ANESTHESIA POSTPROCEDURE EVALUATION
Patient: Kamilah Garcia    Procedure: Procedure(s):  DILATION AND CURETTAGE, UTERUS, USING SUCTION, WITH ULTRASOUND GUIDANCE       Anesthesia Type:  MAC    Note:  Disposition: Outpatient   Postop Pain Control: Uneventful            Sign Out: Well controlled pain   PONV: No   Neuro/Psych: Uneventful            Sign Out: Acceptable/Baseline neuro status   Airway/Respiratory: Uneventful            Sign Out: Acceptable/Baseline resp. status   CV/Hemodynamics: Uneventful            Sign Out: Acceptable CV status; No obvious hypovolemia; No obvious fluid overload   Other NRE: NONE   DID A NON-ROUTINE EVENT OCCUR?            Last vitals:  Vitals Value Taken Time   /69 12/12/24 0820   Temp 36.1  C (97  F) 12/12/24 0815   Pulse 65 12/12/24 0821   Resp 14 12/12/24 0821   SpO2 100 % 12/12/24 0821   Vitals shown include unfiled device data.    Electronically Signed By: Larry Lazo MD  December 12, 2024  2:05 PM

## 2024-12-12 NOTE — LETTER
[facility] PREOP  [address] [place] [place] [address]  Phone: [phone]  Fax: [phone]    December 12, 2024        Kamilah Garcia  [address]  [facility] [address]          To whom it may concern:    RE: Kamilah Garcia    Patient underwent surgery on 12/12/24. Please excuse her from all work from 12/12/24-12/14/2024. She may return to work without restrictions on 12/15/24.    Please contact me for questions or concerns.      Sincerely,      Yuli Elder MD

## 2024-12-12 NOTE — DISCHARGE INSTRUCTIONS
Today you received Toradol, an antiinflammatory medication similar to Ibuprofen.  You should not take other antiinflammatory medication, such as Ibuprofen, Motrin, Advil, Aleve, Naprosyn, etc until 1:40 p.m.     Today you were given 975 mg of Tylenol at 6:34 a.m. The recommended daily maximum dose is 4000 mg. You may take more Tylenol at 12:30 p.m.    Same Day Surgery Discharge Instructions for  Sedation and General Anesthesia     It's not unusual to feel dizzy, light-headed or faint for up to 24 hours after surgery or while taking pain medication.  If you have these symptoms: sit for a few minutes before standing and have someone assist you when you get up to walk or use the bathroom.    You should rest and relax for the next 24 hours. We recommend you make arrangements to have an adult stay with you for at least 24 hours after your discharge.  Avoid hazardous and strenuous activity.    DO NOT DRIVE any vehicle or operate mechanical equipment for 24 hours following the end of your surgery.  Even though you may feel normal, your reactions may be affected by the medication you have received.    Do not drink alcoholic beverages for 24 hours following surgery.     Slowly progress to your regular diet as you feel able. It's not unusual to feel nauseated and/or vomit after receiving anesthesia.  If you develop these symptoms, drink clear liquids (apple juice, ginger ale, broth, 7-up, etc. ) until you feel better.  If your nausea and vomiting persists for 24 hours, please notify your surgeon.      All narcotic pain medications, along with inactivity and anesthesia, can cause constipation. Drinking plenty of liquids and increasing fiber intake will help.    For any questions of a medical nature, call your surgeon.    Do not make important decisions for 24 hours.    If you had general anesthesia, you may have a sore throat for a couple of days related to the breathing tube used during surgery.  You may use Cepacol lozenges  to help with this discomfort.  If it worsens or if you develop a fever, contact your surgeon.     If you feel your pain is not well managed with the pain medications prescribed by your surgeon, please contact your surgeon's office to let them know so they can address your concerns.     **If you have questions or concerns about your procedure,   call Dr.Dr Elder at **    ____________________________________________    Our hearts go out to you at this difficult time. Be assured that there is no right way to find comfort and support. It may take time to find out what works for you.  You were given a packet in pre-op with additional support and resources.  Please read when you are ready and contact resources as needed.     ______________________________________________

## 2024-12-12 NOTE — ANESTHESIA PREPROCEDURE EVALUATION
"Prior to Admission medications    Medication Sig Start Date End Date Taking? Authorizing Provider   Misc Natural Products (ATRANTIL PO) Take by mouth. OTC: patient takes 3-6 a day with meals.   Yes Reported, Patient   ondansetron (ZOFRAN) 4 MG tablet Take 1 tablet (4 mg) by mouth every 6 hours as needed for nausea. 11/14/24  Yes Milligan, Deirdre E, MD   thyroid (ARMOUR) 60 MG tablet Take 60 mg by mouth daily.   Yes Reported, Patient     Current Facility-Administered Medications   Medication Dose Route Frequency Provider Last Rate Last Admin    lactated ringers infusion   Intravenous Continuous Larry Lazo  mL/hr at 12/12/24 0632 New Bag at 12/12/24 0632    lidocaine (LMX4) cream   Topical Q1H PRN Larry Lazo MD        lidocaine 1 % 0.1-1 mL  0.1-1 mL Other Q1H PRN Larry Lazo MD        lidocaine 1 % injection             sodium chloride (PF) 0.9% PF flush 3 mL  3 mL Intracatheter Q8H Larry Lazo MD        sodium chloride (PF) 0.9% PF flush 3 mL  3 mL Intracatheter q1 min prn Larry Lazo MD         Current Facility-Administered Medications   Medication Dose Route Frequency Provider Last Rate Last Admin    lactated ringers infusion   Intravenous Continuous Larry Lazo  mL/hr at 12/12/24 0632 New Bag at 12/12/24 0632     No results for input(s): \"ABO\", \"RH\" in the last 72683 hours.  Recent Labs   Lab Test 11/05/24  1129   HCG Positive*     Recent Results (from the past 744 hours)   US OB < 14 Weeks Single    Narrative    EXAM: US OB < 14 WEEKS SINGLE-TRANSABDOMINAL  LOCATION: [facility]  DATE: 11/12/2024    INDICATION:  Pregnancy with inconclusive fetal viability. Dating.  COMPARISON: None.  TECHNIQUE: Transabdominal scans were performed. Endovaginal ultrasound was performed to better visualize the embryo.    FINDINGS:  UTERUS: Single normal appearing intrauterine gestation sac.  CRL: Measures 1.3 cm, equals 7 weeks and 4 days.  RATE OF CARDIAC ACTIVITY: 152 " bpm.  AMNIOTIC FLUID: Normal.  PLACENTA: Not yet formed. Tiny subchorionic hemorrhage superolaterally on the right measuring 12 x 7 x 6 mm.    RIGHT OVARY: Normal.  LEFT OVARY: Normal.  Both adnexa are otherwise obscured by bowel gas.        Impression    IMPRESSION:   1.  Single intrauterine gestation with cardiac activity at 7 weeks and 4 days, with EDC of 2025.  2.  Tiny subchorionic hemorrhage.       US Transvaginal Pelvic Non-OB    Narrative    Table formatting from the original result was not included.     Obstetrical Ultrasound Report  OB U/S  < 14 Weeks -  Transvaginal  [facility]  Referring Provider: Yuli Elder MD   Sonographer: Gabrielle Frost RDMS  Indication:  Assess for RPOC s/p med TOP, Complication following medical         Dating (mm/dd/yyyy):   LMP: Unknown     Anatomy Scan:  Lopes gestation.  Biometry:  Gest Sac MSD    Not seen  CRL                       Not seen               Yolk Sac                              Not seen               Endo Stripe (If no IUP)    20.36mm                                                 Cardiac activity:  N/A  Findings: Products of conception in uterine cavity.     Maternal Structures:  Cervix: The cervix appears long and closed.  Right Ovary: Wnl  Left Ovary: Wnl  Technique:Transvaginal Imaging performed    Impression:   No IUP is seen. Heterogeneous, thickened tissue present in endometrial   cavity at the fundus, measuring 20.3mm, consistent with retained products   of conception.     Nadya Mcintosh MD, [facility]  24      Anesthesia Pre-Procedure Evaluation    Patient: Kamilah Garcia   MRN: [id number] : 1985        Procedure : Procedure(s):  DILATION AND CURETTAGE, UTERUS, USING SUCTION, WITH ULTRASOUND GUIDANCE          Past Medical History:   Diagnosis Date    Anemia     with pregnancy    Anxiety 2014    Depression     Encounter for IUD removal 2023    Inserted 22, MIRENA Lot # [id number] Exp:  10/2024    History of varicella-documented immunity 2011    Migraine 2012    Other, mixed, or unspecified nondependent drug abuse, episodic     Tic age 14      Past Surgical History:   Procedure Laterality Date    ABDOMINOPLASTY  2021    HC REMOVAL OF TONSILS,11+ Y/O  1996    Tonsils 11+y.o.      Allergies   Allergen Reactions    Remeron [Mirtazapine]      Excess sedation even with 7.5mg dose      Social History     Tobacco Use    Smoking status: Former     Current packs/day: 0.00     Types: Cigarettes     Quit date: 2010     Years since quittin.5    Smokeless tobacco: Never   Substance Use Topics    Alcohol use: Yes     Alcohol/week: 1.0 standard drink of alcohol     Types: 1 Standard drinks or equivalent per week     Comment: occasionally 1-2 etoh a month/      Wt Readings from Last 1 Encounters:   24 64.9 kg (143 lb)        Anesthesia Evaluation   Pt has had prior anesthetic.         ROS/MED HX  ENT/Pulmonary:     (+)                tobacco use, Past use,                    (-) sleep apnea   Neurologic: Comment: TIC disorder since age 13      Cardiovascular:  - neg cardiovascular ROS     METS/Exercise Tolerance:     Hematologic:       Musculoskeletal:       GI/Hepatic:       Renal/Genitourinary:       Endo:       Psychiatric/Substance Use:     (+) psychiatric history anxiety and depression       Infectious Disease:       Malignancy:       Other:            Physical Exam    Airway        Mallampati: I    Neck ROM: full     Respiratory Devices and Support         Dental       (+) Minor Abnormalities - some fillings, tiny chips      Cardiovascular   cardiovascular exam normal          Pulmonary   pulmonary exam normal                OUTSIDE LABS:  CBC:   Lab Results   Component Value Date    WBC 5.3 2024    WBC 4.9 2024    HGB 12.8 2024    HGB 12.5 2024    HCT 40.5 2024    HCT 38.5 2024     2024     2024     BMP:   Lab  "Results   Component Value Date     07/16/2024     04/22/2024    POTASSIUM 4.1 07/16/2024    POTASSIUM 4.0 04/22/2024    CHLORIDE 102 07/16/2024    CHLORIDE 103 04/22/2024    CO2 27 07/16/2024    CO2 22 04/22/2024    BUN 12.4 07/16/2024    BUN 12.0 04/22/2024    CR 0.76 07/16/2024    CR 0.73 04/22/2024    GLC 78 07/16/2024    GLC 78 04/22/2024     COAGS: No results found for: \"PTT\", \"INR\", \"FIBR\"  POC:   Lab Results   Component Value Date     (H) 10/29/2014    HCG Positive (A) 11/05/2024    HCGS Negative 11/11/2018     HEPATIC:   Lab Results   Component Value Date    ALBUMIN 4.6 07/16/2024    PROTTOTAL 7.2 07/16/2024    ALT 10 07/16/2024    AST 22 07/16/2024    ALKPHOS 38 (L) 07/16/2024    BILITOTAL 0.6 07/16/2024     OTHER:   Lab Results   Component Value Date    A1C 5.2 09/14/2022    JORGE 9.5 07/16/2024    MAG 2.0 07/16/2024    LIPASE 150 08/12/2019    TSH 2.28 11/05/2024    T4 0.80 09/20/2011    CRP <5.0 04/26/2012    SED 5 08/12/2021       Anesthesia Plan    ASA Status:  2    NPO Status:  NPO Appropriate    Anesthesia Type: MAC.     - Reason for MAC: immobility needed, straight local not clinically adequate              Consents    Anesthesia Plan(s) and associated risks, benefits, and realistic alternatives discussed. Questions answered and patient/representative(s) expressed understanding.     - Discussed:     - Discussed with:  Patient            Postoperative Care    Pain management: IV analgesics.   PONV prophylaxis: Ondansetron (or other 5HT-3)     Comments:               Larry Lazo MD    I have reviewed the pertinent notes and labs in the chart from the past 30 days and (re)examined the patient.  Any updates or changes from those notes are reflected in this note.                                   "

## 2024-12-12 NOTE — PROGRESS NOTES
Patient denies any changes in health history since previous visit (see H&P from 11/5). Confirmed surgical plan. Consents to blood transfusion if needed. Blood type O Positive > will not need Rhogam.  Patient understands routine pathology examination of RPOC. Declines any genetic testing. Not currently interested in anything for contraception. All questions answered.     Yuli Elder MD

## 2024-12-12 NOTE — OR NURSING
Meets criteria for discharge.  Discharge instructions reviewed with pt and pt's designated responsible party.  Pt label on prescription bag from pharmacy matched to pt's wristband. Pharmacy bag opened with 2 prescriptions inside. Medications were reviewed to match pt wristband while pt and significant other agreed with identification. Prescriptions placed back in pharmacy bag resealed with tape and sent in pt's belongings bag per pt request.

## 2024-12-12 NOTE — ANESTHESIA CARE TRANSFER NOTE
Patient: Kamilah Garcia    Procedure: Procedure(s):  DILATION AND CURETTAGE, UTERUS, USING SUCTION, WITH ULTRASOUND GUIDANCE       Diagnosis: Retained products of conception after induced termination of pregnancy [O07.4]  Diagnosis Additional Information: No value filed.    Anesthesia Type:   MAC     Note:    Oropharynx: oropharynx clear of all foreign objects and spontaneously breathing  Level of Consciousness: drowsy  Oxygen Supplementation: room air    Independent Airway: airway patency satisfactory and stable  Dentition: dentition unchanged  Vital Signs Stable: post-procedure vital signs reviewed and stable  Report to RN Given: handoff report given  Patient transferred to: PACU  Comments: At end of procedure, spontaneous respirations, patient alert to voice, able to follow commands. Patient breathing room air at room air to PACU. SpO2, NiBP, and EKG monitors and alarms on and functioning, Esdras Hugger warmer connected to patient gown, report on patient's clinical status given to PACU RN, RN questions answered.      Handoff Report: Identifed the Patient, Identified the Reponsible Provider, Reviewed the pertinent medical history, Discussed the surgical course, Reviewed Intra-OP anesthesia mangement and issues during anesthesia, Set expectations for post-procedure period and Allowed opportunity for questions and acknowledgement of understanding      Vitals:  Vitals Value Taken Time   BP 96/72 12/12/24 0751   Temp     Pulse 96 12/12/24 0753   Resp 20 12/12/24 0753   SpO2 98 % 12/12/24 0753   Vitals shown include unfiled device data.    Electronically Signed By: TRISHA Noonan CRNA  December 12, 2024  7:54 AM   Esa Herring  Internal Medicine  [address], [address]  [place] [address]  Phone: [phone]  Fax: [phone]  Established Patient  Follow Up Time: 1 week

## 2024-12-15 PROCEDURE — 88305 TISSUE EXAM BY PATHOLOGIST: CPT | Mod: 26 | Performed by: PATHOLOGY

## 2025-01-10 ENCOUNTER — MYC MEDICAL ADVICE (OUTPATIENT)
Dept: OBGYN | Facility: CLINIC | Age: 40
End: 2025-01-10
Payer: COMMERCIAL

## 2025-01-15 NOTE — TELEPHONE ENCOUNTER
After phone calls and time waiting for patient to call us back, We have resent the completed paper work to [phone]. Originals are scanned into patients chart and filed in drawer at check out.

## 2025-03-17 ENCOUNTER — PATIENT OUTREACH (OUTPATIENT)
Dept: CARE COORDINATION | Facility: CLINIC | Age: 40
End: 2025-03-17

## 2025-04-10 ENCOUNTER — TELEPHONE (OUTPATIENT)
Dept: INTERNAL MEDICINE | Facility: CLINIC | Age: 40
End: 2025-04-10

## 2025-04-10 ENCOUNTER — VIRTUAL VISIT (OUTPATIENT)
Dept: FAMILY MEDICINE | Facility: CLINIC | Age: 40
End: 2025-04-10
Payer: COMMERCIAL

## 2025-04-10 DIAGNOSIS — J02.9 PHARYNGITIS, UNSPECIFIED ETIOLOGY: Primary | ICD-10-CM

## 2025-04-10 LAB
DEPRECATED S PYO AG THROAT QL EIA: NEGATIVE
S PYO DNA THROAT QL NAA+PROBE: NOT DETECTED

## 2025-04-10 ASSESSMENT — ENCOUNTER SYMPTOMS: SORE THROAT: 1

## 2025-04-10 NOTE — TELEPHONE ENCOUNTER
Reason for Call:  Appointment Request    Patient requesting this type of appt:  same day    Requested provider: Mario Joseph    Reason patient unable to be scheduled: Not within requested timeframe    When does patient want to be seen/preferred time: Same day    Comments: Kamilah has sore throat and is feeling sick.      [facility] we [facility] you [facility] or would you prefer to receive a phone call?:   Patient would prefer a phone call   Okay to leave a detailed message?: Yes at Cell number on file:    Telephone Information:   Mobile [phone]       Call taken on 4/10/2025 at 12:57 PM by Mary Nichols RN

## 2025-05-09 ENCOUNTER — TELEPHONE (OUTPATIENT)
Dept: INTERNAL MEDICINE | Facility: CLINIC | Age: 40
End: 2025-05-09

## 2025-05-09 NOTE — TELEPHONE ENCOUNTER
Order/Referral Request    Who is requesting: Patient    Orders being requested: Lipid, A1C, CBC with differential    Reason service is needed/diagnosis: wants to redo the test to check to see if her numbers are now normal.     When are orders needed by: ASAP    Has this been discussed with Provider: No    Does patient have a preference on a Group/Provider/Facility? [facility]     Does patient have an appointment scheduled?: Yes: 05/23//2025    Where to send orders: Place orders within Epic    Could we send this information to you in [employer] or would you prefer to receive a phone call?:   Patient would like to be contacted via [employer]

## 2025-05-23 ENCOUNTER — LAB (OUTPATIENT)
Dept: LAB | Facility: CLINIC | Age: 40
End: 2025-05-23
Payer: COMMERCIAL

## 2025-05-23 LAB
HOLD SPECIMEN: NORMAL

## 2025-06-16 NOTE — TELEPHONE ENCOUNTER
I have not seen pt dine 2021. These labs were recently done 3/14/25 and  normal except for minimal lipid elevation not requiring Rx therapy and do not need to be repeated at this time. Recommended pt have repeat lipids 1 year later. Will have to see me in clinic before future labs ordered  by me or can be done by other provider managing pt. Recommendations were passed on to by my nurse a couple weeks ago

## 2025-07-23 ENCOUNTER — OFFICE VISIT (OUTPATIENT)
Dept: FAMILY MEDICINE | Facility: CLINIC | Age: 40
End: 2025-07-23
Payer: COMMERCIAL

## 2025-07-23 ENCOUNTER — ANCILLARY PROCEDURE (OUTPATIENT)
Dept: GENERAL RADIOLOGY | Facility: CLINIC | Age: 40
End: 2025-07-23
Attending: INTERNAL MEDICINE
Payer: COMMERCIAL

## 2025-07-23 VITALS
HEART RATE: 86 BPM | DIASTOLIC BLOOD PRESSURE: 83 MMHG | OXYGEN SATURATION: 99 % | SYSTOLIC BLOOD PRESSURE: 129 MMHG | HEIGHT: 66 IN | BODY MASS INDEX: 23.82 KG/M2 | WEIGHT: 148.19 LBS | TEMPERATURE: 98.8 F

## 2025-07-23 DIAGNOSIS — Z13.220 ENCOUNTER FOR SCREENING FOR LIPID DISORDER: ICD-10-CM

## 2025-07-23 DIAGNOSIS — R06.00 DYSPNEA, UNSPECIFIED TYPE: ICD-10-CM

## 2025-07-23 DIAGNOSIS — J18.1 CONSOLIDATION OF LEFT LOWER LOBE OF LUNG: ICD-10-CM

## 2025-07-23 DIAGNOSIS — G44.219 EPISODIC TENSION-TYPE HEADACHE, NOT INTRACTABLE: ICD-10-CM

## 2025-07-23 DIAGNOSIS — F95.9 TIC DISORDER: ICD-10-CM

## 2025-07-23 DIAGNOSIS — Z00.00 ROUTINE GENERAL MEDICAL EXAMINATION AT A HEALTH CARE FACILITY: Primary | ICD-10-CM

## 2025-07-23 DIAGNOSIS — F41.1 GAD (GENERALIZED ANXIETY DISORDER): ICD-10-CM

## 2025-07-23 DIAGNOSIS — E03.9 HYPOTHYROIDISM, UNSPECIFIED TYPE: ICD-10-CM

## 2025-07-23 DIAGNOSIS — F33.1 MODERATE RECURRENT MAJOR DEPRESSION (H): ICD-10-CM

## 2025-07-23 DIAGNOSIS — R10.84 ABDOMINAL PAIN, GENERALIZED: ICD-10-CM

## 2025-07-23 DIAGNOSIS — F95.2 TOURETTES DISORDER: ICD-10-CM

## 2025-07-23 DIAGNOSIS — Z12.31 ENCOUNTER FOR SCREENING MAMMOGRAM FOR BREAST CANCER: ICD-10-CM

## 2025-07-23 LAB
ALBUMIN UR-MCNC: NEGATIVE MG/DL
APPEARANCE UR: CLEAR
BACTERIA #/AREA URNS HPF: ABNORMAL /HPF
BILIRUB UR QL STRIP: NEGATIVE
COLOR UR AUTO: YELLOW
ERYTHROCYTE [DISTWIDTH] IN BLOOD BY AUTOMATED COUNT: 12.5 % (ref 10–15)
GLUCOSE UR STRIP-MCNC: NEGATIVE MG/DL
HCT VFR BLD AUTO: 37.3 % (ref 35–47)
HGB BLD-MCNC: 12.5 G/DL (ref 11.7–15.7)
HGB UR QL STRIP: NEGATIVE
KETONES UR STRIP-MCNC: 15 MG/DL
LEUKOCYTE ESTERASE UR QL STRIP: NEGATIVE
MCH RBC QN AUTO: 28.6 PG (ref 26.5–33)
MCHC RBC AUTO-ENTMCNC: 33.5 G/DL (ref 31.5–36.5)
MCV RBC AUTO: 85 FL (ref 78–100)
NITRATE UR QL: NEGATIVE
PH UR STRIP: 6.5 [PH] (ref 5–7)
PLATELET # BLD AUTO: 261 10E3/UL (ref 150–450)
RBC # BLD AUTO: 4.37 10E6/UL (ref 3.8–5.2)
RBC #/AREA URNS AUTO: ABNORMAL /HPF
SP GR UR STRIP: 1.01 (ref 1–1.03)
SQUAMOUS #/AREA URNS AUTO: ABNORMAL /LPF
UROBILINOGEN UR STRIP-ACNC: 0.2 E.U./DL
WBC # BLD AUTO: 6.7 10E3/UL (ref 4–11)
WBC #/AREA URNS AUTO: ABNORMAL /HPF

## 2025-07-23 PROCEDURE — 82607 VITAMIN B-12: CPT | Performed by: INTERNAL MEDICINE

## 2025-07-23 PROCEDURE — 80061 LIPID PANEL: CPT | Performed by: INTERNAL MEDICINE

## 2025-07-23 PROCEDURE — 99396 PREV VISIT EST AGE 40-64: CPT | Performed by: INTERNAL MEDICINE

## 2025-07-23 PROCEDURE — 36415 COLL VENOUS BLD VENIPUNCTURE: CPT | Performed by: INTERNAL MEDICINE

## 2025-07-23 PROCEDURE — 83540 ASSAY OF IRON: CPT | Performed by: INTERNAL MEDICINE

## 2025-07-23 PROCEDURE — 3079F DIAST BP 80-89 MM HG: CPT | Performed by: INTERNAL MEDICINE

## 2025-07-23 PROCEDURE — 85027 COMPLETE CBC AUTOMATED: CPT | Performed by: INTERNAL MEDICINE

## 2025-07-23 PROCEDURE — 80053 COMPREHEN METABOLIC PANEL: CPT | Performed by: INTERNAL MEDICINE

## 2025-07-23 PROCEDURE — 71046 X-RAY EXAM CHEST 2 VIEWS: CPT | Mod: TC | Performed by: RADIOLOGY

## 2025-07-23 PROCEDURE — 3074F SYST BP LT 130 MM HG: CPT | Performed by: INTERNAL MEDICINE

## 2025-07-23 PROCEDURE — 84443 ASSAY THYROID STIM HORMONE: CPT | Performed by: INTERNAL MEDICINE

## 2025-07-23 PROCEDURE — 99215 OFFICE O/P EST HI 40 MIN: CPT | Mod: 25 | Performed by: INTERNAL MEDICINE

## 2025-07-23 PROCEDURE — 83550 IRON BINDING TEST: CPT | Performed by: INTERNAL MEDICINE

## 2025-07-23 PROCEDURE — 81001 URINALYSIS AUTO W/SCOPE: CPT | Performed by: INTERNAL MEDICINE

## 2025-07-23 PROCEDURE — 1126F AMNT PAIN NOTED NONE PRSNT: CPT | Performed by: INTERNAL MEDICINE

## 2025-07-23 RX ORDER — SUMATRIPTAN 50 MG/1
50 TABLET, FILM COATED ORAL
Qty: 20 TABLET | Refills: 1 | Status: SHIPPED | OUTPATIENT
Start: 2025-07-23

## 2025-07-23 RX ORDER — ALBUTEROL SULFATE 90 UG/1
2 INHALANT RESPIRATORY (INHALATION) EVERY 6 HOURS PRN
Qty: 18 G | Refills: 1 | Status: SHIPPED | OUTPATIENT
Start: 2025-07-23

## 2025-07-23 RX ORDER — OMEPRAZOLE 20 MG/1
20 CAPSULE, DELAYED RELEASE ORAL DAILY
Qty: 90 CAPSULE | Refills: 1 | Status: SHIPPED | OUTPATIENT
Start: 2025-07-23

## 2025-07-23 SDOH — HEALTH STABILITY: PHYSICAL HEALTH: ON AVERAGE, HOW MANY MINUTES DO YOU ENGAGE IN EXERCISE AT THIS LEVEL?: PATIENT DECLINED

## 2025-07-23 SDOH — HEALTH STABILITY: PHYSICAL HEALTH
ON AVERAGE, HOW MANY DAYS PER WEEK DO YOU ENGAGE IN MODERATE TO STRENUOUS EXERCISE (LIKE A BRISK WALK)?: PATIENT DECLINED

## 2025-07-23 ASSESSMENT — PATIENT HEALTH QUESTIONNAIRE - PHQ9
10. IF YOU CHECKED OFF ANY PROBLEMS, HOW DIFFICULT HAVE THESE PROBLEMS MADE IT FOR YOU TO DO YOUR WORK, TAKE CARE OF THINGS AT HOME, OR GET ALONG WITH OTHER PEOPLE: VERY DIFFICULT
SUM OF ALL RESPONSES TO PHQ QUESTIONS 1-9: 10
SUM OF ALL RESPONSES TO PHQ QUESTIONS 1-9: 10

## 2025-07-23 ASSESSMENT — PAIN SCALES - GENERAL: PAINLEVEL_OUTOF10: NO PAIN (0)

## 2025-07-23 ASSESSMENT — SOCIAL DETERMINANTS OF HEALTH (SDOH): HOW OFTEN DO YOU GET TOGETHER WITH FRIENDS OR RELATIVES?: PATIENT DECLINED

## 2025-07-23 NOTE — COMMUNITY RESOURCES LIST (ENGLISH)
[facility]  Health Care Coverage Enrollment Assistance   Program Provider: [employer]  Program Website : [email or web address]/enrollment/  Next Steps: Call [phone]    Program Locations:   Address:  [address] [place] [address]   Distance:  11.32 mi   Office Phone Number: [phone]    Hours:   Monday: 8:00 AM - 4:30 PM   Tuesday: 8:00 AM - 4:30 PM   Wednesday: 8:00 AM - 4:30 PM   Thursday: 8:00 AM - 4:30 PM   Friday: 8:00 AM - 4:30 PM   Saturday: CLOSED   Sunday: CLOSED     Energy Assistance Program (EAP)   Program Provider: [place] [employer]  Program Website : [email or web address]/consumers/consumer-assistance/energy-assistance/  Program Phone Number: [phone]  Next Steps: Go to [email or web address]://[employer]/consumers/consumer-assistance/energy-assistance/    Program Locations:   Address:  [address] [place] [address]   Distance:  13.62 mi   Office Phone Number: [phone]    Hours:   Monday: 8:00 AM - 5:00 PM   Tuesday: 8:00 AM - 5:00 PM   Wednesday: 8:00 AM - 5:00 PM   Thursday: 8:00 AM - 5:00 PM   Friday: 8:00 AM - 5:00 PM   Saturday: CLOSED   Sunday: CLOSED     Bill-Pay Assistance   Program Provider: [facility]  Program Website : [email or web address]-[email or web address]  Program Phone Number: [phone]  Next Steps: get more info [email or web address]/contact-us/    Program Locations:   Address:  [address] [place] [address]   Distance:  14.39 mi   Office Phone Number: [phone]    Hours:   Monday: 8:00 AM - 5:00 PM   Tuesday: 8:00 AM - 5:00 PM   Wednesday: 8:00 AM - 5:00 PM   Thursday: 8:00 AM - 5:00 PM   Friday: 8:00 AM - 5:00 PM   Saturday: CLOSED   Sunday: CLOSED

## 2025-07-23 NOTE — PROGRESS NOTES
Preventive Care Visit  [facility] ALEJANDRINA Barrera MD, Internal Medicine  Jul 23, 2025        Assessment and Plan  1. Routine general medical examination at a health care facility (Primary)    Last seen patient in 9/2022 for virtual visit in follow-up of patient atypical chest pain, patient had abnormal echocardiogram at that time.  In the interim patient was seen multiple other providers for acute concerns, virtual visits and gynecology.      Patient was also seen by neurology for chronic motor tic disorder and patient was given adult mental health, urology.  Extensive chart review done to take care of this patient, unfortunately patient is noncompliant with her thyroid medication which she supposed to be on [place] Thyroid 60 mg daily.  Patient endorses that she does not like to take any medications and do not want to get started on medications with all the risks and complications explained which he understands.    Last lab work in March 2025 showing normal CMP, A1c, lipid panel.  Normal CBC April 2024.  Chart review does show medical termination of pregnancy in December 2024.    Patient does endorses her social economic status is poor that she takes care of her children who does not have father, has mentioned that she is having severe anxiety unable to understand what is happening to her, she feels very sick and thinks that she is going to die with no energy.    Placed referral to primary care coordination for further support.    - REVIEW OF HEALTH MAINTENANCE PROTOCOL ORDERS  - TSH with free T4 reflex; Future  - Comprehensive metabolic panel (BMP + Alb, Alk Phos, ALT, AST, Total. Bili, TP); Future  - CBC with platelets; Future  - Lipid panel reflex to direct LDL Fasting; Future  - Iron and iron binding capacity; Future  - Vitamin B12; Future  - PRIMARY CARE FOLLOW-UP SCHEDULING; Future  - Primary Care - Care Coordination Referral; Future  - TSH with free T4 reflex  - Comprehensive  metabolic panel (BMP + Alb, Alk Phos, ALT, AST, Total. Bili, TP)  - CBC with platelets  - Lipid panel reflex to direct LDL Fasting  - Iron and iron binding capacity  - Vitamin B12    2. Tic disorder  3. Tourettes disorder  Chronic problem, severe visible jerky movements on right shoulder and head as she endorses that has been happening for many years which she has been living with.  Supposed to follow neurology, patient endorses that nothing can be done by any specialist and do not want to follow any of them.  Possible financial concerns to visit the providers.  -Offered medications which she does not want to start on any medications.  All the risks and complications understood.  - CBC with platelets; Future  - Primary Care - Care Coordination Referral; Future  - CBC with platelets    4. Hypothyroidism, unspecified type  Uncontrolled with noncompliance of medication most likely suspect TSH remaining uncontrolled and will need restart of medications which I emphasized to the patient, understands all the risks and complications.  - Pt states that she has been on Thyroid medication 1 year back and since TSH normalized she states that she stopped taking it.   - TSH with free T4 reflex; Future  - Primary Care - Care Coordination Referral; Future  - TSH with free T4 reflex    5. Dyspnea, unspecified type  Uncontrolled dyspnea as she endorses, Started since 14 years age , no seasonal flareups and states that she cannot breathe completely . Pt states no one has tested on this lately. Last CT chest showing LLL consolidation in 2021 >> Denies any cough but does have SOB.   -Physical exam positive for decreased breath sounds on the left lower lung, will consider checking chest x-ray at this time and given albuterol inhaler.  I do not see acute needs of antibiotic at this time.  ER precautions given.  - XR Chest 2 Views; Future  - Primary Care - Care Coordination Referral; Future  - albuterol (PROAIR HFA/PROVENTIL HFA/VENTOLIN  HFA) 108 (90 Base) MCG/ACT inhaler; Inhale 2 puffs into the lungs every 6 hours as needed for shortness of breath, wheezing or cough.  Dispense: 18 g; Refill: 1    6. Consolidation of left lower lobe of lung  New problem added to patient problem list given the above findings on checking the CT scan in the past and given the current physical exam findings will consider checking x-ray at this time before further recommendations.  - XR Chest 2 Views; Future  - Primary Care - Care Coordination Referral; Future    7. Moderate recurrent major depression (H)  8. HAMIDA (generalized anxiety disorder)  Chronic problems, uncontrolled. Has been chronic problem, as a teenager was on depression and anxiety and do not remember medications. Pt states that she feels very sick and thinks that she is going to die with no energy.   - Shared decision to place for psychiatry referral at the earliest, offered her medications which she declines.  All the risks and complications understood.  - Adult Mental Health  Referral; Future  - Primary Care - Care Coordination Referral; Future    9. Episodic tension-type headache, not intractable  Chronic problem, uncontrolled with intractable headaches for which patient unable to tolerate NSAIDs given her medical conditions of abdominal pain which could be NSAID induced which she endorses has been taking in the past.  -Will consider checking the baseline workup as she is requesting, prescribed Imitrex as needed for headaches.  - Comprehensive metabolic panel (BMP + Alb, Alk Phos, ALT, AST, Total. Bili, TP); Future  - Iron and iron binding capacity; Future  - Vitamin B12; Future  - Primary Care - Care Coordination Referral; Future  - SUMAtriptan (IMITREX) 50 MG tablet; Take 1 tablet (50 mg) by mouth at onset of headache for migraine. May repeat in 2 hours. Max 4 tablets/24 hours.  Dispense: 20 tablet; Refill: 1  - Comprehensive metabolic panel (BMP + Alb, Alk Phos, ALT, AST, Total. Bili, TP)  -  Iron and iron binding capacity  - Vitamin B12    10. Abdominal pain, generalized  Chronic problem, physical exam positive for suprapubic discomfort on palpation though she endorses this has been happening all over the periumbilical area and upper abdominal region as well.  Denies constipation.  Regular bowel movements.  No blood in the stool.  -Given possible NSAID induced gastritis as mentioned above we will consider checking for H. pylori and start off on PPI.  Urinalysis ordered for excluding UTI.  Patient understands the plan  - Primary Care - Care Coordination Referral; Future  - omeprazole (PRILOSEC) 20 MG DR capsule; Take 1 capsule (20 mg) by mouth daily.  Dispense: 90 capsule; Refill: 1  - UA with Microscopic - lab collect; Future  - Helicobacter pylori Antigen Stool; Future  - UA with Microscopic - lab collect  - Helicobacter pylori Antigen Stool  - Urine Microscopic Exam    11. Encounter for screening for lipid disorder  - Lipid panel reflex to direct LDL Fasting; Future  - Primary Care - Care Coordination Referral; Future  - Lipid panel reflex to direct LDL Fasting    12. Encounter for screening mammogram for breast cancer  - MA Screen Bilateral w/[place]; Future        Over 40 minutes spent outside the preventive visit ( 20 minutes ) on reviewing patient chart,  face to face encounter, greater than 50% time spent with plan/cordination of care and documentation as above in my A/P.           Please note that this note consists of symbols derived from keyboarding, dictation and/or voice recognition software. As a result, there may be errors in the script that have gone undetected. Please consider this when interpreting information found in this chart.    Patient Instructions   As discussed, please do non fasting labs placed     Refills will be taken care with possible restart of thyroid medication     Sent in Albuterol inhaler for breathing , X ray chest ordered     For abdominal concerns - Will need H.Pylori  test as well as started on Omeprazole.   Will exclude urine infection if any     For your Tics will need Neurology and medication but since you are declining please update when you decide.     Placed referral to Psychiatry and then safety plan as below -     Safety plan reviewed. To the Emergency Department as needed or call after hours crisis line at [phone] or [phone]. [place] Crisis Text Line: Text [place] to 999185  or  Suicide LifeLine Chat: suicideUnsubscribe.com.org/chat/  Follow up with primary care provider as planned or for acute medical concerns.        Crisis Resources:    National Suicide Prevention Lifeline: 510.653.7220 (TTY: 795.736.5884). Call anytime for help.  (www.suicidepre[place] Media Groupline.org)  National [place] on Mental Illness (www.sarika.org): 799.406.5274 or 829-308-8461.   Mental Health Association (www.mentalhealth.org): 627.119.5252 or 318-713-2696.  [place] Crisis Text Line: Text [place] to 209118  Suicide LifeLine Chat: suicideUnsubscribe.com.org/chat        ======================        Patient Education  Preventive Care Advice   This is general advice given by our system to help you stay healthy. However, your care team may have specific advice just for you. Please talk to your care team about your preventive care needs.  Nutrition  Eat 5 or more servings of fruits and vegetables each day.  Try wheat bread, brown rice and whole grain pasta (instead of white bread, rice, and pasta).  Get enough calcium and vitamin D. Check the label on foods and aim for 100% of the RDA (recommended daily allowance).  Lifestyle  Exercise at least 150 minutes each week  (30 minutes a day, 5 days a week).  Do muscle strengthening activities 2 days a week. These help control your weight and prevent disease.  No smoking.  Wear sunscreen to prevent skin cancer.  Have a dental exam and cleaning every 6 months.  Yearly exams  See your health care team every year to talk about:  Any changes in your  health.  Any medicines your care team has prescribed.  Preventive care, family planning, and ways to prevent chronic diseases.  Shots (vaccines)   HPV shots (up to age 26), if you've never had them before.  Hepatitis B shots (up to age 59), if you've never had them before.  COVID-19 shot: Get this shot when it's due.  Flu shot: Get a flu shot every year.  Tetanus shot: Get a tetanus shot every 10 years.  Pneumococcal, hepatitis A, and RSV shots: Ask your care team if you need these based on your risk.  Shingles shot (for age 50 and up)  General health tests  Diabetes screening:  Starting at age 35, Get screened for diabetes at least every 3 years.  If you are younger than age 35, ask your care team if you should be screened for diabetes.  Cholesterol test: At age 39, start having a cholesterol test every 5 years, or more often if advised.  Bone density scan (DEXA): At age 50, ask your care team if you should have this scan for osteoporosis (brittle bones).  Hepatitis C: Get tested at least once in your life.  STIs (sexually transmitted infections)  Before age 24: Ask your care team if you should be screened for STIs.  After age 24: Get screened for STIs if you're at risk. You are at risk for STIs (including HIV) if:  You are sexually active with more than one person.  You don't use condoms every time.  You or a partner was diagnosed with a sexually transmitted infection.  If you are at risk for HIV, ask about PrEP medicine to prevent HIV.  Get tested for HIV at least once in your life, whether you are at risk for HIV or not.  Cancer screening tests  Cervical cancer screening: If you have a cervix, begin getting regular cervical cancer screening tests starting at age 21.  Breast cancer scan (mammogram): If you've ever had breasts, begin having regular mammograms starting at age 40. This is a scan to check for breast cancer.  Colon cancer screening: It is important to start screening for colon cancer at age 45.  Have  a colonoscopy test every 10 years (or more often if you're at risk) Or, ask your provider about stool tests like a FIT test every year or Cologuard test every 3 years.  To learn more about your testing options, visit:   .  For help making a decision, visit:   https://true.demetris/zb16053.  Prostate cancer screening test: If you have a prostate, ask your care team if a prostate cancer screening test (PSA) at age 55 is right for you.  Lung cancer screening: If you are a current or former smoker ages 50 to 80, ask your care team if ongoing lung cancer screenings are right for you.  For informational purposes only. Not to replace the advice of your health care provider. Copyright   2023 [employer]. All rights reserved. Clinically reviewed by the [facility]. [employer] [id number] - REV 01/24.  Learning About Stress  What is stress?     Stress is your body's response to a hard situation. Your body can have a physical, emotional, or mental response. Stress is a fact of life for most people, and it affects everyone differently. What causes stress for you may not be stressful for someone else.  A lot of things can cause stress. You may feel stress when you go on a job interview, take a test, or run a race. This kind of short-term stress is normal and even useful. It can help you if you need to work hard or react quickly. For example, stress can help you finish an important job on time.  Long-term stress is caused by ongoing stressful situations or events. Examples of long-term stress include long-term health problems, ongoing problems at work, or conflicts in your family. Long-term stress can harm your health.  How does stress affect your health?  When you are stressed, your body responds as though you are in danger. It makes hormones that speed up your heart, make you breathe faster, and give you a burst of energy. This is called the fight-or-flight stress response. If the stress is over  quickly, your body goes back to normal and no harm is done.  But if stress happens too often or lasts too long, it can have bad effects. Long-term stress can make you more likely to get sick, and it can make symptoms of some diseases worse. If you tense up when you are stressed, you may develop neck, shoulder, or low back pain. Stress is linked to high blood pressure and heart disease.  Stress also harms your emotional health. It can make you daley, tense, or depressed. Your relationships may suffer, and you may not do well at work or school.  What can you do to manage stress?  You can try these things to help manage stress:   Do something active. Exercise or activity can help reduce stress. Walking is a great way to get started. Even everyday activities such as housecleaning or yard work can help.  Try yoga or cristopher chi. These techniques combine exercise and meditation. You may need some training at first to learn them.  Do something you enjoy. For example, listen to music or go to a movie. Practice your hobby or do volunteer work.  Meditate. This can help you relax, because you are not worrying about what happened before or what may happen in the future.  Do guided imagery. Imagine yourself in any setting that helps you feel calm. You can use online videos, books, or a teacher to guide you.  Do breathing exercises. For example:  From a standing position, bend forward from the waist with your knees slightly bent. Let your arms dangle close to the floor.  Breathe in slowly and deeply as you return to a standing position. Roll up slowly and lift your head last.  Hold your breath for just a few seconds in the standing position.  Breathe out slowly and bend forward from the waist.  Let your feelings out. Talk, laugh, cry, and express anger when you need to. Talking with supportive friends or family, a counselor, or a pauline leader about your feelings is a healthy way to relieve stress. Avoid discussing your feelings with  "people who make you feel worse.  Write. It may help to write about things that are bothering you. This helps you find out how much stress you feel and what is causing it. When you know this, you can find better ways to cope.  What can you do to prevent stress?  You might try some of these things to help prevent stress:  Manage your time. This helps you find time to do the things you want and need to do.  Get enough sleep. Your body recovers from the stresses of the day while you are sleeping.  Get support. Your family, friends, and community can make a difference in how you experience stress.  Limit your news feed. Avoid or limit time on social media or news that may make you feel stressed.  Do something active. Exercise or activity can help reduce stress. Walking is a great way to get started.  Where can you learn more?  Go to https://www.EdÃºkame.Wishabi/patiented  Enter N032 in the search box to learn more about \"Learning About Stress.\"  Current as of: October 24, 2024  Content Version: 14.5 2024-2025 [employer].   Care instructions adapted under license by your healthcare professional. If you have questions about a medical condition or this instruction, always ask your healthcare professional. [employer] disclaims any warranty or liability for your use of this information.    Recovering From Depression: Care Instructions  Overview    Sticking to your treatment plan is important as you recover from depression. It may take time for your symptoms to get better after you start treatment. Try not to give up if you don't feel better right away. Make sure you keep going to counseling and taking any prescribed medicine if they are part of your treatment plan.  Focus on things that can help you feel better, such as being with friends and family. Try to eat healthy foods, be active, and get enough sleep. Take things slowly as you begin to recover.  Follow-up care is a key part of your treatment " and safety. Be sure to make and go to all appointments, and call your doctor if you are having problems. It's also a good idea to know your test results and keep a list of the medicines you take.  How can you care for yourself at home?  Be realistic  If you have a large task to do, break it up into smaller steps you can handle, and just do what you can.  You may want to put off important decisions until your depression has lifted. If you have plans that will have a major impact on your life, such as marriage, divorce, or a job change, try to wait a bit. Talk it over with friends and loved ones who can help you look at the overall picture first.  Reaching out to people for help is important. Do not isolate yourself. Let your family and friends help you. Find someone you can trust and confide in, and talk to that person.  Be patient, and be kind to yourself. Remember that depression is not your fault and is not something you can overcome with willpower alone. Treatment is important for depression, just like for any other illness. Feeling better takes time, and your mood will improve little by little.  Stay active  Stay busy and get outside. Take a walk, or try some other light exercise.  Talk with your doctor about an exercise program. Exercise can help with mild depression.  Go to a movie or concert. Take part in a Evangelical activity or other social gathering. Go to a ball game.  Ask a friend to have dinner with you.  Take care of yourself  Eat healthy foods such as fresh fruits and vegetables, whole grains, and lean protein. If you have lost your appetite, eat small snacks rather than large meals.  Avoid using marijuana and other drugs and drinking alcohol. Do not take medicines that have not been prescribed for you. They may interfere with medicines you may be taking for depression, or they may make your depression worse.  Take your medicines exactly as they are prescribed. You may start to feel better within 1 to 3  weeks of taking antidepressant medicine. But it can take as many as 6 to 8 weeks to see more improvement. If you have questions or concerns about your medicines, or if you do not notice any improvement by 3 weeks, talk to your doctor.  Continue to take your medicine after your symptoms improve. Taking your medicine for at least 6 months after you feel better can help keep you from getting depressed again. If this isn't the first time you have been depressed, your doctor may recommend you to take medicine even longer.  If you have any side effects from your medicine, tell your doctor. Many side effects are mild and will go away on their own after you have been taking the medicine for a few weeks. Some may last longer. Talk to your doctor if side effects are bothering you too much. You might be able to try a different medicine.  Continue counseling. It may help prevent depression from returning, especially if you've had multiple episodes of depression. Talk with your counselor if you are having a hard time attending your sessions or you think the sessions aren't working. Don't just stop going.  Get enough sleep. Talk to your doctor if you are having problems sleeping.  Avoid sleeping pills unless they are prescribed by the doctor treating your depression. Sleeping pills may make you groggy during the day, and they may interact with other medicine you are taking.  If you have any other illnesses, such as diabetes, heart disease, or high blood pressure, make sure to continue with your treatment. Tell your doctor about all of the medicines you take, including those with or without a prescription.  Where to get help 24 hours a day, 7 days a week  If you or someone you know talks about suicide, self-harm, a mental health crisis, a substance use crisis, or any other kind of emotional distress, get help right away. You can:  Call the Suicide and Crisis Lifeline at 971.  Text HOME to 437342 to access the Crisis Text  "Line.  Consider saving these numbers in your phone.  Go to [employer] for more information or to chat online.  Call 911 anytime you think you may need emergency care. For example, call if:  You feel like hurting yourself or someone else.  Someone you know has depression and is about to attempt or is attempting suicide.  Where to get help 24 hours a day, 7 days a week  If you or someone you know talks about suicide, self-harm, a mental health crisis, a substance use crisis, or any other kind of emotional distress, get help right away. You can:  Call the Suicide and Crisis Lifeline at 988.  Text HOME to 263226 to access the Crisis Text Line.  Consider saving these numbers in your phone.  Go to [employer]FLO for more information or to chat online.  Call your doctor now or seek immediate medical care if:  You hear voices.  Someone you know has depression and:  Starts to give away possessions.  Uses illegal drugs or drinks alcohol heavily.  Talks or writes about death, including writing suicide notes or talking about guns, knives, or pills.  Starts to spend a lot of time alone.  Acts very aggressively or suddenly appears calm.  Watch closely for changes in your health, and be sure to contact your doctor if:  You do not get better as expected.  Where can you learn more?  Go to https://www.Playsino.net/patiented  Enter N529 in the search box to learn more about \"Recovering From Depression: Care Instructions.\"  Current as of: July 31, 2024  Content Version: 14.5    [phone] opinions.h.   Care instructions adapted under license by your healthcare professional. If you have questions about a medical condition or this instruction, always ask your healthcare professional. opinions.h disclaims any warranty or liability for your use of this information.       Return in about 2 months (around 9/23/2025), or if symptoms worsen or fail to improve, for If symptoms persist, Follow up of last visit, " medication follow up, video visit.    Rashida Barrera MD  [facility] ALEJANDRINA Triana is a 40 year old, presenting for the following:  Physical        7/23/2025     3:28 PM   Additional Questions   Roomed by art PANDEY    Answers submitted by the patient for this visit:  Patient Health Questionnaire (Submitted on 7/23/2025)  If you checked off any problems, how difficult have these problems made it for you to do your work, take care of things at home, or get along with other people?: Very difficult  PHQ9 TOTAL SCORE: 10      Advance Care Planning    Discussed advance care planning with patient; however, patient declined at this time.        7/23/2025   General Health   How would you rate your overall physical health? (!) POOR   Feel stress (tense, anxious, or unable to sleep) Very much   (!) STRESS CONCERN      7/23/2025   Nutrition   Three or more servings of calcium each day? (!) I DON'T KNOW   Diet: I don't know   How many servings of fruit and vegetables per day? (!) I DON'T KNOW   How many sweetened beverages each day? (!) I DON'T KNOW         7/23/2025   Exercise   Days per week of moderate/strenous exercise Patient declined   Average minutes spent exercising at this level Patient declined         7/23/2025   Social Factors   Frequency of gathering with friends or relatives Patient declined   Worry food won't last until get money to buy more No   Food not last or not have enough money for food? No   Do you have housing? (Housing is defined as stable permanent housing and does not include staying outside in a car, in a tent, in an abandoned building, in an overnight shelter, or couch-surfing.) Yes   Are you worried about losing your housing? Patient declined   Lack of transportation? No   Unable to get utilities (heat,electricity)? Yes   Want help with housing or utility concern? No   (!) FINANCIAL RESOURCE STRAIN CONCERN      7/23/2025   Dental   Dentist two times  every year? Yes       Today's PHQ-9 Score:       7/23/2025     3:28 PM   PHQ-9 SCORE   PHQ-9 Total Score MyChart 10 (Moderate depression)   PHQ-9 Total Score 10        Patient-reported         7/23/2025   Substance Use   Alcohol more than 3/day or more than 7/wk No   Do you use any other substances recreationally? No     Social History     Tobacco Use    Smoking status: Former     Current packs/day: 0.00     Types: Cigarettes     Quit date: 5/25/2010     Years since quitting: 15.1    Smokeless tobacco: Never   Vaping Use    Vaping status: Never Used   Substance Use Topics    Alcohol use: Yes     Alcohol/week: 1.0 standard drink of alcohol     Types: 1 Standard drinks or equivalent per week     Comment: occasionally 1-2 etoh a month/    Drug use: No     Comment: marijuana hasn't used in 3 years, prior use of ampht, LSD, crack & cocaine. Sober since age 18            2/14/2024   LAST FHS-7 RESULTS   1st degree relative breast or ovarian cancer Unknown   Any relative bilateral breast cancer No   Any male have breast cancer No   Any ONE woman have BOTH breast AND ovarian cancer No   Any woman with breast cancer before 50yrs Unknown   2 or more relatives with breast AND/OR ovarian cancer No   2 or more relatives with breast AND/OR bowel cancer No        Mammogram Screening - Annual screen due to greater than 20% lifetime risk as estimated by Breast Cancer Risk Calculator        7/23/2025   STI Screening   New sexual partner(s) since last STI/HIV test? No     History of abnormal Pap smear: No - age 20 - 64 - Patient with other risk factor requiring more frequent screening - see link Cervical Cytology Screening Guidelines        Latest Ref Rng & Units 3/14/2025     3:38 PM 7/16/2024     1:10 PM 1/26/2023     8:59 AM   PAP / HPV   PAP  Negative for Intraepithelial Lesion or Malignancy (NILM)  Negative for Intraepithelial Lesion or Malignancy (NILM)  Negative for Intraepithelial Lesion or Malignancy (NILM)    HPV 16 DNA  Negative Negative  Negative  Negative    HPV 18 DNA Negative Negative  Negative  Negative    Other HR HPV Negative Negative  Negative  Negative      ASCVD Risk   The 10-year ASCVD risk score (Sunday WALLS, et al., 2019) is: 0.3%    Values used to calculate the score:      Age: 40 years      Sex: Female      Is Non- : No      Diabetic: No      Tobacco smoker: No      Systolic Blood Pressure: 129 mmHg      Is BP treated: No      HDL Cholesterol: 90 mg/dL      Total Cholesterol: 212 mg/dL        2025   Contraception/Family Planning   Questions about contraception or family planning No   What are your periods like? (!) IRREGULAR        Reviewed and updated as needed this visit by Provider   Tobacco  Allergies  Meds  Problems  Med Hx  Surg Hx  Fam Hx            Past Medical History:   Diagnosis Date    Anemia     with pregnancy    Anxiety 2014    Depression     Encounter for IUD removal 2023    Inserted 22, MIRENA Lot # [id number] Exp: 10/2024    History of varicella-documented immunity 2011    Migraine 2012    Other, mixed, or unspecified nondependent drug abuse, episodic     Tic age 14     Past Surgical History:   Procedure Laterality Date    ABDOMINOPLASTY  2021    DILATION AND CURETTAGE SUCTION WITH ULTRASOUND GUIDANCE N/A 2024    Procedure: DILATION AND CURETTAGE, UTERUS, USING SUCTION, WITH ULTRASOUND GUIDANCE;  Surgeon: Yuli Elder MD;  Location: [facility] OR    HC REMOVAL OF TONSILS,12+ Y/O  1996    Tonsils 11+y.o.     OB History    Para Term  AB Living   2 2 2 0 0 2   SAB IAB Ectopic Multiple Live Births   0 0 0 0 2      # Outcome Date GA Lbr Eddie/2nd Weight Sex Type Anes PTL Lv   2 Term 10/29/14 39w6d 04:40 / 00:21 3.33 kg (7 lb 5.5 oz) F Vag-Spont EPI N LEAH      Name: Clary      Apgar1: 8  Apgar5: 9   1 Term 11 40w1d 07:34 / 01:28 3.56 kg (7 lb 13.6 oz) M Vag-Spont   LEAH      Name: Bandar      Apgar5: 9     Lab  work is in process  Labs reviewed in EPIC  BP Readings from Last 3 Encounters:   07/23/25 129/83   03/14/25 102/64   12/12/24 102/63    Wt Readings from Last 3 Encounters:   07/23/25 67.2 kg (148 lb 3 oz)   03/14/25 67.4 kg (148 lb 9.6 oz)   12/12/24 64.9 kg (143 lb)                  Patient Active Problem List   Diagnosis    Tic disorder    Migraine    HAMIDA (generalized anxiety disorder)    Atypical glandular cells on cervical Pap smear    Major depressive disorder, single episode, mild    Family history of breast cancer    Small intestinal bacterial overgrowth (SIBO)    Tension headache     Past Surgical History:   Procedure Laterality Date    ABDOMINOPLASTY  08/2021    DILATION AND CURETTAGE SUCTION WITH ULTRASOUND GUIDANCE N/A 12/12/2024    Procedure: DILATION AND CURETTAGE, UTERUS, USING SUCTION, WITH ULTRASOUND GUIDANCE;  Surgeon: Yuli Elder MD;  Location: [facility] OR    HC REMOVAL OF TONSILS,12+ Y/O  01/01/1996    Tonsils 11+y.o.       Social History     Tobacco Use    Smoking status: Former     Current packs/day: 0.00     Types: Cigarettes     Quit date: 5/25/2010     Years since quitting: 15.1    Smokeless tobacco: Never   Substance Use Topics    Alcohol use: Yes     Alcohol/week: 1.0 standard drink of alcohol     Types: 1 Standard drinks or equivalent per week     Comment: occasionally 1-2 etoh a month/     Family History   Problem Relation Age of Onset    Alcohol/Drug Mother         b:1964 addicted to crack    Family History Negative Father         b: 1962    Family History Negative Sister         b:1994    Cervical Cancer Sister 35        10/2015    Breast Cancer Sister 35        no information. hysterectomy/mastectomy    Family History Negative Sister         b:1981    Family History Negative Brother         b:1989    No Known Problems Maternal Grandmother     No Known Problems Maternal Grandfather     No Known Problems Paternal Grandmother     No Known Problems Other          Current Outpatient  Medications   Medication Sig Dispense Refill    albuterol (PROAIR HFA/PROVENTIL HFA/VENTOLIN HFA) 108 (90 Base) MCG/ACT inhaler Inhale 2 puffs into the lungs every 6 hours as needed for shortness of breath, wheezing or cough. 18 g 1    Misc Natural Products (ATRANTIL PO) Take by mouth. OTC: patient takes 3-6 a day with meals.      omeprazole (PRILOSEC) 20 MG DR capsule Take 1 capsule (20 mg) by mouth daily. 90 capsule 1    SUMAtriptan (IMITREX) 50 MG tablet Take 1 tablet (50 mg) by mouth at onset of headache for migraine. May repeat in 2 hours. Max 4 tablets/24 hours. 20 tablet 1     Allergies   Allergen Reactions    Remeron [Mirtazapine]      Excess sedation even with 7.5mg dose     Recent Labs   Lab Test 03/14/25  1050 11/05/24  1224 07/16/24  1333 04/22/24  1511 01/04/23  1714 09/15/22  1410 09/14/22  1024 05/05/22  1131 10/11/21  1357 09/07/21  1359 08/12/21  1617 01/04/21  1746 09/09/20  1143   A1C 5.1  --   --   --   --   --  5.2  --   --  5.3  --   --   --    *  --   --  103*  --   --   --   --   --   --   --   --   --    HDL 90  --   --  79  --   --   --   --   --   --   --   --   --    TRIG 47  --   --  43  --   --   --   --   --   --   --   --   --    ALT  --   --  10 14 12   < >  --   --    < >  --    < >  --  20   CR 0.70  --  0.76 0.73  --   --   --   --    < >  --    < > 0.70 0.71   GFRESTIMATED >90  --  >90 >90  --   --   --   --    < >  --    < > >90 >90   GFRESTBLACK  --   --   --   --   --   --   --   --   --   --   --  >90 >90   POTASSIUM 4.1  --  4.1 4.0  --   --   --    < >   < >  --    < > 3.6 4.0   TSH  --  2.28 1.06 1.69  --    < >  --    < >   < >  --   --  2.28 3.03    < > = values in this interval not displayed.          Review of Systems  Constitutional, HEENT, cardiovascular, pulmonary, GI, , musculoskeletal, neuro, skin, endocrine and psych systems are negative, except as otherwise noted.     Objective    Exam  /83 (BP Location: Left arm, Patient Position: Sitting, Cuff  "Size: Adult Regular)   Pulse 86   Temp 98.8  F (37.1  C) (Tympanic)   Ht 1.676 m (5' 6\")   Wt 67.2 kg (148 lb 3 oz)   LMP  (LMP Unknown)   SpO2 99%   BMI 23.92 kg/m     Estimated body mass index is 23.92 kg/m  as calculated from the following:    Height as of this encounter: 1.676 m (5' 6\").    Weight as of this encounter: 67.2 kg (148 lb 3 oz).    Physical Exam  GENERAL: alert and no distress  EYES: Eyes grossly normal to inspection, PERRL and conjunctivae and sclerae normal  HENT: ear canals and TM's normal, nose and mouth without ulcers or lesions  NECK: no adenopathy, no asymmetry, masses, or scars  RESP: lungs clear to auscultation - no rales, rhonchi or wheezes  BREAST: Deferred, mammogram  CV: regular rate and rhythm, normal S1 S2, no S3 or S4, no murmur, click or rub, no peripheral edema  ABDOMEN: soft, positive for generalized abdominal discomfort with more tenderness on left upper quadrant as well as suprapubic regions., no hepatosplenomegaly, no masses and bowel sounds normal  MS: no gross musculoskeletal defects noted, no edema  SKIN: no suspicious lesions or rashes  NEURO: Normal strength and tone, mentation intact and speech normal  SEVERE visible TICS on patient right shoulder, head with severe and sudden jerking movements constantly continuously.  PSYCH: mentation appears normal, affect normal/bright        Signed Electronically by: Rashida Barrera MD    "

## 2025-07-23 NOTE — PATIENT INSTRUCTIONS
As discussed, please do non fasting labs placed     Refills will be taken care with possible restart of thyroid medication     Sent in Albuterol inhaler for breathing , X ray chest ordered     For abdominal concerns - Will need H.Pylori test as well as started on Omeprazole.   Will exclude urine infection if any     For your Tics will need Neurology and medication but since you are declining please update when you decide.     Placed referral to Psychiatry and then safety plan as below -     Safety plan reviewed. To the Emergency Department as needed or call after hours crisis line at [phone] or [phone]. [place] Crisis Text Line: Text [place] to 303275  or  Suicide LifeLine Chat: [email or web address]/[email or web address]  Follow up with primary care provider as planned or for acute medical concerns.        Crisis Resources:    National Suicide Prevention Lifeline: 297.578.2750 (TTY: 995.621.9916). Call anytime for help.  (www.suicidepreventionNomadica Brainstormingline.org)  National [place] [employer] Illness (www.sarika.org): 279.549.8862 or 190-750-6761.   Mental Health Association (www.mentalhealth.org): 634.257.7082 or 486-877-4086.  [place] Crisis Text Line: Text [place] to 035866  Suicide LifeLine Chat: [email or web address]/chat        ======================        Patient Education   Preventive Care Advice   This is general advice given by our system to help you stay healthy. However, your care team may have specific advice just for you. Please talk to your care team about your preventive care needs.  Nutrition  Eat 5 or more servings of fruits and vegetables each day.  Try wheat bread, brown rice and whole grain pasta (instead of white bread, rice, and pasta).  Get enough calcium and vitamin D. Check the label on foods and aim for 100% of the RDA (recommended daily allowance).  Lifestyle  Exercise at least 150 minutes each week  (30 minutes a day, 5 days a week).  Do muscle strengthening activities 2 days a week. These  help control your weight and prevent disease.  No smoking.  Wear sunscreen to prevent skin cancer.  Have a dental exam and cleaning every 6 months.  Yearly exams  See your health care team every year to talk about:  Any changes in your health.  Any medicines your care team has prescribed.  Preventive care, family planning, and ways to prevent chronic diseases.  Shots (vaccines)   HPV shots (up to age 26), if you've never had them before.  Hepatitis B shots (up to age 59), if you've never had them before.  COVID-19 shot: Get this shot when it's due.  Flu shot: Get a flu shot every year.  Tetanus shot: Get a tetanus shot every 10 years.  Pneumococcal, hepatitis A, and RSV shots: Ask your care team if you need these based on your risk.  Shingles shot (for age 50 and up)  General health tests  Diabetes screening:  Starting at age 35, Get screened for diabetes at least every 3 years.  If you are younger than age 35, ask your care team if you should be screened for diabetes.  Cholesterol test: At age 39, start having a cholesterol test every 5 years, or more often if advised.  Bone density scan (DEXA): At age 50, ask your care team if you should have this scan for osteoporosis (brittle bones).  Hepatitis C: Get tested at least once in your life.  STIs (sexually transmitted infections)  Before age 24: Ask your care team if you should be screened for STIs.  After age 24: Get screened for STIs if you're at risk. You are at risk for STIs (including HIV) if:  You are sexually active with more than one person.  You don't use condoms every time.  You or a partner was diagnosed with a sexually transmitted infection.  If you are at risk for HIV, ask about PrEP medicine to prevent HIV.  Get tested for HIV at least once in your life, whether you are at risk for HIV or not.  Cancer screening tests  Cervical cancer screening: If you have a cervix, begin getting regular cervical cancer screening tests starting at age 21.  Breast cancer  scan (mammogram): If you've ever had breasts, begin having regular mammograms starting at age 40. This is a scan to check for breast cancer.  Colon cancer screening: It is important to start screening for colon cancer at age 45.  Have a colonoscopy test every 10 years (or more often if you're at risk) Or, ask your provider about stool tests like a FIT test every year or Cologuard test every 3 years.  To learn more about your testing options, visit:   .  For help making a decision, visit:   https://bit.ly/ul30416.  Prostate cancer screening test: If you have a prostate, ask your care team if a prostate cancer screening test (PSA) at age 55 is right for you.  Lung cancer screening: If you are a current or former smoker ages 50 to 80, ask your care team if ongoing lung cancer screenings are right for you.  For informational purposes only. Not to replace the advice of your health care provider. Copyright   2023 [employer]. All rights reserved. Clinically reviewed by the [facility]. [employer] [id number] - REV 01/24.  Learning About Stress  What is stress?     Stress is your body's response to a hard situation. Your body can have a physical, emotional, or mental response. Stress is a fact of life for most people, and it affects everyone differently. What causes stress for you may not be stressful for someone else.  A lot of things can cause stress. You may feel stress when you go on a job interview, take a test, or run a race. This kind of short-term stress is normal and even useful. It can help you if you need to work hard or react quickly. For example, stress can help you finish an important job on time.  Long-term stress is caused by ongoing stressful situations or events. Examples of long-term stress include long-term health problems, ongoing problems at work, or conflicts in your family. Long-term stress can harm your health.  How does stress affect your health?  When you are  stressed, your body responds as though you are in danger. It makes hormones that speed up your heart, make you breathe faster, and give you a burst of energy. This is called the fight-or-flight stress response. If the stress is over quickly, your body goes back to normal and no harm is done.  But if stress happens too often or lasts too long, it can have bad effects. Long-term stress can make you more likely to get sick, and it can make symptoms of some diseases worse. If you tense up when you are stressed, you may develop neck, shoulder, or low back pain. Stress is linked to high blood pressure and heart disease.  Stress also harms your emotional health. It can make you daley, tense, or depressed. Your relationships may suffer, and you may not do well at work or school.  What can you do to manage stress?  You can try these things to help manage stress:   Do something active. Exercise or activity can help reduce stress. Walking is a great way to get started. Even everyday activities such as housecleaning or yard work can help.  Try yoga or cristopher chi. These techniques combine exercise and meditation. You may need some training at first to learn them.  Do something you enjoy. For example, listen to music or go to a movie. Practice your hobby or do volunteer work.  Meditate. This can help you relax, because you are not worrying about what happened before or what may happen in the future.  Do guided imagery. Imagine yourself in any setting that helps you feel calm. You can use online videos, books, or a teacher to guide you.  Do breathing exercises. For example:  From a standing position, bend forward from the waist with your knees slightly bent. Let your arms dangle close to the floor.  Breathe in slowly and deeply as you return to a standing position. Roll up slowly and lift your head last.  Hold your breath for just a few seconds in the standing position.  Breathe out slowly and bend forward from the waist.  Let your  "feelings out. Talk, laugh, cry, and express anger when you need to. Talking with supportive friends or family, a counselor, or a pauline leader about your feelings is a healthy way to relieve stress. Avoid discussing your feelings with people who make you feel worse.  Write. It may help to write about things that are bothering you. This helps you find out how much stress you feel and what is causing it. When you know this, you can find better ways to cope.  What can you do to prevent stress?  You might try some of these things to help prevent stress:  Manage your time. This helps you find time to do the things you want and need to do.  Get enough sleep. Your body recovers from the stresses of the day while you are sleeping.  Get support. Your family, friends, and community can make a difference in how you experience stress.  Limit your news feed. Avoid or limit time on social media or news that may make you feel stressed.  Do something active. Exercise or activity can help reduce stress. Walking is a great way to get started.  Where can you learn more?  Go to https://www.[employer]The[employer].net/patiented  Enter N032 in the search box to learn more about \"Learning About Stress.\"  Current as of: October 24, 2024  Content Version: 14.5 2024-2025 [employer].   Care instructions adapted under license by your healthcare professional. If you have questions about a medical condition or this instruction, always ask your healthcare professional. [employer] disclaims any warranty or liability for your use of this information.    Recovering From Depression: Care Instructions  Overview    Sticking to your treatment plan is important as you recover from depression. It may take time for your symptoms to get better after you start treatment. Try not to give up if you don't feel better right away. Make sure you keep going to counseling and taking any prescribed medicine if they are part of your treatment plan.  Focus " on things that can help you feel better, such as being with friends and family. Try to eat healthy foods, be active, and get enough sleep. Take things slowly as you begin to recover.  Follow-up care is a key part of your treatment and safety. Be sure to make and go to all appointments, and call your doctor if you are having problems. It's also a good idea to know your test results and keep a list of the medicines you take.  How can you care for yourself at home?  Be realistic  If you have a large task to do, break it up into smaller steps you can handle, and just do what you can.  You may want to put off important decisions until your depression has lifted. If you have plans that will have a major impact on your life, such as marriage, divorce, or a job change, try to wait a bit. Talk it over with friends and loved ones who can help you look at the overall picture first.  Reaching out to people for help is important. Do not isolate yourself. Let your family and friends help you. Find someone you can trust and confide in, and talk to that person.  Be patient, and be kind to yourself. Remember that depression is not your fault and is not something you can overcome with willpower alone. Treatment is important for depression, just like for any other illness. Feeling better takes time, and your mood will improve little by little.  Stay active  Stay busy and get outside. Take a walk, or try some other light exercise.  Talk with your doctor about an exercise program. Exercise can help with mild depression.  Go to a movie or concert. Take part in a Religious activity or other social gathering. Go to a ball game.  Ask a friend to have dinner with you.  Take care of yourself  Eat healthy foods such as fresh fruits and vegetables, whole grains, and lean protein. If you have lost your appetite, eat small snacks rather than large meals.  Avoid using marijuana and other drugs and drinking alcohol. Do not take medicines that have not  been prescribed for you. They may interfere with medicines you may be taking for depression, or they may make your depression worse.  Take your medicines exactly as they are prescribed. You may start to feel better within 1 to 3 weeks of taking antidepressant medicine. But it can take as many as 6 to 8 weeks to see more improvement. If you have questions or concerns about your medicines, or if you do not notice any improvement by 3 weeks, talk to your doctor.  Continue to take your medicine after your symptoms improve. Taking your medicine for at least 6 months after you feel better can help keep you from getting depressed again. If this isn't the first time you have been depressed, your doctor may recommend you to take medicine even longer.  If you have any side effects from your medicine, tell your doctor. Many side effects are mild and will go away on their own after you have been taking the medicine for a few weeks. Some may last longer. Talk to your doctor if side effects are bothering you too much. You might be able to try a different medicine.  Continue counseling. It may help prevent depression from returning, especially if you've had multiple episodes of depression. Talk with your counselor if you are having a hard time attending your sessions or you think the sessions aren't working. Don't just stop going.  Get enough sleep. Talk to your doctor if you are having problems sleeping.  Avoid sleeping pills unless they are prescribed by the doctor treating your depression. Sleeping pills may make you groggy during the day, and they may interact with other medicine you are taking.  If you have any other illnesses, such as diabetes, heart disease, or high blood pressure, make sure to continue with your treatment. Tell your doctor about all of the medicines you take, including those with or without a prescription.  Where to get help 24 hours a day, 7 days a week  If you or someone you know talks about suicide,  "self-harm, a mental health crisis, a substance use crisis, or any other kind of emotional distress, get help right away. You can:  Call the Suicide and Crisis Lifeline at 988.  Text HOME to 179069 to access the Crisis Text Line.  Consider saving these numbers in your phone.  Go to "[employer] for more information or to chat online.  Call 911 anytime you think you may need emergency care. For example, call if:  You feel like hurting yourself or someone else.  Someone you know has depression and is about to attempt or is attempting suicide.  Where to get help 24 hours a day, 7 days a week  If you or someone you know talks about suicide, self-harm, a mental health crisis, a substance use crisis, or any other kind of emotional distress, get help right away. You can:  Call the Suicide and Crisis Lifeline at 428.  Text HOME to 198515 to access the Crisis Text Line.  Consider saving these numbers in your phone.  Go to "[employer] for more information or to chat online.  Call your doctor now or seek immediate medical care if:  You hear voices.  Someone you know has depression and:  Starts to give away possessions.  Uses illegal drugs or drinks alcohol heavily.  Talks or writes about death, including writing suicide notes or talking about guns, knives, or pills.  Starts to spend a lot of time alone.  Acts very aggressively or suddenly appears calm.  Watch closely for changes in your health, and be sure to contact your doctor if:  You do not get better as expected.  Where can you learn more?  Go to https://www.[employer].net/patiented  Enter N529 in the search box to learn more about \"Recovering From Depression: Care Instructions.\"  Current as of: July 31, 2024  Content Version: 14.5 2024-2025 [employer].   Care instructions adapted under license by your healthcare professional. If you have questions about a medical condition or this instruction, always ask your healthcare professional. [employer] disclaims any warranty or liability for your use of this information.

## 2025-07-24 ENCOUNTER — PATIENT OUTREACH (OUTPATIENT)
Dept: CARE COORDINATION | Facility: CLINIC | Age: 40
End: 2025-07-24
Payer: COMMERCIAL

## 2025-07-24 ENCOUNTER — RESULTS FOLLOW-UP (OUTPATIENT)
Dept: FAMILY MEDICINE | Facility: CLINIC | Age: 40
End: 2025-07-24
Payer: COMMERCIAL

## 2025-07-24 LAB
ALBUMIN SERPL BCG-MCNC: 4.6 G/DL (ref 3.5–5.2)
ALP SERPL-CCNC: 39 U/L (ref 40–150)
ALT SERPL W P-5'-P-CCNC: 11 U/L (ref 0–50)
ANION GAP SERPL CALCULATED.3IONS-SCNC: 11 MMOL/L (ref 7–15)
AST SERPL W P-5'-P-CCNC: 20 U/L (ref 0–45)
BILIRUB SERPL-MCNC: 0.7 MG/DL
BUN SERPL-MCNC: 12.2 MG/DL (ref 6–20)
CALCIUM SERPL-MCNC: 9.5 MG/DL (ref 8.8–10.4)
CHLORIDE SERPL-SCNC: 103 MMOL/L (ref 98–107)
CHOLEST SERPL-MCNC: 204 MG/DL
CREAT SERPL-MCNC: 0.72 MG/DL (ref 0.51–0.95)
EGFRCR SERPLBLD CKD-EPI 2021: >90 ML/MIN/1.73M2
FASTING STATUS PATIENT QL REPORTED: ABNORMAL
FASTING STATUS PATIENT QL REPORTED: ABNORMAL
GLUCOSE SERPL-MCNC: 90 MG/DL (ref 70–99)
HCO3 SERPL-SCNC: 24 MMOL/L (ref 22–29)
HDLC SERPL-MCNC: 81 MG/DL
IRON BINDING CAPACITY (ROCHE): 380 UG/DL (ref 240–430)
IRON SATN MFR SERPL: 35 % (ref 15–46)
IRON SERPL-MCNC: 133 UG/DL (ref 37–145)
LDLC SERPL CALC-MCNC: 109 MG/DL
NONHDLC SERPL-MCNC: 123 MG/DL
POTASSIUM SERPL-SCNC: 3.7 MMOL/L (ref 3.4–5.3)
PROT SERPL-MCNC: 7.2 G/DL (ref 6.4–8.3)
SODIUM SERPL-SCNC: 138 MMOL/L (ref 135–145)
TRIGL SERPL-MCNC: 69 MG/DL
TSH SERPL DL<=0.005 MIU/L-ACNC: 1.8 UIU/ML (ref 0.3–4.2)
VIT B12 SERPL-MCNC: 945 PG/ML (ref 232–1245)

## 2025-07-24 NOTE — RESULT ENCOUNTER NOTE
Your X ray is normal, no concerns per radiology review.    Please let me know if you have any questions.  Rashida Barrera MD on 7/24/2025

## 2025-07-24 NOTE — PROGRESS NOTES
Clinic Care Coordination Contact  RUST/Voicemail    Clinical Data: Care Coordinator Outreach    Outreach Documentation Number of Outreach Attempt   7/24/2025  11:43 AM 1       Left message on patient's voicemail with call back information and requested return call.      Plan: Care Coordinator will try to reach patient again in 1-2 business days.    NAKIA Vicente  Clinic Care Coordination  [facility] [place] [facility]: [place] [place], [place], [place], and [facility]  Phone: [phone]

## 2025-07-28 ENCOUNTER — APPOINTMENT (OUTPATIENT)
Dept: LAB | Facility: CLINIC | Age: 40
End: 2025-07-28
Payer: COMMERCIAL

## 2025-07-29 LAB — H PYLORI AG STL QL IA: NEGATIVE

## (undated) DEVICE — GLOVE BIOGEL PI MICRO SZ 6.0 48560

## (undated) DEVICE — SOL NACL 0.9% IRRIG 1000ML BOTTLE 2F7124

## (undated) DEVICE — SOL WATER IRRIG 1000ML BOTTLE 2F7114

## (undated) DEVICE — GLOVE BIOGEL PI MICRO INDICATOR UNDERGLOVE SZ 6.5 48965

## (undated) DEVICE — PACK TVT HYSTEROSCOPY SMA15HYFSE

## (undated) DEVICE — DECANTER BAG 2002S

## (undated) DEVICE — DRAPE POUCH IRR 1016

## (undated) DEVICE — TUBING VACUUM COLLECTION 6FT 23116

## (undated) DEVICE — SUCTION CANNULA UTERINE 08MM CVD 022108-10

## (undated) DEVICE — LINEN TOWEL PACK X5 5464

## (undated) RX ORDER — ONDANSETRON 2 MG/ML
INJECTION INTRAMUSCULAR; INTRAVENOUS
Status: DISPENSED
Start: 2024-12-12

## (undated) RX ORDER — DOXYCYCLINE 100 MG/1
CAPSULE ORAL
Status: DISPENSED
Start: 2024-12-12

## (undated) RX ORDER — DEXAMETHASONE SODIUM PHOSPHATE 4 MG/ML
INJECTION, SOLUTION INTRA-ARTICULAR; INTRALESIONAL; INTRAMUSCULAR; INTRAVENOUS; SOFT TISSUE
Status: DISPENSED
Start: 2024-12-12

## (undated) RX ORDER — FENTANYL CITRATE 50 UG/ML
INJECTION, SOLUTION INTRAMUSCULAR; INTRAVENOUS
Status: DISPENSED
Start: 2024-12-12

## (undated) RX ORDER — KETOROLAC TROMETHAMINE 30 MG/ML
INJECTION, SOLUTION INTRAMUSCULAR; INTRAVENOUS
Status: DISPENSED
Start: 2024-12-12

## (undated) RX ORDER — ACETAMINOPHEN 325 MG/1
TABLET ORAL
Status: DISPENSED
Start: 2024-12-12